# Patient Record
Sex: FEMALE | Race: WHITE | NOT HISPANIC OR LATINO | Employment: FULL TIME | URBAN - METROPOLITAN AREA
[De-identification: names, ages, dates, MRNs, and addresses within clinical notes are randomized per-mention and may not be internally consistent; named-entity substitution may affect disease eponyms.]

---

## 2017-01-01 ENCOUNTER — APPOINTMENT (OUTPATIENT)
Dept: ULTRASOUND IMAGING | Facility: CLINIC | Age: 62
DRG: 271 | End: 2017-01-01
Attending: INTERNAL MEDICINE
Payer: COMMERCIAL

## 2017-01-01 PROCEDURE — 93970 EXTREMITY STUDY: CPT

## 2017-01-02 ENCOUNTER — APPOINTMENT (OUTPATIENT)
Dept: CT IMAGING | Facility: CLINIC | Age: 62
DRG: 271 | End: 2017-01-02
Attending: PHYSICIAN ASSISTANT
Payer: COMMERCIAL

## 2017-01-02 ENCOUNTER — APPOINTMENT (OUTPATIENT)
Dept: GENERAL RADIOLOGY | Facility: CLINIC | Age: 62
DRG: 271 | End: 2017-01-02
Attending: INTERNAL MEDICINE
Payer: COMMERCIAL

## 2017-01-02 ENCOUNTER — DOCUMENTATION ONLY (OUTPATIENT)
Dept: PHARMACY | Facility: CLINIC | Age: 62
End: 2017-01-02

## 2017-01-02 ENCOUNTER — APPOINTMENT (OUTPATIENT)
Dept: ULTRASOUND IMAGING | Facility: CLINIC | Age: 62
DRG: 271 | End: 2017-01-02
Attending: INTERNAL MEDICINE
Payer: COMMERCIAL

## 2017-01-02 PROCEDURE — 93975 VASCULAR STUDY: CPT | Mod: TC

## 2017-01-02 PROCEDURE — 74177 CT ABD & PELVIS W/CONTRAST: CPT

## 2017-01-02 PROCEDURE — 74000 XR ABDOMEN 1 VW: CPT

## 2017-01-02 NOTE — PROGRESS NOTES
Prior Authorization Approval    Enoxaparin 150 mg/mL  Qty: 60 syringes  Day Supply: 30  Diagnosis: Portal vein thrombosis    Expected copay: 15.00  Effective Dates: 01/02/2017 - 01/02/2018    Insurance: [place]  Phone: [phone]  ID: [id number]  Case Number: [id number]  Submitted via: sterling Tucker  Pharmacy Liaison  Cell: [phone] Page: [phone]

## 2017-01-04 ENCOUNTER — HOSPITAL ENCOUNTER (INPATIENT)
Dept: VASCULAR ULTRASOUND | Facility: CLINIC | Age: 62
DRG: 271 | End: 2017-01-04
Attending: INTERNAL MEDICINE | Admitting: INTERNAL MEDICINE
Payer: COMMERCIAL

## 2017-01-04 ENCOUNTER — APPOINTMENT (OUTPATIENT)
Dept: PHYSICAL THERAPY | Facility: CLINIC | Age: 62
DRG: 271 | End: 2017-01-04
Attending: PHYSICIAN ASSISTANT
Payer: COMMERCIAL

## 2017-01-06 ENCOUNTER — APPOINTMENT (OUTPATIENT)
Dept: INTERVENTIONAL RADIOLOGY/VASCULAR | Facility: CLINIC | Age: 62
DRG: 271 | End: 2017-01-06
Attending: NURSE PRACTITIONER
Payer: COMMERCIAL

## 2017-01-06 ENCOUNTER — ANESTHESIA (OUTPATIENT)
Dept: SURGERY | Facility: CLINIC | Age: 62
DRG: 271 | End: 2017-01-06
Payer: COMMERCIAL

## 2017-01-06 ENCOUNTER — ANESTHESIA EVENT (OUTPATIENT)
Dept: SURGERY | Facility: CLINIC | Age: 62
DRG: 271 | End: 2017-01-06
Payer: COMMERCIAL

## 2017-01-06 ENCOUNTER — APPOINTMENT (OUTPATIENT)
Dept: GENERAL RADIOLOGY | Facility: CLINIC | Age: 62
DRG: 271 | End: 2017-01-06
Attending: INTERNAL MEDICINE
Payer: COMMERCIAL

## 2017-01-06 PROCEDURE — 25000125 ZZHC RX 250: Performed by: INTERNAL MEDICINE

## 2017-01-06 PROCEDURE — 35476: CPT

## 2017-01-06 PROCEDURE — 40000940 XR ABDOMEN PORT F1 VW

## 2017-01-06 PROCEDURE — 25000125 ZZHC RX 250: Performed by: NURSE ANESTHETIST, CERTIFIED REGISTERED

## 2017-01-06 PROCEDURE — 36011 PLACE CATHETER IN VEIN: CPT

## 2017-01-06 PROCEDURE — 37187 VENOUS MECH THROMBECTOMY: CPT

## 2017-01-06 PROCEDURE — 75891 VEIN X-RAY LIVER: CPT

## 2017-01-06 PROCEDURE — C1725 CATH, TRANSLUMIN NON-LASER: HCPCS

## 2017-01-06 PROCEDURE — C1757 CATH, THROMBECTOMY/EMBOLECT: HCPCS

## 2017-01-06 PROCEDURE — 37212 THROMBOLYTIC VENOUS THERAPY: CPT

## 2017-01-06 PROCEDURE — 25000128 H RX IP 250 OP 636: Performed by: NURSE PRACTITIONER

## 2017-01-06 PROCEDURE — 75978: CPT

## 2017-01-06 RX ORDER — NEOSTIGMINE METHYLSULFATE 1 MG/ML
VIAL (ML) INJECTION PRN
Status: DISCONTINUED | OUTPATIENT
Start: 2017-01-06 | End: 2017-01-06

## 2017-01-06 RX ORDER — LIDOCAINE HYDROCHLORIDE 20 MG/ML
INJECTION, SOLUTION INFILTRATION; PERINEURAL PRN
Status: DISCONTINUED | OUTPATIENT
Start: 2017-01-06 | End: 2017-01-06

## 2017-01-06 RX ORDER — GLYCOPYRROLATE 0.2 MG/ML
INJECTION, SOLUTION INTRAMUSCULAR; INTRAVENOUS PRN
Status: DISCONTINUED | OUTPATIENT
Start: 2017-01-06 | End: 2017-01-06

## 2017-01-06 RX ORDER — PROPOFOL 10 MG/ML
INJECTION, EMULSION INTRAVENOUS CONTINUOUS PRN
Status: DISCONTINUED | OUTPATIENT
Start: 2017-01-06 | End: 2017-01-06

## 2017-01-06 RX ORDER — FENTANYL CITRATE 50 UG/ML
INJECTION, SOLUTION INTRAMUSCULAR; INTRAVENOUS PRN
Status: DISCONTINUED | OUTPATIENT
Start: 2017-01-06 | End: 2017-01-06

## 2017-01-06 RX ORDER — HEPARIN SODIUM 1000 [USP'U]/ML
INJECTION, SOLUTION INTRAVENOUS; SUBCUTANEOUS PRN
Status: DISCONTINUED | OUTPATIENT
Start: 2017-01-06 | End: 2017-01-06

## 2017-01-06 RX ORDER — PROPOFOL 10 MG/ML
INJECTION, EMULSION INTRAVENOUS PRN
Status: DISCONTINUED | OUTPATIENT
Start: 2017-01-06 | End: 2017-01-06

## 2017-01-06 RX ADMIN — SODIUM CHLORIDE: 9 INJECTION, SOLUTION INTRAVENOUS at 11:05

## 2017-01-06 RX ADMIN — Medication 2000 UNITS: at 14:07

## 2017-01-06 RX ADMIN — PROPOFOL 150 MG: 10 INJECTION, EMULSION INTRAVENOUS at 11:20

## 2017-01-06 RX ADMIN — PIPERACILLIN AND TAZOBACTAM 3.38 G: 3; .375 INJECTION, POWDER, FOR SOLUTION INTRAVENOUS at 15:00

## 2017-01-06 RX ADMIN — Medication 4000 UNITS: at 14:49

## 2017-01-06 RX ADMIN — Medication 3 MG: at 15:38

## 2017-01-06 RX ADMIN — ROCURONIUM BROMIDE 50 MG: 10 INJECTION INTRAVENOUS at 11:20

## 2017-01-06 RX ADMIN — PROPOFOL 50 MCG/KG/MIN: 10 INJECTION, EMULSION INTRAVENOUS at 16:22

## 2017-01-06 RX ADMIN — GLYCOPYRROLATE 0.6 MG: 0.2 INJECTION, SOLUTION INTRAMUSCULAR; INTRAVENOUS at 15:38

## 2017-01-06 RX ADMIN — Medication 3000 UNITS: at 14:16

## 2017-01-06 RX ADMIN — FENTANYL CITRATE 100 MCG: 50 INJECTION, SOLUTION INTRAMUSCULAR; INTRAVENOUS at 11:20

## 2017-01-06 RX ADMIN — Medication 5000 UNITS: at 13:07

## 2017-01-06 RX ADMIN — FENTANYL CITRATE 50 MCG: 50 INJECTION, SOLUTION INTRAMUSCULAR; INTRAVENOUS at 12:58

## 2017-01-06 RX ADMIN — LIDOCAINE HYDROCHLORIDE 100 MG: 20 INJECTION, SOLUTION INFILTRATION; PERINEURAL at 11:20

## 2017-01-06 RX ADMIN — ONDANSETRON 4 MG: 2 INJECTION INTRAMUSCULAR; INTRAVENOUS at 15:33

## 2017-01-06 RX ADMIN — FENTANYL CITRATE 100 MCG: 50 INJECTION, SOLUTION INTRAMUSCULAR; INTRAVENOUS at 16:30

## 2017-01-06 NOTE — ANESTHESIA PREPROCEDURE EVALUATION
Anesthesia Evaluation     . Pt has had prior anesthetic. Type: General    No history of anesthetic complications     ROS/MED HX    ENT/Pulmonary:     (+)KALEB risk factors obese, Intermittent asthma Treatment: Inhaler prn,  , recent URI resolved Received Zithromax 5 day course, starting 11/11: . .    Neurologic:     (+)neuropathy - Numbness in bilateral feet associated with Rituxan therapy, migraines,     Cardiovascular:  - neg cardiovascular ROS      (-) taking anticoagulants/antiplatelets   METS/Exercise Tolerance:  4 - Raking leaves, gardening   Hematologic:     (+) Other Hematologic Disorder-Idiopathic thrombocytopenic purpura     (-) History of Transfusion   Musculoskeletal:  - neg musculoskeletal ROS       GI/Hepatic: Comment: Hepatitis B core antibody positive, indicating that she has been exposed, but cleared the virus.  However, due to the use of Rituxan, treatment for Hepatitis B was initiated.    (+) GERD Asymptomatic on medication, hepatitis type B, Other GI/Hepatic Borderline splenomegaly      Renal/Genitourinary:  - ROS Renal section negative       Endo:     (+) Chronic steroid usage for Other Date most recently used: Prednisone 5 mg PO BID ,Obesity, .      Psychiatric:  - neg psychiatric ROS       Infectious Disease:  - neg infectious disease ROS      (-) Recent Fever   Malignancy:      - no malignancy   Other:    - neg other ROS         ANESTHESIA PREOP EVALUATION    HPI: Charlee Marks is a 61 year old female who presents for Procedure(s):  Anesthesia Out of OR  IR Rm# 3 Portal Vein Thrombectomy       No personal or family h/o anesthesia problems.    PMHx/PSHx/ROS:  Past Medical History   Diagnosis Date     Other specified gastritis without mention of hemorrhage      Splenomegaly      Obesity      GERD (gastroesophageal reflux disease)      Idiopathic thrombocytopenic purpura (H)      Asthma        Past Surgical History   Procedure Laterality Date     Surgical history of -   1976     Breast Cyst  Excision      Surgical history of -   8/1997     Open Bladder Neck Suspension for stress urinary incontinence     C ligate fallopian tube       Surgical history of -   Age 6     Tonsillectomy     Surgical history of -   10/83 & 2/84     Oral Surgery [place]      Colonoscopy  10/19/2007     Laparoscopic splenectomy N/A 12/13/2016     Procedure: LAPAROSCOPIC SPLENECTOMY;  Surgeon: Catalina Brian MD;  Location: [facility] OR     Picc insertion Right 1/4/2017     5fr DL BioFlo PICC, 44cm (3cm external) in the R cephalic vein w/ tip in the low SVC.         Soc Hx:   Social History   Substance Use Topics     Smoking status: Never Smoker      Smokeless tobacco: Never Used     Alcohol Use: 0.0 oz/week     0 Standard drinks or equivalent per week      Comment: 2-3/week, none since Summer 2016       Allergies:   Allergies   Allergen Reactions     Adhesive Tape Other (See Comments)     reaction on skin     Sulfa Drugs Nausea       Meds:     No current facility-administered medications on file prior to encounter.  Current Outpatient Prescriptions on File Prior to Encounter:  predniSONE (DELTASONE) 5 MG tablet 5 mg po q q   Pyridoxine HCl (B-6) 100 MG TABS Take 1 tablet by mouth daily   tenofovir (VIREAD) 300 MG tablet Take 1 tablet (300 mg) by mouth daily   Acetaminophen (TYLENOL PO) Take 1,000 mg by mouth every 4 hours as needed for mild pain or fever (Headaches)   albuterol (PROAIR HFA, PROVENTIL HFA, VENTOLIN HFA) 108 (90 BASE) MCG/ACT inhaler Inhale 2 puffs into the lungs every 4 hours as needed for shortness of breath / dyspnea   LORATADINE 10 MG PO TABS ONE DAILY   oxyCODONE (ROXICODONE) 5 MG IR tablet Take 1-2 tablets (5-10 mg) by mouth every 3 hours as needed for moderate to severe pain   calcium carbonate-vitamin D 500-400 MG-UNIT TABS tablt Take 1 tablet by mouth 2 times daily   RANITIDINE  MG PO TABS ONE TABLET TWICE DAILY as needed will call when needed (Patient taking differently: ONE TABLET prn)   GLUCOSAMINE  CHONDRO COMPLEX OR 1 tablet by mouth daily   OMEGA 3 1000 MG OR CAPS 1 capsule by mouth daily   MULTIVITAMIN TABS   OR 1 TABLET DAILY       NPO Status: see flowsheet     Labs:    Blood Bank:  [facility]   Pending   1/6/2017  [facility]    Pending   1/6/2017  AS    Pending   1/6/2017    BMP:  Recent Labs   Lab Test  01/06/17 0813   NA  145*   POTASSIUM  3.6   CHLORIDE  109   CO2  29   BUN  12   CR  0.51*   GLC  103*   JOSÉ MIGUEL  7.9*     CBC:   Recent Labs   Lab Test  01/06/17 0813   WBC  9.7   RBC  2.79*   HGB  7.5*   HCT  25.7*   MCV  92   MCH  26.9   MCHC  29.2*   RDW  15.6*   PLT  588*     Coags:  Recent Labs   Lab Test  01/06/17   0013   12/31/16   1034   INR  1.11   < >  1.17*   PTT   --    --   32   FIBR   --    --   707*    < > = values in this interval not displayed.         Physical Exam  Normal systems: dental    Airway   Mallampati: II  TM distance: >3 FB  Neck ROM: full    Dental     Cardiovascular   Rhythm and rate: regular and normal  (-) carotid bruit is not present and no peripheral edema    Pulmonary    breath sounds clear to auscultation                 PAC Discussion and Assessment    ASA Classification:   Case is suitable for:   Anesthetic techniques and relevant risks discussed:   Invasive monitoring and risk discussed:   Types:   Possibility and Risk of blood transfusion discussed:   NPO instructions given:   Additional anesthetic preparation and risks discussed:   Needs early admission to pre-op area:   Other:     PAC Resident/NP Anesthesia Assessment:          Mid-Level Provider/Resident:   Date:   Time:     Attending Anesthesiologist Anesthesia Assessment:        Anesthesiologist:   Date:   Time:   Pass/Fail:   Disposition:     PAC Pharmacist Assessment:        Pharmacist:   Date:   Time:      Anesthesia Plan      History & Physical Review  History and physical reviewed and following examination; no interval change.    ASA Status:  3 .        Plan for General with Intravenous induction. Maintenance will be  Inhalation.    PONV prophylaxis:  Ondansetron  Additional equipment: 2nd IV and Arterial Line      Postoperative Care  Postoperative pain management:  IV analgesics.      Consents            -- ITP (type and screen pending). Has gotten plt transfusions with previous surgery   -- Daily steroid use - consider stress dose   -- GERD - moderate control on Zantac           Raul Bryant MD  Staff Anesthesiologist                       .

## 2017-01-06 NOTE — ANESTHESIA CARE TRANSFER NOTE
Patient: Charlee Marks    ANESTHESIA OUT OF OR (N/A Update)  Additional InformationProcedure(s):  Anesthesia Out of OR  IR Rm# 3 Portal Vein Thrombectomy     Diagnosis: Portal Vein Thrombosis  Diagnosis Additional Information: No value filed.    Anesthesia Type:   General     Note:  Airway :ETT (o2/ambu assist)  Patient transferred to:ICU  Comments: To icu monitored and propofol for sedation.  Placed on ventilator simv rr 12 fio2 40% tv 500  Vss, report given to rn      Vitals: (Last set prior to Anesthesia Care Transfer)              Electronically Signed By: ANDRA Phelps CRNA  January 6, 2017  4:41 PM

## 2017-01-06 NOTE — ANESTHESIA POSTPROCEDURE EVALUATION
Patient: Charlee Marks    ANESTHESIA OUT OF OR (N/A Update)  Additional InformationProcedure(s):  Anesthesia Out of OR  IR Rm# 3 Portal Vein Thrombectomy     Diagnosis:Portal Vein Thrombosis  Diagnosis Additional Information: No value filed.    Anesthesia Type:  General, ETT    Note:  Anesthesia Post Evaluation    Patient location during evaluation: ICU  Patient participation: Unable to evaluate secondary to administered sedation  Level of consciousness: obtunded/minimal responses  Pain management: adequate  Airway patency: patent  Cardiovascular status: acceptable  Respiratory status: acceptable  Hydration status: acceptable  PONV: none     Anesthetic complications: None    Comments: To 4A ICU. Intubated, Ventilated, Sedated with Propofol gtt. Monitored. No anesthetic complications.        Last vitals:  Filed Vitals:    01/05/17 1532 01/05/17 2243 01/06/17 0644   BP: 139/69 127/56 129/63   Pulse: 74 76 76   Temp: 37.1  C (98.7  F) 36.8  C (98.2  F) 36.7  C (98  F)   Resp: 18 18 18   SpO2: 94% 94% 95%       Electronically Signed By: Herberth Fraser MD  January 6, 2017  5:12 PM

## 2017-01-07 ENCOUNTER — APPOINTMENT (OUTPATIENT)
Dept: INTERVENTIONAL RADIOLOGY/VASCULAR | Facility: CLINIC | Age: 62
DRG: 271 | End: 2017-01-07
Attending: INTERNAL MEDICINE
Payer: COMMERCIAL

## 2017-01-07 PROCEDURE — 37188 VEN MECHNL THRMBC REPEAT TX: CPT

## 2017-01-07 PROCEDURE — 37187 VENOUS MECH THROMBECTOMY: CPT

## 2017-01-07 PROCEDURE — C1725 CATH, TRANSLUMIN NON-LASER: HCPCS

## 2017-01-07 PROCEDURE — 37213 THROMBLYTIC ART/VEN THERAPY: CPT

## 2017-01-07 PROCEDURE — C1887 CATHETER, GUIDING: HCPCS

## 2017-01-08 ENCOUNTER — APPOINTMENT (OUTPATIENT)
Dept: INTERVENTIONAL RADIOLOGY/VASCULAR | Facility: CLINIC | Age: 62
DRG: 271 | End: 2017-01-08
Attending: INTERNAL MEDICINE
Payer: COMMERCIAL

## 2017-01-08 ENCOUNTER — APPOINTMENT (OUTPATIENT)
Dept: PHYSICAL THERAPY | Facility: CLINIC | Age: 62
DRG: 271 | End: 2017-01-08
Attending: INTERNAL MEDICINE
Payer: COMMERCIAL

## 2017-01-08 PROCEDURE — 27810156 ZZH COIL/EMBOLIC DEVICE CR25

## 2017-01-08 PROCEDURE — 37214 CESSJ THERAPY CATH REMOVAL: CPT

## 2017-01-09 ENCOUNTER — APPOINTMENT (OUTPATIENT)
Dept: CT IMAGING | Facility: CLINIC | Age: 62
DRG: 271 | End: 2017-01-09
Attending: INTERNAL MEDICINE
Payer: COMMERCIAL

## 2017-01-09 PROCEDURE — 74177 CT ABD & PELVIS W/CONTRAST: CPT

## 2017-01-09 PROCEDURE — 71260 CT THORAX DX C+: CPT

## 2017-01-10 ENCOUNTER — APPOINTMENT (OUTPATIENT)
Dept: PHYSICAL THERAPY | Facility: CLINIC | Age: 62
DRG: 271 | End: 2017-01-10
Attending: INTERNAL MEDICINE
Payer: COMMERCIAL

## 2017-01-11 ENCOUNTER — APPOINTMENT (OUTPATIENT)
Dept: PHYSICAL THERAPY | Facility: CLINIC | Age: 62
DRG: 271 | End: 2017-01-11
Attending: INTERNAL MEDICINE
Payer: COMMERCIAL

## 2017-01-12 ENCOUNTER — APPOINTMENT (OUTPATIENT)
Dept: PHYSICAL THERAPY | Facility: CLINIC | Age: 62
DRG: 271 | End: 2017-01-12
Attending: INTERNAL MEDICINE
Payer: COMMERCIAL

## 2017-01-13 ENCOUNTER — APPOINTMENT (OUTPATIENT)
Dept: PHYSICAL THERAPY | Facility: CLINIC | Age: 62
DRG: 271 | End: 2017-01-13
Attending: INTERNAL MEDICINE
Payer: COMMERCIAL

## 2017-01-14 ENCOUNTER — TELEPHONE (OUTPATIENT)
Dept: NURSING | Facility: CLINIC | Age: 62
End: 2017-01-14

## 2017-01-14 NOTE — TELEPHONE ENCOUNTER
Call Type: Triage Call    Presenting Problem: Care coordinator from [facility] calling to  see if [facility] lab can draw blood off a PICC line.  Advised that I do not know, and recommend she call clinic to see if  they have a nurse who could access a line at the [facility].  She states she will have her follow up at the [facility].  Noted she  could call clinic Monday to see if they can do this.  Triage Note:  Guideline Title: Information Only Call; No Symptom Triage (Adult)  Recommended Disposition: Call Provider When Office is Open  Original Inclination: Wanted to speak with a nurse  Override Disposition:  Intended Action: Follow advice given  Physician Contacted: No  Requesting information and provider is best resource; no triage required. ?  YES  Requesting regular office appointment ? NO  Sign(s) or symptom(s) associated with a diagnosed condition or with a new illness  ? NO  Requesting information about provider, services or community resources ? NO  Call back to complete assessment/clarification of information from prior caller to  complete triage ? NO  Physician Instructions:  Care Advice:

## 2017-01-15 NOTE — ANESTHESIA PROCEDURE NOTES
Arterial Line Procedure Note  Staff:     Anesthesiologist:  KIERSTEN PARIS  Location: In OR After Induction  Procedure Start/Stop Times:     patient identified, IV checked, risks and benefits discussed, informed consent, monitors and equipment checked, pre-op evaluation and at physician/surgeon's request      Correct Patient: Yes      Correct Position: Yes      Correct Site: Yes      Correct Procedure: Yes    Line Placement:     Procedure:  Arterial Line    Insertion Site:  Radial    Insertion laterality:  Left    Skin Prep: Chloraprep      Patient Prep: mask, sterile gloves, hat and hand hygiene      Local skin infiltration:  None    Ultrasound Guided?: Yes      Artery evaluated via ultrasound confirming patency.   Using realtime imaging, the artery was punctured and the needle was observed entering the artery.      A permanent image is entered into patient's chart.      Catheter size:  20 gauge, Quick cath    Dressing:  Tegaderm    Complications:  None obvious    Arterial waveform: Yes      IBP within 10% of NIBP: Yes

## 2017-01-16 ENCOUNTER — ANTICOAGULATION THERAPY VISIT (OUTPATIENT)
Dept: ANTICOAGULATION | Facility: CLINIC | Age: 62
End: 2017-01-16

## 2017-01-16 ENCOUNTER — HOSPITAL ENCOUNTER (OUTPATIENT)
Facility: CLINIC | Age: 62
Discharge: HOME | End: 2017-01-16
Attending: FAMILY MEDICINE | Admitting: FAMILY MEDICINE
Payer: COMMERCIAL

## 2017-01-16 ENCOUNTER — CARE COORDINATION (OUTPATIENT)
Dept: SURGERY | Facility: CLINIC | Age: 62
End: 2017-01-16

## 2017-01-16 ENCOUNTER — INFUSION THERAPY VISIT (OUTPATIENT)
Dept: INFUSION THERAPY | Facility: CLINIC | Age: 62
End: 2017-01-16
Attending: INTERNAL MEDICINE
Payer: COMMERCIAL

## 2017-01-16 DIAGNOSIS — Z79.01 LONG-TERM (CURRENT) USE OF ANTICOAGULANTS: Primary | ICD-10-CM

## 2017-01-16 DIAGNOSIS — Z79.01 LONG-TERM (CURRENT) USE OF ANTICOAGULANTS: ICD-10-CM

## 2017-01-16 DIAGNOSIS — I81 PORTAL VEIN THROMBOSIS: ICD-10-CM

## 2017-01-16 LAB — INR PPP: 2.25 (ref 0.86–1.14)

## 2017-01-16 PROCEDURE — 85610 PROTHROMBIN TIME: CPT | Performed by: FAMILY MEDICINE

## 2017-01-16 PROCEDURE — 36592 COLLECT BLOOD FROM PICC: CPT

## 2017-01-16 NOTE — PROGRESS NOTES
ANTICOAGULATION FOLLOW-UP CLINIC VISIT    Patient Name:  Charlee Marks  Date:  1/16/2017  Contact Type:  Telephone    SUBJECTIVE:        OBJECTIVE    INR   Date Value Ref Range Status   01/16/2017 2.25* 0.86 - 1.14 Final       ASSESSMENT / PLAN  INR assessment THER    Recheck INR In: 2 DAYS    INR Location Clinic      Anticoagulation Summary as of 1/16/2017     INR goal 2.0-3.0   Selected INR 2.25 (1/16/2017)   Maintenance plan No maintenance plan   Full instructions 1/16: 5 mg; 1/17: 7.5 mg; Otherwise No maintenance plan   Next INR check 1/18/2017   Priority INR   Target end date     Indications   Long-term (current) use of anticoagulants [Z79.01] [Z79.01]  Portal vein thrombosis [I81]         Anticoagulation Episode Summary     INR check location     Preferred lab     Send INR reminders to [facility]    Comments 1/16/17 portal vein, splenic vein, and superior mesenteric vein thromboses. May transition to [facility] in future if PICC is DC'ed.       Anticoagulation Care Providers     Provider Role Specialty Phone number    Yasmin Mckenna MD [facility] [phone]            See the Encounter Report to view Anticoagulation Flowsheet and Dosing Calendar (Go to Encounters tab in chart review, and find the Anticoagulation Therapy Visit)    LM for patient.    Renetta Hair RN

## 2017-01-16 NOTE — PROGRESS NOTES
Hospitalized 12/31 to 1/14. Had previously undergone splenectomy for ITP on 12/13/16, then rehospitalized 12/31 and diagnosed with superior mesenteric vein, portal vein, and splenic vein thromboses. Underwent thrombectomy and tPA thombolysis. On Lovenox 135 mg Q 12 hours, to stop when INR therapeutic. Discharge instructions state INR to be done every other day, starting 1/16/17.     Spoke with Charlee today and introduced our clinic, provided warfarin education, and answered questions. Education materials, welcome letter, and HIPAA form sent today. She will be using the [place] lab and plans to have blood drawn via her PICC line. Reports she has bad veins; did let her know that in the future, she could use the [place] [facility] for finger stick INR if her PICC is DC'ed or if she finds that to be preferable.     Notes that she is off her multivitamin, but may resume after she speaks with her PCP. She understands that this will affect her warfarin and will let us know.

## 2017-01-16 NOTE — PROGRESS NOTES
Infusion Nursing Note:  Charlee Marks presents today for PICC lab draw and dressing change.    Patient seen by provider today: No    Note: N/A.    Intravenous Access:  PICC.    Treatment Conditions:N/A  INR drawn.      Post Infusion Assessment:  PICC dressing changed per protocol.    Discharge Plan:   Patient discharged in stable condition accompanied by: .  Departure Mode: Ambulatory.    Pilar Modi RN

## 2017-01-16 NOTE — PROGRESS NOTES
RN Post Op Care Coordination Note    Ms. Charlee Marks is a 61 year old female who underwent splenectomy with readmission for thrombosis  Spoke with Patient.    Support  Patient able to care for self independently     Health Status  Fevers/chills: Patient denies any fever or chills.  Nausea/Vomiting: Patient denies nausea/vomiting.  Eating/drinking: Patient is able to eat and drink without any complaints.  Bowel habits: Patient reports having a normal bowel movement.  Urinary: Voiding normally  Drains (NATALIE): N/A  Incisions: Patient denies any signs and symptoms of infection.  Pain: Minimal and well controlled  Anticoagulation:  Lovenox SQ BID and po Coumadin. INR arranged as well as PICC care.  INR Clinic following per patient.  INR being drawn today.  Appointment arranged with PCP 1/19.    Activity/Restrictions  Following restrictions outlined by surgeon.  Balancing rest with activity.    Equipment  other None    All of her questions were answered including reviewing restrictions.  She will call this office if she has any further questions and/or concerns.      Follow up appointment arranged with Dr. Brian 2/3/17 at 1100.    Whom and When to Call  Patient acknowledges understanding of how to manage any medication changes and when to seek medical care.     Patient advised that if after hour medical concerns arise to please call [phone] and choose option 4 to speak to the physician on call.

## 2017-01-18 ENCOUNTER — ANTICOAGULATION THERAPY VISIT (OUTPATIENT)
Dept: ANTICOAGULATION | Facility: CLINIC | Age: 62
End: 2017-01-18

## 2017-01-18 ENCOUNTER — HOSPITAL ENCOUNTER (OUTPATIENT)
Facility: CLINIC | Age: 62
Discharge: HOME | End: 2017-01-18
Attending: FAMILY MEDICINE | Admitting: FAMILY MEDICINE
Payer: COMMERCIAL

## 2017-01-18 ENCOUNTER — INFUSION THERAPY VISIT (OUTPATIENT)
Dept: INFUSION THERAPY | Facility: CLINIC | Age: 62
End: 2017-01-18
Attending: FAMILY MEDICINE
Payer: COMMERCIAL

## 2017-01-18 VITALS — TEMPERATURE: 98.4 F | DIASTOLIC BLOOD PRESSURE: 70 MMHG | SYSTOLIC BLOOD PRESSURE: 120 MMHG

## 2017-01-18 DIAGNOSIS — Z79.01 LONG-TERM (CURRENT) USE OF ANTICOAGULANTS: ICD-10-CM

## 2017-01-18 DIAGNOSIS — I81 PORTAL VEIN THROMBOSIS: ICD-10-CM

## 2017-01-18 DIAGNOSIS — Z79.01 LONG-TERM (CURRENT) USE OF ANTICOAGULANTS: Primary | ICD-10-CM

## 2017-01-18 LAB — INR PPP: 2.34 (ref 0.86–1.14)

## 2017-01-18 PROCEDURE — 85610 PROTHROMBIN TIME: CPT | Performed by: FAMILY MEDICINE

## 2017-01-18 PROCEDURE — 36592 COLLECT BLOOD FROM PICC: CPT

## 2017-01-18 PROCEDURE — 25000125 ZZHC RX 250: Performed by: FAMILY MEDICINE

## 2017-01-18 RX ORDER — HEPARIN SODIUM,PORCINE 10 UNIT/ML
5-10 VIAL (ML) INTRAVENOUS EVERY 24 HOURS
Status: DISCONTINUED | OUTPATIENT
Start: 2017-01-18 | End: 2017-01-18 | Stop reason: HOSPADM

## 2017-01-18 RX ADMIN — SODIUM CHLORIDE, PRESERVATIVE FREE 5 ML: 5 INJECTION INTRAVENOUS at 14:45

## 2017-01-18 NOTE — PROGRESS NOTES
ANTICOAGULATION FOLLOW-UP CLINIC VISIT    Patient Name:  Charlee Marks  Date:  1/18/2017  Contact Type:  Telephone    SUBJECTIVE:        OBJECTIVE    INR   Date Value Ref Range Status   01/18/2017 2.34* 0.86 - 1.14 Final       ASSESSMENT / PLAN  INR assessment THER    Recheck INR In: 2 DAYS    INR Location Clinic      Anticoagulation Summary as of 1/18/2017     INR goal 2.0-3.0   Selected INR 2.34 (1/18/2017)   Maintenance plan No maintenance plan   Full instructions 1/18: 7.5 mg; 1/19: 7.5 mg; Otherwise No maintenance plan   Next INR check 1/20/2017   Priority INR   Target end date     Indications   Long-term (current) use of anticoagulants [Z79.01] [Z79.01]  Portal vein thrombosis [I81]         Anticoagulation Episode Summary     INR check location     Preferred lab     Send INR reminders to  [facility]    Comments 1/16/17 portal vein, splenic vein, and superior mesenteric vein thromboses. [facility] is DC[facility]ed.       Anticoagulation Care Providers     Provider Role Specialty Phone number    Yasmin Mckenna MD [facility] [phone]            See the Encounter Report to view Anticoagulation Flowsheet and Dosing Calendar (Go to Encounters tab in chart review, and find the Anticoagulation Therapy Visit)    Left message with results and dosing recommendations. Asked patient to call back to report any missed doses, falls, signs and symptoms of bleeding or clotting, or any changes to health or diet.     Gracie Parada RN

## 2017-01-19 ENCOUNTER — OFFICE VISIT (OUTPATIENT)
Dept: FAMILY MEDICINE | Facility: CLINIC | Age: 62
End: 2017-01-19
Payer: COMMERCIAL

## 2017-01-19 VITALS
SYSTOLIC BLOOD PRESSURE: 127 MMHG | OXYGEN SATURATION: 96 % | HEIGHT: 67 IN | DIASTOLIC BLOOD PRESSURE: 77 MMHG | HEART RATE: 74 BPM | WEIGHT: 293 LBS | BODY MASS INDEX: 45.99 KG/M2 | TEMPERATURE: 98.9 F

## 2017-01-19 DIAGNOSIS — D69.3 IDIOPATHIC THROMBOCYTOPENIC PURPURA (H): ICD-10-CM

## 2017-01-19 DIAGNOSIS — Z23 ENCOUNTER FOR IMMUNIZATION: ICD-10-CM

## 2017-01-19 DIAGNOSIS — Z90.81 S/P SPLENECTOMY: Primary | ICD-10-CM

## 2017-01-19 PROCEDURE — 99213 OFFICE O/P EST LOW 20 MIN: CPT | Mod: 25 | Performed by: FAMILY MEDICINE

## 2017-01-19 PROCEDURE — 90472 IMMUNIZATION ADMIN EACH ADD: CPT | Performed by: FAMILY MEDICINE

## 2017-01-19 PROCEDURE — 90471 IMMUNIZATION ADMIN: CPT | Performed by: FAMILY MEDICINE

## 2017-01-19 PROCEDURE — 90734 MENACWYD/MENACWYCRM VACC IM: CPT | Performed by: FAMILY MEDICINE

## 2017-01-19 PROCEDURE — 90732 PPSV23 VACC 2 YRS+ SUBQ/IM: CPT | Performed by: FAMILY MEDICINE

## 2017-01-19 NOTE — PROGRESS NOTES
SUBJECTIVE:                                                    Charlee Marks is a 61 year old female who presents to clinic today for the following health issues:    Hospital Follow-up Visit:  Hospital/Nursing Home/IP Rehab Facility: [facility]  Date of Admission: 12/31/16  Date of Discharge: 1/14/17  Reason(s) for Admission:  Portal vein thrombosis  I81       S/P splenectomy  Z90.81      Idiopathic thrombocytopenic purpura (H)                Problems taking medications regularly:  None       Medication changes since discharge: now on warfarin       Problems adhering to non-medication therapy:  None  Summary of hospitalization:  [facility] discharge summary reviewed  Diagnostic Tests/Treatments reviewed.  Follow up needed: She is being followed by the [facility] Coumadin clinic and having her INRs drawn here.  She has a follow-up appointment with her surgeon and is still being followed by Dr. Rankin.  Other Healthcare Providers Involved in Patient s Care:         Specialist appointment - surgeon, hematologist, ongoing INR monitoring  Update since discharge: improved.     Post Discharge Medication Reconciliation: discharge medications reconciled, continue medications without change.  Plan of care communicated with patient     Coding guidelines for this visit:  Type of Medical   Decision Making Face-to-Face Visit       within 7 Days of discharge Face-to-Face Visit        within 14 days of discharge   Moderate Complexity [id number]   High Complexity [id number]            Charlee Marks is a 61 year old female who was diagnosed with ITP this fall, but did not respond adequately to treatment, so had pre-op immunizations on 11-7-16 and underwent splenectomy on 12/13/2016 at the [facility]. She was discharged on 12/20, but re-admitted on 12/31/16 with abdominal pain which imaging showed to be due to portal vein and superior mesenteric vein thrombosis. She underwent thrombectomy, thrombolysis, was  placed on heparin, then bridged with Lovenox until warfarin could reach therapeutic range. Her INR on 1/16 was 2.25, so she had discontinued the Lovenox.  Her last CBC on 1/14 showed platelets at 377, and a normocytic anemia (likely post-surgical) with a hemoglobin of 7.9.  She is due to have that rechecked at the end of this week and to see Dr. Rankin early next week. She is still on low dose prednisone for the ITP, and is anticipating being weaned off of that.    ASSESSMENT: s/p splenectomy for idiopathic thrombocytopenic purpura      Chronic anticoagulation for history of post-operative portal vein thrombosis and superior mesenteric vein thrombosis       Encounter for immunizations (Menactra #2, Pneumovax-23 #2)    PLAN: She will continue follow-up with hematology and surgery post-op follow-up.   According to current guidelines, she is now due for a second Pneumovax-23 (at least two months since her Prevnar-13 and >5 years since her previous Pneumovax-23) and a second Menactra.  Her Bexsero does not need to be repeated, but she was informed that she should have a Menactra booster every 5 years. This information was also added to her problem list.   She will be due for a dT booster this summer.    Yasmin Mckenna md

## 2017-01-19 NOTE — MR AVS SNAPSHOT
After Visit Summary   1/19/2017    Charlee Marks    MRN: [id number]           Patient Information     Date Of Birth          1955        Visit Information        Provider Department      1/19/2017 2:20 PM Yasmin Mckenna MD [facility]        Today's Diagnoses     S/P splenectomy    -  1     ITP (idiopathic thrombocytopenic purpura)            Follow-ups after your visit        Your next 10 appointments already scheduled     Jan 20, 2017  2:30 PM   Level O with ROOM 3 [facility] ([facility])    [facility]  [address]  [place] [address]   [phone]            Jan 23, 2017  2:30 PM   Level O with ROOM 10 [facility] ([facility])    [facility]  [address]  [place] [address]   [phone]            Jan 25, 2017  2:30 PM   Level O with ROOM 3 [facility] ([facility])    [facility]  [address]  [place] [address]   [phone]            Jan 26, 2017  1:15 PM   Return Visit with Kerrie Rankin MD   [facility] ([facility])    [facility]  [address]  [place] [address]   [phone]            Jan 27, 2017  2:30 PM   Level O with ROOM 4 [facility] ([facility])    [facility]  [address]  [facility] [address]   [phone]            Jan 30, 2017  2:30 PM   Level O with ROOM 10 [facility] ([facility])    [facility]  [address]  [place] [address]   [phone]            Feb 01, 2017  2:30 PM   Level O with ROOM 10 [facility] ([facility])    [facility]  [address]  [place] [address]   [phone]     "        Feb 03, 2017 11:00 AM   (Arrive by 10:45 AM)   Post-Op with Catalina Brian MD   [facility] ([facility])    [address]  4th Floor  [facility] [address] [phone]            Feb 03, 2017  2:30 PM   Level O with ROOM 4 [facility] ([facility])    [facility]  [address]  [facility] [address] [phone]              Who to contact     If you have questions or need follow up information about today's clinic visit or your schedule please contact [facility] directly at [phone].  Normal or non-critical lab and imaging results will be communicated to you by MyChart, letter or phone within 4 business days after the clinic has received the results. If you do not hear from us within 7 days, please contact the clinic through [employer] or phone. If you have a critical or abnormal lab result, we will notify you by phone as soon as possible.  Submit refill requests through [employer] or call your pharmacy and they will forward the refill request to us. Please allow 3 business days for your refill to be completed.          Additional Information About Your Visit        [employer] Information     [employer] gives you secure access to your electronic health record. If you see a primary care provider, you can also send messages to your care team and make appointments. If you have questions, please call your primary care clinic.  If you do not have a primary care provider, please call [phone] and they will assist you.        Care EveryWhere ID     This is your Care EveryWhere ID. This could be used by other organizations to access your [facility] medical records  [id number]        Your Vitals Were     Pulse Temperature Height BMI (Body Mass Index) Pulse Oximetry Last Period    74 98.9  F (37.2  C) (Tympanic) 5' 6.5\" (1.689 m) 46.72 kg/m2 96% 09/15/2007       Blood Pressure from " Last 3 Encounters:   01/19/17 127/77   01/18/17 120/70   01/14/17 115/72    Weight from Last 3 Encounters:   01/19/17 293 lb 12.8 oz (133.267 kg)   01/14/17 300 lb 9.6 oz (136.351 kg)   12/29/16 297 lb 11.2 oz (135.036 kg)              We Performed the Following     MENINGOCOCCAL VACCINE,IM (MENACTRA )     PNEUMOVOCCAL VACCINE 23 VALENT (PNEUMOVAX 23)          Today's Medication Changes          These changes are accurate as of: 1/19/17  3:04 PM.  If you have any questions, ask your nurse or doctor.               These medicines have changed or have updated prescriptions.        Dose/Directions    ranitidine 150 MG tablet   Commonly known as:  ZANTAC   This may have changed:  See the new instructions.   Used for:  Esophageal reflux        ONE TABLET TWICE DAILY as needed will call when needed   Quantity:  180 Tab   Refills:  3                Primary Care Provider Office Phone # Fax #    Yasmin Evelyn Mckenna [phone]       [facility] [address] [facility] [address]        Thank you!     Thank you for choosing [facility]  for your care. Our goal is always to provide you with excellent care. Hearing back from our patients is one way we can continue to improve our services. Please take a few minutes to complete the written survey that you may receive in the mail after your visit with us. Thank you!             Your Updated Medication List - Protect others around you: Learn how to safely use, store and throw away your medicines at www.disposemymeds.org.          This list is accurate as of: 1/19/17  3:04 PM.  Always use your most recent med list.                   Brand Name Dispense Instructions for use    albuterol 108 (90 BASE) MCG/ACT Inhaler    PROAIR HFA/PROVENTIL HFA/VENTOLIN HFA    1 Inhaler    Inhale 2 puffs into the lungs every 4 hours as needed for shortness of breath / dyspnea       B-6 100 MG Tabs      Take 1 tablet by mouth daily       calcium  carbonate-vitamin D 500-400 MG-UNIT Tabs per tablet      Take 1 tablet by mouth 2 times daily       GLUCOSAMINE CHONDRO COMPLEX OR      1 tablet by mouth daily       loratadine 10 MG tablet    CLARITIN    90 Tab    ONE DAILY       MULTIVITAMIN TABS   OR      1 TABLET DAILY       omega 3 1000 MG Caps      1 capsule by mouth daily       oxyCODONE 5 MG IR tablet    ROXICODONE    50 tablet    Take 1-2 tablets (5-10 mg) by mouth every 3 hours as needed for moderate to severe pain       predniSONE 5 MG tablet    DELTASONE    60 tablet    5 mg po q q       ranitidine 150 MG tablet    ZANTAC    180 Tab    ONE TABLET TWICE DAILY as needed will call when needed       tenofovir 300 MG tablet    VIREAD    30 tablet    Take 1 tablet (300 mg) by mouth daily       TYLENOL PO      Take 1,000 mg by mouth every 4 hours as needed for mild pain or fever (Headaches)       * warfarin 2.5 MG tablet    COUMADIN    30 tablet    Take 1 tablet (2.5 mg) by mouth daily       * warfarin 5 MG tablet    COUMADIN    30 tablet    Take 1 tablet (5 mg) by mouth daily       * warfarin 7.5 MG tablet    COUMADIN    2 tablet    Take 1 tablet (7.5 mg) by mouth daily for 2 days       * Notice:  This list has 3 medication(s) that are the same as other medications prescribed for you. Read the directions carefully, and ask your doctor or other care provider to review them with you.

## 2017-01-19 NOTE — NURSING NOTE
"Chief Complaint   Patient presents with     [facility] follow up 12/31/16 through 1/14/17       Initial /77 mmHg  Pulse 74  Temp(Src) 98.9  F (37.2  C) (Tympanic)  Ht 5' 6.5\" (1.689 m)  Wt 293 lb 12.8 oz (133.267 kg)  BMI 46.72 kg/m2  SpO2 96%  LMP 09/15/2007 Estimated body mass index is 46.72 kg/(m^2) as calculated from the following:    Height as of this encounter: 5' 6.5\" (1.689 m).    Weight as of this encounter: 293 lb 12.8 oz (133.267 kg).  BP completed using cuff size: pio Lemus CMA    "

## 2017-01-20 ENCOUNTER — INFUSION THERAPY VISIT (OUTPATIENT)
Dept: INFUSION THERAPY | Facility: CLINIC | Age: 62
End: 2017-01-20
Attending: FAMILY MEDICINE
Payer: COMMERCIAL

## 2017-01-20 ENCOUNTER — ANTICOAGULATION THERAPY VISIT (OUTPATIENT)
Dept: ANTICOAGULATION | Facility: CLINIC | Age: 62
End: 2017-01-20

## 2017-01-20 ENCOUNTER — HOSPITAL ENCOUNTER (OUTPATIENT)
Facility: CLINIC | Age: 62
Discharge: HOME | End: 2017-01-20
Attending: FAMILY MEDICINE | Admitting: INTERNAL MEDICINE
Payer: COMMERCIAL

## 2017-01-20 VITALS — DIASTOLIC BLOOD PRESSURE: 52 MMHG | TEMPERATURE: 97.8 F | SYSTOLIC BLOOD PRESSURE: 131 MMHG | HEART RATE: 73 BPM

## 2017-01-20 DIAGNOSIS — D69.3 IDIOPATHIC THROMBOCYTOPENIC PURPURA (H): ICD-10-CM

## 2017-01-20 DIAGNOSIS — I81 PORTAL VEIN THROMBOSIS: ICD-10-CM

## 2017-01-20 DIAGNOSIS — Z79.01 LONG-TERM (CURRENT) USE OF ANTICOAGULANTS: Primary | ICD-10-CM

## 2017-01-20 DIAGNOSIS — Z79.01 LONG-TERM (CURRENT) USE OF ANTICOAGULANTS: ICD-10-CM

## 2017-01-20 LAB
BASOPHILS # BLD AUTO: 0.1 10E9/L (ref 0–0.2)
BASOPHILS NFR BLD AUTO: 0.8 %
DIFFERENTIAL METHOD BLD: ABNORMAL
EOSINOPHIL # BLD AUTO: 0.2 10E9/L (ref 0–0.7)
EOSINOPHIL NFR BLD AUTO: 1.8 %
ERYTHROCYTE [DISTWIDTH] IN BLOOD BY AUTOMATED COUNT: 16.4 % (ref 10–15)
HCT VFR BLD AUTO: 33.4 % (ref 35–47)
HGB BLD-MCNC: 9.7 G/DL (ref 11.7–15.7)
IMM GRANULOCYTES # BLD: 0 10E9/L (ref 0–0.4)
IMM GRANULOCYTES NFR BLD: 0.2 %
INR PPP: 2.94 (ref 0.86–1.14)
LYMPHOCYTES # BLD AUTO: 1.9 10E9/L (ref 0.8–5.3)
LYMPHOCYTES NFR BLD AUTO: 20.9 %
MCH RBC QN AUTO: 25.9 PG (ref 26.5–33)
MCHC RBC AUTO-ENTMCNC: 29 G/DL (ref 31.5–36.5)
MCV RBC AUTO: 89 FL (ref 78–100)
MONOCYTES # BLD AUTO: 0.8 10E9/L (ref 0–1.3)
MONOCYTES NFR BLD AUTO: 9 %
NEUTROPHILS # BLD AUTO: 6 10E9/L (ref 1.6–8.3)
NEUTROPHILS NFR BLD AUTO: 67.3 %
PLATELET # BLD AUTO: 311 10E9/L (ref 150–450)
RBC # BLD AUTO: 3.74 10E12/L (ref 3.8–5.2)
WBC # BLD AUTO: 9 10E9/L (ref 4–11)

## 2017-01-20 PROCEDURE — 85610 PROTHROMBIN TIME: CPT | Performed by: INTERNAL MEDICINE

## 2017-01-20 PROCEDURE — 36592 COLLECT BLOOD FROM PICC: CPT

## 2017-01-20 PROCEDURE — 85025 COMPLETE CBC W/AUTO DIFF WBC: CPT | Performed by: INTERNAL MEDICINE

## 2017-01-20 NOTE — PROGRESS NOTES
Infusion Nursing Note:  Charlee Marks presents today for PICC labs.    Patient seen by provider today: No   present during visit today: Not Applicable.    Note: N/A.    Intravenous Access:  PICC.    Treatment Conditions:  Not Applicable.      Post Infusion Assessment:  Patient tolerated PICC labs and flush without incident.  Blood return noted pre and post flush to both lumens.     Discharge Plan:   Patient discharged in stable condition accompanied by: .  Departure Mode: Ambulatory.  Pt to return on 1/23/17 for labs and dressing change.     Kellie Paredes RN

## 2017-01-20 NOTE — PROGRESS NOTES
ANTICOAGULATION FOLLOW-UP CLINIC VISIT    Patient Name:  Charlee Marks  Date:  1/20/2017  Contact Type:  Telephone    SUBJECTIVE:        OBJECTIVE    INR   Date Value Ref Range Status   01/20/2017 2.94* 0.86 - 1.14 Final       ASSESSMENT / PLAN  INR assessment THER    Recheck INR In: 3 DAYS    INR Location Clinic      Anticoagulation Summary as of 1/20/2017     INR goal 2.0-3.0   Selected INR 2.94 (1/20/2017)   Maintenance plan No maintenance plan   Full instructions 1/20: 5 mg; 1/21: 7.5 mg; 1/22: 7.5 mg; Otherwise No maintenance plan   Next INR check 1/23/2017   Priority INR   Target end date     Indications   Long-term (current) use of anticoagulants [Z79.01] [Z79.01]  Portal vein thrombosis [I81]         Anticoagulation Episode Summary     INR check location     Preferred lab     Send INR reminders to [facility]    Comments 1/16/17 portal vein, splenic vein, and superior mesenteric vein thromboses. May transition to [facility] in future if PICC is DC'ed.       Anticoagulation Care Providers     Provider Role Specialty Phone number    Yasmin Mckenna MD [facility] [phone]            See the Encounter Report to view Anticoagulation Flowsheet and Dosing Calendar (Go to Encounters tab in chart review, and find the Anticoagulation Therapy Visit)    Spoke with Charlee. She had 2 vaccines yesterday and also had some diarrhea earlier this week for which she took imodium. This has resolved. She plans to restart her multivitamin this weekend and we discussed that this may lead to an increase in her warfarin dose.    Gracie Parada RN

## 2017-01-23 ENCOUNTER — INFUSION THERAPY VISIT (OUTPATIENT)
Dept: INFUSION THERAPY | Facility: CLINIC | Age: 62
End: 2017-01-23
Attending: FAMILY MEDICINE
Payer: COMMERCIAL

## 2017-01-23 ENCOUNTER — ANTICOAGULATION THERAPY VISIT (OUTPATIENT)
Dept: ANTICOAGULATION | Facility: CLINIC | Age: 62
End: 2017-01-23

## 2017-01-23 ENCOUNTER — HOSPITAL ENCOUNTER (OUTPATIENT)
Facility: CLINIC | Age: 62
Discharge: HOME | End: 2017-01-23
Attending: FAMILY MEDICINE | Admitting: FAMILY MEDICINE
Payer: COMMERCIAL

## 2017-01-23 DIAGNOSIS — Z79.01 LONG-TERM (CURRENT) USE OF ANTICOAGULANTS: Primary | ICD-10-CM

## 2017-01-23 DIAGNOSIS — I81 PORTAL VEIN THROMBOSIS: ICD-10-CM

## 2017-01-23 DIAGNOSIS — Z79.01 LONG-TERM (CURRENT) USE OF ANTICOAGULANTS: ICD-10-CM

## 2017-01-23 LAB — INR PPP: 2.53 (ref 0.86–1.14)

## 2017-01-23 PROCEDURE — 85610 PROTHROMBIN TIME: CPT | Performed by: FAMILY MEDICINE

## 2017-01-23 PROCEDURE — 36592 COLLECT BLOOD FROM PICC: CPT

## 2017-01-23 NOTE — PROGRESS NOTES
ANTICOAGULATION FOLLOW-UP CLINIC VISIT    Patient Name:  Charlee Marks  Date:  1/23/2017  Contact Type:  Telephone    SUBJECTIVE:     Patient Findings     Positives Herbal/Supplements    Comments Restarted her multivitamin this weekend.           OBJECTIVE    INR   Date Value Ref Range Status   01/23/2017 2.53* 0.86 - 1.14 Final       ASSESSMENT / PLAN  INR assessment THER    Recheck INR In: 2 DAYS    INR Location Clinic      Anticoagulation Summary as of 1/23/2017     INR goal 2.0-3.0   Selected INR 2.53 (1/23/2017)   Maintenance plan No maintenance plan   Full instructions 1/23: 7.5 mg; 1/24: 7.5 mg; Otherwise No maintenance plan   Next INR check 1/25/2017   Priority INR   Target end date     Indications   Long-term (current) use of anticoagulants [Z79.01] [Z79.01]  Portal vein thrombosis [I81]         Anticoagulation Episode Summary     INR check location     Preferred lab     Send INR reminders to [facility]    Comments 1/16/17 portal vein, splenic vein, and superior mesenteric vein thromboses. May transition to [facility] in future if PICC is DC'ed.       Anticoagulation Care Providers     Provider Role Specialty Phone number    Yasmin Mckenna MD [facility] [phone]            See the Encounter Report to view Anticoagulation Flowsheet and Dosing Calendar (Go to Encounters tab in chart review, and find the Anticoagulation Therapy Visit)    Spoke with Charlee. She is still having labs MWF. Explained that she could probably go to weekly soon.     Gracie Parada RN

## 2017-01-23 NOTE — PROGRESS NOTES
Labs drawn from PICC, both lines flushed and caps changed per protocol. PICC dressing changed. Pt will return on Wednesday for labs. Pilar Modi RN

## 2017-01-25 ENCOUNTER — ANTICOAGULATION THERAPY VISIT (OUTPATIENT)
Dept: ANTICOAGULATION | Facility: CLINIC | Age: 62
End: 2017-01-25

## 2017-01-25 ENCOUNTER — INFUSION THERAPY VISIT (OUTPATIENT)
Dept: INFUSION THERAPY | Facility: CLINIC | Age: 62
End: 2017-01-25
Attending: FAMILY MEDICINE
Payer: COMMERCIAL

## 2017-01-25 ENCOUNTER — HOSPITAL ENCOUNTER (OUTPATIENT)
Facility: CLINIC | Age: 62
Discharge: HOME | End: 2017-01-25
Attending: INTERNAL MEDICINE | Admitting: INTERNAL MEDICINE
Payer: COMMERCIAL

## 2017-01-25 DIAGNOSIS — Z79.01 LONG-TERM (CURRENT) USE OF ANTICOAGULANTS: ICD-10-CM

## 2017-01-25 DIAGNOSIS — I81 PORTAL VEIN THROMBOSIS: ICD-10-CM

## 2017-01-25 DIAGNOSIS — D69.3 IDIOPATHIC THROMBOCYTOPENIC PURPURA (H): ICD-10-CM

## 2017-01-25 DIAGNOSIS — Z79.01 LONG-TERM (CURRENT) USE OF ANTICOAGULANTS: Primary | ICD-10-CM

## 2017-01-25 LAB
BASOPHILS # BLD AUTO: 0.1 10E9/L (ref 0–0.2)
BASOPHILS NFR BLD AUTO: 0.8 %
DIFFERENTIAL METHOD BLD: ABNORMAL
EOSINOPHIL # BLD AUTO: 0.2 10E9/L (ref 0–0.7)
EOSINOPHIL NFR BLD AUTO: 1.7 %
ERYTHROCYTE [DISTWIDTH] IN BLOOD BY AUTOMATED COUNT: 16.2 % (ref 10–15)
HCT VFR BLD AUTO: 34.7 % (ref 35–47)
HGB BLD-MCNC: 10.2 G/DL (ref 11.7–15.7)
IMM GRANULOCYTES # BLD: 0 10E9/L (ref 0–0.4)
IMM GRANULOCYTES NFR BLD: 0.2 %
INR PPP: 2.44 (ref 0.86–1.14)
LYMPHOCYTES # BLD AUTO: 2.2 10E9/L (ref 0.8–5.3)
LYMPHOCYTES NFR BLD AUTO: 22.9 %
MCH RBC QN AUTO: 26 PG (ref 26.5–33)
MCHC RBC AUTO-ENTMCNC: 29.4 G/DL (ref 31.5–36.5)
MCV RBC AUTO: 89 FL (ref 78–100)
MONOCYTES # BLD AUTO: 0.9 10E9/L (ref 0–1.3)
MONOCYTES NFR BLD AUTO: 9.2 %
NEUTROPHILS # BLD AUTO: 6.2 10E9/L (ref 1.6–8.3)
NEUTROPHILS NFR BLD AUTO: 65.2 %
PLATELET # BLD AUTO: 175 10E9/L (ref 150–450)
RBC # BLD AUTO: 3.92 10E12/L (ref 3.8–5.2)
WBC # BLD AUTO: 9.5 10E9/L (ref 4–11)

## 2017-01-25 PROCEDURE — 36592 COLLECT BLOOD FROM PICC: CPT

## 2017-01-25 PROCEDURE — 85610 PROTHROMBIN TIME: CPT | Performed by: INTERNAL MEDICINE

## 2017-01-25 PROCEDURE — 85025 COMPLETE CBC W/AUTO DIFF WBC: CPT | Performed by: INTERNAL MEDICINE

## 2017-01-25 NOTE — PROGRESS NOTES
Labs drawn from PICC line as ordered. Both lines flushed with NS per protocol. Pt returns tomorrow for appt with Dr. Rankin. Pilar Modi RN

## 2017-01-26 ENCOUNTER — TELEPHONE (OUTPATIENT)
Dept: ANTICOAGULATION | Facility: CLINIC | Age: 62
End: 2017-01-26

## 2017-01-26 ENCOUNTER — ONCOLOGY VISIT (OUTPATIENT)
Dept: ONCOLOGY | Facility: CLINIC | Age: 62
End: 2017-01-26
Attending: INTERNAL MEDICINE
Payer: COMMERCIAL

## 2017-01-26 VITALS
TEMPERATURE: 97.8 F | SYSTOLIC BLOOD PRESSURE: 108 MMHG | WEIGHT: 293 LBS | DIASTOLIC BLOOD PRESSURE: 80 MMHG | HEART RATE: 84 BPM | OXYGEN SATURATION: 97 % | BODY MASS INDEX: 47.09 KG/M2 | HEIGHT: 66 IN | RESPIRATION RATE: 20 BRPM

## 2017-01-26 DIAGNOSIS — Z90.81 S/P SPLENECTOMY: ICD-10-CM

## 2017-01-26 DIAGNOSIS — K55.069 SUPERIOR MESENTERIC VEIN THROMBOSIS (H): Primary | ICD-10-CM

## 2017-01-26 DIAGNOSIS — I81 PORTAL VEIN THROMBOSIS: ICD-10-CM

## 2017-01-26 DIAGNOSIS — D64.9 ANEMIA, UNSPECIFIED TYPE: ICD-10-CM

## 2017-01-26 DIAGNOSIS — D69.3 IDIOPATHIC THROMBOCYTOPENIC PURPURA (H): ICD-10-CM

## 2017-01-26 PROCEDURE — 99215 OFFICE O/P EST HI 40 MIN: CPT | Performed by: INTERNAL MEDICINE

## 2017-01-26 PROCEDURE — 99211 OFF/OP EST MAY X REQ PHY/QHP: CPT

## 2017-01-26 ASSESSMENT — PAIN SCALES - GENERAL: PAINLEVEL: MODERATE PAIN (4)

## 2017-01-26 NOTE — TELEPHONE ENCOUNTER
"Received call from Ilsa Chin, station . Dr. Rankin requesting this pt have INR done via finger stick, to compare with INR drawn from PICC.   Review of chart indicates pt has been having INRs drawn only from PICC line, and all warfarin dosing is being done by the ACC at the . Per [facility] 1/26/17, \"She is a hard stick and have PICC line for INR monitoring.\"  Also per EPIC 1/26: \"If blood draw becomes a issue, she needs to use Lovenox or new oral anti Xa.\"  Pt's PCP is Dr. Mckenna, who has also signed the INR Clinic Referral for this pt.   Pt was scheduled for [facility] appt here at [facility], after PICC labs on Wednesday 2/1/17 for INR comparison (PICC vs finger stick POC).    Alanna Hodges RN      "

## 2017-01-26 NOTE — Clinical Note
[facility]  [address]  [place] [facility] [address]  Phone: [phone]     January 26, 2017      Re: Charlee Marks      TO WHOM IT MAY CONCERN:    Charlee Marks  was seen on 01.26.17 for post op and oncology/hematology evaluation, and adverse side effects.  Please extend her medical leave until 03.01.2017 due to surgery and side effects.  Patient is unable to work until then.    Cordially,          Kerrie Rankin MD, MD  [facility]  [phone]

## 2017-01-26 NOTE — PROGRESS NOTES
Per Dr. Rankin, OK to write letter extending patient's JASPREET from work until 03.01.17 d/t surgery and adverse effects (fatigue, weakness, etc).  Letter written, signed by Dr. Rankin and given directly to patient.

## 2017-01-26 NOTE — NURSING NOTE
"Charlee Marks is a 61 year old female who presents for:  Chief Complaint   Patient presents with     Hematology     Recheck ITP, review labs        Initial Vitals:  /80 mmHg  Pulse 84  Temp(Src) 97.8  F (36.6  C) (Tympanic)  Resp 20  Ht 1.689 m (5' 6.5\")  Wt 133.72 kg (294 lb 12.8 oz)  BMI 46.87 kg/m2  SpO2 97%  LMP 09/15/2007  Breastfeeding? No Estimated body mass index is 46.87 kg/(m^2) as calculated from the following:    Height as of this encounter: 1.689 m (5' 6.5\").    Weight as of this encounter: 133.72 kg (294 lb 12.8 oz).. Body surface area is 2.50 meters squared. BP completed using cuff size: large  Moderate Pain (4) Patient's last menstrual period was 09/15/2007. Allergies and medications reviewed.     Medications: Medication refills not needed today.  Pharmacy name entered into [employer]:    [facility] [place] - [place], [place] - [address] [place], [place] - [address] AT PORTAL TO REGISTERED [facility] SITES    Comments: Recheck ITP, review labs. Patient is requesting a letter to continue her time of work until the end of February 2017 due to her fatigue.     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

## 2017-01-26 NOTE — PROGRESS NOTES
CC: ITP s/p IVIG, Rituxan, Nplate, splenectomy, portal vein and SMV thromboses end of 12/2016.      HISTORY OF PRESENT ILLNESS:  The patient Charlee Marks is a 60-year-old female who presented with 2-3 months of easy bruising and also fatigue.  She presented to her Primary Care Physician's office on 07/25/2016 and was found to have critical thrombocytopenia with a platelet count of 3.  She was admitted through the Emergency Department with a platelet count confirmed at 3, with a normal white count, hemoglobin, MCV and other parameters.  She was offered IVIG 1 gram on 7/25/2016 and 4 days of   Dexamethasone. She was d/c with PL of 38 on 7/27/2016. PL dip to 13 on 8/4 and 7 on 8/11. She is offered wklyI IVIG 1g/kg starting 8/4/2016, and 2nd cycle of dex x 4 days on 8/11. PL improves to 77 on 8/15, then dip to 31 on 8/18.   Due to rapid drop of her PL when off dex, tx was changed to po prednisone 8/18 with wkly IVIG. R was added on 8/30/2016. She had no PL response despite R, steroid and IVIG. Nplate was started 9/23/2016. PL up from 20 to 110 after 2 doses of it. Then dip down again.   She saw Dr. Brian at  plan had splenectomy 12/13/2016. She is on prednisone taper.   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 She was admitted to , had IR thrombectomy and catheter-directed lytic therapy to portal vein. Following the procedure she was placed on high intensity heparin drip which was then bridged with warfarin starting 1/9/2017 with an INR goal of 2-3.    PAST MEDICAL HISTORY:      1.  Cystocele.    2.  Morbid obesity.    3.  Reflux.    4.  Intermittent asthma.    5.  Effusion of lower leg joints.        FAMILY HISTORY:  The patient denies a family history of blood disease, thrombocytopenia or cancer.        SOCIAL HISTORY:  The patient lives in [facility] [place] and works in the school district.  She is  with children.  She denies the use of alcohol or tobacco.      ROS  She is  "still recovering from splenectomy, and IR thrombolytic therapy. She is a hard stick and have PICC line for INR monitoring.            PHYSICAL EXAMINATION:    VITAL SIGNS:  Blood pressure 108/80, pulse 84, temperature 97.8  F (36.6  C), temperature source Tympanic, resp. rate 20, height 1.689 m (5' 6.5\"), weight 133.72 kg (294 lb 12.8 oz), last menstrual period 09/15/2007, SpO2 97 %, not currently breastfeeding.  GENERAL APPEARANCE:  A middle-aged woman who appears her stated age, very pleasant, morbidly obese, sitting comfortably in a chair and knitting.    HEENT: The patient is normocephalic, atraumatic. Pupils are equally reactive to light.  Sclerae are anicteric.  There is moist oral mucosa, negative pharynx, no oral thrush.    NECK:  Supple, no jugular venous distention, thyroid not palpable.    LYMPH NODES:  Superficial lymphadenopathy is not appreciable in the bilateral cervical, supraclavicular, axillary or inguinal areas.    CARDIOVASCULAR:  S1, S2 regular with no murmurs or gallops, no carotid or abdominal bruits.    PULMONARY:  Lungs are clear to auscultation and percussion bilaterally.  There is no wheezing or rhonchi.    GASTROINTESTINAL:  Abdomen is soft, nontender with no hepatosplenomegaly, no signs of ascites and no mass appreciable.    MUSCULOSKELETAL/EXTREMITIES:  No edema, no cyanotic changes, no signs of joint deformity, no lymphedema.    NEUROLOGIC:  Cranial nerves II-XII are grossly intact, sensation intact, muscle strength and muscle tone symmetrical, 5/5 throughout.    BACK:  No spinal or paraspinal tenderness, no CVA tenderness.    SKIN:  No petechiae, no rash and no signs of cellulitis.  There is scattered bruising on forearms and thighs, which are healing.        LABORATORY DATA:    1/25/2017     Current Image  CT 1/9/2017   1. Status post splenectomy with postsurgical changes in the left upper quadrant. Persistent thrombosis of the splenic vein.  2. Main portal vein now appears " patent with possible peripheral nonocclusive thrombosis/periportal edema. Residual thrombosis of portion of the left branch of the portal vein and the right posterior  branch of the portal vein.  3. Residual partial thrombosis of the distal portion of the superior mesenteric vein.   4. Small focal nodular opacities in both lower lobes, likely of infectious etiology.    12/31/2016 CT abd - thrombosis of the portal vein, splenic vein.    Old data reviewed with summary  Right after splenectomy 12/13/2016 wbc 14, neutrophiila, PL >600, Hb 9.2  PL at 91 on 12/8/2016, at 51 in 11/2016 at 28 from 43 from 110 early Oct, from 31 on 8/18, at 77 on 8/15, at 7 on 8/11, at 14 on 8/4/2016, at 38 7/27/2016  On 07/26/2016 platelets 8, lymphopenia with lymphocyte count 0.6.    On 07/25/2016 platelets 3.   smear: no dysplasia or schistocytes.     HepB DNA by PCR 10/2016 : negative    9/2016 H. Pylori breath test/stool antigen: negative.     8/2016 hep Bs Ag -,BsAb+, BcAb+, HepC -, for which she saw UGI and started on hepB med Tenofovir 9/8/2016.     CT a/p 10/2016: The spleen is borderline enlarged at 13.3 cm.   CT neck 9/15/2016: No enlarged or necrotic-appearing cervical lymph nodes are seen. No metastatic lesions are identified.  CT head 8/11: negative.           ASSESSMENT/PLAN:    1.  dx ITP in 7/2016 .  PL up to > 600 post splenectomy.   Advice further taper prednisone to 5 mg po qd. Cbc q 2 wks. Due to her dropping PL, will keep her on this dose for now.     2. + hepBcAb, - HepBsAg, HepBsAg-,HebB DNA - by PCR, she saw GI due to the use of Rituxan, started on Tenofovir 9/8/2016.    Advice limit Tylenol intake.     3. Post op anemia due to intra op bleed. Stable since d/c. No need for transfusion at this point. Will follow.     4. 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 S/p thrombolysis to portal vein, lovenox and now on coumadin. She needs minimal 3 likely 6 months of anti coagulation. If blood draw becomes a  issue, she needs to use Lovenox or new oral anti Xa.

## 2017-01-26 NOTE — PATIENT INSTRUCTIONS
Please follow up with Dr. Rankin in 1 month.  Please continue with labs every 2 weeks.     If you have any questions or concerns please feel free to call.    Phil Siu RN, BSN   Oncology Care Coordinator [facility]  [facility]  [phone]

## 2017-01-26 NOTE — MR AVS SNAPSHOT
After Visit Summary   1/26/2017    Charlee Marks    MRN: [id number]           Patient Information     Date Of Birth          1955        Visit Information        Provider Department      1/26/2017 1:15 PM Kerrie Rankin MD [facility]        Today's Diagnoses     Superior mesenteric vein thrombosis    -  1     Portal vein thrombosis         S/P splenectomy         Idiopathic thrombocytopenic purpura (H)         Anemia, unspecified type           Care Instructions      Please follow up with Dr. Rankin in 1 month.  Please continue with labs every 2 weeks.     If you have any questions or concerns please feel free to call.    Phil Siu, RN, BSN   Oncology Care Coordinator RN  [facility]  [phone]            Follow-ups after your visit        Your next 10 appointments already scheduled     Feb 01, 2017  2:30 PM   Level O with ROOM 10 [facility] ([facility])    [facility]  [address]  [facility] [address]   [phone]            Feb 03, 2017 11:00 AM   (Arrive by 10:45 AM)   Post-Op with Catalina Brian MD   [facility] ([facility])    [address] [place] [address]   [phone]            Feb 08, 2017  2:30 PM   Level O with ROOM 1 [facility] ([facility])    [facility]  [address]  [facility] [address]   [phone]            Feb 15, 2017  2:30 PM   Level O with ROOM 6 [facility] ([facility])    [facility]  [address]  [facility] [address]   [phone]            Feb 22, 2017  2:30 PM   Level O with ROOM 5 [facility] ([facility])    [facility]  [address]  [facility] [address]   [phone]            Feb 23, 2017 10:15 AM   Return  "Visit with Kerrie Rankin MD   [facility] ([facility])    [facility]  [address] [facility] [address]  [facility] [address] [phone]              Who to contact     If you have questions or need follow up information about today's clinic visit or your schedule please contact [facility] directly at [phone].  Normal or non-critical lab and imaging results will be communicated to you by [employer], letter or phone within 4 business days after the clinic has received the results. If you do not hear from us within 7 days, please contact the clinic through [employer] or phone. If you have a critical or abnormal lab result, we will notify you by phone as soon as possible.  Submit refill requests through [employer] or call your pharmacy and they will forward the refill request to us. Please allow 3 business days for your refill to be completed.          Additional Information About Your Visit        MyChart Information     [employer] gives you secure access to your electronic health record. If you see a primary care provider, you can also send messages to your care team and make appointments. If you have questions, please call your primary care clinic.  If you do not have a primary care provider, please call [phone] and they will assist you.        Care EveryWhere ID     This is your Care EveryWhere ID. This could be used by other organizations to access your [place] medical records  [id number]        Your Vitals Were     Pulse Temperature Respirations    84 97.8  F (36.6  C) (Tympanic) 20    Height BMI (Body Mass Index) Pulse Oximetry    1.689 m (5' 6.5\") 46.87 kg/m2 97%    Last Period Breastfeeding?       09/15/2007 No        Blood Pressure from Last 3 Encounters:   01/26/17 108/80   01/20/17 131/52   01/19/17 127/77    Weight from Last 3 Encounters:   01/26/17 133.72 kg (294 lb 12.8 oz)   01/19/17 133.267 kg (293 lb 12.8 oz)   01/14/17 136.351 kg (300 lb 9.6 oz)    "           Today, you had the following     No orders found for display         Today's Medication Changes          These changes are accurate as of: 1/26/17  2:23 PM.  If you have any questions, ask your nurse or doctor.               These medicines have changed or have updated prescriptions.        Dose/Directions    ranitidine 150 MG tablet   Commonly known as:  ZANTAC   This may have changed:  See the new instructions.   Used for:  Esophageal reflux        ONE TABLET TWICE DAILY as needed will call when needed   Quantity:  180 Tab   Refills:  3                Primary Care Provider Office Phone # Fax #    Yasmin Evelyn Mckenna [phone]       [facility] [address] [address] [facility] [address]        Thank you!     Thank you for choosing [facility]  for your care. Our goal is always to provide you with excellent care. Hearing back from our patients is one way we can continue to improve our services. Please take a few minutes to complete the written survey that you may receive in the mail after your visit with us. Thank you!             Your Updated Medication List - Protect others around you: Learn how to safely use, store and throw away your medicines at www.disposemymeds.org.          This list is accurate as of: 1/26/17  2:23 PM.  Always use your most recent med list.                   Brand Name Dispense Instructions for use    albuterol 108 (90 BASE) MCG/ACT Inhaler    PROAIR HFA/PROVENTIL HFA/VENTOLIN HFA    1 Inhaler    Inhale 2 puffs into the lungs every 4 hours as needed for shortness of breath / dyspnea       B-6 100 MG Tabs      Take 1 tablet by mouth daily       calcium carbonate-vitamin D 500-400 MG-UNIT Tabs per tablet      Take 1 tablet by mouth 2 times daily       GLUCOSAMINE CHONDRO COMPLEX OR      on hold       loratadine 10 MG tablet    CLARITIN    90 Tab    ONE DAILY       MULTIVITAMIN TABS   OR      1 TABLET DAILY       omega 3 1000 MG Caps      1 capsule  by mouth daily       oxyCODONE 5 MG IR tablet    ROXICODONE    50 tablet    Take 1-2 tablets (5-10 mg) by mouth every 3 hours as needed for moderate to severe pain       predniSONE 5 MG tablet    DELTASONE    60 tablet    5 mg po q q       ranitidine 150 MG tablet    ZANTAC    180 Tab    ONE TABLET TWICE DAILY as needed will call when needed       tenofovir 300 MG tablet    VIREAD    30 tablet    Take 1 tablet (300 mg) by mouth daily       TYLENOL PO      Take 1,000 mg by mouth every 4 hours as needed for mild pain or fever (Headaches)       * warfarin 2.5 MG tablet    COUMADIN    30 tablet    Take 1 tablet (2.5 mg) by mouth daily       * warfarin 5 MG tablet    COUMADIN    30 tablet    Take 1 tablet (5 mg) by mouth daily       * Notice:  This list has 2 medication(s) that are the same as other medications prescribed for you. Read the directions carefully, and ask your doctor or other care provider to review them with you.

## 2017-02-01 ENCOUNTER — TELEPHONE (OUTPATIENT)
Dept: GASTROENTEROLOGY | Facility: CLINIC | Age: 62
End: 2017-02-01

## 2017-02-01 ENCOUNTER — INFUSION THERAPY VISIT (OUTPATIENT)
Dept: INFUSION THERAPY | Facility: CLINIC | Age: 62
End: 2017-02-01
Attending: FAMILY MEDICINE
Payer: COMMERCIAL

## 2017-02-01 ENCOUNTER — ANTICOAGULATION THERAPY VISIT (OUTPATIENT)
Dept: ANTICOAGULATION | Facility: CLINIC | Age: 62
End: 2017-02-01

## 2017-02-01 ENCOUNTER — ANTICOAGULATION THERAPY VISIT (OUTPATIENT)
Dept: ANTICOAGULATION | Facility: CLINIC | Age: 62
End: 2017-02-01
Payer: COMMERCIAL

## 2017-02-01 ENCOUNTER — HOSPITAL ENCOUNTER (OUTPATIENT)
Facility: CLINIC | Age: 62
Discharge: HOME | End: 2017-02-01
Attending: INTERNAL MEDICINE | Admitting: INTERNAL MEDICINE
Payer: COMMERCIAL

## 2017-02-01 DIAGNOSIS — Z79.01 LONG-TERM (CURRENT) USE OF ANTICOAGULANTS: Primary | ICD-10-CM

## 2017-02-01 DIAGNOSIS — Z79.01 LONG-TERM (CURRENT) USE OF ANTICOAGULANTS: ICD-10-CM

## 2017-02-01 DIAGNOSIS — D69.3 IDIOPATHIC THROMBOCYTOPENIC PURPURA (H): ICD-10-CM

## 2017-02-01 DIAGNOSIS — I81 PORTAL VEIN THROMBOSIS: ICD-10-CM

## 2017-02-01 DIAGNOSIS — R76.8 HEPATITIS B CORE ANTIBODY POSITIVE: ICD-10-CM

## 2017-02-01 LAB
ALBUMIN SERPL-MCNC: 3.1 G/DL (ref 3.4–5)
ALP SERPL-CCNC: 164 U/L (ref 40–150)
ALT SERPL W P-5'-P-CCNC: 137 U/L (ref 0–50)
AST SERPL W P-5'-P-CCNC: 121 U/L (ref 0–45)
BASOPHILS # BLD AUTO: 0.1 10E9/L (ref 0–0.2)
BASOPHILS NFR BLD AUTO: 0.6 %
BILIRUB DIRECT SERPL-MCNC: <0.1 MG/DL (ref 0–0.2)
BILIRUB SERPL-MCNC: 0.4 MG/DL (ref 0.2–1.3)
DIFFERENTIAL METHOD BLD: ABNORMAL
EOSINOPHIL # BLD AUTO: 0.1 10E9/L (ref 0–0.7)
EOSINOPHIL NFR BLD AUTO: 1.3 %
ERYTHROCYTE [DISTWIDTH] IN BLOOD BY AUTOMATED COUNT: 16.2 % (ref 10–15)
HCT VFR BLD AUTO: 35.7 % (ref 35–47)
HGB BLD-MCNC: 10.5 G/DL (ref 11.7–15.7)
IMM GRANULOCYTES # BLD: 0 10E9/L (ref 0–0.4)
IMM GRANULOCYTES NFR BLD: 0.2 %
INR POINT OF CARE: 2.7 (ref 0.86–1.14)
INR PPP: 2.18 (ref 0.86–1.14)
LYMPHOCYTES # BLD AUTO: 2.5 10E9/L (ref 0.8–5.3)
LYMPHOCYTES NFR BLD AUTO: 22.5 %
MCH RBC QN AUTO: 25.4 PG (ref 26.5–33)
MCHC RBC AUTO-ENTMCNC: 29.4 G/DL (ref 31.5–36.5)
MCV RBC AUTO: 86 FL (ref 78–100)
MONOCYTES # BLD AUTO: 1.1 10E9/L (ref 0–1.3)
MONOCYTES NFR BLD AUTO: 10.4 %
NEUTROPHILS # BLD AUTO: 7.1 10E9/L (ref 1.6–8.3)
NEUTROPHILS NFR BLD AUTO: 65 %
PLATELET # BLD AUTO: 95 10E9/L (ref 150–450)
PROT SERPL-MCNC: 6.6 G/DL (ref 6.8–8.8)
RBC # BLD AUTO: 4.13 10E12/L (ref 3.8–5.2)
WBC # BLD AUTO: 11 10E9/L (ref 4–11)

## 2017-02-01 PROCEDURE — 80076 HEPATIC FUNCTION PANEL: CPT | Performed by: INTERNAL MEDICINE

## 2017-02-01 PROCEDURE — 99207 ZZC NO CHARGE NURSE ONLY: CPT

## 2017-02-01 PROCEDURE — 85610 PROTHROMBIN TIME: CPT | Performed by: INTERNAL MEDICINE

## 2017-02-01 PROCEDURE — 85025 COMPLETE CBC W/AUTO DIFF WBC: CPT | Performed by: INTERNAL MEDICINE

## 2017-02-01 PROCEDURE — 85610 PROTHROMBIN TIME: CPT | Mod: QW

## 2017-02-01 PROCEDURE — 36416 COLLJ CAPILLARY BLOOD SPEC: CPT

## 2017-02-01 PROCEDURE — 36592 COLLECT BLOOD FROM PICC: CPT

## 2017-02-01 NOTE — PROGRESS NOTES
Spoke with Charlee today.  She reported that the [place] clinic will start managing her warfarin dosing.

## 2017-02-01 NOTE — PROGRESS NOTES
"  ANTICOAGULATION FOLLOW-UP CLINIC VISIT    Patient Name:  Charlee Marks  Date:  2/1/2017  Contact Type:  Face to Face    SUBJECTIVE:     Patient Findings     Positives No Problem Findings    Comments Patient is seeing surgeon on 2-3 in hopes to have PICC removed. Spent 30 minutes answering questions about the lab versus infusion drawn INR versus ACC. Patient had many questions as did her spouse. Patient also sees Dr. Rankin, who apparently mentioned possibly needing lovenox if platelets get too low (not sure if this is accurate). She is waiting for lab results today.           OBJECTIVE    INR   Date Value Ref Range Status   02/01/2017 2.18* 0.86 - 1.14 Final       ASSESSMENT / PLAN  INR assessment THER    Recheck INR In: 6 DAYS    INR Location Clinic      Anticoagulation Summary as of 2/1/2017     INR goal 2.0-3.0   Selected INR 2.7 (2/1/2017)   Maintenance plan 5 mg (5 mg x 1) on Fri; 7.5 mg (5 mg x 1.5) all other days   Full instructions 5 mg on Fri; 7.5 mg all other days   Weekly total 50 mg   Plan last modified Kia Jimenez RN (2/1/2017)   Next INR check 2/7/2017   Priority INR   Target end date     Indications   Long-term (current) use of anticoagulants [Z79.01] [Z79.01]  Portal vein thrombosis [I81]         Anticoagulation Episode Summary     INR check location     Preferred lab     Send INR reminders to  ANTICOAG POOL    Comments * 1/16/17 portal vein, splenic vein, and superior mesenteric vein thromboses. Was getting INR via PICC line at the  Hopes to get PICC out soon and will see ACC in [facility]. Per Dr. Rankin \"minimal 3 likely 6 months of anti coagulation\"      Anticoagulation Care Providers     Provider Role Specialty Phone number    Yasmin Mckenna MD [facility] [phone]            See the Encounter Report to view Anticoagulation Flowsheet and Dosing Calendar (Go to Encounters tab in chart review, and find the Anticoagulation Therapy Visit)        Kia Jimenez RN        "

## 2017-02-01 NOTE — TELEPHONE ENCOUNTER
This patient had a recent splenectomy for ITP.  A recent note from oncology shows that this patient has a thrombus of the portal vein and SMV.  She is on coagulation therapy.  This could be the reason for the elevated transaminases.  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  She was started on Rituximab so she was started on Viread.  She has now been off the Rituximab for 3 months.  I will have the patient see me back in 2-3 months and we will recheck labs.    Suzie Valdes MS PA-C

## 2017-02-01 NOTE — MR AVS SNAPSHOT
Charlee HERNANDEZ Kendrick   2/1/2017 3:00 PM   Anticoagulation Therapy Visit    Description:  61 year old female   Provider:  [facility] [facility]   Department:  [facility]           INR as of 2/1/2017     Selected INR 2.7 (2/1/2017)      Anticoagulation Summary as of 2/1/2017     INR goal 2.0-3.0   Selected INR 2.7 (2/1/2017)   Full instructions 5 mg on Fri; 7.5 mg all other days   Next INR check 2/7/2017    Indications   Long-term (current) use of anticoagulants [Z79.01] [Z79.01]  Portal vein thrombosis [I81]         Your next Anticoagulation Clinic appointment(s)     Feb 07, 2017  2:45 PM   Anticoagulation [facility] with [facility] CO[facility]   [facility]    [address] [id number] [phone]              Contact Numbers     Please call [phone] to cancel and/or reschedule your appointment.  Please call [phone] with any problems or questions regarding your therapy          February 2017 Details    Sun Mon Tue Wed Thu Fri Sat        1      7.5 mg   See details      2      7.5 mg         3      5 mg         4      7.5 mg           5      7.5 mg         6      7.5 mg         7            8               9               10               11                 12               13               14               15               16               17               18                 19               20               21               22               23               24               25                 26               27               28                    Date Details   02/01 This INR check       Date of next INR:  2/7/2017         How to take your warfarin dose     To take:  5 mg Take 1 of the 5 mg tablets.    To take:  7.5 mg Take 1.5 of the 5 mg tablets.

## 2017-02-01 NOTE — PROGRESS NOTES
Infusion Nursing Note:  Charlee Marks presents today for PICC labs and dressing change.    Patient seen by provider today: No   present during visit today: Not Applicable.    Note: N/A.    Intravenous Access:  PICC.    Treatment Conditions:  HGB     10.5   2/1/2017  WBC     11.0   2/1/2017   ANEU      7.1   2/1/2017  PLT       95   2/1/2017       Post Infusion Assessment:  Pt tolerated PICC dressing change.      Discharge Plan:   Patient discharged in stable condition accompanied by: .  Departure Mode: Ambulatory.  Pt to return on 2/8/17 at 2;30 pm for labs and dressing change.      Kellie Paredes RN

## 2017-02-02 ENCOUNTER — TELEPHONE (OUTPATIENT)
Dept: SURGERY | Facility: CLINIC | Age: 62
End: 2017-02-02

## 2017-02-02 DIAGNOSIS — D69.3 IDIOPATHIC THROMBOCYTOPENIC PURPURA (H): Primary | ICD-10-CM

## 2017-02-02 NOTE — PROGRESS NOTES
Quick Note:    Pt returned my call and informed that Dr. Rankin recommends that she come in twice weekly to check a CBC for her decreased platelets. Pt stated that she has an appt with her surgeon tomorrow to see if her PICC line is discontinued and will call me back once she knows whether or not she can use this for draws before scheduling the labs.  ______

## 2017-02-02 NOTE — TELEPHONE ENCOUNTER
Established Patient Telephone Reminder Call    Date of call:  02/02/2017  Phone numbers:  Home number on file [phone] (home)    Reached patient/confirmed appointment:  Yes  Appointment with:   Dr. Catalina Brian  Reason for visit:  Po spleen

## 2017-02-03 ENCOUNTER — OFFICE VISIT (OUTPATIENT)
Dept: SURGERY | Facility: CLINIC | Age: 62
End: 2017-02-03

## 2017-02-03 VITALS
BODY MASS INDEX: 45.99 KG/M2 | HEIGHT: 67 IN | WEIGHT: 293 LBS | DIASTOLIC BLOOD PRESSURE: 86 MMHG | OXYGEN SATURATION: 96 % | HEART RATE: 77 BPM | TEMPERATURE: 98.2 F | SYSTOLIC BLOOD PRESSURE: 142 MMHG

## 2017-02-03 DIAGNOSIS — Z90.81 S/P SPLENECTOMY: Primary | ICD-10-CM

## 2017-02-03 ASSESSMENT — PAIN SCALES - GENERAL: PAINLEVEL: NO PAIN (0)

## 2017-02-03 NOTE — NURSING NOTE
"Chief Complaint   Patient presents with     Surgical Followup     f/u       Filed Vitals:    02/03/17 1110   BP: 142/86   Pulse: 77   Temp: 98.2  F (36.8  C)   Height: 5' 7\"   Weight: 294 lb 9.6 oz   SpO2: 96%       Body mass index is 46.13 kg/(m^2).      Nidia Hicks MA                            "

## 2017-02-03 NOTE — PROGRESS NOTES
Quick Note:    Pt called back stating that her PICC line has been removed and needs her labs drawn peripherally. Told Pt that Dr. Rankin recommends that she come in on Mondays and Thursdays for her labs draws and pt verbalized understanding and agreed with the plan. Call transferred to Alanis at the  to schedule these appts.  ______

## 2017-02-03 NOTE — PROGRESS NOTES
HISTORY OF PRESENT ILLNESS:  I am seeing Charlee for a postoperative splenectomy.  Her splenectomy was complicated by hemorrhaging during the operation, which required an emergent open procedure.  This seemed to go well, but postoperatively she did develop a splenic and portal vein thrombosis and did have a portal vein thrombectomy lysis and has been maintained on Coumadin for anticoagulation.  She was discharged with a PICC line, as she has pretty small veins, and there was concern about the ability for blood draws.  My hope was that we would get her to a stabilized point on her Coumadin prior to removing this; however, she does relate to me that they have been able to do finger sticks for her INR checks.  Her platelets have dropped since surgery; they are currently at 95.  She is working with her hematologist and is going to be doing twice-weekly platelet checks.  She has been maintained on her prednisone for the ITP.      From an abdominal standpoint, she is back to her regular diet.  She is able to move around.  She has not noted any incisional difficulties.  She occasionally has twinges of pain, but is at her baseline daily activities.      PHYSICAL EXAMINATION:  She has a well-healed left subcostal scar and 2 port sites.  There are no hernias detected at these, and there is no fluctuance or skin changes.        ASSESSMENT AND PLAN:  We discussed in detail the platelet drop.  I think that this can be explained by multiple things rather than persistence of ITP.  I am cautiously optimistic that we will see that the platelets will begin to settle out and bottom out not too much further than what they are, but I will certainly be keeping tabs while she works with her hematologist with this.  Her PICC line was removed in clinic, as it seemed that there is an ongoing need for this.  This was removed without difficulty without any bleeding.  A dressing was applied, which she can remove in 24 hours.        She is being  discharged from clinic.  We will contact her in 2-3 weeks just to see how she is doing.  If there are any other issues, we will try to coordinate a followup visit in April when she is coming to follow up with GI for her persistently elevated LFTs.

## 2017-02-06 DIAGNOSIS — D69.3 IDIOPATHIC THROMBOCYTOPENIC PURPURA (H): ICD-10-CM

## 2017-02-06 LAB
BASOPHILS # BLD AUTO: 0.1 10E9/L (ref 0–0.2)
BASOPHILS NFR BLD AUTO: 0.6 %
DIFFERENTIAL METHOD BLD: ABNORMAL
EOSINOPHIL # BLD AUTO: 0.1 10E9/L (ref 0–0.7)
EOSINOPHIL NFR BLD AUTO: 0.9 %
ERYTHROCYTE [DISTWIDTH] IN BLOOD BY AUTOMATED COUNT: 16.8 % (ref 10–15)
HCT VFR BLD AUTO: 34.7 % (ref 35–47)
HGB BLD-MCNC: 10.4 G/DL (ref 11.7–15.7)
LYMPHOCYTES # BLD AUTO: 2.9 10E9/L (ref 0.8–5.3)
LYMPHOCYTES NFR BLD AUTO: 29.3 %
MCH RBC QN AUTO: 25.8 PG (ref 26.5–33)
MCHC RBC AUTO-ENTMCNC: 30 G/DL (ref 31.5–36.5)
MCV RBC AUTO: 86 FL (ref 78–100)
MONOCYTES # BLD AUTO: 0.8 10E9/L (ref 0–1.3)
MONOCYTES NFR BLD AUTO: 7.8 %
NEUTROPHILS # BLD AUTO: 6.1 10E9/L (ref 1.6–8.3)
NEUTROPHILS NFR BLD AUTO: 61.4 %
PLATELET # BLD AUTO: 153 10E9/L (ref 150–450)
RBC # BLD AUTO: 4.03 10E12/L (ref 3.8–5.2)
WBC # BLD AUTO: 9.9 10E9/L (ref 4–11)

## 2017-02-06 PROCEDURE — 85025 COMPLETE CBC W/AUTO DIFF WBC: CPT | Performed by: INTERNAL MEDICINE

## 2017-02-06 PROCEDURE — 36415 COLL VENOUS BLD VENIPUNCTURE: CPT | Performed by: INTERNAL MEDICINE

## 2017-02-07 ENCOUNTER — ANTICOAGULATION THERAPY VISIT (OUTPATIENT)
Dept: ANTICOAGULATION | Facility: CLINIC | Age: 62
End: 2017-02-07

## 2017-02-07 ENCOUNTER — ANTICOAGULATION THERAPY VISIT (OUTPATIENT)
Dept: ANTICOAGULATION | Facility: CLINIC | Age: 62
End: 2017-02-07
Payer: COMMERCIAL

## 2017-02-07 DIAGNOSIS — I81 PORTAL VEIN THROMBOSIS: ICD-10-CM

## 2017-02-07 DIAGNOSIS — Z79.01 LONG-TERM (CURRENT) USE OF ANTICOAGULANTS: Primary | ICD-10-CM

## 2017-02-07 LAB — INR POINT OF CARE: 2.3 (ref 0.86–1.14)

## 2017-02-07 PROCEDURE — 99207 ZZC NO CHARGE NURSE ONLY: CPT

## 2017-02-07 PROCEDURE — 85610 PROTHROMBIN TIME: CPT | Mod: QW

## 2017-02-07 PROCEDURE — 36416 COLLJ CAPILLARY BLOOD SPEC: CPT

## 2017-02-07 RX ORDER — WARFARIN SODIUM 5 MG/1
TABLET ORAL
Qty: 90 TABLET | Refills: 0 | Status: SHIPPED | OUTPATIENT
Start: 2017-02-07 | End: 2017-02-17

## 2017-02-07 RX ORDER — WARFARIN SODIUM 2.5 MG/1
2.5 TABLET ORAL DAILY
Qty: 90 TABLET | Refills: 0 | Status: SHIPPED | OUTPATIENT
Start: 2017-02-07 | End: 2017-02-17

## 2017-02-07 NOTE — PROGRESS NOTES
Patient is being followed by the [facility].  We will inactivate her from our clinic.

## 2017-02-07 NOTE — PROGRESS NOTES
"  ANTICOAGULATION FOLLOW-UP CLINIC VISIT    Patient Name:  Charlee Marks  Date:  2/7/2017  Contact Type:  Face to Face    SUBJECTIVE:     Patient Findings     Comments Doesn't want to cut pills uses 5mg and 2.5mg           OBJECTIVE    INR PROTIME   Date Value Ref Range Status   02/07/2017 2.3* 0.86 - 1.14 Final       ASSESSMENT / PLAN  INR assessment THER    Recheck INR In: 10 DAYS    INR Location Clinic      Anticoagulation Summary as of 2/7/2017     INR goal 2.0-3.0   Selected INR 2.3 (2/7/2017)   Maintenance plan 5 mg (5 mg x 1) on Fri; 7.5 mg (5 mg x 1.5) all other days   Full instructions 5 mg on Fri; 7.5 mg all other days   Weekly total 50 mg   Plan last modified Kia Jimenez RN (2/1/2017)   Next INR check 2/17/2017   Priority INR   Target end date     Indications   Long-term (current) use of anticoagulants [Z79.01] [Z79.01]  Portal vein thrombosis [I81]         Anticoagulation Episode Summary     INR check location     Preferred lab     Send INR reminders to [facility] POOL    Comments * 1/16/17 portal vein, splenic vein, and superior mesenteric vein thromboses. Was getting INR via PICC line at the . Hopes to get PICC out soon and will see [facility] in [facility]. Per Dr. Rankin \"minimal 3 likely 6 months of anti coagulation\"      Anticoagulation Care Providers     Provider Role Specialty Phone number    Yasmin Mckenna MD [facility] [phone]            See the Encounter Report to view Anticoagulation Flowsheet and Dosing Calendar (Go to Encounters tab in chart review, and find the Anticoagulation Therapy Visit)        Samara Velarde RN                 "

## 2017-02-07 NOTE — MR AVS SNAPSHOT
Charlee HERNANDEZ Wilmangerson   2/7/2017 2:45 PM   Anticoagulation Therapy Visit    Description:  61 year old female   Provider:  [facility]   Department:  [facility]           INR as of 2/7/2017     Selected INR 2.3 (2/7/2017)      Anticoagulation Summary as of 2/7/2017     INR goal 2.0-3.0   Selected INR 2.3 (2/7/2017)   Full instructions 5 mg on Fri; 7.5 mg all other days   Next INR check 2/17/2017    Indications   Long-term (current) use of anticoagulants [Z79.01] [Z79.01]  Portal vein thrombosis [I81]         Description     Warfarin dose: 5mg Fri and 7.5mg the rest of the days of the week.        Contact Numbers     Please call [phone] to cancel and/or reschedule your appointment.  Please call [phone] with any problems or questions regarding your therapy          February 2017 Details    Sun Mon Tue Wed Thu Fri Sat        1               2               3               4                 5               6               7      7.5 mg   See details      8      7.5 mg         9      7.5 mg         10      5 mg         11      7.5 mg           12      7.5 mg         13      7.5 mg         14      7.5 mg         15      7.5 mg         16      7.5 mg         17            18                 19               20               21               22               23               24               25                 26               27               28                    Date Details   02/07 This INR check       Date of next INR:  2/17/2017         How to take your warfarin dose     To take:  5 mg Take 1 of the 5 mg tablets.    To take:  7.5 mg Take 1.5 of the 5 mg tablets.

## 2017-02-09 DIAGNOSIS — D69.3 IDIOPATHIC THROMBOCYTOPENIC PURPURA (H): ICD-10-CM

## 2017-02-09 LAB
BASOPHILS # BLD AUTO: 0 10E9/L (ref 0–0.2)
BASOPHILS NFR BLD AUTO: 0.4 %
DIFFERENTIAL METHOD BLD: ABNORMAL
EOSINOPHIL # BLD AUTO: 0.1 10E9/L (ref 0–0.7)
EOSINOPHIL NFR BLD AUTO: 1.3 %
ERYTHROCYTE [DISTWIDTH] IN BLOOD BY AUTOMATED COUNT: 16.9 % (ref 10–15)
HCT VFR BLD AUTO: 34.7 % (ref 35–47)
HGB BLD-MCNC: 10.4 G/DL (ref 11.7–15.7)
LYMPHOCYTES # BLD AUTO: 2.8 10E9/L (ref 0.8–5.3)
LYMPHOCYTES NFR BLD AUTO: 26.9 %
MCH RBC QN AUTO: 25.6 PG (ref 26.5–33)
MCHC RBC AUTO-ENTMCNC: 30 G/DL (ref 31.5–36.5)
MCV RBC AUTO: 86 FL (ref 78–100)
MONOCYTES # BLD AUTO: 1 10E9/L (ref 0–1.3)
MONOCYTES NFR BLD AUTO: 9.2 %
NEUTROPHILS # BLD AUTO: 6.6 10E9/L (ref 1.6–8.3)
NEUTROPHILS NFR BLD AUTO: 62.2 %
PLATELET # BLD AUTO: 216 10E9/L (ref 150–450)
RBC # BLD AUTO: 4.06 10E12/L (ref 3.8–5.2)
WBC # BLD AUTO: 10.5 10E9/L (ref 4–11)

## 2017-02-09 PROCEDURE — 85025 COMPLETE CBC W/AUTO DIFF WBC: CPT | Performed by: INTERNAL MEDICINE

## 2017-02-09 PROCEDURE — 36415 COLL VENOUS BLD VENIPUNCTURE: CPT | Performed by: INTERNAL MEDICINE

## 2017-02-10 NOTE — PROGRESS NOTES
Quick Note:    Called Pt regarding her recent labs results. Pt verbalized understanding and no further que/concerns.  ______

## 2017-02-13 DIAGNOSIS — D69.3 IDIOPATHIC THROMBOCYTOPENIC PURPURA (H): ICD-10-CM

## 2017-02-13 LAB
BASOPHILS # BLD AUTO: 0 10E9/L (ref 0–0.2)
BASOPHILS NFR BLD AUTO: 0.4 %
DIFFERENTIAL METHOD BLD: ABNORMAL
EOSINOPHIL # BLD AUTO: 0.1 10E9/L (ref 0–0.7)
EOSINOPHIL NFR BLD AUTO: 0.7 %
ERYTHROCYTE [DISTWIDTH] IN BLOOD BY AUTOMATED COUNT: 17 % (ref 10–15)
HCT VFR BLD AUTO: 34.3 % (ref 35–47)
HGB BLD-MCNC: 10.3 G/DL (ref 11.7–15.7)
LYMPHOCYTES # BLD AUTO: 3.1 10E9/L (ref 0.8–5.3)
LYMPHOCYTES NFR BLD AUTO: 29.5 %
MCH RBC QN AUTO: 25.7 PG (ref 26.5–33)
MCHC RBC AUTO-ENTMCNC: 30 G/DL (ref 31.5–36.5)
MCV RBC AUTO: 86 FL (ref 78–100)
MONOCYTES # BLD AUTO: 0.7 10E9/L (ref 0–1.3)
MONOCYTES NFR BLD AUTO: 6.7 %
NEUTROPHILS # BLD AUTO: 6.5 10E9/L (ref 1.6–8.3)
NEUTROPHILS NFR BLD AUTO: 62.7 %
PLATELET # BLD AUTO: 247 10E9/L (ref 150–450)
RBC # BLD AUTO: 4.01 10E12/L (ref 3.8–5.2)
WBC # BLD AUTO: 10.4 10E9/L (ref 4–11)

## 2017-02-13 PROCEDURE — 36415 COLL VENOUS BLD VENIPUNCTURE: CPT | Performed by: INTERNAL MEDICINE

## 2017-02-13 PROCEDURE — 85025 COMPLETE CBC W/AUTO DIFF WBC: CPT | Performed by: INTERNAL MEDICINE

## 2017-02-14 ENCOUNTER — TELEPHONE (OUTPATIENT)
Dept: ONCOLOGY | Facility: CLINIC | Age: 62
End: 2017-02-14

## 2017-02-14 NOTE — TELEPHONE ENCOUNTER
Called pt and reviewed the results of her CBC drawn 2/13/17. Told Pt that she does not need to be re-drawn until 2/23/17 the same day as her f/u with Dr. Rankin and that we would cancel her bi-weekly lab draws until that time. Pt verbalized understanding and no que at this time.

## 2017-02-17 ENCOUNTER — ANTICOAGULATION THERAPY VISIT (OUTPATIENT)
Dept: ANTICOAGULATION | Facility: CLINIC | Age: 62
End: 2017-02-17
Payer: COMMERCIAL

## 2017-02-17 DIAGNOSIS — Z79.01 LONG-TERM (CURRENT) USE OF ANTICOAGULANTS: ICD-10-CM

## 2017-02-17 DIAGNOSIS — I81 PORTAL VEIN THROMBOSIS: ICD-10-CM

## 2017-02-17 LAB — INR POINT OF CARE: 2 (ref 0.86–1.14)

## 2017-02-17 PROCEDURE — 85610 PROTHROMBIN TIME: CPT | Mod: QW

## 2017-02-17 PROCEDURE — 99207 ZZC NO CHARGE NURSE ONLY: CPT

## 2017-02-17 PROCEDURE — 36416 COLLJ CAPILLARY BLOOD SPEC: CPT

## 2017-02-17 RX ORDER — WARFARIN SODIUM 2.5 MG/1
TABLET ORAL
Qty: 90 TABLET | Refills: 0 | COMMUNITY
Start: 2017-02-17 | End: 2017-04-07

## 2017-02-17 RX ORDER — WARFARIN SODIUM 5 MG/1
TABLET ORAL
Qty: 90 TABLET | Refills: 0 | COMMUNITY
Start: 2017-02-17 | End: 2017-02-24

## 2017-02-17 NOTE — MR AVS SNAPSHOT
Chralee HERNANDEZ Kendrick   2/17/2017 3:15 PM   Anticoagulation Therapy Visit    Description:  61 year old female   Provider:  [facility] ANTI COAG   Department:  [facility] Anticoag           INR as of 2/17/2017     Today's INR 2.0      Anticoagulation Summary as of 2/17/2017     INR goal 2.0-3.0   Today's INR 2.0   Full instructions 7.5 mg every day   Next INR check 2/24/2017    Indications   Portal vein thrombosis [I81]  Long-term (current) use of anticoagulants [Z79.01] [Z79.01]         Description     Recheck INR 1 week, 2/24/17  Increase warfarin to 7.5 mg daily      Your next Anticoagulation Clinic appointment(s)     Feb 24, 2017  3:15 PM CST   Anticoagulation Visit with [facility]   [facility] ([facility]    [address] [facility] [id number] [phone]              Contact Numbers     Please call [phone] to cancel and/or reschedule your appointment.  Please call [phone] with any problems or questions regarding your therapy          February 2017 Details    Sun Mon Tue Wed Thu Fri Sat        1               2               3               4                 5               6               7               8               9               10               11                 12               13               14               15               16               17      7.5 mg   See details      18      7.5 mg           19      7.5 mg         20      7.5 mg         21      7.5 mg         22      7.5 mg         23      7.5 mg         24            25                 26               27               28                    Date Details   02/17 This INR check       Date of next INR:  2/24/2017         How to take your warfarin dose     To take:  7.5 mg Take 1 of the 2.5 mg tablets and 1 of the 5 mg tablets.

## 2017-02-17 NOTE — PROGRESS NOTES
ANTICOAGULATION FOLLOW-UP CLINIC VISIT    Patient Name:  Charlee Marks  Date:  2/17/2017  Contact Type:  Face to Face    SUBJECTIVE:     Patient Findings     Positives Change in medications (pt restarted her MVI with Vit K about 3 weeks ago), Change in diet/appetite (Pt eatsd Vit K foods about 1 time a week), Activity level change (activity is slightly increased as she is feeling better.  She hopes to go back to work in March)           OBJECTIVE    INR Protime   Date Value Ref Range Status   02/17/2017 2.0 (A) 0.86 - 1.14 Final       ASSESSMENT / PLAN  INR assessment THER    Recheck INR In: 1 WEEK    INR Location Clinic      Anticoagulation Summary as of 2/17/2017     INR goal 2.0-3.0   Today's INR 2.0   Maintenance plan 7.5 mg (2.5 mg x 1 and 5 mg x 1) every day   Full instructions 7.5 mg every day   Weekly total 52.5 mg   Plan last modified Yamileth Mcghee RN (2/17/2017)   Next INR check 2/24/2017   Priority INR   Target end date     Indications   Portal vein thrombosis [I81]  Long-term (current) use of anticoagulants [Z79.01] [Z79.01]         Anticoagulation Episode Summary     INR check location [facility]    Preferred lab     Send INR reminders to CL ANTICOAG POOL    Comments  +      Anticoagulation Care Providers     Provider Role Specialty Phone number    Yasmin Mckenna MD [facility] [phone]            See the Encounter Report to view Anticoagulation Flowsheet and Dosing Calendar (Go to Encounters tab in chart review, and find the Anticoagulation Therapy Visit)        Yamileth Mcghee RN

## 2017-02-23 ENCOUNTER — HOSPITAL ENCOUNTER (OUTPATIENT)
Dept: LAB | Facility: CLINIC | Age: 62
Discharge: HOME | End: 2017-02-23
Attending: INTERNAL MEDICINE | Admitting: INTERNAL MEDICINE
Payer: COMMERCIAL

## 2017-02-23 ENCOUNTER — ONCOLOGY VISIT (OUTPATIENT)
Dept: ONCOLOGY | Facility: CLINIC | Age: 62
End: 2017-02-23
Attending: INTERNAL MEDICINE
Payer: COMMERCIAL

## 2017-02-23 VITALS
SYSTOLIC BLOOD PRESSURE: 124 MMHG | WEIGHT: 293 LBS | TEMPERATURE: 97.6 F | HEIGHT: 67 IN | RESPIRATION RATE: 18 BRPM | OXYGEN SATURATION: 98 % | HEART RATE: 68 BPM | DIASTOLIC BLOOD PRESSURE: 66 MMHG | BODY MASS INDEX: 45.99 KG/M2

## 2017-02-23 DIAGNOSIS — D64.9 ANEMIA, UNSPECIFIED TYPE: ICD-10-CM

## 2017-02-23 DIAGNOSIS — D69.3 IDIOPATHIC THROMBOCYTOPENIC PURPURA (H): ICD-10-CM

## 2017-02-23 DIAGNOSIS — K75.9 HEPATITIS: ICD-10-CM

## 2017-02-23 DIAGNOSIS — K55.069 SUPERIOR MESENTERIC VEIN THROMBOSIS (H): ICD-10-CM

## 2017-02-23 DIAGNOSIS — D69.3 IDIOPATHIC THROMBOCYTOPENIC PURPURA (H): Primary | ICD-10-CM

## 2017-02-23 LAB
BASOPHILS # BLD AUTO: 0.1 10E9/L (ref 0–0.2)
BASOPHILS NFR BLD AUTO: 0.6 %
DIFFERENTIAL METHOD BLD: ABNORMAL
EOSINOPHIL # BLD AUTO: 0.2 10E9/L (ref 0–0.7)
EOSINOPHIL NFR BLD AUTO: 2.2 %
ERYTHROCYTE [DISTWIDTH] IN BLOOD BY AUTOMATED COUNT: 16.9 % (ref 10–15)
HCT VFR BLD AUTO: 36 % (ref 35–47)
HGB BLD-MCNC: 10.7 G/DL (ref 11.7–15.7)
IMM GRANULOCYTES # BLD: 0 10E9/L (ref 0–0.4)
IMM GRANULOCYTES NFR BLD: 0.1 %
LYMPHOCYTES # BLD AUTO: 2.6 10E9/L (ref 0.8–5.3)
LYMPHOCYTES NFR BLD AUTO: 30.6 %
MCH RBC QN AUTO: 25.1 PG (ref 26.5–33)
MCHC RBC AUTO-ENTMCNC: 29.7 G/DL (ref 31.5–36.5)
MCV RBC AUTO: 85 FL (ref 78–100)
MONOCYTES # BLD AUTO: 1.1 10E9/L (ref 0–1.3)
MONOCYTES NFR BLD AUTO: 12.9 %
NEUTROPHILS # BLD AUTO: 4.5 10E9/L (ref 1.6–8.3)
NEUTROPHILS NFR BLD AUTO: 53.6 %
PLATELET # BLD AUTO: 236 10E9/L (ref 150–450)
RBC # BLD AUTO: 4.26 10E12/L (ref 3.8–5.2)
WBC # BLD AUTO: 8.3 10E9/L (ref 4–11)

## 2017-02-23 PROCEDURE — 99211 OFF/OP EST MAY X REQ PHY/QHP: CPT

## 2017-02-23 PROCEDURE — 99215 OFFICE O/P EST HI 40 MIN: CPT | Performed by: INTERNAL MEDICINE

## 2017-02-23 PROCEDURE — 36415 COLL VENOUS BLD VENIPUNCTURE: CPT | Performed by: INTERNAL MEDICINE

## 2017-02-23 PROCEDURE — 85025 COMPLETE CBC W/AUTO DIFF WBC: CPT | Performed by: INTERNAL MEDICINE

## 2017-02-23 RX ORDER — PREDNISONE 5 MG/1
TABLET ORAL
Qty: 60 TABLET | Refills: 1 | Status: SHIPPED | OUTPATIENT
Start: 2017-02-23 | End: 2017-04-20

## 2017-02-23 ASSESSMENT — PAIN SCALES - GENERAL: PAINLEVEL: NO PAIN (0)

## 2017-02-23 NOTE — LETTER
2/24/2017     Re: Charlee Marks    To: Whom it May Concern,     Charlee Marks is under my medical care for the treatment of ITP and was last seen by myself in clinic on 2/23/17. Due to the nature of Charlee's disease I recommend that she work no more than 4 hours per day 5 days per week through the end of April 2017 at which time she will be re-evaluated regarding workability.    If you have any further questions or concerns, please do not hesitate to contact me.      Sincerely,      Kerrie Rankin MD  [facility] CANCER [facility]  [address] [address]  Phone: [phone]

## 2017-02-23 NOTE — PROGRESS NOTES
CC: ITP s/p IVIG, Rituxan, Nplate, splenectomy, portal vein and SMV thromboses end of 12/2016.      HISTORY OF PRESENT ILLNESS:  The patient Charlee Marks is a 60-year-old female who presented with 2-3 months of easy bruising and also fatigue.  She presented to her Primary Care Physician's office on 07/25/2016 and was found to have critical thrombocytopenia with a platelet count of 3.  She was admitted through the Emergency Department with a platelet count confirmed at 3, with a normal white count, hemoglobin, MCV and other parameters.  She was offered IVIG 1 gram on 7/25/2016 and 4 days of   Dexamethasone. She was d/c with PL of 38 on 7/27/2016. PL dip to 13 on 8/4 and 7 on 8/11. She is offered wklyI IVIG 1g/kg starting 8/4/2016, and 2nd cycle of dex x 4 days on 8/11. PL improves to 77 on 8/15, then dip to 31 on 8/18.   Due to rapid drop of her PL when off dex, tx was changed to po prednisone 8/18 with wkly IVIG. R was added on 8/30/2016. She had no PL response despite R, steroid and IVIG. Nplate was started 9/23/2016. PL up from 20 to 110 after 2 doses of it. Then dip down again.   She saw Dr. Brian at  plan had splenectomy 12/13/2016. She is on prednisone slow  taper.   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 She was admitted to , had IR thrombectomy and catheter-directed lytic therapy to portal vein. Following the procedure she was placed on high intensity heparin drip which was then bridged with warfarin starting 1/9/2017 with an INR goal of 2-3.    PAST MEDICAL HISTORY:      1.  Cystocele.    2.  Morbid obesity.    3.  Reflux.    4.  Intermittent asthma.    5.  Effusion of lower leg joints.        FAMILY HISTORY:  The patient denies a family history of blood disease, thrombocytopenia or cancer.        SOCIAL HISTORY:  The patient lives in [facility] and works in the school district.  She is  with children.  She denies the use of alcohol or tobacco.  "     ROS  She is still recovering from splenectomy, and IR thrombolytic therapy.   She is doing coumadin now.            PHYSICAL EXAMINATION:    VITAL SIGNS:  Blood pressure 124/66, pulse 68, temperature 97.6  F (36.4  C), temperature source Tympanic, resp. rate 18, height 1.702 m (5' 7.01\"), weight (!) 138.4 kg (305 lb 3.2 oz), last menstrual period 09/15/2007, SpO2 98 %, not currently breastfeeding.  GENERAL APPEARANCE:  A middle-aged woman who appears her stated age, very pleasant, morbidly obese, sitting comfortably in a chair and knitting.    HEENT: The patient is normocephalic, atraumatic. Pupils are equally reactive to light.  Sclerae are anicteric.  There is moist oral mucosa, negative pharynx, no oral thrush.    NECK:  Supple, no jugular venous distention, thyroid not palpable.    LYMPH NODES:  Superficial lymphadenopathy is not appreciable in the bilateral cervical, supraclavicular, axillary or inguinal areas.    CARDIOVASCULAR:  S1, S2 regular with no murmurs or gallops, no carotid or abdominal bruits.    PULMONARY:  Lungs are clear to auscultation and percussion bilaterally.  There is no wheezing or rhonchi.    GASTROINTESTINAL:  Abdomen is soft, nontender with no hepatosplenomegaly, no signs of ascites and no mass appreciable.    MUSCULOSKELETAL/EXTREMITIES:  No edema, no cyanotic changes, no signs of joint deformity, no lymphedema.    NEUROLOGIC:  Cranial nerves II-XII are grossly intact, sensation intact, muscle strength and muscle tone symmetrical, 5/5 throughout.    BACK:  No spinal or paraspinal tenderness, no CVA tenderness.    SKIN:  No petechiae, no rash and no signs of cellulitis.  There is scattered bruising on forearms and thighs, which are healing.        LABORATORY DATA:    Today PL  s> 250, hb 10-11    Current Image  CT 1/9/2017   1. Status post splenectomy with postsurgical changes in the left upper quadrant. Persistent thrombosis of the splenic vein.  2. Main portal vein now appears " patent with possible peripheral nonocclusive thrombosis/periportal edema. Residual thrombosis of portion of the left branch of the portal vein and the right posterior  branch of the portal vein.  3. Residual partial thrombosis of the distal portion of the superior mesenteric vein.   4. Small focal nodular opacities in both lower lobes, likely of infectious etiology.    12/31/2016 CT abd - thrombosis of the portal vein, splenic vein.    Old data reviewed with summary  Right after splenectomy 12/13/2016 wbc 14, neutrophiila, PL >600, Hb 9.2  PL at 91 on 12/8/2016, at 51 in 11/2016 at 28 from 43 from 110 early Oct, from 31 on 8/18, at 77 on 8/15, at 7 on 8/11, at 14 on 8/4/2016, at 38 7/27/2016  On 07/26/2016 platelets 8, lymphopenia with lymphocyte count 0.6.    On 07/25/2016 platelets 3.   smear: no dysplasia or schistocytes.     HepB DNA by PCR 10/2016 : negative    9/2016 H. Pylori breath test/stool antigen: negative.     8/2016 hep Bs Ag -,BsAb+, BcAb+, HepC -, for which she saw UGI and started on hepB med Tenofovir 9/8/2016.     CT a/p 10/2016: The spleen is borderline enlarged at 13.3 cm.   CT neck 9/15/2016: No enlarged or necrotic-appearing cervical lymph nodes are seen. No metastatic lesions are identified.  CT head 8/11: negative.           ASSESSMENT/PLAN:    1.  dx ITP in 7/2016 .  PL up to > 600 post splenectomy.   Advice further taper prednisone to 2.5 mg po qd. Cbc q 4 wks.   F/u in 2 months.     2. + hepBcAb, - HepBsAg, HepBsAg-,HebB DNA - by PCR, she saw GI due to the use of Rituxan, started on Tenofovir 9/8/2016.    Advice limit Tylenol intake.     3. Post op anemia due to intra op bleed. Stable since d/c. No need for transfusion at this point. Will follow.     4. 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 S/p thrombolysis to portal vein, lovenox and now on coumadin. She needs minimal 3 likely 6 months of anti coagulation.

## 2017-02-23 NOTE — PATIENT INSTRUCTIONS
Dr. Rankin recommends that you change your CBC to monthly and would like to see you back in 2 months.  Copy of appointments, and after visit summary (AVS) given to patient.  If you have any questions please call Jenny Osullivan RN, BSN, OCN Oncology Hematology  [facility] [phone]. For questions after business hours, or on holidays/weekends, please call our after hours Nurse Triage line [phone]. Thank you.

## 2017-02-23 NOTE — MR AVS SNAPSHOT
After Visit Summary   2/23/2017    Charlee Marks    MRN: [id number]           Patient Information     Date Of Birth          1955        Visit Information        Provider Department      2/23/2017 10:15 AM Kerrie Rankin MD [facility]        Today's Diagnoses     ITP (idiopathic thrombocytopenic purpura)    -  1    Superior mesenteric vein thrombosis        Anemia, unspecified type        Hepatitis        Idiopathic thrombocytopenic purpura (H)           Follow-ups after your visit        Your next 10 appointments already scheduled     Feb 24, 2017  3:15 PM CST   Anticoagulation Visit with CL ANTI COAG   [facility]    [address] [address]   [phone]            Mar 20, 2017 10:00 AM CDT   LAB with CL LAB   [facility])    [address] [address]   [phone]           Patient must bring picture ID.  Patient should be prepared to give a urine specimen  Please do not eat 10-12 hours before your appointment if you are coming in fasting for labs on lipids, cholesterol, or glucose (sugar).  Pregnant women should follow their Care Team instructions. Water with medications is okay. Do not drink coffee or other fluids.   If you have concerns about taking  your medications, please ask at office or if scheduling via Mersimot, send a message by clicking on Secure Messaging, Message Your Care Team.            Apr 14, 2017  9:45 AM CDT   Lab with  LAB   [facility] ([facility])    [address]  1st [address]  [place] [place] [address] [phone]            Apr 14, 2017 11:00 AM CDT   (Arrive by 10:45 AM)   Return General Liver with Suzie Valdes PA-C   [facility] ([facility])    [address]  3rd [address]  [facility] [id number] [phone]            Apr 17, 2017  10:00 AM CDT   LAB with CL LAB   [facility] ([facility])    [address]  [facility] [address] [phone]           Patient must bring picture ID.  Patient should be prepared to give a urine specimen  Please do not eat 10-12 hours before your appointment if you are coming in fasting for labs on lipids, cholesterol, or glucose (sugar).  Pregnant women should follow their Care Team instructions. Water with medications is okay. Do not drink coffee or other fluids.   If you have concerns about taking  your medications, please ask at office or if scheduling via Unbxd, send a message by clicking on Secure Messaging, Message Your Care Team.            Apr 20, 2017 10:15 AM CDT   Return Visit with Kerrie Rankin MD   [facility] ([facility])    [facility]  [address]  [facility] [address]   [phone]              Future tests that were ordered for you today     Open Standing Orders        Priority Remaining Interval Expires Ordered    CBC with platelets differential Routine 6/6 monthly 2/23/2018 2/23/2017            Who to contact     If you have questions or need follow up information about today's clinic visit or your schedule please contact [facility] CANCER [facility] directly at [phone].  Normal or non-critical lab and imaging results will be communicated to you by MyChart, letter or phone within 4 business days after the clinic has received the results. If you do not hear from us within 7 days, please contact the clinic through MyChart or phone. If you have a critical or abnormal lab result, we will notify you by phone as soon as possible.  Submit refill requests through Unbxd or call your pharmacy and they will forward the refill request to us. Please allow 3 business days for your refill to be completed.          Additional Information About Your Visit        Lexplique - /l?k â€¢ [email or web address] Information     Unbxd gives you  "secure access to your electronic health record. If you see a primary care provider, you can also send messages to your care team and make appointments. If you have questions, please call your primary care clinic.  If you do not have a primary care provider, please call [phone] and they will assist you.        Care EveryWhere ID     This is your Care EveryWhere ID. This could be used by other organizations to access your [place] medical records  [id number]        Your Vitals Were     Pulse Temperature Respirations Height Last Period Pulse Oximetry    68 97.6  F (36.4  C) (Tympanic) 18 1.702 m (5' 7.01\") 09/15/2007 98%    Breastfeeding? BMI (Body Mass Index)                No 47.79 kg/m2           Blood Pressure from Last 3 Encounters:   02/23/17 124/66   02/03/17 142/86   01/26/17 108/80    Weight from Last 3 Encounters:   02/23/17 (!) 138.4 kg (305 lb 3.2 oz)   02/03/17 133.6 kg (294 lb 9.6 oz)   01/26/17 133.7 kg (294 lb 12.8 oz)                 Today's Medication Changes          These changes are accurate as of: 2/23/17 10:39 AM.  If you have any questions, ask your nurse or doctor.               These medicines have changed or have updated prescriptions.        Dose/Directions    predniSONE 5 MG tablet   Commonly known as:  DELTASONE   This may have changed:  additional instructions   Used for:  Idiopathic thrombocytopenic purpura (H)   Changed by:  Kerrie Rankin MD        2.5 mg po q q   Quantity:  60 tablet   Refills:  1       ranitidine 150 MG tablet   Commonly known as:  ZANTAC   This may have changed:  See the new instructions.   Used for:  Esophageal reflux        ONE TABLET TWICE DAILY as needed will call when needed   Quantity:  180 Tab   Refills:  3            Where to get your medicines      These medications were sent to [facility] [place], [place] - [address] [place] [address]  [address] [place] [address]     Phone:  [phone]     predniSONE 5 MG tablet                Primary " Care Provider Office Phone # Fax #    Yasmin Evelyn Mckenna [phone]       [facility] [address]  [facility] [address]        Thank you!     Thank you for choosing [facility]  for your care. Our goal is always to provide you with excellent care. Hearing back from our patients is one way we can continue to improve our services. Please take a few minutes to complete the written survey that you may receive in the mail after your visit with us. Thank you!             Your Updated Medication List - Protect others around you: Learn how to safely use, store and throw away your medicines at www.disposemymeds.org.          This list is accurate as of: 2/23/17 10:39 AM.  Always use your most recent med list.                   Brand Name Dispense Instructions for use    albuterol 108 (90 BASE) MCG/ACT Inhaler    PROAIR HFA/PROVENTIL HFA/VENTOLIN HFA    1 Inhaler    Inhale 2 puffs into the lungs every 4 hours as needed for shortness of breath / dyspnea       B-6 100 MG Tabs      Take 1 tablet by mouth daily       calcium carbonate-vitamin D 500-400 MG-UNIT Tabs per tablet      Take 1 tablet by mouth 2 times daily       GLUCOSAMINE CHONDRO COMPLEX OR      on hold       loratadine 10 MG tablet    CLARITIN    90 Tab    ONE DAILY       MULTIVITAMIN TABS   OR      1 TABLET DAILY       omega 3 1000 MG Caps      Reported on 2/23/2017       oxyCODONE 5 MG IR tablet    ROXICODONE    50 tablet    Take 1-2 tablets (5-10 mg) by mouth every 3 hours as needed for moderate to severe pain       predniSONE 5 MG tablet    DELTASONE    60 tablet    2.5 mg po q q       ranitidine 150 MG tablet    ZANTAC    180 Tab    ONE TABLET TWICE DAILY as needed will call when needed       tenofovir 300 MG tablet    VIREAD    30 tablet    Take 1 tablet (300 mg) by mouth daily       TYLENOL PO      Take 1,000 mg by mouth every 4 hours as needed for mild pain or fever (Headaches)       * warfarin 5 MG tablet     COUMADIN    90 tablet    . As directed by Anticoagulation Clinic, current dose 7.5 mg daily, uses both 5 mg and 2.5 mg tablets       * warfarin 2.5 MG tablet    COUMADIN    90 tablet    As directed by Anticoagulation Clinic, current dose 7.5 mg daily   Uses both 5 mg and 2.5 mg tablets       * Notice:  This list has 2 medication(s) that are the same as other medications prescribed for you. Read the directions carefully, and ask your doctor or other care provider to review them with you.

## 2017-02-23 NOTE — NURSING NOTE
"Charlee Marks is a 61 year old female who presents for:  Chief Complaint   Patient presents with     Hematology     Recheck ITP, Labs today        Initial Vitals:  /66 (BP Location: Right arm, Patient Position: Chair, Cuff Size: Adult Large)  Pulse 68  Temp 97.6  F (36.4  C) (Tympanic)  Resp 18  Ht 1.702 m (5' 7.01\")  Wt (!) 138.4 kg (305 lb 3.2 oz)  LMP 09/15/2007  SpO2 98%  Breastfeeding? No  BMI 47.79 kg/m2 Estimated body mass index is 47.79 kg/(m^2) as calculated from the following:    Height as of this encounter: 1.702 m (5' 7.01\").    Weight as of this encounter: 138.4 kg (305 lb 3.2 oz).. Body surface area is 2.56 meters squared. BP completed using cuff size: large  No Pain (0) Patient's last menstrual period was 09/15/2007. Allergies and medications reviewed.     Medications: Medication refills not needed today.  Pharmacy name entered into RegistryLove:    [facility] - [place], [place] [facility] [address] [facility] [place] [place] - [address] AT PORTAL TO REGISTERED [facility] SITES    Comments: Recheck ITP, Labs today.   7  minutes for nursing intake (face to face time)   Lorena Wei CMA        "

## 2017-02-24 ENCOUNTER — CARE COORDINATION (OUTPATIENT)
Dept: SURGERY | Facility: CLINIC | Age: 62
End: 2017-02-24

## 2017-02-24 ENCOUNTER — ANTICOAGULATION THERAPY VISIT (OUTPATIENT)
Dept: ANTICOAGULATION | Facility: CLINIC | Age: 62
End: 2017-02-24
Payer: COMMERCIAL

## 2017-02-24 DIAGNOSIS — Z79.01 LONG-TERM (CURRENT) USE OF ANTICOAGULANTS: ICD-10-CM

## 2017-02-24 DIAGNOSIS — I81 PORTAL VEIN THROMBOSIS: ICD-10-CM

## 2017-02-24 LAB — INR POINT OF CARE: 1.5 (ref 0.86–1.14)

## 2017-02-24 PROCEDURE — 99207 ZZC NO CHARGE NURSE ONLY: CPT

## 2017-02-24 PROCEDURE — 85610 PROTHROMBIN TIME: CPT | Mod: QW

## 2017-02-24 PROCEDURE — 36416 COLLJ CAPILLARY BLOOD SPEC: CPT

## 2017-02-24 RX ORDER — WARFARIN SODIUM 5 MG/1
TABLET ORAL
Qty: 90 TABLET | Refills: 0 | COMMUNITY
Start: 2017-02-24 | End: 2017-04-07

## 2017-02-24 NOTE — PROGRESS NOTES
Called patient in follow up to recent clinic visit with Dr. Brian.  Charlee states that she continues to recover from the splenectomy and is followed closely by Dr. Rankin.  Her platelets are rising with most current at 236.  She is taking Coumadin.  She is up ad felicity, dilan po, afebrile, and her wounds are healing without signs or symptoms of infection.  She plans to return to work part time 3/1/17.      Overall, the patient is doing well.  Per Dr. Brian's note she can be discharged from the [facility] Surgery Clinic.  She will call with questions/concerns.

## 2017-02-24 NOTE — MR AVS SNAPSHOT
Charlee HERNANDEZ Kendrick   2/24/2017 3:15 PM   Anticoagulation Therapy Visit    Description:  61 year old female   Provider:  JAMAL ANTI COAG   Department:  Cl Anticoag           INR as of 2/24/2017     Today's INR 1.5!      Anticoagulation Summary as of 2/24/2017     INR goal 2.0-3.0   Today's INR 1.5!   Full instructions 10 mg on Fri, Sat; 7.5 mg all other days   Next INR check 3/3/2017    Indications   Portal vein thrombosis [I81]  Long-term (current) use of anticoagulants [Z79.01] [Z79.01]         Description     Recheck INR 1 week, 3/3/17  Take warfarin 10 mg Fri and Sat, 7.5 mg Sun, Mon, Tues, Wed, Thurs      Your next Anticoagulation Clinic appointment(s)     Mar 03, 2017  3:00 PM CST   Anticoagulation Visit with CL ANTI COAG   [facility] ([facility]    [address] [address] [phone]              Contact Numbers     Please call [phone] to cancel and/or reschedule your appointment.  Please call [phone] with any problems or questions regarding your therapy          February 2017 Details    Sun Mon Tue Wed Thu Fri Sat        1               2               3               4                 5               6               7               8               9               10               11                 12               13               14               15               16               17               18                 19               20               21               22               23               24      10 mg   See details      25      10 mg   See details        26      7.5 mg         27      7.5 mg         28      7.5 mg              Date Details   02/24 This INR check   no vit k veggies today      02/25 no vit k veggies today               How to take your warfarin dose     To take:  7.5 mg Take 1 of the 2.5 mg tablets and 1 of the 5 mg tablets.    To take:  10 mg Take 2 of the 5 mg tablets.           March 2017 Details    Sun Mon  Tue Wed Thu Fri Sat        1      7.5 mg         2      7.5 mg         3            4                 5               6               7               8               9               10               11                 12               13               14               15               16               17               18                 19               20               21               22               23               24               25                 26               27               28               29               30               31                 Date Details   No additional details    Date of next INR:  3/3/2017         How to take your warfarin dose     To take:  7.5 mg Take 1 of the 2.5 mg tablets and 1 of the 5 mg tablets.    To take:  10 mg Take 2 of the 5 mg tablets.

## 2017-02-24 NOTE — PROGRESS NOTES
ANTICOAGULATION FOLLOW-UP CLINIC VISIT    Patient Name:  Charlee Marks  Date:  2/24/2017  Contact Type:  Face to Face    SUBJECTIVE:     Patient Findings     Positives Activity level change (feeling a little better, activity is increasing), Missed doses (quite possibly frgot a 7.5 mg dose this week )    Comments Gave pt a medication box and taught how to treat missed doses           OBJECTIVE    INR Protime   Date Value Ref Range Status   02/24/2017 1.5 (A) 0.86 - 1.14 Final       ASSESSMENT / PLAN  INR assessment SUB    Recheck INR In: 1 WEEK    INR Location Clinic      Anticoagulation Summary as of 2/24/2017     INR goal 2.0-3.0   Today's INR 1.5!   Maintenance plan 10 mg (5 mg x 2) on Fri, Sat; 7.5 mg (2.5 mg x 1 and 5 mg x 1) all other days   Full instructions 10 mg on Fri, Sat; 7.5 mg all other days   Weekly total 57.5 mg   Plan last modified Yamileth Mcghee RN (2/24/2017)   Next INR check 3/3/2017   Priority INR   Target end date     Indications   Portal vein thrombosis [I81]  Long-term (current) use of anticoagulants [Z79.01] [Z79.01]         Anticoagulation Episode Summary     INR check location [facility]    Preferred lab     Send INR reminders to CL ANTICOAG POOL    Comments  +      Anticoagulation Care Providers     Provider Role Specialty Phone number    Yasmin Mckenna MD [facility] [phone]            See the Encounter Report to view Anticoagulation Flowsheet and Dosing Calendar (Go to Encounters tab in chart review, and find the Anticoagulation Therapy Visit)        Yamileth Mcghee RN

## 2017-03-02 ENCOUNTER — CARE COORDINATION (OUTPATIENT)
Dept: ONCOLOGY | Facility: CLINIC | Age: 62
End: 2017-03-02

## 2017-03-02 NOTE — PROGRESS NOTES
Long Term Disability Paperwork faxed to [employer] Attn: Ashley Barger at Fax #[phone]. Confirmation fax received and Pt notified that this was completed and a copy is filed at the  for her to . Pt verbalized understanding.

## 2017-03-03 ENCOUNTER — ANTICOAGULATION THERAPY VISIT (OUTPATIENT)
Dept: ANTICOAGULATION | Facility: CLINIC | Age: 62
End: 2017-03-03
Payer: COMMERCIAL

## 2017-03-03 DIAGNOSIS — Z79.01 LONG-TERM (CURRENT) USE OF ANTICOAGULANTS: ICD-10-CM

## 2017-03-03 DIAGNOSIS — I81 PORTAL VEIN THROMBOSIS: ICD-10-CM

## 2017-03-03 LAB — INR POINT OF CARE: 2.5 (ref 0.86–1.14)

## 2017-03-03 PROCEDURE — 36416 COLLJ CAPILLARY BLOOD SPEC: CPT

## 2017-03-03 PROCEDURE — 85610 PROTHROMBIN TIME: CPT | Mod: QW

## 2017-03-03 PROCEDURE — 99207 ZZC NO CHARGE NURSE ONLY: CPT

## 2017-03-03 NOTE — PROGRESS NOTES
ANTICOAGULATION FOLLOW-UP CLINIC VISIT    Patient Name:  Charlee Marks  Date:  3/3/2017  Contact Type:  Face to Face    SUBJECTIVE:     Patient Findings     Positives Change in diet/appetite (stable diet), Activity level change (pt was back to work for 3 half days this week.  she will be working 5 half days next week )    Comments States she tired but managed better than she thought           OBJECTIVE    INR Protime   Date Value Ref Range Status   03/03/2017 2.5 (A) 0.86 - 1.14 Final       ASSESSMENT / PLAN  INR assessment THER    Recheck INR In: 1 WEEK    INR Location Clinic      Anticoagulation Summary as of 3/3/2017     INR goal 2.0-3.0   Today's INR 2.5   Maintenance plan 10 mg (5 mg x 2) on Mon, Fri; 7.5 mg (2.5 mg x 1 and 5 mg x 1) all other days   Full instructions 10 mg on Mon, Fri; 7.5 mg all other days   Weekly total 57.5 mg   Plan last modified Yamileth Mcghee RN (3/3/2017)   Next INR check 3/10/2017   Priority INR   Target end date     Indications   Portal vein thrombosis [I81]  Long-term (current) use of anticoagulants [Z79.01] [Z79.01]         Anticoagulation Episode Summary     INR check location [facility]    Preferred lab     Send INR reminders to CL ANTICOAG POOL    Comments  +      Anticoagulation Care Providers     Provider Role Specialty Phone number    Yasmin Mckenna MD [facility] [phone]            See the Encounter Report to view Anticoagulation Flowsheet and Dosing Calendar (Go to Encounters tab in chart review, and find the Anticoagulation Therapy Visit)        Yamileth Mcghee RN

## 2017-03-03 NOTE — MR AVS SNAPSHOT
Charlee HERNANDEZ Kendrick   3/3/2017 3:00 PM   Anticoagulation Therapy Visit    Description:  61 year old female   Provider:  JAMAL ANTI COAG   Department:  Cl Anticoag           INR as of 3/3/2017     Today's INR 2.5      Anticoagulation Summary as of 3/3/2017     INR goal 2.0-3.0   Today's INR 2.5   Full instructions 10 mg on Mon, Fri; 7.5 mg all other days   Next INR check 3/10/2017    Indications   Portal vein thrombosis [I81]  Long-term (current) use of anticoagulants [Z79.01] [Z79.01]         Description     Recheck INR 1 week, 3/10/17  Take warfarin 10 mg Mon, Fri, 7.5 mg rest of week       Your next Anticoagulation Clinic appointment(s)     Mar 10, 2017 11:00 AM CST   Anticoagulation Visit with CL ANTI COAG   [facility] ([facility]    [address] [address] [id number] [phone]              Contact Numbers     Please call [phone] to cancel and/or reschedule your appointment.  Please call [phone] with any problems or questions regarding your therapy          March 2017 Details    Sun Mon Tue Wed Thu Fri Sat        1               2               3      10 mg   See details      4      7.5 mg           5      7.5 mg         6      10 mg         7      7.5 mg         8      7.5 mg         9      7.5 mg         10            11                 12               13               14               15               16               17               18                 19               20               21               22               23               24               25                 26               27               28               29               30               31                 Date Details   03/03 This INR check       Date of next INR:  3/10/2017         How to take your warfarin dose     To take:  7.5 mg Take 1 of the 2.5 mg tablets and 1 of the 5 mg tablets.    To take:  10 mg Take 2 of the 5 mg tablets.

## 2017-03-10 ENCOUNTER — ANTICOAGULATION THERAPY VISIT (OUTPATIENT)
Dept: ANTICOAGULATION | Facility: CLINIC | Age: 62
End: 2017-03-10
Payer: COMMERCIAL

## 2017-03-10 DIAGNOSIS — I81 PORTAL VEIN THROMBOSIS: ICD-10-CM

## 2017-03-10 DIAGNOSIS — Z79.01 LONG-TERM (CURRENT) USE OF ANTICOAGULANTS: ICD-10-CM

## 2017-03-10 LAB — INR POINT OF CARE: 2.9 (ref 0.86–1.14)

## 2017-03-10 PROCEDURE — 36416 COLLJ CAPILLARY BLOOD SPEC: CPT

## 2017-03-10 PROCEDURE — 85610 PROTHROMBIN TIME: CPT | Mod: QW

## 2017-03-10 PROCEDURE — 99207 ZZC NO CHARGE NURSE ONLY: CPT

## 2017-03-10 NOTE — MR AVS SNAPSHOT
Charlee HERNANDEZ Kendrick   3/10/2017 11:00 AM   Anticoagulation Therapy Visit    Description:  61 year old female   Provider:  CL ANTI COAG   Department:  Cl Anticoag           INR as of 3/10/2017     Today's INR 2.9      Anticoagulation Summary as of 3/10/2017     INR goal 2.0-3.0   Today's INR 2.9   Full instructions 10 mg on Mon, Fri; 7.5 mg all other days   Next INR check 3/24/2017    Indications   Portal vein thrombosis [I81]  Long-term (current) use of anticoagulants [Z79.01] [Z79.01]         Description     Recheck INR 2 weeks, 3/24/17  Continue warfarin 10 mg Mon, 7.5 mg rest of weeks      Your next Anticoagulation Clinic appointment(s)     Mar 10, 2017 11:00 AM CST   Anticoagulation Visit with CL ANTI COAG   [facility] ([facility])    [address] [address] [facility] [address] [address]   [phone]            Mar 24, 2017  3:30 PM CDT   Anticoagulation Visit with CL ANTI COAG   [facility] ([facility])    [address] [address] [address]   [phone]              Contact Numbers     Please call [phone] to cancel and/or reschedule your appointment.  Please call [phone] with any problems or questions regarding your therapy          March 2017 Details    Sun Mon Tue Wed Thu Fri Sat        1               2               3               4                 5               6               7               8               9               10      10 mg   See details      11      7.5 mg           12      7.5 mg         13      10 mg         14      7.5 mg         15      7.5 mg         16      7.5 mg         17      10 mg         18      7.5 mg           19      7.5 mg         20      10 mg         21      7.5 mg         22      7.5 mg         23      7.5 mg         24            25                 26               27               28               29               30               31                 Date Details   03/10 This INR  check   have a serving of Vit K        Date of next INR:  3/24/2017         How to take your warfarin dose     To take:  7.5 mg Take 1 of the 2.5 mg tablets and 1 of the 5 mg tablets.    To take:  10 mg Take 2 of the 5 mg tablets.

## 2017-03-10 NOTE — PROGRESS NOTES
ANTICOAGULATION FOLLOW-UP CLINIC VISIT    Patient Name:  Charlee Marks  Date:  3/10/2017  Contact Type:  Face to Face    SUBJECTIVE:     Patient Findings     Positives Change in diet/appetite (encouraged her to have at least 1 serving of Vit K food a week), Activity level change (pt has been back to work half days fo 6 days.  She will be on vacation next  week as it is spring break.)    Comments Also encouraged her to walk and be active during her vacation so she does not lose the progress she has gained  in getting  back to her baseline, verbalized understanding           OBJECTIVE    INR Protime   Date Value Ref Range Status   03/10/2017 2.9 (A) 0.86 - 1.14 Final       ASSESSMENT / PLAN  INR assessment THER    Recheck INR In: 2 WEEKS    INR Location Clinic      Anticoagulation Summary as of 3/10/2017     INR goal 2.0-3.0   Today's INR 2.9   Maintenance plan 10 mg (5 mg x 2) on Mon, Fri; 7.5 mg (2.5 mg x 1 and 5 mg x 1) all other days   Full instructions 10 mg on Mon, Fri; 7.5 mg all other days   Weekly total 57.5 mg   No change documented Yamileth Mcghee RN   Plan last modified Yamileth Mcghee RN (3/3/2017)   Next INR check 3/24/2017   Priority INR   Target end date     Indications   Portal vein thrombosis [I81]  Long-term (current) use of anticoagulants [Z79.01] [Z79.01]         Anticoagulation Episode Summary     [facility] check [facility]    Preferred lab     Send INR reminders to CL ANTICOAG POOL    Comments  +      Anticoagulation Care Providers     Provider Role Specialty Phone number    Yasmin Mckenna MD [facility] [phone]            See the Encounter Report to view Anticoagulation Flowsheet and Dosing Calendar (Go to Encounters tab in chart review, and find the Anticoagulation Therapy Visit)        Yamileth Mcghee RN

## 2017-03-20 DIAGNOSIS — K55.069 SUPERIOR MESENTERIC VEIN THROMBOSIS (H): ICD-10-CM

## 2017-03-20 DIAGNOSIS — D69.3 IDIOPATHIC THROMBOCYTOPENIC PURPURA (H): ICD-10-CM

## 2017-03-20 LAB
BASOPHILS # BLD AUTO: 0.1 10E9/L (ref 0–0.2)
BASOPHILS NFR BLD AUTO: 0.7 %
DIFFERENTIAL METHOD BLD: ABNORMAL
EOSINOPHIL # BLD AUTO: 0.1 10E9/L (ref 0–0.7)
EOSINOPHIL NFR BLD AUTO: 1 %
ERYTHROCYTE [DISTWIDTH] IN BLOOD BY AUTOMATED COUNT: 17.9 % (ref 10–15)
HCT VFR BLD AUTO: 34.4 % (ref 35–47)
HGB BLD-MCNC: 10.3 G/DL (ref 11.7–15.7)
LYMPHOCYTES # BLD AUTO: 3 10E9/L (ref 0.8–5.3)
LYMPHOCYTES NFR BLD AUTO: 32.2 %
MCH RBC QN AUTO: 24.9 PG (ref 26.5–33)
MCHC RBC AUTO-ENTMCNC: 29.9 G/DL (ref 31.5–36.5)
MCV RBC AUTO: 83 FL (ref 78–100)
MONOCYTES # BLD AUTO: 1 10E9/L (ref 0–1.3)
MONOCYTES NFR BLD AUTO: 10.4 %
NEUTROPHILS # BLD AUTO: 5.3 10E9/L (ref 1.6–8.3)
NEUTROPHILS NFR BLD AUTO: 55.7 %
PLATELET # BLD AUTO: 250 10E9/L (ref 150–450)
RBC # BLD AUTO: 4.13 10E12/L (ref 3.8–5.2)
WBC # BLD AUTO: 9.4 10E9/L (ref 4–11)

## 2017-03-20 PROCEDURE — 85025 COMPLETE CBC W/AUTO DIFF WBC: CPT | Performed by: INTERNAL MEDICINE

## 2017-03-20 PROCEDURE — 36415 COLL VENOUS BLD VENIPUNCTURE: CPT | Performed by: INTERNAL MEDICINE

## 2017-03-24 ENCOUNTER — ANTICOAGULATION THERAPY VISIT (OUTPATIENT)
Dept: ANTICOAGULATION | Facility: CLINIC | Age: 62
End: 2017-03-24
Payer: COMMERCIAL

## 2017-03-24 DIAGNOSIS — Z79.01 LONG-TERM (CURRENT) USE OF ANTICOAGULANTS: ICD-10-CM

## 2017-03-24 DIAGNOSIS — I81 PORTAL VEIN THROMBOSIS: ICD-10-CM

## 2017-03-24 LAB — INR POINT OF CARE: 3.4 (ref 0.86–1.14)

## 2017-03-24 PROCEDURE — 36416 COLLJ CAPILLARY BLOOD SPEC: CPT

## 2017-03-24 PROCEDURE — 85610 PROTHROMBIN TIME: CPT | Mod: QW

## 2017-03-24 PROCEDURE — 99207 ZZC NO CHARGE NURSE ONLY: CPT

## 2017-03-24 NOTE — MR AVS SNAPSHOT
Charlee HERNANDEZ Kendrick   3/24/2017 3:30 PM   Anticoagulation Therapy Visit    Description:  61 year old female   Provider:  CL ANTI COAG   Department:  Cl Anticoag           INR as of 3/24/2017     Today's INR 3.4!      Anticoagulation Summary as of 3/24/2017     INR goal 2.0-3.0   Today's INR 3.4!   Full instructions 3/24: 7.5 mg; Otherwise 10 mg on Mon, Fri; 7.5 mg all other days   Next INR check 4/7/2017    Indications   Portal vein thrombosis [I81]  Long-term (current) use of anticoagulants [Z79.01] [Z79.01]         Description     Recheck INR 2 weeks, 4/7/17  Take warfarin 7.5 mg today, then resume usual dose of 10 mg Mon, Fri, 7.5 mg rest of week   Have 2 servings of greens a week         Your next Anticoagulation Clinic appointment(s)     Mar 24, 2017  3:30 PM CDT   Anticoagulation Visit with CL ANTI COAG   [facility]    [address] [address] [address]   [phone]            Apr 07, 2017  3:15 PM CDT   Anticoagulation Visit with CL ANTI COAG   [facility]    [address] [address] [address]   [phone]              Contact Numbers     Please call [phone] to cancel and/or reschedule your appointment.  Please call [phone] with any problems or questions regarding your therapy          March 2017 Details    Sun Mon Tue Wed Thu Fri Sat        1               2               3               4                 5               6               7               8               9               10               11                 12               13               14               15               16               17               18                 19               20               21               22               23               24      7.5 mg   See details      25      7.5 mg           26      7.5 mg         27      10 mg         28      7.5 mg         29      7.5 mg         30       7.5 mg         31      10 mg           Date Details   03/24 This INR check   Take 7.5 mg (2.5 mg tablets x 1 and 5 mg tablets x 1)   serving of greens today               How to take your warfarin dose     To take:  7.5 mg Take 1 of the 2.5 mg tablets and 1 of the 5 mg tablets.    To take:  10 mg Take 2 of the 5 mg tablets.           April 2017 Details    Sun Mon Tue Wed Thu Fri Sat           1      7.5 mg           2      7.5 mg         3      10 mg         4      7.5 mg         5      7.5 mg         6      7.5 mg         7            8                 9               10               11               12               13               14               15                 16               17               18               19               20               21               22                 23               24               25               26               27               28               29                 30                      Date Details   No additional details    Date of next INR:  4/7/2017         How to take your warfarin dose     To take:  7.5 mg Take 1 of the 2.5 mg tablets and 1 of the 5 mg tablets.    To take:  10 mg Take 2 of the 5 mg tablets.

## 2017-03-24 NOTE — PROGRESS NOTES
ANTICOAGULATION FOLLOW-UP CLINIC VISIT    Patient Name:  Charlee Marks  Date:  3/24/2017  Contact Type:  Face to Face    SUBJECTIVE:     Patient Findings     Positives Change in diet/appetite (pt is drinking a serving of Boost M-F as part of her lunch since going back to work.  She will also try to have 2 servings of Vit K foods a week.  She has had her Boost this week but no Vit K veggies, she will have a serving tonight), Activity level change (she is now back to work full days, she was off work last week)           OBJECTIVE    INR Protime   Date Value Ref Range Status   03/24/2017 3.4 (A) 0.86 - 1.14 Final       ASSESSMENT / PLAN  INR assessment SUPRA    Recheck INR In: 2 WEEKS    INR Location Clinic      Anticoagulation Summary as of 3/24/2017     INR goal 2.0-3.0   Today's INR 3.4!   Maintenance plan 10 mg (5 mg x 2) on Mon, Fri; 7.5 mg (2.5 mg x 1 and 5 mg x 1) all other days   Full instructions 3/24: 7.5 mg; Otherwise 10 mg on Mon, Fri; 7.5 mg all other days   Weekly total 57.5 mg   Plan last modified Yamileth Mcghee RN (3/3/2017)   Next INR check 4/7/2017   Priority INR   Target end date     Indications   Portal vein thrombosis [I81]  Long-term (current) use of anticoagulants [Z79.01] [Z79.01]         Anticoagulation Episode Summary     [facility] check location [facility]    Preferred lab     Send INR reminders to  ANTICOAG POOL    Comments  +      Anticoagulation Care Providers     Provider Role Specialty Phone number    Yasmin Mckenna MD [facility] [phone]            See the Encounter Report to view Anticoagulation Flowsheet and Dosing Calendar (Go to Encounters tab in chart review, and find the Anticoagulation Therapy Visit)        Yamileth Mcghee RN

## 2017-04-07 ENCOUNTER — ANTICOAGULATION THERAPY VISIT (OUTPATIENT)
Dept: ANTICOAGULATION | Facility: CLINIC | Age: 62
End: 2017-04-07
Payer: COMMERCIAL

## 2017-04-07 DIAGNOSIS — Z79.01 LONG-TERM (CURRENT) USE OF ANTICOAGULANTS: ICD-10-CM

## 2017-04-07 DIAGNOSIS — I81 PORTAL VEIN THROMBOSIS: ICD-10-CM

## 2017-04-07 LAB — INR POINT OF CARE: 3.8 (ref 0.86–1.14)

## 2017-04-07 PROCEDURE — 99207 ZZC NO CHARGE NURSE ONLY: CPT

## 2017-04-07 PROCEDURE — 85610 PROTHROMBIN TIME: CPT | Mod: QW

## 2017-04-07 PROCEDURE — 36416 COLLJ CAPILLARY BLOOD SPEC: CPT

## 2017-04-07 RX ORDER — WARFARIN SODIUM 2.5 MG/1
TABLET ORAL
Qty: 90 TABLET | Refills: 0 | COMMUNITY
Start: 2017-04-07 | End: 2017-04-21

## 2017-04-07 RX ORDER — WARFARIN SODIUM 5 MG/1
TABLET ORAL
Qty: 90 TABLET | Refills: 0 | COMMUNITY
Start: 2017-04-07 | End: 2017-04-21

## 2017-04-07 NOTE — PROGRESS NOTES
ANTICOAGULATION FOLLOW-UP CLINIC VISIT    Patient Name:  Charlee Marks  Date:  4/7/2017  Contact Type:  Face to Face    SUBJECTIVE:     Patient Findings     Positives Change in diet/appetite (diet is stable, she will have a serving of greesn today), Activity level change (activity is back to her base line, back to work full time for the last 2 weeks)    Comments Reviewed increased risk of bleeding with the elevated INR, what to observe for and how to report if needed and to seek immediate medical attention for bleeding or head trauma, verbalized understanding           OBJECTIVE    INR Protime   Date Value Ref Range Status   04/07/2017 3.8 (A) 0.86 - 1.14 Final       ASSESSMENT / PLAN  INR assessment SUPRA    Recheck INR In: 2 WEEKS    INR Location Clinic      Anticoagulation Summary as of 4/7/2017     INR goal 2.0-3.0   Today's INR 3.8!   Maintenance plan 5 mg (5 mg x 1) on Fri; 7.5 mg (2.5 mg x 1 and 5 mg x 1) all other days   Full instructions 5 mg on Fri; 7.5 mg all other days   Weekly total 50 mg   Plan last modified Yamileth Mcghee RN (4/7/2017)   Next INR check 4/21/2017   Priority INR   Target end date     Indications   Portal vein thrombosis [I81]  Long-term (current) use of anticoagulants [Z79.01] [Z79.01]         Anticoagulation Episode Summary     [facility]R check location [facility]    Preferred lab     Send INR reminders to CL ANTICOAG POOL    Comments  +      Anticoagulation Care Providers     Provider Role Specialty Phone number    Yasmin Mckenna MD [facility] [phone]            See the Encounter Report to view Anticoagulation Flowsheet and Dosing Calendar (Go to Encounters tab in chart review, and find the Anticoagulation Therapy Visit)        Yamileth Mcghee RN

## 2017-04-07 NOTE — MR AVS SNAPSHOT
Charlee HERNANDEZ Kendrick   4/7/2017 3:15 PM   Anticoagulation Therapy Visit    Description:  61 year old female   Provider:  AJITH ANTI COAG   Department:  [facility]           INR as of 4/7/2017     Today's INR 3.8!      Anticoagulation Summary as of 4/7/2017     INR goal 2.0-3.0   Today's INR 3.8!   Full instructions 5 mg on Fri; 7.5 mg all other days   Next INR check 4/21/2017    Indications   Portal vein thrombosis [I81]  Long-term (current) use of anticoagulants [Z79.01] [Z79.01]         Description     Recheck INR 2 weeks, 4/21/17  Decrease warfarin  To 5 mg Fri, 7.5 mg rest of week       Contact Numbers     Please call [phone] to cancel and/or reschedule your appointment.  Please call [phone] with any problems or questions regarding your therapy          April 2017 Details    Sun Mon Tue Wed Thu Fri Sat           1                 2               3               4               5               6               7      5 mg   See details      8      7.5 mg           9      7.5 mg         10      7.5 mg         11      7.5 mg         12      7.5 mg         13      7.5 mg         14      5 mg         15      7.5 mg           16      7.5 mg         17      7.5 mg         18      7.5 mg         19      7.5 mg         20      7.5 mg         21            22                 23               24               25               26               27               28               29                 30                      Date Details   04/07 This INR check   have a serving of greens today       Date of next INR:  4/21/2017         How to take your warfarin dose     To take:  5 mg Take 1 of the 5 mg tablets.    To take:  7.5 mg Take 1 of the 2.5 mg tablets and 1 of the 5 mg tablets.

## 2017-04-12 DIAGNOSIS — R76.8 HEPATITIS B CORE ANTIBODY POSITIVE: Primary | ICD-10-CM

## 2017-04-14 ENCOUNTER — OFFICE VISIT (OUTPATIENT)
Dept: GASTROENTEROLOGY | Facility: CLINIC | Age: 62
End: 2017-04-14
Attending: INTERNAL MEDICINE
Payer: COMMERCIAL

## 2017-04-14 VITALS
WEIGHT: 293 LBS | DIASTOLIC BLOOD PRESSURE: 84 MMHG | HEIGHT: 67 IN | OXYGEN SATURATION: 96 % | SYSTOLIC BLOOD PRESSURE: 144 MMHG | TEMPERATURE: 98 F | BODY MASS INDEX: 45.99 KG/M2 | HEART RATE: 77 BPM

## 2017-04-14 DIAGNOSIS — R76.8 HEPATITIS B CORE ANTIBODY POSITIVE: Primary | ICD-10-CM

## 2017-04-14 DIAGNOSIS — R76.8 HEPATITIS B CORE ANTIBODY POSITIVE: ICD-10-CM

## 2017-04-14 LAB
ALBUMIN SERPL-MCNC: 3.6 G/DL (ref 3.4–5)
ALP SERPL-CCNC: 164 U/L (ref 40–150)
ALT SERPL W P-5'-P-CCNC: 51 U/L (ref 0–50)
ANION GAP SERPL CALCULATED.3IONS-SCNC: 6 MMOL/L (ref 3–14)
AST SERPL W P-5'-P-CCNC: 45 U/L (ref 0–45)
BILIRUB DIRECT SERPL-MCNC: <0.1 MG/DL (ref 0–0.2)
BILIRUB SERPL-MCNC: 0.5 MG/DL (ref 0.2–1.3)
BUN SERPL-MCNC: 14 MG/DL (ref 7–30)
CALCIUM SERPL-MCNC: 8.6 MG/DL (ref 8.5–10.1)
CHLORIDE SERPL-SCNC: 105 MMOL/L (ref 94–109)
CO2 SERPL-SCNC: 31 MMOL/L (ref 20–32)
CREAT SERPL-MCNC: 0.63 MG/DL (ref 0.52–1.04)
ERYTHROCYTE [DISTWIDTH] IN BLOOD BY AUTOMATED COUNT: 19.3 % (ref 10–15)
GFR SERPL CREATININE-BSD FRML MDRD: ABNORMAL ML/MIN/1.7M2
GLUCOSE SERPL-MCNC: 102 MG/DL (ref 70–99)
HCT VFR BLD AUTO: 35.2 % (ref 35–47)
HGB BLD-MCNC: 10.4 G/DL (ref 11.7–15.7)
INR PPP: 2.55 (ref 0.86–1.14)
MCH RBC QN AUTO: 24.9 PG (ref 26.5–33)
MCHC RBC AUTO-ENTMCNC: 29.5 G/DL (ref 31.5–36.5)
MCV RBC AUTO: 84 FL (ref 78–100)
PLATELET # BLD AUTO: 207 10E9/L (ref 150–450)
POTASSIUM SERPL-SCNC: 4.2 MMOL/L (ref 3.4–5.3)
PROT SERPL-MCNC: 6.7 G/DL (ref 6.8–8.8)
RBC # BLD AUTO: 4.18 10E12/L (ref 3.8–5.2)
SODIUM SERPL-SCNC: 142 MMOL/L (ref 133–144)
WBC # BLD AUTO: 8.5 10E9/L (ref 4–11)

## 2017-04-14 PROCEDURE — 99212 OFFICE O/P EST SF 10 MIN: CPT | Mod: ZF

## 2017-04-14 ASSESSMENT — PAIN SCALES - GENERAL: PAINLEVEL: NO PAIN (0)

## 2017-04-14 NOTE — LETTER
4/14/2017      RE: Charlee Marks  [address]  [facility] [address]           GASTROENTEROLOGY CLINIC             ASSESSMENT AND RECOMMENDATIONS:   Assessment:  Charlee Marks is a 61 year old female with a history of ITP s/p splenectomy, with a history of HBV exposure on Viread here with abnormal LFTs.      Idiopathic thrombocytopenic purpura   s/p splenectomy c/b portal and SMV thrombosis  She has a history of ITP, with portal and SMV thrombosis complicating her splenectomy.   She is on warfarin at the moment with therapeutic INR. No recent imaging of her liver and blood vessels.   Platelets have been stable for the last 2 months. It is reasonable to image her again.   Her proposed AC plan is 6 months at the most. However, they may be able to stop soon if clot has completely resolved.     Prior HBV exposure:  She was commenced on Viread following lab tests and treatment with Rituximab.   Given her serological markers, risk of reactivation is low.   In addition, given that her last dose of Rituximab was over 6 months ago, and that she is on small prednisone doses, based on AGA guidelines, it can be stopped.    NAFLD:  Steatosis noted on ultrasound. She is not a known hypertensive.   She is also not diabetic and a non-smoker. This makes her risk of developing cirrhosis much lower.  Using a FIB-4 score lacks utility in her because of her splenectomy and recovering platelets.   She may benefit from evaluation for fibrosis. She will also require risk factor modification.         Recommendations  -- Stop Viread today  -- Will get USS of liver with dopplers  -- Continue warfarin for now   Will defer stop date to the hematology providers  -- Advised patient on weight loss with diet and lifestyle management  -- Will see in 6 months and will consider getting a Fibroscan at that time.   -- Continue steroid taper  -- Monitor platelets and INR      Pt care plan discussed with Dr. Le, GI staff physician.    [facility]  [place] [facility]  Internal Medicine  PGY 2    The patient was seen and examined.  The above assessment and plan was developed jointly with the resident.    Agustin Le MD      Professor of Medicine  [employer]      Executive Medical Director, Solid Organ Transplant Program  [facility]   -------------------------------------------------------------------------------------------------------------------          Chief Complaint:   We were asked by Suzie Valdes of GI to evaluate this patient with abnormal LFTs    History is obtained from the patient and the medical record.          History of Present Illness:   Charlee Marks is a 61 year old female with a history of ITP s/p splenectomy, with a history of HBV exposure on Viread her with abnormal LFTs.     She had presented in 05/2016 with easy bruising and was found to have idiopathic thrombocytopenic purpura.  She was placed on steroids and then started on Rituximab. She saw hepatology at that time and her labs revealed HBcAb and HBsAb positive, but negative HBsAg and HBV DNA. She was commenced on Viread at that time. She had her last dose of Rituximab 09/2016.     Given poor response, she underwent splenectomy in 12/2016 and this was complicated by portal vein and SMV thrombosis, requiring readmission into the hospital within 3 weeks. She underwent thrombectomy and catheter directed thrombolysis. She was placed on Lovenox and then warfarin with an anticoagulation duration plan of 3 - 6 months.     Her platelets has improved and she is doing well. She has some epigastric pain, not out of expectation of that associated with surgery. She denies any fevers, bleeding or easy bruising, chills or rigors. She has no reports of nausea, vomiting or diarrhea.   She currently weighs over 300 pounds and is trying to lose weight. Her last ultrasound did show some evidence of fatty liver disease. She has no prior history of  hypertension or diabetes. She does not smoke. She is currently on a prednisone taper at 2.5 mg daily.            Past Medical History:   Reviewed and edited as appropriate  Past Medical History:   Diagnosis Date     Asthma      GERD (gastroesophageal reflux disease)      Idiopathic thrombocytopenic purpura (H)      Obesity      Other specified gastritis without mention of hemorrhage      Splenomegaly             Past Surgical History:   Reviewed and edited as appropriate   Past Surgical History:   Procedure Laterality Date     C LIGATE FALLOPIAN TUBE       COLONOSCOPY  10/19/2007     LAPAROSCOPIC SPLENECTOMY N/A 12/13/2016    Procedure: LAPAROSCOPIC SPLENECTOMY;  Surgeon: Catalina Brian MD;  Location: [facility] OR     PICC INSERTION Right 1/4/2017    5fr DL BioFlo PICC, 44cm (3cm external) in the R cephalic vein w/ tip in the low SVC.     SURGICAL HISTORY OF -   1976    Breast Cyst Excision      SURGICAL HISTORY OF -   8/1997    Open Bladder Neck Suspension for stress urinary incontinence     SURGICAL HISTORY OF -   Age 6    Tonsillectomy     SURGICAL HISTORY OF -   10/83 & 2/84    Oral Surgery [place]             Social History:   Reviewed and edited as appropriate  Social History     Social History     Marital status:      Spouse name: N/A     Number of children: N/A     Years of education: N/A     Occupational History     Not on file.     Social History Main Topics     Smoking status: Never Smoker     Smokeless tobacco: Never Used     Alcohol use 0.0 oz/week     0 Standard drinks or equivalent per week      Comment: 2-3/week, none since Summer 2016     Drug use: No     Sexual activity: Not Currently     Partners: Male     Birth control/ protection: Surgical      Comment: tubal     Other Topics Concern     Parent/Sibling W/ Cabg, Mi Or Angioplasty Before 65f 55m? No     Social History Narrative            Family History:   Reviewed and edited as appropriate  No known history of gastrointestinal/liver disease  "or  gastrointestinal malignancies       Allergies:   Reviewed and edited as appropriate     Allergies   Allergen Reactions     Adhesive Tape Other (See Comments)     reaction on skin     Sulfa Drugs Nausea            Medications:     Current Outpatient Prescriptions   Medication Sig     warfarin (COUMADIN) 5 MG tablet . As directed by Anticoagulation Clinic, current dose 5 mg Fri, 7.5 mg rest of week, uses both 2.5 and 5 mg tablets     warfarin (COUMADIN) 2.5 MG tablet As directed by Anticoagulation Clinic, current dose 5 mg Fri, 7.5 mg rest of week uses both 2.5 and 5 mg tablets     predniSONE (DELTASONE) 5 MG tablet 2.5 mg po q q     Pyridoxine HCl (B-6) 100 MG TABS Take 1 tablet by mouth daily     Acetaminophen (TYLENOL PO) Take 1,000 mg by mouth every 4 hours as needed for mild pain or fever (Headaches)     albuterol (PROAIR HFA, PROVENTIL HFA, VENTOLIN HFA) 108 (90 BASE) MCG/ACT inhaler Inhale 2 puffs into the lungs every 4 hours as needed for shortness of breath / dyspnea     calcium carbonate-vitamin D 500-400 MG-UNIT TABS tablt Take 1 tablet by mouth 2 times daily     RANITIDINE  MG PO TABS ONE TABLET TWICE DAILY as needed will call when needed (Patient taking differently: ONE TABLET prn)     LORATADINE 10 MG PO TABS ONE DAILY     GLUCOSAMINE CHONDRO COMPLEX OR on hold     MULTIVITAMIN TABS   OR 1 TABLET DAILY     OMEGA 3 1000 MG OR CAPS Reported on 4/14/2017     No current facility-administered medications for this visit.              Review of Systems:   A complete review of systems was performed and is negative except as noted in the HPI           Physical Exam:   /84  Pulse 77  Temp 98  F (36.7  C) (Oral)  Ht 1.695 m (5' 6.75\")  Wt (!) 141.3 kg (311 lb 6.4 oz)  LMP 09/15/2007  SpO2 96%  BMI 49.14 kg/m2  Wt:   Wt Readings from Last 2 Encounters:   04/14/17 (!) 141.3 kg (311 lb 6.4 oz)   02/23/17 (!) 138.4 kg (305 lb 3.2 oz)      Constitutional: Obese female, cooperative, pleasant, not " dyspneic, not diaphoretic, no acute distress  Eyes: Sclera anicteric/injected  Ears/nose/mouth/throat: Normal oropharynx without ulcers or exudate, mucus membranes moist, hearing intact  Neck: supple, thyroid normal size  CV:RRR,  No edema  Respiratory: Unlabored breathing, CTAB   Abd: Obese abdomen, Nondistended, +bs, nontender, no peritoneal signs  Skin: warm, perfused, no jaundice  Neuro: AAO x 3, No asterixis  Psych: Normal affect  MSK: Normal gait         Data:   Labs   Most Recent 3 CBC's:  Recent Labs   Lab Test  04/14/17   0944  03/20/17   1016  02/23/17   0927   WBC  8.5  9.4  8.3   HGB  10.4*  10.3*  10.7*   MCV  84  83  85   PLT  207  250  236     Most Recent 3 BMP's:  Recent Labs   Lab Test  04/14/17   0944  01/14/17   0405  01/13/17   0405   NA  142  143  142   POTASSIUM  4.2  3.8  3.9   CHLORIDE  105  108  106   CO2  31  26  28   BUN  14  14  15   CR  0.63  0.55  0.49*   ANIONGAP  6  9  9   JOSÉ MIGUEL  8.6  8.1*  8.0*   GLC  102*  87  103*     Most Recent 2 LFT's:  Recent Labs   Lab Test  04/14/17   0944  02/01/17   1445   AST  45  121*   ALT  51*  137*   ALKPHOS  164*  164*   BILITOTAL  0.5  0.4     Most Recent 3 INR's:  Recent Labs   Lab Test  04/14/17   0944 04/07/17 03/24/17   INR  2.55*  3.8*  3.4*       Agustin Le MD

## 2017-04-14 NOTE — PROGRESS NOTES
GASTROENTEROLOGY CLINIC             ASSESSMENT AND RECOMMENDATIONS:   Assessment:  Charlee Marks is a 61 year old female with a history of ITP s/p splenectomy, with a history of HBV exposure on Viread here with abnormal LFTs.      Idiopathic thrombocytopenic purpura   s/p splenectomy c/b portal and SMV thrombosis  She has a history of ITP, with portal and SMV thrombosis complicating her splenectomy.   She is on warfarin at the moment with therapeutic INR. No recent imaging of her liver and blood vessels.   Platelets have been stable for the last 2 months. It is reasonable to image her again.   Her proposed AC plan is 6 months at the most. However, they may be able to stop soon if clot has completely resolved.     Prior HBV exposure:  She was commenced on Viread following lab tests and treatment with Rituximab.   Given her serological markers, risk of reactivation is low.   In addition, given that her last dose of Rituximab was over 6 months ago, and that she is on small prednisone doses, based on AGA guidelines, it can be stopped.    NAFLD:  Steatosis noted on ultrasound. She is not a known hypertensive.   She is also not diabetic and a non-smoker. This makes her risk of developing cirrhosis much lower.  Using a FIB-4 score lacks utility in her because of her splenectomy and recovering platelets.   She may benefit from evaluation for fibrosis. She will also require risk factor modification.         Recommendations  -- Stop Viread today  -- Will get USS of liver with dopplers  -- Continue warfarin for now   Will defer stop date to the hematology providers  -- Advised patient on weight loss with diet and lifestyle management  -- Will see in 6 months and will consider getting a Fibroscan at that time.   -- Continue steroid taper  -- Monitor platelets and INR      Pt care plan discussed with Dr. Le, GI staff physician.    Fifi Kitchen  Internal Medicine  PGY 2    The patient was seen and examined.  The  above assessment and plan was developed jointly with the resident.    Agustin Le MD      Professor of Medicine  [employer]      Executive Medical Director, Solid Organ Transplant Program  [facility]   -------------------------------------------------------------------------------------------------------------------          Chief Complaint:   We were asked by Suzie Valdes of GI to evaluate this patient with abnormal LFTs    History is obtained from the patient and the medical record.          History of Present Illness:   Charlee Marks is a 61 year old female with a history of ITP s/p splenectomy, with a history of HBV exposure on Viread her with abnormal LFTs.     She had presented in 05/2016 with easy bruising and was found to have idiopathic thrombocytopenic purpura.  She was placed on steroids and then started on Rituximab. She saw hepatology at that time and her labs revealed HBcAb and HBsAb positive, but negative HBsAg and HBV DNA. She was commenced on Viread at that time. She had her last dose of Rituximab 09/2016.     Given poor response, she underwent splenectomy in 12/2016 and this was complicated by portal vein and SMV thrombosis, requiring readmission into the hospital within 3 weeks. She underwent thrombectomy and catheter directed thrombolysis. She was placed on Lovenox and then warfarin with an anticoagulation duration plan of 3 - 6 months.     Her platelets has improved and she is doing well. She has some epigastric pain, not out of expectation of that associated with surgery. She denies any fevers, bleeding or easy bruising, chills or rigors. She has no reports of nausea, vomiting or diarrhea.   She currently weighs over 300 pounds and is trying to lose weight. Her last ultrasound did show some evidence of fatty liver disease. She has no prior history of hypertension or diabetes. She does not smoke. She is currently on a prednisone taper  at 2.5 mg daily.            Past Medical History:   Reviewed and edited as appropriate  Past Medical History:   Diagnosis Date     Asthma      GERD (gastroesophageal reflux disease)      Idiopathic thrombocytopenic purpura (H)      Obesity      Other specified gastritis without mention of hemorrhage      Splenomegaly             Past Surgical History:   Reviewed and edited as appropriate   Past Surgical History:   Procedure Laterality Date     C LIGATE FALLOPIAN TUBE       COLONOSCOPY  10/19/2007     LAPAROSCOPIC SPLENECTOMY N/A 12/13/2016    Procedure: LAPAROSCOPIC SPLENECTOMY;  Surgeon: Catalina Brian MD;  Location: [facility] OR     PICC INSERTION Right 1/4/2017    5fr DL BioFlo PICC, 44cm (3cm external) in the R cephalic vein w/ tip in the low SVC.     SURGICAL HISTORY OF -   1976    Breast Cyst Excision      SURGICAL HISTORY OF -   8/1997    Open Bladder Neck Suspension for stress urinary incontinence     SURGICAL HISTORY OF -   Age 6    Tonsillectomy     SURGICAL HISTORY OF -   10/83 & 2/84    Oral Surgery [place]             Social History:   Reviewed and edited as appropriate  Social History     Social History     Marital status:      Spouse name: N/A     Number of children: N/A     Years of education: N/A     Occupational History     Not on file.     Social History Main Topics     Smoking status: Never Smoker     Smokeless tobacco: Never Used     Alcohol use 0.0 oz/week     0 Standard drinks or equivalent per week      Comment: 2-3/week, none since Summer 2016     Drug use: No     Sexual activity: Not Currently     Partners: Male     Birth control/ protection: Surgical      Comment: tubal     Other Topics Concern     Parent/Sibling W/ Cabg, Mi Or Angioplasty Before 65f 55m? No     Social History Narrative            Family History:   Reviewed and edited as appropriate  No known history of gastrointestinal/liver disease or  gastrointestinal malignancies       Allergies:   Reviewed and edited as  "appropriate     Allergies   Allergen Reactions     Adhesive Tape Other (See Comments)     reaction on skin     Sulfa Drugs Nausea            Medications:     Current Outpatient Prescriptions   Medication Sig     warfarin (COUMADIN) 5 MG tablet . As directed by Anticoagulation Clinic, current dose 5 mg Fri, 7.5 mg rest of week, uses both 2.5 and 5 mg tablets     warfarin (COUMADIN) 2.5 MG tablet As directed by Anticoagulation Clinic, current dose 5 mg Fri, 7.5 mg rest of week uses both 2.5 and 5 mg tablets     predniSONE (DELTASONE) 5 MG tablet 2.5 mg po q q     Pyridoxine HCl (B-6) 100 MG TABS Take 1 tablet by mouth daily     Acetaminophen (TYLENOL PO) Take 1,000 mg by mouth every 4 hours as needed for mild pain or fever (Headaches)     albuterol (PROAIR HFA, PROVENTIL HFA, VENTOLIN HFA) 108 (90 BASE) MCG/ACT inhaler Inhale 2 puffs into the lungs every 4 hours as needed for shortness of breath / dyspnea     calcium carbonate-vitamin D 500-400 MG-UNIT TABS tablt Take 1 tablet by mouth 2 times daily     RANITIDINE  MG PO TABS ONE TABLET TWICE DAILY as needed will call when needed (Patient taking differently: ONE TABLET prn)     LORATADINE 10 MG PO TABS ONE DAILY     GLUCOSAMINE CHONDRO COMPLEX OR on hold     MULTIVITAMIN TABS   OR 1 TABLET DAILY     OMEGA 3 1000 MG OR CAPS Reported on 4/14/2017     No current facility-administered medications for this visit.              Review of Systems:   A complete review of systems was performed and is negative except as noted in the HPI           Physical Exam:   /84  Pulse 77  Temp 98  F (36.7  C) (Oral)  Ht 1.695 m (5' 6.75\")  Wt (!) 141.3 kg (311 lb 6.4 oz)  LMP 09/15/2007  SpO2 96%  BMI 49.14 kg/m2  Wt:   Wt Readings from Last 2 Encounters:   04/14/17 (!) 141.3 kg (311 lb 6.4 oz)   02/23/17 (!) 138.4 kg (305 lb 3.2 oz)      Constitutional: Obese female, cooperative, pleasant, not dyspneic, not diaphoretic, no acute distress  Eyes: Sclera " anicteric/injected  Ears/nose/mouth/throat: Normal oropharynx without ulcers or exudate, mucus membranes moist, hearing intact  Neck: supple, thyroid normal size  CV:RRR,  No edema  Respiratory: Unlabored breathing, CTAB   Abd: Obese abdomen, Nondistended, +bs, nontender, no peritoneal signs  Skin: warm, perfused, no jaundice  Neuro: AAO x 3, No asterixis  Psych: Normal affect  MSK: Normal gait         Data:   Labs   Most Recent 3 CBC's:  Recent Labs   Lab Test  04/14/17   0944  03/20/17   1016  02/23/17   0927   WBC  8.5  9.4  8.3   HGB  10.4*  10.3*  10.7*   MCV  84  83  85   PLT  207  250  236     Most Recent 3 BMP's:  Recent Labs   Lab Test  04/14/17   0944  01/14/17   0405  01/13/17   0405   NA  142  143  142   POTASSIUM  4.2  3.8  3.9   CHLORIDE  105  108  106   CO2  31  26  28   BUN  14  14  15   CR  0.63  0.55  0.49*   ANIONGAP  6  9  9   JOSÉ MIGUEL  8.6  8.1*  8.0*   GLC  102*  87  103*     Most Recent 2 LFT's:  Recent Labs   Lab Test  04/14/17   0944  02/01/17   1445   AST  45  121*   ALT  51*  137*   ALKPHOS  164*  164*   BILITOTAL  0.5  0.4     Most Recent 3 INR's:  Recent Labs   Lab Test  04/14/17   0944 04/07/17 03/24/17   INR  2.55*  3.8*  3.4*

## 2017-04-14 NOTE — NURSING NOTE
Chief Complaint   Patient presents with     RECHECK     Hepatitis B core antibody positive   Pt roomed, vitals, meds, and allergies reviewed with pt. Pt ready for provider.  Calvin Jang, CMA

## 2017-04-14 NOTE — MR AVS SNAPSHOT
After Visit Summary   4/14/2017    Charlee Marks    MRN: [id number]           Patient Information     Date Of Birth          1955        Visit Information        Provider Department      4/14/2017 11:15 AM Agustin Le MD [facility] Hepatology        Today's Diagnoses     Hepatitis B core antibody positive           Follow-ups after your visit        Follow-up notes from your care team     Return in about 6 months (around 10/14/2017).      Your next 10 appointments already scheduled     Apr 17, 2017 10:00 AM CDT   LAB with CL LAB   [facility])    [address] [address]   [phone]           Patient must bring picture ID.  Patient should be prepared to give a urine specimen  Please do not eat 10-12 hours before your appointment if you are coming in fasting for labs on lipids, cholesterol, or glucose (sugar).  Pregnant women should follow their Care Team instructions. Water with medications is okay. Do not drink coffee or other fluids.   If you have concerns about taking  your medications, please ask at office or if scheduling via zerobound, send a message by clicking on Secure Messaging, Message Your Care Team.            Apr 20, 2017 10:15 AM CDT   Return Visit with Kerrie Rankin MD   [facility] ([facility])    [facility]  [address]  [place] [facility] [address]   [phone]            Apr 21, 2017  3:15 PM CDT   Anticoagulation Visit with CL ANTI COAG   [facility])    [address] [address]   [phone]            Apr 22, 2017 10:00 AM CDT   US ABDOMEN/PELVIS DUPLEX COMPLETE with UCUS3   [facility] ([facility])    [address]  1[address] Floor  [place] [address] [phone]           Please bring a list of your medicines (including vitamins,  minerals and over-the-counter drugs). Also, tell your doctor about any allergies you may have. Wear comfortable clothes and leave your valuables at home.  Adults: No eating or drinking for 8 hours before the exam. You may take medicine with a small sip of water.  Children: - Children 6+ years: No food or drink for 6 hours before exam. - Children 1-5 years: No food or drink for 4 hours before exam. - Infants, breast-fed: may have breast milk up to 2 hours before exam. - Infants, formula: may have bottle until 4 hours before exam.  Please call the Imaging Department at your exam site with any questions.            Oct 09, 2017  1:00 PM CDT   Lab with  LAB   [facility] ([facility])    [address] [place] [address] [phone]            Oct 09, 2017  2:00 PM CDT   (Arrive by 1:45 PM)   Return General Liver with Agustin Le MD   [facility] ([facility])    [address] [place] [address] [phone]              Who to contact     If you have questions or need follow up information about today's clinic visit or your schedule please contact [facility] HEPATOLOG[facility] directly at [phone].  Normal or non-critical lab and imaging results will be communicated to you by MyChart, letter or phone within 4 business days after the clinic has received the results. If you do not hear from us within 7 days, please contact the clinic through [facility] or phone. If you have a critical or abnormal lab result, we will notify you by phone as soon as possible.  Submit refill requests through [facility] or call your pharmacy and they will forward the refill request to us. Please allow 3 business days for your refill to be completed.          Additional Information About Your Visit        [facility] Information     [facility] gives you secure access to your electronic health record. If you see a primary care provider, you can also  "send messages to your care team and make appointments. If you have questions, please call your primary care clinic.  If you do not have a primary care provider, please call [phone] and they will assist you.        Care EveryWhere ID     This is your Care EveryWhere ID. This could be used by other organizations to access your [facility] medical records  [id number]        Your Vitals Were     Pulse Temperature Height Last Period Pulse Oximetry BMI (Body Mass Index)    77 98  F (36.7  C) (Oral) 1.695 m (5' 6.75\") 09/15/2007 96% 49.14 kg/m2       Blood Pressure from Last 3 Encounters:   04/14/17 144/84   02/23/17 124/66   02/03/17 142/86    Weight from Last 3 Encounters:   04/14/17 (!) 141.3 kg (311 lb 6.4 oz)   02/23/17 (!) 138.4 kg (305 lb 3.2 oz)   02/03/17 133.6 kg (294 lb 9.6 oz)              We Performed the Following     US Abdomen Complete w Doppler Complete          Today's Medication Changes          These changes are accurate as of: 4/14/17 11:43 AM.  If you have any questions, ask your nurse or doctor.               These medicines have changed or have updated prescriptions.        Dose/Directions    ranitidine 150 MG tablet   Commonly known as:  ZANTAC   This may have changed:  See the new instructions.   Used for:  Esophageal reflux        ONE TABLET TWICE DAILY as needed will call when needed   Quantity:  180 Tab   Refills:  3                Primary Care Provider Office Phone # Fax #    Yasmin Evelyn Mckenna [phone]       [facility] [address] [facility] [address]        Thank you!     Thank you for choosing [facility] HEPATOLOG[facility]  for your care. Our goal is always to provide you with excellent care. Hearing back from our patients is one way we can continue to improve our services. Please take a few minutes to complete the written survey that you may receive in the mail after your visit with us. Thank you!             Your Updated Medication List - Protect others " around you: Learn how to safely use, store and throw away your medicines at www.disposemymeds.org.          This list is accurate as of: 4/14/17 11:43 AM.  Always use your most recent med list.                   Brand Name Dispense Instructions for use    albuterol 108 (90 BASE) MCG/ACT Inhaler    PROAIR HFA/PROVENTIL HFA/VENTOLIN HFA    1 Inhaler    Inhale 2 puffs into the lungs every 4 hours as needed for shortness of breath / dyspnea       B-6 100 MG Tabs      Take 1 tablet by mouth daily       calcium carbonate-vitamin D 500-400 MG-UNIT Tabs per tablet      Take 1 tablet by mouth 2 times daily       GLUCOSAMINE CHONDRO COMPLEX OR      on hold       loratadine 10 MG tablet    CLARITIN    90 Tab    ONE DAILY       MULTIVITAMIN TABS   OR      1 TABLET DAILY       omega 3 1000 MG Caps      Reported on 4/14/2017       predniSONE 5 MG tablet    DELTASONE    60 tablet    2.5 mg po q q       ranitidine 150 MG tablet    ZANTAC    180 Tab    ONE TABLET TWICE DAILY as needed will call when needed       TYLENOL PO      Take 1,000 mg by mouth every 4 hours as needed for mild pain or fever (Headaches)       * warfarin 5 MG tablet    COUMADIN    90 tablet    . As directed by Anticoagulation Clinic, current dose 5 mg Fri, 7.5 mg rest of week, uses both 2.5 and 5 mg tablets       * warfarin 2.5 MG tablet    COUMADIN    90 tablet    As directed by Anticoagulation Clinic, current dose 5 mg Fri, 7.5 mg rest of week uses both 2.5 and 5 mg tablets       * Notice:  This list has 2 medication(s) that are the same as other medications prescribed for you. Read the directions carefully, and ask your doctor or other care provider to review them with you.

## 2017-04-17 DIAGNOSIS — D69.3 IDIOPATHIC THROMBOCYTOPENIC PURPURA (H): ICD-10-CM

## 2017-04-17 DIAGNOSIS — K55.069 SUPERIOR MESENTERIC VEIN THROMBOSIS (H): ICD-10-CM

## 2017-04-17 LAB
BASOPHILS # BLD AUTO: 0 10E9/L (ref 0–0.2)
BASOPHILS NFR BLD AUTO: 0.4 %
DIFFERENTIAL METHOD BLD: ABNORMAL
EOSINOPHIL # BLD AUTO: 0.2 10E9/L (ref 0–0.7)
EOSINOPHIL NFR BLD AUTO: 2.5 %
ERYTHROCYTE [DISTWIDTH] IN BLOOD BY AUTOMATED COUNT: 19.6 % (ref 10–15)
HCT VFR BLD AUTO: 34.4 % (ref 35–47)
HGB BLD-MCNC: 10.6 G/DL (ref 11.7–15.7)
LYMPHOCYTES # BLD AUTO: 2.7 10E9/L (ref 0.8–5.3)
LYMPHOCYTES NFR BLD AUTO: 35.2 %
MCH RBC QN AUTO: 25.1 PG (ref 26.5–33)
MCHC RBC AUTO-ENTMCNC: 30.8 G/DL (ref 31.5–36.5)
MCV RBC AUTO: 82 FL (ref 78–100)
MONOCYTES # BLD AUTO: 1 10E9/L (ref 0–1.3)
MONOCYTES NFR BLD AUTO: 12.8 %
NEUTROPHILS # BLD AUTO: 3.8 10E9/L (ref 1.6–8.3)
NEUTROPHILS NFR BLD AUTO: 49.1 %
PLATELET # BLD AUTO: 214 10E9/L (ref 150–450)
RBC # BLD AUTO: 4.22 10E12/L (ref 3.8–5.2)
WBC # BLD AUTO: 7.7 10E9/L (ref 4–11)

## 2017-04-17 PROCEDURE — 85025 COMPLETE CBC W/AUTO DIFF WBC: CPT | Performed by: INTERNAL MEDICINE

## 2017-04-17 PROCEDURE — 36415 COLL VENOUS BLD VENIPUNCTURE: CPT | Performed by: INTERNAL MEDICINE

## 2017-04-18 LAB
HBV DNA SERPL NAA+PROBE-ACNC: NORMAL [IU]/ML
HBV DNA SERPL NAA+PROBE-LOG IU: NORMAL {LOG_IU}/ML

## 2017-04-20 ENCOUNTER — ONCOLOGY VISIT (OUTPATIENT)
Dept: ONCOLOGY | Facility: CLINIC | Age: 62
End: 2017-04-20
Attending: INTERNAL MEDICINE
Payer: COMMERCIAL

## 2017-04-20 VITALS
HEART RATE: 75 BPM | SYSTOLIC BLOOD PRESSURE: 143 MMHG | RESPIRATION RATE: 20 BRPM | HEIGHT: 67 IN | TEMPERATURE: 98.1 F | DIASTOLIC BLOOD PRESSURE: 86 MMHG | WEIGHT: 293 LBS | OXYGEN SATURATION: 97 % | BODY MASS INDEX: 45.99 KG/M2

## 2017-04-20 DIAGNOSIS — K55.069 SUPERIOR MESENTERIC VEIN THROMBOSIS (H): ICD-10-CM

## 2017-04-20 DIAGNOSIS — D64.9 ANEMIA, UNSPECIFIED TYPE: ICD-10-CM

## 2017-04-20 DIAGNOSIS — D69.3 IDIOPATHIC THROMBOCYTOPENIC PURPURA (H): Primary | ICD-10-CM

## 2017-04-20 DIAGNOSIS — R94.5 NONSPECIFIC ABNORMAL RESULTS OF LIVER FUNCTION STUDY: ICD-10-CM

## 2017-04-20 PROCEDURE — 99211 OFF/OP EST MAY X REQ PHY/QHP: CPT

## 2017-04-20 PROCEDURE — 99214 OFFICE O/P EST MOD 30 MIN: CPT | Performed by: INTERNAL MEDICINE

## 2017-04-20 ASSESSMENT — PAIN SCALES - GENERAL: PAINLEVEL: MILD PAIN (2)

## 2017-04-20 NOTE — NURSING NOTE
"Charlee Marks is a 61 year old female who presents for:  Chief Complaint   Patient presents with     Hematology     2 month recheck ITP, review labs        Initial Vitals:  /86 (BP Location: Right arm, Patient Position: Chair, Cuff Size: Adult Large)  Pulse 75  Temp 98.1  F (36.7  C) (Tympanic)  Resp 20  Ht 1.695 m (5' 6.73\")  Wt (!) 143 kg (315 lb 3.2 oz)  LMP 09/15/2007  SpO2 97%  Breastfeeding? No  BMI 49.76 kg/m2 Estimated body mass index is 49.76 kg/(m^2) as calculated from the following:    Height as of this encounter: 1.695 m (5' 6.73\").    Weight as of this encounter: 143 kg (315 lb 3.2 oz).. Body surface area is 2.59 meters squared. BP completed using cuff size: large  Mild Pain (2) Patient's last menstrual period was 09/15/2007. Allergies and medications reviewed.     Medications: Medication refills not needed today.  Pharmacy name entered into [facility]:    [facility] - [place], [place] - [address] [facility] - [place], [place] - [address] AT PORTAL TO REGISTERED [facility] SITES    Comments: 2 month recheck ITP, review labs.     7  minutes for nursing intake (face to face time)   Lorena eWi CMA        "

## 2017-04-20 NOTE — LETTER
4/20/2017       To Whom it May Concern,     Re: Charlee Marks  [address]  [place] [address]      Charlee Marks is under my medical care for the treatment of ITP and is able to return to work without restrictions starting 5/1/17. If you have any questions or concerns, please do not hesitate to contact me.      Sincerely,      Kerrie Rankin MD  [facility]  [address]  [place] [address]  Phone: [phone]

## 2017-04-20 NOTE — MR AVS SNAPSHOT
After Visit Summary   4/20/2017    Charlee Marks    MRN: [id number]           Patient Information     Date Of Birth          1955        Visit Information        Provider Department      4/20/2017 10:15 AM Kerrie Rankin MD [facility]        Today's Diagnoses     ITP (idiopathic thrombocytopenic purpura)    -  1    Superior mesenteric vein thrombosis        Anemia, unspecified type        Nonspecific abnormal results of liver function study          Care Instructions    Dr. Rankin recommends that you discontinue your prednisone and to complete monthly labs to check your CBC. We would like to see you back in 3 months for a follow up. When you are in need of a refill, please call your pharmacy and they will send us a request.  Copy of appointments, and after visit summary (AVS) given to patient.  If you have any questions please call Jenny Osullivan RN, BSN, OCN Oncology Hematology  [facility] [phone]. For questions after business hours, or on holidays/weekends, please call our after hours Nurse Triage line [phone]. Thank you.           Follow-ups after your visit        Your next 10 appointments already scheduled     Apr 21, 2017  3:15 PM CDT   Anticoagulation Visit with CL ANTI COAG   [facility]    [address]  [facility] [address] [phone]            Apr 22, 2017 10:00 AM CDT   US ABDOMEN/PELVIS DUPLEX COMPLETE with UCUS3   [facility] ([facility])    [address]  1st Floor  [facility] [address] [phone]           Please bring a list of your medicines (including vitamins, minerals and over-the-counter drugs). Also, tell your doctor about any allergies you may have. Wear comfortable clothes and leave your valuables at home.  Adults: No eating or drinking for 8 hours before the exam. You may take medicine with a small  sip of water.  Children: - Children 6+ years: No food or drink for 6 hours before exam. - Children 1-5 years: No food or drink for 4 hours before exam. - Infants, breast-fed: may have breast milk up to 2 hours before exam. - Infants, formula: may have bottle until 4 hours before exam.  Please call the Imaging Department at your exam site with any questions.            May 15, 2017  9:45 AM CDT   LAB with CL LAB   [facility] ([facility])    [address] [address]   [phone]           Patient must bring picture ID.  Patient should be prepared to give a urine specimen  Please do not eat 10-12 hours before your appointment if you are coming in fasting for labs on lipids, cholesterol, or glucose (sugar).  Pregnant women should follow their Care Team instructions. Water with medications is okay. Do not drink coffee or other fluids.   If you have concerns about taking  your medications, please ask at office or if scheduling via [employer], send a message by clicking on Secure Messaging, Message Your Care Team.            Jun 12, 2017  9:45 AM CDT   LAB with CL LAB   [facility] ([facility])    [address] [address]   [phone]           Patient must bring picture ID.  Patient should be prepared to give a urine specimen  Please do not eat 10-12 hours before your appointment if you are coming in fasting for labs on lipids, cholesterol, or glucose (sugar).  Pregnant women should follow their Care Team instructions. Water with medications is okay. Do not drink coffee or other fluids.   If you have concerns about taking  your medications, please ask at office or if scheduling via [employer], send a message by clicking on Secure Messaging, Message Your Care Team.            Jul 17, 2017 10:00 AM CDT   LAB with CL LAB   [facility] ([facility])    [address]  [facility] [address]   [phone]           Patient must bring picture ID.  Patient should be prepared to give a urine specimen  Please do not eat 10-12 hours before your appointment if you are coming in fasting for labs on lipids, cholesterol, or glucose (sugar).  Pregnant women should follow their Care Team instructions. Water with medications is okay. Do not drink coffee or other fluids.   If you have concerns about taking  your medications, please ask at office or if scheduling via [employer], send a message by clicking on Secure Messaging, Message Your Care Team.            Jul 18, 2017  1:15 PM CDT   Return Visit with Kerrie Rankin MD   [facility] ([facility])    [facility]  [address] [address]   [phone]            Oct 09, 2017  1:00 PM CDT   Lab with  LAB   [facility] ([facility])    [address]  1st Floor  [facility] [id number] [phone]            Oct 09, 2017  2:00 PM CDT   (Arrive by 1:45 PM)   Return General Liver with Agustin Le MD   [facility] ([facility])    [address]  3rd Floor  [facility] [id number] [phone]              Who to contact     If you have questions or need follow up information about today's clinic visit or your schedule please contact [facility] directly at [phone].  Normal or non-critical lab and imaging results will be communicated to you by MyChart, letter or phone within 4 business days after the clinic has received the results. If you do not hear from us within 7 days, please contact the clinic through [employer] or phone. If you have a critical or abnormal lab result, we will notify you by phone as soon as possible.  Submit refill requests through [employer] or call your pharmacy and they will forward the refill request to us. Please allow 3 business days for your refill to be  "completed.          Additional Information About Your Visit        [employer] Information     [email or web address] gives you secure access to your electronic health record. If you see a primary care provider, you can also send messages to your care team and make appointments. If you have questions, please call your primary care clinic.  If you do not have a primary care provider, please call [phone] and they will assist you.        Care EveryWhere ID     This is your Care EveryWhere ID. This could be used by other organizations to access your [place] medical records  [id number]        Your Vitals Were     Pulse Temperature Respirations Height Last Period Pulse Oximetry    75 98.1  F (36.7  C) (Tympanic) 20 1.695 m (5' 6.73\") 09/15/2007 97%    Breastfeeding? BMI (Body Mass Index)                No 49.76 kg/m2           Blood Pressure from Last 3 Encounters:   04/20/17 143/86   04/14/17 144/84   02/23/17 124/66    Weight from Last 3 Encounters:   04/20/17 (!) 143 kg (315 lb 3.2 oz)   04/14/17 (!) 141.3 kg (311 lb 6.4 oz)   02/23/17 (!) 138.4 kg (305 lb 3.2 oz)              Today, you had the following     No orders found for display         Today's Medication Changes          These changes are accurate as of: 4/20/17 11:26 AM.  If you have any questions, ask your nurse or doctor.               These medicines have changed or have updated prescriptions.        Dose/Directions    ranitidine 150 MG tablet   Commonly known as:  ZANTAC   This may have changed:  See the new instructions.   Used for:  Esophageal reflux        ONE TABLET TWICE DAILY as needed will call when needed   Quantity:  180 Tab   Refills:  3                Primary Care Provider Office Phone # Fax #    Yasmin Mckenna [phone]       [facility] [address] [place] [address]        Thank you!     Thank you for choosing [facility]  for your care. Our goal is always to provide you with excellent care. Hearing " back from our patients is one way we can continue to improve our services. Please take a few minutes to complete the written survey that you may receive in the mail after your visit with us. Thank you!             Your Updated Medication List - Protect others around you: Learn how to safely use, store and throw away your medicines at www.disposemymeds.org.          This list is accurate as of: 4/20/17 11:26 AM.  Always use your most recent med list.                   Brand Name Dispense Instructions for use    albuterol 108 (90 BASE) MCG/ACT Inhaler    PROAIR HFA/PROVENTIL HFA/VENTOLIN HFA    1 Inhaler    Inhale 2 puffs into the lungs every 4 hours as needed for shortness of breath / dyspnea       B-6 100 MG Tabs      Take 1 tablet by mouth daily       calcium carbonate-vitamin D 500-400 MG-UNIT Tabs per tablet      Take 1 tablet by mouth 2 times daily       GLUCOSAMINE CHONDRO COMPLEX OR      on hold       loratadine 10 MG tablet    CLARITIN    90 Tab    ONE DAILY       MULTIVITAMIN TABS   OR      1 TABLET DAILY       omega 3 1000 MG Caps      Reported on 4/20/2017       ranitidine 150 MG tablet    ZANTAC    180 Tab    ONE TABLET TWICE DAILY as needed will call when needed       TYLENOL PO      Take 1,000 mg by mouth every 4 hours as needed for mild pain or fever (Headaches)       * warfarin 5 MG tablet    COUMADIN    90 tablet    . As directed by Anticoagulation Clinic, current dose 5 mg Fri, 7.5 mg rest of week, uses both 2.5 and 5 mg tablets       * warfarin 2.5 MG tablet    COUMADIN    90 tablet    As directed by Anticoagulation Clinic, current dose 5 mg Fri, 7.5 mg rest of week uses both 2.5 and 5 mg tablets       * Notice:  This list has 2 medication(s) that are the same as other medications prescribed for you. Read the directions carefully, and ask your doctor or other care provider to review them with you.

## 2017-04-20 NOTE — PATIENT INSTRUCTIONS
Dr. Rankin recommends that you discontinue your prednisone and to complete monthly labs to check your CBC. We would like to see you back in 3 months for a follow up. When you are in need of a refill, please call your pharmacy and they will send us a request.  Copy of appointments, and after visit summary (AVS) given to patient.  If you have any questions please call Jenny Osullivan RN, BSN, OCN Oncology Hematology  [facility] [phone]. For questions after business hours, or on holidays/weekends, please call our after hours Nurse Triage line [phone]. Thank you.

## 2017-04-20 NOTE — PROGRESS NOTES
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      HISTORY OF PRESENT ILLNESS:  she presented with 2-3 months of easy bruising and also fatigue.  She presented to her Primary Care Physician's office on 07/25/2016 and was found to have critical thrombocytopenia with a platelet count of 3.    She was offered IVIG 1 gram on 7/25/2016 and 4 days of   Dexamethasone. She was d/c with PL of 38 on 7/27/2016. PL dip to 13 on 8/4 and 7 on 8/11. She is offered wklyI IVIG 1g/kg starting 8/4/2016, and 2nd cycle of dex x 4 days on 8/11. PL improves to 77 on 8/15, then dip to 31 on 8/18.   Due to rapid drop of her PL when off dex, tx was changed to po prednisone 8/18 with wkly IVIG. R was added on 8/30/2016. She had no PL response despite R, steroid and IVIG.   Nplate was started 9/23/2016. PL up from 20 to 110 after 2 doses of it. Then dip down again.   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   Course was complicated with portal vein and SMV thromboses end of 12/2016. She was admitted to , had IR thrombectomy and catheter-directed lytic therapy to portal vein. Following the procedure she was placed on high intensity heparin drip which was then bridged with warfarin starting 1/9/2017 with an INR goal of 2-3.  She finished prednisone slow taper 4/2017.     PAST MEDICAL HISTORY:      1.  Cystocele.    2.  Morbid obesity.    3.  Reflux.    4.  Intermittent asthma.    5.  Effusion of lower leg joints.    6. 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        SOCIAL HISTORY:  The patient lives in [place] and works in the school district.  She is  with children.  She denies the use of alcohol or tobacco.      ROS  She is back to her baseline energy level.   She reports occasional left side abd pain.   She denies bleeding episode.          PHYSICAL EXAMINATION:    VITAL SIGNS:  Blood pressure 143/86,  "pulse 75, temperature 98.1  F (36.7  C), temperature source Tympanic, resp. rate 20, height 1.695 m (5' 6.73\"), weight (!) 143 kg (315 lb 3.2 oz), last menstrual period 09/15/2007, SpO2 97 %, not currently breastfeeding.  GENERAL APPEARANCE:  A middle-aged woman who appears her stated age, very pleasant, morbidly obese, sitting comfortably in a chair and knitting.    HEENT: The patient is normocephalic, atraumatic. Pupils are equally reactive to light.  Sclerae are anicteric.  There is moist oral mucosa, negative pharynx, no oral thrush.    NECK:  Supple, no jugular venous distention, thyroid not palpable.    LYMPH NODES:  Superficial lymphadenopathy is not appreciable in the bilateral cervical, supraclavicular, axillary or inguinal areas.    CARDIOVASCULAR:  S1, S2 regular with no murmurs or gallops, no carotid or abdominal bruits.    PULMONARY:  Lungs are clear to auscultation and percussion bilaterally.  There is no wheezing or rhonchi.    GASTROINTESTINAL:  Abdomen is soft, nontender with no hepatosplenomegaly, no signs of ascites and no mass appreciable.    MUSCULOSKELETAL/EXTREMITIES:  No edema, no cyanotic changes, no signs of joint deformity, no lymphedema.    NEUROLOGIC:  Cranial nerves II-XII are grossly intact, sensation intact, muscle strength and muscle tone symmetrical, 5/5 throughout.    BACK:  No spinal or paraspinal tenderness, no CVA tenderness.    SKIN:  No petechiae, no rash and no signs of cellulitis.  There is scattered bruising on forearms and thighs, which are healing.        LABORATORY DATA:    PL  > 200, hb 10-11    Current Image  CT 1/9/2017   1. Status post splenectomy with postsurgical changes in the left upper quadrant. Persistent thrombosis of the splenic vein.  2. Main portal vein now appears patent with possible peripheral nonocclusive thrombosis/periportal edema. Residual thrombosis of portion of the left branch of the portal vein and the right posterior  branch of the portal " vein.  3. Residual partial thrombosis of the distal portion of the superior mesenteric vein.   4. Small focal nodular opacities in both lower lobes, likely of infectious etiology.    Old data reviewed with summary  12/31/2016 CT abd - thrombosis of the portal vein, splenic vein.    Right after splenectomy 12/13/2016 wbc 14, neutrophiila, PL >600, Hb 9.2  PL at 91 on 12/8/2016, at 51 in 11/2016 at 28 from 43 from 110 early Oct, from 31 on 8/18, at 77 on 8/15, at 7 on 8/11, at 14 on 8/4/2016, at 38 7/27/2016  On 07/26/2016 platelets 8, lymphopenia with lymphocyte count 0.6.    On 07/25/2016 platelets 3.   smear: no dysplasia or schistocytes.     HepB DNA by PCR 10/2016 : negative    9/2016 H. Pylori breath test/stool antigen: negative.     8/2016 hep Bs Ag -,BsAb+, BcAb+, HepC -, for which she saw UGI and started on hepB med Tenofovir 9/8/2016.     CT a/p 10/2016: The spleen is borderline enlarged at 13.3 cm.   CT neck 9/15/2016: No enlarged or necrotic-appearing cervical lymph nodes are seen. No metastatic lesions are identified.  CT head 8/11: negative.           ASSESSMENT/PLAN:    1.  dx ITP in 7/2016 . Failed dexa =/- R, IVIG, Nplate  PL up to > 600 post splenectomy in 12/2016.   Advice d/c prednisone.  Cbc q 4 wks.   F/u in 3 months.     2. + hepBcAb, - HepBsAg, HepBsAg-,HebB DNA - by PCR, she saw GI due to the use of Rituxan, started on Tenofovir 9/8/2016 till 4/2017.    Her LFT is not nl.   Advice limit Tylenol intake.     3. Post op anemia due to intra op bleed. Stable since d/c. No need for transfusion at this point. Will follow.     4. 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 S/p thrombolysis to portal vein, lovenox and now on coumadin. She needs minimal 3 likely 6 months of anti coagulation.

## 2017-04-21 ENCOUNTER — ANTICOAGULATION THERAPY VISIT (OUTPATIENT)
Dept: ANTICOAGULATION | Facility: CLINIC | Age: 62
End: 2017-04-21
Payer: COMMERCIAL

## 2017-04-21 DIAGNOSIS — I81 PORTAL VEIN THROMBOSIS: ICD-10-CM

## 2017-04-21 DIAGNOSIS — I81 PORTAL VEIN THROMBOSIS: Primary | ICD-10-CM

## 2017-04-21 DIAGNOSIS — K74.60 CIRRHOSIS OF LIVER WITHOUT ASCITES, UNSPECIFIED HEPATIC CIRRHOSIS TYPE (H): Primary | ICD-10-CM

## 2017-04-21 DIAGNOSIS — Z79.01 LONG-TERM (CURRENT) USE OF ANTICOAGULANTS: ICD-10-CM

## 2017-04-21 LAB — INR POINT OF CARE: 3.4 (ref 0.86–1.14)

## 2017-04-21 PROCEDURE — 85610 PROTHROMBIN TIME: CPT | Mod: QW

## 2017-04-21 PROCEDURE — 36416 COLLJ CAPILLARY BLOOD SPEC: CPT

## 2017-04-21 PROCEDURE — 99207 ZZC NO CHARGE NURSE ONLY: CPT

## 2017-04-21 RX ORDER — WARFARIN SODIUM 2.5 MG/1
TABLET ORAL
Qty: 90 TABLET | Refills: 0 | COMMUNITY
Start: 2017-04-21 | End: 2017-05-05

## 2017-04-21 RX ORDER — WARFARIN SODIUM 5 MG/1
TABLET ORAL
Qty: 90 TABLET | Refills: 0 | COMMUNITY
Start: 2017-04-21 | End: 2017-05-05

## 2017-04-21 NOTE — MR AVS SNAPSHOT
Charlee HERNANDEZ Kendrick   4/21/2017 3:15 PM   Anticoagulation Therapy Visit    Description:  61 year old female   Provider:  JAMAL ANTI COAG   Department:  Cl Anticoag           INR as of 4/21/2017     Today's INR 3.4!      Anticoagulation Summary as of 4/21/2017     INR goal 2.0-3.0   Today's INR 3.4!   Full instructions 5 mg on Mon, Wed, Fri; 7.5 mg all other days   Next INR check 5/5/2017    Indications   Portal vein thrombosis [I81]  Long-term (current) use of anticoagulants [Z79.01] [Z79.01]         Your next Anticoagulation Clinic appointment(s)     May 05, 2017  3:30 PM CDT   Anticoagulation Visit with JAMAL ANTI COAG   [facility]    [address] [address] [phone]              Contact Numbers     Please call [phone] to cancel and/or reschedule your appointment.  Please call [phone] with any problems or questions regarding your therapy          April 2017 Details    Sun Mon Tue Wed Thu Fri Sat           1                 2               3               4               5               6               7               8                 9               10               11               12               13               14               15                 16               17               18               19               20               21      5 mg   See details      22      7.5 mg           23      7.5 mg         24      5 mg         25      7.5 mg         26      5 mg         27      7.5 mg         28      5 mg         29      7.5 mg           30      7.5 mg                Date Details   04/21 This INR check   have a serving of greens today               How to take your warfarin dose     To take:  5 mg Take 1 of the 5 mg tablets.    To take:  7.5 mg Take 1 of the 2.5 mg tablets and 1 of the 5 mg tablets.           May 2017 Details    Sun Mon Tue Wed Thu Fri Sat      1      5 mg         2      7.5 mg         3      5 mg         4       7.5 mg         5            6                 7               8               9               10               11               12               13                 14               15               16               17               18               19               20                 21               22               23               24               25               26               27                 28               29               30               31                   Date Details   No additional details    Date of next INR:  5/5/2017         How to take your warfarin dose     To take:  5 mg Take 1 of the 5 mg tablets.    To take:  7.5 mg Take 1 of the 2.5 mg tablets and 1 of the 5 mg tablets.

## 2017-04-21 NOTE — PROGRESS NOTES
ANTICOAGULATION FOLLOW-UP CLINIC VISIT    Patient Name:  Charlee Marks  Date:  4/21/2017  Contact Type:  Face to Face    SUBJECTIVE:     Patient Findings     Positives Change in medications (pt stopped prednisone and Viread (does not interact with warfarin)), Change in diet/appetite (diet is stable with her usual Vit K food), Activity level change (activity is stable, no change, will be going back to work full time in [place])           OBJECTIVE    INR Protime   Date Value Ref Range Status   04/21/2017 3.4 (A) 0.86 - 1.14 Final       ASSESSMENT / PLAN  INR assessment SUPRA    Recheck INR In: 2 WEEKS    INR Location Clinic      Anticoagulation Summary as of 4/21/2017     INR goal 2.0-3.0   Today's INR 3.4!   Maintenance plan 5 mg (5 mg x 1) on Mon, Wed, Fri; 7.5 mg (2.5 mg x 1 and 5 mg x 1) all other days   Full instructions 5 mg on Mon, Wed, Fri; 7.5 mg all other days   Weekly total 45 mg   Plan last modified Yamileth Mcghee RN (4/21/2017)   Next INR check 5/5/2017   Priority INR   Target end date     Indications   Portal vein thrombosis [I81]  Long-term (current) use of anticoagulants [Z79.01] [Z79.01]         Anticoagulation Episode Summary     INR check location [facility]    Preferred lab     Send INR reminders to CL ANTICOAG POOL    Comments  +      Anticoagulation Care Providers     Provider Role Specialty Phone number    Yasmin Mckenna MD [facility] [phone]            See the Encounter Report to view Anticoagulation Flowsheet and Dosing Calendar (Go to Encounters tab in chart review, and find the Anticoagulation Therapy Visit)        Yamileth Mcghee RN

## 2017-05-05 ENCOUNTER — ANTICOAGULATION THERAPY VISIT (OUTPATIENT)
Dept: ANTICOAGULATION | Facility: CLINIC | Age: 62
End: 2017-05-05
Payer: COMMERCIAL

## 2017-05-05 DIAGNOSIS — I81 PORTAL VEIN THROMBOSIS: ICD-10-CM

## 2017-05-05 DIAGNOSIS — Z79.01 LONG-TERM (CURRENT) USE OF ANTICOAGULANTS: ICD-10-CM

## 2017-05-05 LAB — INR POINT OF CARE: 3.2 (ref 0.86–1.14)

## 2017-05-05 PROCEDURE — 36416 COLLJ CAPILLARY BLOOD SPEC: CPT

## 2017-05-05 PROCEDURE — 85610 PROTHROMBIN TIME: CPT | Mod: QW

## 2017-05-05 PROCEDURE — 99207 ZZC NO CHARGE NURSE ONLY: CPT

## 2017-05-05 RX ORDER — WARFARIN SODIUM 5 MG/1
TABLET ORAL
Qty: 90 TABLET | Refills: 0 | Status: SHIPPED | OUTPATIENT
Start: 2017-05-05 | End: 2017-07-24

## 2017-05-05 RX ORDER — WARFARIN SODIUM 2.5 MG/1
TABLET ORAL
Qty: 60 TABLET | Refills: 0 | Status: SHIPPED | OUTPATIENT
Start: 2017-05-05 | End: 2017-05-05

## 2017-05-05 RX ORDER — WARFARIN SODIUM 2.5 MG/1
TABLET ORAL
Qty: 60 TABLET | Refills: 0 | Status: SHIPPED | OUTPATIENT
Start: 2017-05-05 | End: 2017-07-24

## 2017-05-05 NOTE — MR AVS SNAPSHOT
Charlee Marks   5/5/2017 3:30 PM   Anticoagulation Therapy Visit    Description:  61 year old female   Provider:  JAMAL ANTI COAG   Department:  Cl Anticoag           INR as of 5/5/2017     Today's INR 3.2!      Anticoagulation Summary as of 5/5/2017     INR goal 2.0-3.0   Today's INR 3.2!   Full instructions 7.5 mg on Mon, Wed, Fri; 5 mg all other days   Next INR check 5/19/2017    Indications   Portal vein thrombosis [I81]  Long-term (current) use of anticoagulants [Z79.01] [Z79.01]         Description     Warfarin dose: 7.5mg MWF and 5mg the rest of the days of the week.        Your next Anticoagulation Clinic appointment(s)     May 19, 2017  3:45 PM CDT   Anticoagulation Visit with JAMAL ANTI COAG   [facility] ([facility])    [address] [id number] [phone]              Contact Numbers     Please call [phone] to cancel and/or reschedule your appointment.  Please call [phone] with any problems or questions regarding your therapy          May 2017 Details    Sun Mon Tue Wed Thu Fri Sat      1               2               3               4               5      7.5 mg   See details      6      5 mg           7      5 mg         8      7.5 mg         9      5 mg         10      7.5 mg         11      5 mg         12      7.5 mg         13      5 mg           14      5 mg         15      7.5 mg         16      5 mg         17      7.5 mg         18      5 mg         19            20                 21               22               23               24               25               26               27                 28               29               30               31                   Date Details   05/05 This INR check       Date of next INR:  5/19/2017         How to take your warfarin dose     To take:  5 mg Take 1 of the 5 mg tablets.    To take:  7.5 mg Take 1 of the 2.5 mg tablets and 1 of the 5 mg tablets.

## 2017-05-05 NOTE — PROGRESS NOTES
ANTICOAGULATION FOLLOW-UP CLINIC VISIT    Patient Name:  Charlee Marks  Date:  5/5/2017  Contact Type:  Face to Face    SUBJECTIVE: No changes      OBJECTIVE    INR Protime   Date Value Ref Range Status   05/05/2017 3.2 (A) 0.86 - 1.14 Final       ASSESSMENT / PLAN  No question data found.  Anticoagulation Summary as of 5/5/2017     INR goal 2.0-3.0   Today's INR 3.2!   Maintenance plan 7.5 mg (2.5 mg x 1 and 5 mg x 1) on Mon, Wed, Fri; 5 mg (5 mg x 1) all other days   Full instructions 7.5 mg on Mon, Wed, Fri; 5 mg all other days   Weekly total 42.5 mg   Plan last modified Samara Velarde RN (5/5/2017)   Next INR check 5/19/2017   Priority INR   Target end date     Indications   Portal vein thrombosis [I81]  Long-term (current) use of anticoagulants [Z79.01] [Z79.01]         Anticoagulation Episode Summary     [facility] check location [facility]    Preferred lab     Send INR reminders to  [facility]EL[facility] POOL    Comments  +      Anticoagulation Care Providers     Provider Role Specialty Phone number    Yasmin Mckenna MD [facility] [phone]            See the Encounter Report to view Anticoagulation Flowsheet and Dosing Calendar (Go to Encounters tab in chart review, and find the Anticoagulation Therapy Visit)        Samara Velarde RN

## 2017-05-15 DIAGNOSIS — D69.3 IDIOPATHIC THROMBOCYTOPENIC PURPURA (H): ICD-10-CM

## 2017-05-15 DIAGNOSIS — K55.069 SUPERIOR MESENTERIC VEIN THROMBOSIS (H): ICD-10-CM

## 2017-05-15 LAB
BASOPHILS # BLD AUTO: 0.1 10E9/L (ref 0–0.2)
BASOPHILS NFR BLD AUTO: 0.5 %
DIFFERENTIAL METHOD BLD: ABNORMAL
EOSINOPHIL # BLD AUTO: 0.1 10E9/L (ref 0–0.7)
EOSINOPHIL NFR BLD AUTO: 1.5 %
ERYTHROCYTE [DISTWIDTH] IN BLOOD BY AUTOMATED COUNT: 19.7 % (ref 10–15)
HCT VFR BLD AUTO: 34.4 % (ref 35–47)
HGB BLD-MCNC: 10.3 G/DL (ref 11.7–15.7)
LYMPHOCYTES # BLD AUTO: 2.9 10E9/L (ref 0.8–5.3)
LYMPHOCYTES NFR BLD AUTO: 30.9 %
MCH RBC QN AUTO: 25.2 PG (ref 26.5–33)
MCHC RBC AUTO-ENTMCNC: 29.9 G/DL (ref 31.5–36.5)
MCV RBC AUTO: 84 FL (ref 78–100)
MONOCYTES # BLD AUTO: 1.3 10E9/L (ref 0–1.3)
MONOCYTES NFR BLD AUTO: 14.3 %
NEUTROPHILS # BLD AUTO: 4.9 10E9/L (ref 1.6–8.3)
NEUTROPHILS NFR BLD AUTO: 52.8 %
PLATELET # BLD AUTO: 173 10E9/L (ref 150–450)
RBC # BLD AUTO: 4.08 10E12/L (ref 3.8–5.2)
WBC # BLD AUTO: 9.3 10E9/L (ref 4–11)

## 2017-05-15 PROCEDURE — 85025 COMPLETE CBC W/AUTO DIFF WBC: CPT | Performed by: INTERNAL MEDICINE

## 2017-05-15 PROCEDURE — 36415 COLL VENOUS BLD VENIPUNCTURE: CPT | Performed by: INTERNAL MEDICINE

## 2017-05-19 ENCOUNTER — ANTICOAGULATION THERAPY VISIT (OUTPATIENT)
Dept: ANTICOAGULATION | Facility: CLINIC | Age: 62
End: 2017-05-19
Payer: COMMERCIAL

## 2017-05-19 DIAGNOSIS — I81 PORTAL VEIN THROMBOSIS: ICD-10-CM

## 2017-05-19 DIAGNOSIS — Z79.01 LONG-TERM (CURRENT) USE OF ANTICOAGULANTS: ICD-10-CM

## 2017-05-19 LAB — INR POINT OF CARE: 2.3 (ref 0.86–1.14)

## 2017-05-19 PROCEDURE — 36416 COLLJ CAPILLARY BLOOD SPEC: CPT

## 2017-05-19 PROCEDURE — 85610 PROTHROMBIN TIME: CPT | Mod: QW

## 2017-05-19 PROCEDURE — 99207 ZZC NO CHARGE NURSE ONLY: CPT

## 2017-05-19 NOTE — MR AVS SNAPSHOT
Charlee Stovallgerson   5/19/2017 3:45 PM   Anticoagulation Therapy Visit    Description:  61 year old female   Provider:  CL ANTI COAG   Department:  Cl Anticoag           INR as of 5/19/2017     Today's INR 2.3      Anticoagulation Summary as of 5/19/2017     INR goal 2.0-3.0   Today's INR 2.3   Full instructions 7.5 mg on Mon, Wed, Fri; 5 mg all other days   Next INR check 6/9/2017    Indications   Portal vein thrombosis [I81]  Long-term (current) use of anticoagulants [Z79.01] [Z79.01]         Description     Warfarin dose: 7.5mg MWF and 5mg the rest of the days of the week.        Your next Anticoagulation Clinic appointment(s)     May 19, 2017  3:45 PM CDT   Anticoagulation Visit with CL ANTI COAG   [facility]    [address] [address]   [phone]            Jun 09, 2017 10:00 AM CDT   Anticoagulation Visit with CL ANTI COAG   [facility]    [address] [address]   [phone]              Contact Numbers     Please call [phone] to cancel and/or reschedule your appointment.  Please call [phone] with any problems or questions regarding your therapy          May 2017 Details    Sun Mon Tue Wed Thu Fri Sat      1               2               3               4               5               6                 7               8               9               10               11               12               13                 14               15               16               17               18               19      7.5 mg   See details      20      5 mg           21      5 mg         22      7.5 mg         23      5 mg         24      7.5 mg         25      5 mg         26      7.5 mg         27      5 mg           28      5 mg         29      7.5 mg         30      5 mg         31      7.5 mg             Date Details   05/19 This INR check               How  to take your warfarin dose     To take:  5 mg Take 1 of the 5 mg tablets.    To take:  7.5 mg Take 1 of the 2.5 mg tablets and 1 of the 5 mg tablets.           June 2017 Details    Sun Mon Tue Wed Thu Fri Sat         1      5 mg         2      7.5 mg         3      5 mg           4      5 mg         5      7.5 mg         6      5 mg         7      7.5 mg         8      5 mg         9            10                 11               12               13               14               15               16               17                 18               19               20               21               22               23               24                 25               26               27               28               29               30                 Date Details   No additional details    Date of next INR:  6/9/2017         How to take your warfarin dose     To take:  5 mg Take 1 of the 5 mg tablets.    To take:  7.5 mg Take 1 of the 2.5 mg tablets and 1 of the 5 mg tablets.

## 2017-05-19 NOTE — PROGRESS NOTES
ANTICOAGULATION FOLLOW-UP CLINIC VISIT    Patient Name:  Charlee Marks  Date:  5/19/2017  Contact Type:  Face to Face    SUBJECTIVE:     Patient Findings     Positives No Problem Findings           OBJECTIVE    INR Protime   Date Value Ref Range Status   05/19/2017 2.3 (A) 0.86 - 1.14 Final       ASSESSMENT / PLAN  INR assessment THER    Recheck INR In: 3 WEEKS    INR Location Clinic      Anticoagulation Summary as of 5/19/2017     INR goal 2.0-3.0   Today's INR 2.3   Maintenance plan 7.5 mg (2.5 mg x 1 and 5 mg x 1) on Mon, Wed, Fri; 5 mg (5 mg x 1) all other days   Full instructions 7.5 mg on Mon, Wed, Fri; 5 mg all other days   Weekly total 42.5 mg   Plan last modified Samara Velarde RN (5/5/2017)   Next INR check 6/9/2017   Priority INR   Target end date     Indications   Portal vein thrombosis [I81]  Long-term (current) use of anticoagulants [Z79.01] [Z79.01]         Anticoagulation Episode Summary     [facility]    Preferred lab     Send INR reminders to [facility] ANTICOAG POOL    Comments  +      Anticoagulation Care Providers     Provider Role Specialty Phone number    Yasmin Mckenna MD [facility] [phone]            See the Encounter Report to view Anticoagulation Flowsheet and Dosing Calendar (Go to Encounters tab in chart review, and find the Anticoagulation Therapy Visit)        Samara Velarde RN

## 2017-05-31 ENCOUNTER — TELEPHONE (OUTPATIENT)
Dept: FAMILY MEDICINE | Facility: CLINIC | Age: 62
End: 2017-05-31

## 2017-05-31 NOTE — LETTER
[facility]  [address]  [facility] [address]  Phone: [phone]    May 31, 2017    Charlee Marks  [address] [place] [address]              Allison,       This is a reminder that it is time to make your appointment for your annual mammogram.  You may make an appointment for your Digital Mammogram by calling our scheduling line at [phone] to schedule at one of our locations.    If you are experiencing breast symptoms or concerns such as a new lump or breast pain you should first contact your health care provider before scheduling your mammogram. Your physician may want to see you prior to your visit.    We would like to take this opportunity to remind you that along with an annual mammogram, the [employer] Society recommends an annual breast examination by your physician or health care provider.    If you are a patient currently enrolled in the [place] [facility] Program or the [facility] Program, you must be seen by your provider for a clinical breast examination prior to your mammogram.    Please disregard this notice if you have already scheduled an appointment. Or, if you have had this done elsewhere, please have your records sent to the clinic.     Thank you and we look forward to seeing you in the near future for your annual screening mammogram.        Sincerely,      Yasmin Mckenna MD/ la

## 2017-05-31 NOTE — TELEPHONE ENCOUNTER
Panel Management Review      Patient has the following on her problem list:     Asthma review     ACT Total Scores 7/25/2016   ACT TOTAL SCORE -   ASTHMA ER VISITS -   ASTHMA HOSPITALIZATIONS -   ACT TOTAL SCORE (Goal Greater than or Equal to 20) 22   In the past 12 months, how many times did you visit the emergency room for your asthma without being admitted to the hospital? 0   In the past 12 months, how many times were you hospitalized overnight because of your asthma? 0      1. Is Asthma diagnosis on the Problem List? Yes    2. Is Asthma listed on Health Maintenance? Yes    3. Patient is due for:  ACT and AAP      Composite cancer screening  Chart review shows that this patient is due/due soon for the following Mammogram  Summary:    Patient is due/failing the following:   ACT and MAMMOGRAM    Action needed:   Patient needs to do ACT. and to schedule mammogram    Type of outreach:    Sent letter. left message for pt to call back to go over ACT    Questions for provider review:    None                                                                                                                                    Iona Briggs MA

## 2017-06-09 ENCOUNTER — ANTICOAGULATION THERAPY VISIT (OUTPATIENT)
Dept: ANTICOAGULATION | Facility: CLINIC | Age: 62
End: 2017-06-09
Payer: COMMERCIAL

## 2017-06-09 DIAGNOSIS — I81 PORTAL VEIN THROMBOSIS: ICD-10-CM

## 2017-06-09 DIAGNOSIS — Z79.01 LONG-TERM (CURRENT) USE OF ANTICOAGULANTS: ICD-10-CM

## 2017-06-09 LAB — INR POINT OF CARE: 2.6 (ref 0.86–1.14)

## 2017-06-09 PROCEDURE — 99207 ZZC NO CHARGE NURSE ONLY: CPT

## 2017-06-09 PROCEDURE — 85610 PROTHROMBIN TIME: CPT | Mod: QW

## 2017-06-09 PROCEDURE — 36416 COLLJ CAPILLARY BLOOD SPEC: CPT

## 2017-06-09 NOTE — PROGRESS NOTES
ANTICOAGULATION FOLLOW-UP CLINIC VISIT    Patient Name:  Charlee Marks  Date:  6/9/2017  Contact Type:  Face to Face    SUBJECTIVE:     Patient Findings     Positives No Problem Findings    Comments Has been wearing compression stockings when at work due to LE swelling.            OBJECTIVE    INR Protime   Date Value Ref Range Status   06/09/2017 2.6 (A) 0.86 - 1.14 Final       ASSESSMENT / PLAN  INR assessment THER    Recheck INR In: 2 WEEKS 2 1/2 weeks   INR Location Clinic      Anticoagulation Summary as of 6/9/2017     INR goal 2.0-3.0   Today's INR 2.6   Maintenance plan 7.5 mg (2.5 mg x 1 and 5 mg x 1) on Mon, Wed, Fri; 5 mg (5 mg x 1) all other days   Full instructions 7.5 mg on Mon, Wed, Fri; 5 mg all other days   Weekly total 42.5 mg   No change documented Alanna Hodges RN   Plan last modified Samara Velarde RN (5/5/2017)   Next INR check 6/27/2017   Priority INR   Target end date     Indications   Portal vein thrombosis [I81]  Long-term (current) use of anticoagulants [Z79.01] [Z79.01]         Anticoagulation Episode Summary     [facility]    Preferred lab     Send INR reminders to CL ANTICOAG POOL    Comments * uses both 2.5mg & 5mg tabs      Anticoagulation Care Providers     Provider Role Specialty Phone number    Yasmin Mckenna MD [facility] [phone]            See the Encounter Report to view Anticoagulation Flowsheet and Dosing Calendar (Go to Encounters tab in chart review, and find the Anticoagulation Therapy Visit)    Alanna Hodges, RN

## 2017-06-09 NOTE — MR AVS SNAPSHOT
Charlee Stovallgerson   6/9/2017 10:00 AM   Anticoagulation Therapy Visit    Description:  61 year old female   Provider:  JAMAL ANTI COAG   Department:  Cl Anticoag           INR as of 6/9/2017     Today's INR 2.6      Anticoagulation Summary as of 6/9/2017     INR goal 2.0-3.0   Today's INR 2.6   Full instructions 7.5 mg on Mon, Wed, Fri; 5 mg all other days   Next INR check 6/27/2017    Indications   Portal vein thrombosis [I81]  Long-term (current) use of anticoagulants [Z79.01] [Z79.01]         Description     No change, recheck INR in 2 1/2 weeks.      Your next Anticoagulation Clinic appointment(s)     Jun 27, 2017  2:30 PM CDT   Anticoagulation Visit with JAMAL ANTI CORRIEG   [facility] ([facility]    [address] [id number] [phone]              Contact Numbers     Please call [phone] to cancel and/or reschedule your appointment.  Please call [phone] with any problems or questions regarding your therapy          June 2017 Details    Sun Mon Tue Wed Thu Fri Sat         1               2               3                 4               5               6               7               8               9      7.5 mg   See details      10      5 mg           11      5 mg         12      7.5 mg         13      5 mg         14      7.5 mg         15      5 mg         16      7.5 mg         17      5 mg           18      5 mg         19      7.5 mg         20      5 mg         21      7.5 mg         22      5 mg         23      7.5 mg         24      5 mg           25      5 mg         26      7.5 mg         27            28               29               30                 Date Details   06/09 This INR check       Date of next INR:  6/27/2017         How to take your warfarin dose     To take:  5 mg Take 1 of the 5 mg tablets.    To take:  7.5 mg Take 1 of the 2.5 mg tablets and 1 of the 5 mg tablets.

## 2017-06-12 DIAGNOSIS — D69.3 IDIOPATHIC THROMBOCYTOPENIC PURPURA (H): ICD-10-CM

## 2017-06-12 DIAGNOSIS — K55.069 SUPERIOR MESENTERIC VEIN THROMBOSIS (H): ICD-10-CM

## 2017-06-12 LAB
BASOPHILS # BLD AUTO: 0.1 10E9/L (ref 0–0.2)
BASOPHILS NFR BLD AUTO: 0.7 %
DIFFERENTIAL METHOD BLD: ABNORMAL
EOSINOPHIL # BLD AUTO: 0.2 10E9/L (ref 0–0.7)
EOSINOPHIL NFR BLD AUTO: 2.9 %
ERYTHROCYTE [DISTWIDTH] IN BLOOD BY AUTOMATED COUNT: 18.9 % (ref 10–15)
HCT VFR BLD AUTO: 36.6 % (ref 35–47)
HGB BLD-MCNC: 11 G/DL (ref 11.7–15.7)
LYMPHOCYTES # BLD AUTO: 3.5 10E9/L (ref 0.8–5.3)
LYMPHOCYTES NFR BLD AUTO: 46.4 %
MCH RBC QN AUTO: 25.7 PG (ref 26.5–33)
MCHC RBC AUTO-ENTMCNC: 30.1 G/DL (ref 31.5–36.5)
MCV RBC AUTO: 86 FL (ref 78–100)
MONOCYTES # BLD AUTO: 1 10E9/L (ref 0–1.3)
MONOCYTES NFR BLD AUTO: 12.6 %
NEUTROPHILS # BLD AUTO: 2.9 10E9/L (ref 1.6–8.3)
NEUTROPHILS NFR BLD AUTO: 37.4 %
PLATELET # BLD AUTO: 150 10E9/L (ref 150–450)
RBC # BLD AUTO: 4.28 10E12/L (ref 3.8–5.2)
WBC # BLD AUTO: 7.6 10E9/L (ref 4–11)

## 2017-06-12 PROCEDURE — 36415 COLL VENOUS BLD VENIPUNCTURE: CPT | Performed by: INTERNAL MEDICINE

## 2017-06-12 PROCEDURE — 85025 COMPLETE CBC W/AUTO DIFF WBC: CPT | Performed by: INTERNAL MEDICINE

## 2017-06-27 ENCOUNTER — ANTICOAGULATION THERAPY VISIT (OUTPATIENT)
Dept: ANTICOAGULATION | Facility: CLINIC | Age: 62
End: 2017-06-27
Payer: COMMERCIAL

## 2017-06-27 DIAGNOSIS — Z79.01 LONG-TERM (CURRENT) USE OF ANTICOAGULANTS: ICD-10-CM

## 2017-06-27 DIAGNOSIS — I81 PORTAL VEIN THROMBOSIS: ICD-10-CM

## 2017-06-27 LAB — INR POINT OF CARE: 2.5 (ref 0.86–1.14)

## 2017-06-27 PROCEDURE — 99207 ZZC NO CHARGE NURSE ONLY: CPT

## 2017-06-27 PROCEDURE — 36416 COLLJ CAPILLARY BLOOD SPEC: CPT

## 2017-06-27 PROCEDURE — 85610 PROTHROMBIN TIME: CPT | Mod: QW

## 2017-06-27 NOTE — MR AVS SNAPSHOT
Charlee HERNANDEZ Kendrick   6/27/2017 2:30 PM   Anticoagulation Therapy Visit    Description:  61 year old female   Provider:  JAMAL ANTI COAG   Department:  Cl Anticoag           INR as of 6/27/2017     Today's INR 2.5      Anticoagulation Summary as of 6/27/2017     INR goal 2.0-3.0   Today's INR 2.5   Full instructions 7.5 mg on Mon, Wed, Fri; 5 mg all other days   Next INR check 7/25/2017    Indications   Portal vein thrombosis [I81]  Long-term (current) use of anticoagulants [Z79.01] [Z79.01]         Description     Recheck INR 4 weeks, 7/25/17 if still on warfarin  Continue warfarin 7.5 mg Mon, Wed,  Fri, 5 mg rest of week       Your next Anticoagulation Clinic appointment(s)     Jul 25, 2017  1:00 PM CDT   Anticoagulation Visit with JAMAL ANTI COAG   [facility]    [address] [address] [phone]              Contact Numbers     Please call [phone] to cancel and/or reschedule your appointment.  Please call [phone] with any problems or questions regarding your therapy          June 2017 Details    Sun Mon Tue Wed Thu Fri Sat         1               2               3                 4               5               6               7               8               9               10                 11               12               13               14               15               16               17                 18               19               20               21               22               23               24                 25               26               27      5 mg   See details      28      7.5 mg         29      5 mg         30      7.5 mg           Date Details   06/27 This INR check               How to take your warfarin dose     To take:  5 mg Take 1 of the 5 mg tablets.    To take:  7.5 mg Take 1 of the 2.5 mg tablets and 1 of the 5 mg tablets.           July 2017 Details    Sun Mon Tue Wed Thu Fri Sat           1       5 mg           2      5 mg         3      7.5 mg         4      5 mg         5      7.5 mg         6      5 mg         7      7.5 mg         8      5 mg           9      5 mg         10      7.5 mg         11      5 mg         12      7.5 mg         13      5 mg         14      7.5 mg         15      5 mg           16      5 mg         17      7.5 mg         18      5 mg         19      7.5 mg         20      5 mg         21      7.5 mg         22      5 mg           23      5 mg         24      7.5 mg         25            26               27               28               29                 30               31                     Date Details   No additional details    Date of next INR:  7/25/2017         How to take your warfarin dose     To take:  5 mg Take 1 of the 5 mg tablets.    To take:  7.5 mg Take 1 of the 2.5 mg tablets and 1 of the 5 mg tablets.

## 2017-06-27 NOTE — PROGRESS NOTES
ANTICOAGULATION FOLLOW-UP CLINIC VISIT    Patient Name:  Charlee Marks  Date:  6/27/2017  Contact Type:  Face to Face    SUBJECTIVE:     Patient Findings     Positives No Problem Findings (no changes, concerns or problems reported)    Comments Pt will see Dr. Rankin on 7/18/17, duration of warfarin will be addressed at this clinic appt.           OBJECTIVE    INR Protime   Date Value Ref Range Status   06/27/2017 2.5 (A) 0.86 - 1.14 Final       ASSESSMENT / PLAN  INR assessment THER    Recheck INR In: 4 WEEKS if still on warfarin   INR Location Clinic      Anticoagulation Summary as of 6/27/2017     INR goal 2.0-3.0   Today's INR 2.5   Maintenance plan 7.5 mg (2.5 mg x 1 and 5 mg x 1) on Mon, Wed, Fri; 5 mg (5 mg x 1) all other days   Full instructions 7.5 mg on Mon, Wed, Fri; 5 mg all other days   Weekly total 42.5 mg   No change documented Yamileth Mcghee RN   Plan last modified Samara Velarde RN (5/5/2017)   Next INR check 7/25/2017   Priority INR   Target end date     Indications   Portal vein thrombosis [I81]  Long-term (current) use of anticoagulants [Z79.01] [Z79.01]         Anticoagulation Episode Summary     [facility]    Preferred lab     Send INR reminders to CL ANTICOAG POOL    Comments * uses both 2.5mg & 5mg tabs      Anticoagulation Care Providers     Provider Role Specialty Phone number    Yasmin Mckenna MD [facility] [phone]            See the Encounter Report to view Anticoagulation Flowsheet and Dosing Calendar (Go to Encounters tab in chart review, and find the Anticoagulation Therapy Visit)        Yamileth Mcghee, RN

## 2017-07-11 ENCOUNTER — OFFICE VISIT (OUTPATIENT)
Dept: FAMILY MEDICINE | Facility: CLINIC | Age: 62
End: 2017-07-11
Payer: COMMERCIAL

## 2017-07-11 VITALS
WEIGHT: 293 LBS | DIASTOLIC BLOOD PRESSURE: 87 MMHG | HEART RATE: 75 BPM | SYSTOLIC BLOOD PRESSURE: 142 MMHG | BODY MASS INDEX: 48.69 KG/M2

## 2017-07-11 DIAGNOSIS — R60.0 PERIPHERAL EDEMA: Primary | ICD-10-CM

## 2017-07-11 DIAGNOSIS — E66.01 MORBID OBESITY DUE TO EXCESS CALORIES (H): ICD-10-CM

## 2017-07-11 PROCEDURE — 99213 OFFICE O/P EST LOW 20 MIN: CPT | Performed by: FAMILY MEDICINE

## 2017-07-11 RX ORDER — HYDROCHLOROTHIAZIDE 25 MG/1
25 TABLET ORAL DAILY
Qty: 30 TABLET | Refills: 0 | Status: SHIPPED | OUTPATIENT
Start: 2017-07-11 | End: 2017-08-15

## 2017-07-11 NOTE — PROGRESS NOTES
SUBJECTIVE:                                                    Charlee Marks is a 61 year old female who presents to clinic today for the following health issues:    Chief Complaint   Patient presents with     Musculoskeletal Problem     bilateral knee and ankle pain and swelling and changes in color ongoing for the last 3 months.      Recheck Medication     recently taken off two medications. Taken off prednisone around april and pain started coming back.    ITP     Blood Draw     fasting ---not indicated -- patient had normal fasting lipids in 2016     Knee and ankle Pain    Onset: about three months    Description:   Location: both knees and both ankles  Character: Sharp, Dull ache and Fullness    Intensity: severe    Progression of Symptoms: worse    Accompanying Signs & Symptoms:  Other symptoms: radiation of pain to ankles and feet    History:   Previous similar pain: YES      Precipitating factors:   Trauma or overuse: no     Alleviating factors:  Improved by: nothing    Therapies Tried and outcome: none    Charlee Marks is a 61 year old female who is morbidly obese. Six months ago she was diagnosed with ITP. 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 She is now off these three meds, but is still on warfarin due to the postoperative thromboses; she expects to be taken of off this within the month. She has been followed by Dr. Rankin and Dr. Le.    She reports that she felt good when she was on the prednisone, but as she was weaned off, she has noted more aching in her knees and ankles. In addition she feels she is retaining some fluid in her lower legs.  She has been off the prednisone and Viread since April.      OBJECTIVE: /87 (BP Location: Right arm, Patient Position: Chair, Cuff Size: Adult Regular)  Pulse 75  Wt (!) 308 lb 6.4 oz (139.9 kg)  LMP 09/15/2007  BMI 48.69  kg/m2    Weight has ranged between 290 and 319 over the past six months.    The knees and ankles do not show any erythema or effusion.   Knees were examined -- there is no joint line tenderness, she has full extension and as full flexion as her obese thighs will allow. Cruciate and collateral ligaments are intact, and Joey testing is negative for meniscal injury.  Examination of the lower legs show dry, slightly scaly skin, minimal stasis changes.  There is only trace edema, but she feels the legs are heavier, admits it is worse toward the end of the day.      Component      Latest Ref Rng & Units 7/25/2016 4/14/2017 6/12/2017   WBC      4.0 - 11.0 10e9/L 6.8  7.6   RBC Count      3.8 - 5.2 10e12/L 4.44  4.28   Hemoglobin      11.7 - 15.7 g/dL 12.9  11.0 (L)   Hematocrit      35.0 - 47.0 % 38.9  36.6   MCV      78 - 100 fl 87  86   MCH      26.5 - 33.0 pg 29.1  25.7 (L)   MCHC      31.5 - 36.5 g/dL 33.4  30.1 (L)   RDW      10.0 - 15.0 % 11.8  18.9 (H)   Platelet Count      150 - 450 10e9/L 3 (LL)  150   Diff Method       Automated Method  Automated Method   % Neutrophils      % 79.0  37.4   % Lymphocytes      % 14.0  46.4   % Monocytes      % 6.0  12.6   % Eosinophils      % 1.0  2.9   Absolute Neutrophil      1.6 - 8.3 10e9/L 5.3  2.9   Absolute Lymphocytes      0.8 - 5.3 10e9/L 1.0  3.5   Absolute Monocytes      0.0 - 1.3 10e9/L 0.4  1.0   Absolute Eosinophils      0.0 - 0.7 10e9/L 0.1  0.2   RBC Morphology       Normal     Platelet Estimate       Decreased     % Basophils      %   0.7   Absolute Basophils      0.0 - 0.2 10e9/L   0.1   Sodium      133 - 144 mmol/L  142    Potassium      3.4 - 5.3 mmol/L  4.2    Chloride      94 - 109 mmol/L  105    Carbon Dioxide      20 - 32 mmol/L  31    Anion Gap      3 - 14 mmol/L  6    Glucose      70 - 99 mg/dL  102 (H)    Urea Nitrogen      7 - 30 mg/dL  14    Creatinine      0.52 - 1.04 mg/dL  0.63    GFR Estimate      >60 mL/min/1.7m2  >90 . . .    GFR Estimate If  Black      >60 mL/min/1.7m2  >90 . . .    Calcium      8.5 - 10.1 mg/dL  8.6    Bilirubin Direct      0.0 - 0.2 mg/dL  <0.1    Bilirubin Total      0.2 - 1.3 mg/dL  0.5    Albumin      3.4 - 5.0 g/dL  3.6    Protein Total      6.8 - 8.8 g/dL  6.7 (L)    Alkaline Phosphatase      40 - 150 U/L  164 (H)    ALT      0 - 50 U/L  51 (H)    AST      0 - 45 U/L  45    Cholesterol      <200 mg/dL 188     Triglycerides      <150 mg/dL 196 (H)     HDL Cholesterol      >49 mg/dL 46 (L)     LDL Cholesterol Calculated      <100 mg/dL 103 (H)     Non HDL Cholesterol      <130 mg/dL 142 (H)     Hemoglobin A1C      4.3 - 6.0 % 5.4     TSH      0.40 - 4.00 mU/L 4.60 (H)         ASSESSMENT:   1) morbid obesity  2) osteoarthrosis in weight-bearing joints, likely aggravated by her obesity  3) trace peripheral edema  4) hx ITP, now s/p steroid treatment and splenectomy  5) hx post-op portal vein thrombosis, currently finishing a six month course of warfarin    PLAN: Her symptoms in both ankles and knees are most consistent with early arthritis and strain from her obesity; no imaging was ordered.  At present, she may use Tylenol up to 3000 mg daily. NSAIDs are contraindicated while she is on warfarin, and she is instructed to ask Dr. Rankin at her next visit whether with her ITP history she would ever be safe to take these, since NSAIDs can sometimes trigger ITP. Now that she is s/p spenectomy it might be safe to do so at some point in the future, but she needs to clarify that with her hematologist.   The ideal longterm solution would be weight loss.  She has been morbidly obese for years.She has never followed any weight loss program, and is strongly encouraged to check into Weight Watchers or TOPS.The option for various types of bariatric surgery were also discussed briefly.   Rx for HCTZ 25 mg daily for one month.  Reduce sodium intake, which she does not feel is excessive.  Recheck in one month.    Yasmin Mckenna md

## 2017-07-11 NOTE — MR AVS SNAPSHOT
After Visit Summary   7/11/2017    Charlee Marks    MRN: [id number]           Patient Information     Date Of Birth          1955        Visit Information        Provider Department      7/11/2017 2:40 PM Yasmin Mckenna MD [facility]        Today's Diagnoses     Peripheral edema    -  1       Follow-ups after your visit        Your next 10 appointments already scheduled     Jul 17, 2017 10:00 AM CDT   LAB with CL LAB   [facility] ([facility])    [address] [facility] [address]   [phone]           Patient must bring picture ID.  Patient should be prepared to give a urine specimen  Please do not eat 10-12 hours before your appointment if you are coming in fasting for labs on lipids, cholesterol, or glucose (sugar).  Pregnant women should follow their Care Team instructions. Water with medications is okay. Do not drink coffee or other fluids.   If you have concerns about taking  your medications, please ask at office or if scheduling via [employer], send a message by clicking on Secure Messaging, Message Your Care Team.            Jul 18, 2017  1:15 PM CDT   Return Visit with Kerrie Rankin MD   [facility] ([facility])    [facility]  [address]  [facility] [address]   [phone]            Jul 25, 2017  1:00 PM CDT   Anticoagulation Visit with CL ANTI COAG   [facility] ([facility])    [address] [facility] [address]   [phone]            Oct 09, 2017  1:00 PM CDT   Lab with  LAB   [facility] ([facility])    [address] [place] [address]  [address] [facility] [address]   [phone]            Oct 09, 2017  2:00 PM CDT   (Arrive by 1:45 PM)   Return General Liver with Agustin Le MD   [facility] ([facility])    [address] [place] [address]  [facility] [address] [phone]              Who to contact     If you have questions or need follow up information about today's clinic visit or your schedule please contact [facility] directly at [phone].  Normal or non-critical lab and imaging results will be communicated to you by MyChart, letter or phone within 4 business days after the clinic has received the results. If you do not hear from us within 7 days, please contact the clinic through [employer] or phone. If you have a critical or abnormal lab result, we will notify you by phone as soon as possible.  Submit refill requests through [employer] or call your pharmacy and they will forward the refill request to us. Please allow 3 business days for your refill to be completed.          Additional Information About Your Visit        [employer] Information     [employer] gives you secure access to your electronic health record. If you see a primary care provider, you can also send messages to your care team and make appointments. If you have questions, please call your primary care clinic.  If you do not have a primary care provider, please call [phone] and they will assist you.        Care EveryWhere ID     This is your Care EveryWhere ID. This could be used by other organizations to access your [place] medical records  [id number]        Your Vitals Were     Pulse Last Period BMI (Body Mass Index)             75 09/15/2007 48.69 kg/m2          Blood Pressure from Last 3 Encounters:   07/11/17 142/87   04/20/17 143/86   04/14/17 144/84    Weight from Last 3 Encounters:   07/11/17 (!) 308 lb 6.4 oz (139.9 kg)   04/20/17 (!) 315 lb 3.2 oz (143 kg)   04/14/17 (!) 311 lb 6.4 oz (141.3 kg)              Today, you had the following     No orders found for display         Today's Medication Changes          These changes are accurate as of: 7/11/17  4:36 PM.  If you have any questions, ask your nurse or doctor.               Start  taking these medicines.        Dose/Directions    hydrochlorothiazide 25 MG tablet   Commonly known as:  HYDRODIURIL   Used for:  Peripheral edema   Started by:  Yasmin Mckenna MD        Dose:  25 mg   Take 1 tablet (25 mg) by mouth daily   Quantity:  30 tablet   Refills:  0         These medicines have changed or have updated prescriptions.        Dose/Directions    ranitidine 150 MG tablet   Commonly known as:  ZANTAC   This may have changed:  See the new instructions.   Used for:  Esophageal reflux        ONE TABLET TWICE DAILY as needed will call when needed   Quantity:  180 Tab   Refills:  3            Where to get your medicines      These medications were sent to [facility] [address]  [address] [facility] [address]     Phone:  [phone]     hydrochlorothiazide 25 MG tablet                Primary Care Provider Office Phone # Fax #    Yasmin Mckenna [phone]       [facility] [address] [facility] [address]        Equal Access to Services     [facility]: Hadii sourav ku hadasho Soomaali, waaxda luqadaha, qaybta kaalmada adeegyada, waxay idiin hayalejandro khanna . So [facility] [phone].    ATENCIÓN: Si habla español, tiene a cosme disposición servicios gratuitos de asistencia lingüística. Llame al [phone].    We comply with applicable federal civil rights laws and [place] laws. We do not discriminate on the basis of race, color, national origin, age, disability sex, sexual orientation or gender identity.            Thank you!     Thank you for choosing [facility]  for your care. Our goal is always to provide you with excellent care. Hearing back from our patients is one way we can continue to improve our services. Please take a few minutes to complete the written survey that you may receive in the mail after your visit with us. Thank you!             Your  Updated Medication List - Protect others around you: Learn how to safely use, store and throw away your medicines at www.disposemymeds.org.          This list is accurate as of: 7/11/17  4:36 PM.  Always use your most recent med list.                   Brand Name Dispense Instructions for use Diagnosis    albuterol 108 (90 BASE) MCG/ACT Inhaler    PROAIR HFA/PROVENTIL HFA/VENTOLIN HFA    1 Inhaler    Inhale 2 puffs into the lungs every 4 hours as needed for shortness of breath / dyspnea    Mild intermittent asthma without complication       B-6 100 MG Tabs      Take 1 tablet by mouth daily        calcium carbonate-vitamin D 500-400 MG-UNIT Tabs per tablet      Take 1 tablet by mouth 2 times daily        GLUCOSAMINE CHONDRO COMPLEX OR      on hold        hydrochlorothiazide 25 MG tablet    HYDRODIURIL    30 tablet    Take 1 tablet (25 mg) by mouth daily    Peripheral edema       loratadine 10 MG tablet    CLARITIN    90 Tab    ONE DAILY    Allergy, unspecified not elsewhere classified       MULTIVITAMIN TABS   OR      1 TABLET DAILY        omega 3 1000 MG Caps      Reported on 4/20/2017        ranitidine 150 MG tablet    ZANTAC    180 Tab    ONE TABLET TWICE DAILY as needed will call when needed    Esophageal reflux       TYLENOL PO      Take 1,000 mg by mouth every 4 hours as needed for mild pain or fever (Headaches)        * warfarin 2.5 MG tablet    COUMADIN    60 tablet    As directed by Anticoagulation Clinic, current dose 7.5 mg Mon, Wed, Fri, 5 mg rest of week uses both 2.5 and 5 mg tablets    Portal vein thrombosis       * warfarin 5 MG tablet    COUMADIN    90 tablet    . As directed by Anticoagulation Clinic, current dose 7.5 mg Mon, Wed, Fri, 5 mg rest of week, uses both 2.5 and 5 mg tablets    Portal vein thrombosis       * Notice:  This list has 2 medication(s) that are the same as other medications prescribed for you. Read the directions carefully, and ask your doctor or other care provider to review  them with you.

## 2017-07-11 NOTE — NURSING NOTE
"Chief Complaint   Patient presents with     Musculoskeletal Problem     bilateral knee and ankle pain and swelling and changes in color ongoing for the last 3 months.      Recheck Medication     recently taken off two medications. Taken off prednisone around april and pain started coming back.    ITP     Blood Draw     fasting        Initial /87 (BP Location: Right arm, Patient Position: Chair, Cuff Size: Adult Regular)  Pulse 75  Wt (!) 308 lb 6.4 oz (139.9 kg)  LMP 09/15/2007  BMI 48.69 kg/m2 Estimated body mass index is 48.69 kg/(m^2) as calculated from the following:    Height as of 4/20/17: 5' 6.73\" (1.695 m).    Weight as of this encounter: 308 lb 6.4 oz (139.9 kg).  Medication Reconciliation: complete   Bobbi Lemus CMA    "

## 2017-07-17 DIAGNOSIS — D69.3 IDIOPATHIC THROMBOCYTOPENIC PURPURA (H): ICD-10-CM

## 2017-07-17 DIAGNOSIS — K55.069 SUPERIOR MESENTERIC VEIN THROMBOSIS (H): ICD-10-CM

## 2017-07-17 LAB
BASOPHILS # BLD AUTO: 0.1 10E9/L (ref 0–0.2)
BASOPHILS NFR BLD AUTO: 0.7 %
DIFFERENTIAL METHOD BLD: ABNORMAL
EOSINOPHIL # BLD AUTO: 0.2 10E9/L (ref 0–0.7)
EOSINOPHIL NFR BLD AUTO: 2 %
ERYTHROCYTE [DISTWIDTH] IN BLOOD BY AUTOMATED COUNT: 18.2 % (ref 10–15)
HCT VFR BLD AUTO: 39.8 % (ref 35–47)
HGB BLD-MCNC: 12.2 G/DL (ref 11.7–15.7)
LYMPHOCYTES # BLD AUTO: 3.5 10E9/L (ref 0.8–5.3)
LYMPHOCYTES NFR BLD AUTO: 42.8 %
MCH RBC QN AUTO: 26.7 PG (ref 26.5–33)
MCHC RBC AUTO-ENTMCNC: 30.7 G/DL (ref 31.5–36.5)
MCV RBC AUTO: 87 FL (ref 78–100)
MONOCYTES # BLD AUTO: 1.1 10E9/L (ref 0–1.3)
MONOCYTES NFR BLD AUTO: 13.9 %
NEUTROPHILS # BLD AUTO: 3.3 10E9/L (ref 1.6–8.3)
NEUTROPHILS NFR BLD AUTO: 40.6 %
PLATELET # BLD AUTO: 106 10E9/L (ref 150–450)
RBC # BLD AUTO: 4.57 10E12/L (ref 3.8–5.2)
WBC # BLD AUTO: 8.1 10E9/L (ref 4–11)

## 2017-07-17 PROCEDURE — 85025 COMPLETE CBC W/AUTO DIFF WBC: CPT | Performed by: INTERNAL MEDICINE

## 2017-07-17 PROCEDURE — 36415 COLL VENOUS BLD VENIPUNCTURE: CPT | Performed by: INTERNAL MEDICINE

## 2017-07-18 ENCOUNTER — ONCOLOGY VISIT (OUTPATIENT)
Dept: ONCOLOGY | Facility: CLINIC | Age: 62
End: 2017-07-18
Attending: INTERNAL MEDICINE
Payer: COMMERCIAL

## 2017-07-18 VITALS
TEMPERATURE: 98.6 F | HEIGHT: 67 IN | RESPIRATION RATE: 20 BRPM | DIASTOLIC BLOOD PRESSURE: 68 MMHG | WEIGHT: 293 LBS | OXYGEN SATURATION: 97 % | SYSTOLIC BLOOD PRESSURE: 136 MMHG | HEART RATE: 76 BPM | BODY MASS INDEX: 45.99 KG/M2

## 2017-07-18 DIAGNOSIS — D69.3 IDIOPATHIC THROMBOCYTOPENIC PURPURA (H): Primary | ICD-10-CM

## 2017-07-18 DIAGNOSIS — I81 PORTAL VEIN THROMBOSIS: ICD-10-CM

## 2017-07-18 DIAGNOSIS — R94.5 NONSPECIFIC ABNORMAL RESULTS OF LIVER FUNCTION STUDY: ICD-10-CM

## 2017-07-18 PROCEDURE — 99214 OFFICE O/P EST MOD 30 MIN: CPT | Performed by: INTERNAL MEDICINE

## 2017-07-18 PROCEDURE — 99211 OFF/OP EST MAY X REQ PHY/QHP: CPT

## 2017-07-18 RX ORDER — IOPAMIDOL 755 MG/ML
100 INJECTION, SOLUTION INTRAVASCULAR ONCE
Status: COMPLETED | OUTPATIENT
Start: 2017-07-19 | End: 2017-07-19

## 2017-07-18 ASSESSMENT — PAIN SCALES - GENERAL: PAINLEVEL: NO PAIN (0)

## 2017-07-18 NOTE — MR AVS SNAPSHOT
After Visit Summary   7/18/2017    Charlee Marks    MRN: [id number]           Patient Information     Date Of Birth          1955        Visit Information        Provider Department      7/18/2017 1:15 PM Kerrie Rankin MD [facility]        Today's Diagnoses     ITP (idiopathic thrombocytopenic purpura)    -  1    Portal vein thrombosis        Nonspecific abnormal results of liver function study          Care Instructions    Dr. Rankin would like you yo have a CT now and weekly lab work and we will call you with the results. We will plan the follow up appointment depending on the results. When you are in need of a refill, please call your pharmacy and they will send us a request.  Copy of appointments, and after visit summary (AVS) given to patient.  If you have any questions please call Karla Scott RN, BSN Oncology Hematology  [facility] [phone]. For questions after business hours, or on holidays/weekends, please call our after hours Nurse Triage line [phone]. Thank you.           Wkly cbc diff, CT now, will call pt on result.   F/u plan depends on the above.           Follow-ups after your visit        Your next 10 appointments already scheduled     Jul 19, 2017  4:15 PM CDT   CT ABDOMEN PELVIS W CONTRAST with WYCT1   [facility] ([facility])    [address] [facility] [address] [phone]           Please bring any scans or X-rays taken at other hospitals, if similar tests were done. Also bring a list of your medicines, including vitamins, minerals and over-the-counter drugs. It is safest to leave personal items at home.  Be sure to tell your doctor:   If you have any allergies.   If there s any chance you are pregnant.   If you are breastfeeding.   If you have any special needs.  You may have contrast for this exam. To prepare:   Do not eat or drink for 2 hours before your exam. If you need to take  medicine, you may take it with small sips of water. (We may ask you to take liquid medicine as well.)   The day before your exam, drink extra fluids at least six 8-ounce glasses (unless your doctor tells you to restrict your fluids).  Patients over 70 or patients with diabetes or kidney problems:   If you haven t had a blood test (creatinine test) within the last 30 days, go to your clinic or Diagnostic Imaging Department for this test.  If you have diabetes:   If your kidney function is normal, continue taking your metformin (Avandamet, Glucophage, Glucovance, Metaglip) on the day of your exam.   If your kidney function is abnormal, wait 48 hours before restarting this medicine.  You will have oral contrast for this exam:   You will drink the contrast at home. Get this from your clinic or Diagnostic Imaging Department. Please follow the directions given.  Please wear loose clothing, such as a sweat suit or jogging clothes. Avoid snaps, zippers and other metal. We may ask you to undress and put on a hospital gown.  If you have any questions, please call the Imaging Department where you will have your exam.            Jul 24, 2017  8:00 AM CDT   SHORT with Yasmin Mckenna MD   [facility] ([facility])    [address] [address]   [phone]            Jul 24, 2017  8:30 AM CDT   LAB with CL LAB   [facility] ([facility])    [address] [address]   [phone]           Patient must bring picture ID.  Patient should be prepared to give a urine specimen  Please do not eat 10-12 hours before your appointment if you are coming in fasting for labs on lipids, cholesterol, or glucose (sugar).  Pregnant women should follow their Care Team instructions. Water with medications is okay. Do not drink coffee or other fluids.   If you have concerns about taking  your medications, please ask at office or if  scheduling via [employer], send a message by clicking on Secure Messaging, Message Your Care Team.            Jul 25, 2017  1:00 PM CDT   Anticoagulation Visit with CL ANTI COAG   [facility]    [address] [facility] [address]   [phone]            Jul 31, 2017 10:15 AM CDT   LAB with CL LAB   [facility]    [address] [facility] [address]   [phone]           Patient must bring picture ID.  Patient should be prepared to give a urine specimen  Please do not eat 10-12 hours before your appointment if you are coming in fasting for labs on lipids, cholesterol, or glucose (sugar).  Pregnant women should follow their Care Team instructions. Water with medications is okay. Do not drink coffee or other fluids.   If you have concerns about taking  your medications, please ask at office or if scheduling via [employer], send a message by clicking on Secure Messaging, Message Your Care Team.            Oct 09, 2017  1:00 PM CDT   Lab with  LAB   [facility] ([facility])    [address]  1[address]  [facility] [address] [phone]            Oct 09, 2017  2:00 PM CDT   (Arrive by 1:45 PM)   Return General Liver with Agustin Le MD   [facility] ([facility])    [address]  3rd [address]  [facility] [address] [phone]              Future tests that were ordered for you today     Open Standing Orders        Priority Remaining Interval Expires Ordered    CBC with platelets differential Routine 4/4 weekly 7/18/2018 7/18/2017          Open Future Orders        Priority Expected Expires Ordered    CT Abdomen Pelvis w Contrast Routine  7/18/2018 7/18/2017            Who to contact     If you have questions or need follow up information about today's clinic visit or your schedule please contact [facility]  "directly at [phone].  Normal or non-critical lab and imaging results will be communicated to you by [employer], letter or phone within 4 business days after the clinic has received the results. If you do not hear from us within 7 days, please contact the clinic through [employer] or phone. If you have a critical or abnormal lab result, we will notify you by phone as soon as possible.  Submit refill requests through [email or web address] or call your pharmacy and they will forward the refill request to us. Please allow 3 business days for your refill to be completed.          Additional Information About Your Visit        [employer] Information     [email or web address] gives you secure access to your electronic health record. If you see a primary care provider, you can also send messages to your care team and make appointments. If you have questions, please call your primary care clinic.  If you do not have a primary care provider, please call [phone] and they will assist you.        Care EveryWhere ID     This is your Care EveryWhere ID. This could be used by other organizations to access your [place] medical records  [id number]        Your Vitals Were     Pulse Temperature Respirations Height Last Period Pulse Oximetry    76 98.6  F (37  C) (Tympanic) 20 1.689 m (5' 6.5\") 09/15/2007 97%    Breastfeeding? BMI (Body Mass Index)                No 48.95 kg/m2           Blood Pressure from Last 3 Encounters:   07/18/17 136/68   07/11/17 142/87   04/20/17 143/86    Weight from Last 3 Encounters:   07/18/17 (!) 139.7 kg (307 lb 14.4 oz)   07/11/17 (!) 139.9 kg (308 lb 6.4 oz)   04/20/17 (!) 143 kg (315 lb 3.2 oz)                 Today's Medication Changes          These changes are accurate as of: 7/18/17  1:56 PM.  If you have any questions, ask your nurse or doctor.               These medicines have changed or have updated prescriptions.        Dose/Directions    ranitidine 150 MG tablet   Commonly known as:  ZANTAC   This may have changed:  " See the new instructions.   Used for:  Esophageal reflux        ONE TABLET TWICE DAILY as needed will call when needed   Quantity:  180 Tab   Refills:  3                Primary Care Provider Office Phone # Fax #    Yasmin Evelyn Mckenna [phone]       [facility] [address] [address] [facility] [address]        Equal Access to Services     [facility] : Hadii aad ku hadasho Soomaali, waaxda luqadaha, qaybta kaalmada adeegyada, waxay idiin hayaan adeeg khlisettesh laLeilaniniurkan . So [facility] [phone].    ATENCIÓN: Si habla español, tiene a cosme disposición servicios gratuitos de asistencia lingüística. Llame al [phone].    We comply with applicable federal civil rights laws and [place] laws. We do not discriminate on the basis of race, color, national origin, age, disability sex, sexual orientation or gender identity.            Thank you!     Thank you for choosing [facility]  for your care. Our goal is always to provide you with excellent care. Hearing back from our patients is one way we can continue to improve our services. Please take a few minutes to complete the written survey that you may receive in the mail after your visit with us. Thank you!             Your Updated Medication List - Protect others around you: Learn how to safely use, store and throw away your medicines at www.disposemymeds.org.          This list is accurate as of: 7/18/17  1:56 PM.  Always use your most recent med list.                   Brand Name Dispense Instructions for use Diagnosis    albuterol 108 (90 BASE) MCG/ACT Inhaler    PROAIR HFA/PROVENTIL HFA/VENTOLIN HFA    1 Inhaler    Inhale 2 puffs into the lungs every 4 hours as needed for shortness of breath / dyspnea    Mild intermittent asthma without complication       B-6 100 MG Tabs      Take 1 tablet by mouth daily        calcium carbonate-vitamin D 500-400 MG-UNIT Tabs per tablet      Take 1 tablet by mouth 2 times daily        GLUCOSAMINE  CHONDRO COMPLEX OR      on hold        hydrochlorothiazide 25 MG tablet    HYDRODIURIL    30 tablet    Take 1 tablet (25 mg) by mouth daily    Peripheral edema       loratadine 10 MG tablet    CLARITIN    90 Tab    ONE DAILY    Allergy, unspecified not elsewhere classified       MULTIVITAMIN TABS   OR      1 TABLET DAILY        omega 3 1000 MG Caps      Reported on 4/20/2017        ranitidine 150 MG tablet    ZANTAC    180 Tab    ONE TABLET TWICE DAILY as needed will call when needed    Esophageal reflux       TYLENOL PO      Take 1,000 mg by mouth every 4 hours as needed for mild pain or fever (Headaches)        * warfarin 2.5 MG tablet    COUMADIN    60 tablet    As directed by Anticoagulation Clinic, current dose 7.5 mg Mon, Wed, Fri, 5 mg rest of week uses both 2.5 and 5 mg tablets    Portal vein thrombosis       * warfarin 5 MG tablet    COUMADIN    90 tablet    . As directed by Anticoagulation Clinic, current dose 7.5 mg Mon, Wed, Fri, 5 mg rest of week, uses both 2.5 and 5 mg tablets    Portal vein thrombosis       * Notice:  This list has 2 medication(s) that are the same as other medications prescribed for you. Read the directions carefully, and ask your doctor or other care provider to review them with you.

## 2017-07-18 NOTE — PATIENT INSTRUCTIONS
Dr. Rankin would like you yo have a CT now and weekly lab work and we will call you with the results. We will plan the follow up appointment depending on the results. When you are in need of a refill, please call your pharmacy and they will send us a request.  Copy of appointments, and after visit summary (AVS) given to patient.  If you have any questions please call Karla Scott RN, BSN Oncology Hematology  [facility] [phone]. For questions after business hours, or on holidays/weekends, please call our after hours Nurse Triage line [phone]. Thank you.           Wkly cbc diff, CT now, will call pt on result.   F/u plan depends on the above.

## 2017-07-18 NOTE — PROGRESS NOTES
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      HISTORY OF PRESENT ILLNESS:  she presented with 2-3 months of easy bruising and also fatigue.  She presented to her Primary Care Physician's office on 07/25/2016 and was found to have critical thrombocytopenia with a platelet count of 3.    She was offered IVIG 1 gram on 7/25/2016 and 4 days of   Dexamethasone. She was d/c with PL of 38 on 7/27/2016. PL dip to 13 on 8/4 and 7 on 8/11. She was offered wklyI IVIG 1g/kg starting 8/4/2016, and 2nd cycle of dex x 4 days on 8/11/2016. PL improves to 77 on 8/15, then dip to 31 on 8/18.   Due to rapid drop of her PL when off dex, tx was changed to po prednisone 8/18/2017 with wkly IVIG. R was added on 8/30/2016. She had no PL response despite R, steroid and IVIG.   Nplate was started 9/23/2016. PL up from 20 to 110 after 2 doses of it. Then dip down again.   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   Course was complicated with portal vein and SMV thromboses end of 12/2016. She was admitted to , had IR thrombectomy and catheter-directed lytic therapy to portal vein. Following the procedure she was placed on high intensity heparin drip which was then bridged with warfarin starting 1/9/2017 with an INR goal of 2-3.  She finished prednisone slow taper 4/2017.     PL dip down to nl in 7/2017.     PAST MEDICAL HISTORY:      1.  Cystocele.    2.  Morbid obesity.    3.  Reflux.    4.  Intermittent asthma.    5.  Effusion of lower leg joints.    6. 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        SOCIAL HISTORY:  The patient lives in [facility] and works in the school district.  She is  with children.  She denies the use of alcohol or tobacco.      ROS  She is back to her baseline energy level.   She reports occasional left side abd pain.   She denies bleeding episode.          PHYSICAL  "EXAMINATION:    VITAL SIGNS:  Blood pressure 136/68, pulse 76, temperature 98.6  F (37  C), temperature source Tympanic, resp. rate 20, height 1.689 m (5' 6.5\"), weight (!) 139.7 kg (307 lb 14.4 oz), last menstrual period 09/15/2007, SpO2 97 %, not currently breastfeeding.  GENERAL APPEARANCE:  A middle-aged woman who appears her stated age, very pleasant, morbidly obese, sitting comfortably in a chair and knitting.    HEENT: The patient is normocephalic, atraumatic. Pupils are equally reactive to light.  Sclerae are anicteric.  There is moist oral mucosa, negative pharynx, no oral thrush.    NECK:  Supple, no jugular venous distention, thyroid not palpable.    LYMPH NODES:  Superficial lymphadenopathy is not appreciable in the bilateral cervical, supraclavicular, axillary or inguinal areas.    CARDIOVASCULAR:  S1, S2 regular with no murmurs or gallops, no carotid or abdominal bruits.    PULMONARY:  Lungs are clear to auscultation and percussion bilaterally.  There is no wheezing or rhonchi.    GASTROINTESTINAL:  Abdomen is soft, nontender with no hepatosplenomegaly, no signs of ascites and no mass appreciable.    MUSCULOSKELETAL/EXTREMITIES:  No edema, no cyanotic changes, no signs of joint deformity, no lymphedema.    NEUROLOGIC:  Cranial nerves II-XII are grossly intact, sensation intact, muscle strength and muscle tone symmetrical, 5/5 throughout.    BACK:  No spinal or paraspinal tenderness, no CVA tenderness.    SKIN:  No petechiae, no rash and no signs of cellulitis.  There is scattered bruising on forearms and thighs, which are healing.        LABORATORY DATA reviewed   in 7/2107 from nl 6/2017  Hb nl, wbc/diff nl.     Current Image reviewed  US 4/2017  1.  Recanalized main portal and splenic veins. Persistent occlusion of left portal vein.  2.  Splenectomy.    CT 1/9/2017   1. Status post splenectomy with postsurgical changes in the left upper quadrant. Persistent thrombosis of the splenic vein.  2. " Main portal vein now appears patent with possible peripheral nonocclusive thrombosis/periportal edema. Residual thrombosis of portion of the left branch of the portal vein and the right posterior  branch of the portal vein.  3. Residual partial thrombosis of the distal portion of the superior mesenteric vein.   4. Small focal nodular opacities in both lower lobes, likely of infectious etiology.    Old data reviewed with summary  12/31/2016 CT abd - thrombosis of the portal vein, splenic vein.    Right after splenectomy 12/13/2016 wbc 14, neutrophiila, PL >600, Hb 9.2  PL at 91 on 12/8/2016, at 51 in 11/2016 at 28 from 43 from 110 early Oct, from 31 on 8/18, at 77 on 8/15, at 7 on 8/11, at 14 on 8/4/2016, at 38 7/27/2016  On 07/26/2016 platelets 8, lymphopenia with lymphocyte count 0.6.    On 07/25/2016 platelets 3.   smear: no dysplasia or schistocytes.     HepB DNA by PCR 10/2016 : negative    9/2016 H. Pylori breath test/stool antigen: negative.     8/2016 hep Bs Ag -,BsAb+, BcAb+, HepC -, for which she saw UGI and started on hepB med Tenofovir 9/8/2016.     CT a/p 10/2016: The spleen is borderline enlarged at 13.3 cm.   CT neck 9/15/2016: No enlarged or necrotic-appearing cervical lymph nodes are seen. No metastatic lesions are identified.  CT head 8/11: negative.           ASSESSMENT/PLAN:    1.  dx ITP in 7/2016 . Failed dexa +/- R, IVIG, Nplate  PL up to > 600 post splenectomy in 12/2016.   S/p prednisone taper off in 4/2017.  PL dip down below 150 in 7/2017. She is to f/u with wkly cbc diff.       2. + hepBcAb, - HepBsAg, HepBsAg-,HebB DNA - by PCR, she saw GI due to the use of Rituxan, started on Tenofovir 9/8/2016 till 4/2017.    Her LFT is not nl.   Advice limit Tylenol intake.     3. 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 S/p thrombolysis to portal vein, lovenox and now on coumadin.   She is on  anti coagulation.  Due to recheck DVT status with CT ab. Will call her on results and next step  of action.

## 2017-07-18 NOTE — NURSING NOTE
"Oncology Rooming Note    July 18, 2017 1:17 PM   Charlee Marks is a 61 year old female who presents for:    Chief Complaint   Patient presents with     Oncology Clinic Visit     3 month recheck ITP, review labs     Initial Vitals: /68 (BP Location: Right arm, Patient Position: Sitting, Cuff Size: Adult Large)  Pulse 76  Temp 98.6  F (37  C) (Tympanic)  Resp 20  Ht 1.689 m (5' 6.5\")  Wt (!) 139.7 kg (307 lb 14.4 oz)  LMP 09/15/2007  SpO2 97%  Breastfeeding? No  BMI 48.95 kg/m2 Estimated body mass index is 48.95 kg/(m^2) as calculated from the following:    Height as of this encounter: 1.689 m (5' 6.5\").    Weight as of this encounter: 139.7 kg (307 lb 14.4 oz). Body surface area is 2.56 meters squared.  No Pain (0) Comment: Data Unavailable   Patient's last menstrual period was 09/15/2007.  Allergies reviewed: Yes  Medications reviewed: Yes    Medications: Medication refills not needed today.  Pharmacy name entered into [employer]:    [facility] [place], [place] - [address]      Clinical concerns: 3 month recheck ITP, review labs  7  minutes for nursing intake (face to face time)     Lorena Wei CMA              "

## 2017-07-19 ENCOUNTER — HOSPITAL ENCOUNTER (OUTPATIENT)
Dept: CT IMAGING | Facility: CLINIC | Age: 62
Discharge: HOME | End: 2017-07-19
Attending: INTERNAL MEDICINE | Admitting: INTERNAL MEDICINE
Payer: COMMERCIAL

## 2017-07-19 DIAGNOSIS — I81 PORTAL VEIN THROMBOSIS: ICD-10-CM

## 2017-07-19 PROCEDURE — 25000125 ZZHC RX 250: Performed by: RADIOLOGY

## 2017-07-19 PROCEDURE — 74177 CT ABD & PELVIS W/CONTRAST: CPT

## 2017-07-19 PROCEDURE — 25000128 H RX IP 250 OP 636: Performed by: RADIOLOGY

## 2017-07-19 RX ADMIN — IOPAMIDOL 100 ML: 755 INJECTION, SOLUTION INTRAVENOUS at 16:26

## 2017-07-19 RX ADMIN — SODIUM CHLORIDE 74 ML: 9 INJECTION, SOLUTION INTRAVENOUS at 16:26

## 2017-07-20 ENCOUNTER — ANTICOAGULATION THERAPY VISIT (OUTPATIENT)
Dept: ANTICOAGULATION | Facility: CLINIC | Age: 62
End: 2017-07-20

## 2017-07-20 DIAGNOSIS — Z79.01 LONG-TERM (CURRENT) USE OF ANTICOAGULANTS: ICD-10-CM

## 2017-07-20 DIAGNOSIS — I81 PORTAL VEIN THROMBOSIS: Primary | ICD-10-CM

## 2017-07-20 DIAGNOSIS — I81 PORTAL VEIN THROMBOSIS: ICD-10-CM

## 2017-07-20 NOTE — MR AVS SNAPSHOT
Charlee Marks   7/20/2017   Anticoagulation Therapy Visit    Description:  61 year old female   Provider:  Delia Judd, RN   Department:  [facility]           INR as of 7/20/2017     Today's INR       Anticoagulation Summary as of 7/20/2017     INR goal 2.0-3.0   Today's INR    Full instructions 7.5 mg on Mon, Wed, Fri; 5 mg all other days   Next INR check     Indications   Portal vein thrombosis [I81]  Long-term (current) use of anticoagulants [Z79.01] [Z79.01]         Anticoagulation Episode Summary     Resolved date 7/20/2017    Resolved reason Changed Anticoagulant       Your next Anticoagulation Clinic appointment(s)     Jul 25, 2017  1:00 PM CDT   Anticoagulation Visit with CL ANTI COAG   [facility]    [id number]  [facility] [id number]   [phone]

## 2017-07-20 NOTE — PROGRESS NOTES
Called and reviewed results with the patient per Dr. Rankin. Patient had no further questions or concerns at this time and also verbalized understanding. Direct line provided if any further questions/concerns arise. Called Coumadin clinic to let them know of the discontinuation. CBC w/diff and lovenox order placed.

## 2017-07-20 NOTE — PROGRESS NOTES
[facility] received a call from Karla in hematology. As of today, patient will not be taking warfarin but will be switched to Lovenox BID. Hematology will contact [facility] if she is restarted on warfarin.     Delia Judd RN  Anticoagulation Clinic

## 2017-07-24 ENCOUNTER — TELEPHONE (OUTPATIENT)
Dept: ONCOLOGY | Facility: CLINIC | Age: 62
End: 2017-07-24

## 2017-07-24 ENCOUNTER — OFFICE VISIT (OUTPATIENT)
Dept: FAMILY MEDICINE | Facility: CLINIC | Age: 62
End: 2017-07-24
Payer: COMMERCIAL

## 2017-07-24 VITALS
HEART RATE: 74 BPM | BODY MASS INDEX: 48.71 KG/M2 | DIASTOLIC BLOOD PRESSURE: 84 MMHG | SYSTOLIC BLOOD PRESSURE: 136 MMHG | WEIGHT: 293 LBS

## 2017-07-24 DIAGNOSIS — D69.3 IDIOPATHIC THROMBOCYTOPENIC PURPURA (H): Primary | ICD-10-CM

## 2017-07-24 DIAGNOSIS — D69.3 IDIOPATHIC THROMBOCYTOPENIC PURPURA (H): ICD-10-CM

## 2017-07-24 DIAGNOSIS — R60.0 LOCALIZED EDEMA: ICD-10-CM

## 2017-07-24 LAB
ANION GAP SERPL CALCULATED.3IONS-SCNC: 8 MMOL/L (ref 3–14)
BASOPHILS # BLD AUTO: 0.1 10E9/L (ref 0–0.2)
BASOPHILS NFR BLD AUTO: 0.8 %
BUN SERPL-MCNC: 17 MG/DL (ref 7–30)
CALCIUM SERPL-MCNC: 9.4 MG/DL (ref 8.5–10.1)
CHLORIDE SERPL-SCNC: 104 MMOL/L (ref 94–109)
CO2 SERPL-SCNC: 29 MMOL/L (ref 20–32)
CREAT SERPL-MCNC: 0.69 MG/DL (ref 0.52–1.04)
DIFFERENTIAL METHOD BLD: ABNORMAL
EOSINOPHIL # BLD AUTO: 0.2 10E9/L (ref 0–0.7)
EOSINOPHIL NFR BLD AUTO: 2.8 %
ERYTHROCYTE [DISTWIDTH] IN BLOOD BY AUTOMATED COUNT: 18.1 % (ref 10–15)
GFR SERPL CREATININE-BSD FRML MDRD: 87 ML/MIN/1.7M2
GLUCOSE SERPL-MCNC: 97 MG/DL (ref 70–99)
HCT VFR BLD AUTO: 39.6 % (ref 35–47)
HGB BLD-MCNC: 12.4 G/DL (ref 11.7–15.7)
LYMPHOCYTES # BLD AUTO: 3.4 10E9/L (ref 0.8–5.3)
LYMPHOCYTES NFR BLD AUTO: 41.1 %
MCH RBC QN AUTO: 27.2 PG (ref 26.5–33)
MCHC RBC AUTO-ENTMCNC: 31.3 G/DL (ref 31.5–36.5)
MCV RBC AUTO: 87 FL (ref 78–100)
MONOCYTES # BLD AUTO: 1.1 10E9/L (ref 0–1.3)
MONOCYTES NFR BLD AUTO: 13.5 %
NEUTROPHILS # BLD AUTO: 3.5 10E9/L (ref 1.6–8.3)
NEUTROPHILS NFR BLD AUTO: 41.8 %
PLATELET # BLD AUTO: 109 10E9/L (ref 150–450)
POTASSIUM SERPL-SCNC: 4 MMOL/L (ref 3.4–5.3)
RBC # BLD AUTO: 4.56 10E12/L (ref 3.8–5.2)
SODIUM SERPL-SCNC: 141 MMOL/L (ref 133–144)
WBC # BLD AUTO: 8.3 10E9/L (ref 4–11)

## 2017-07-24 PROCEDURE — 36415 COLL VENOUS BLD VENIPUNCTURE: CPT | Performed by: FAMILY MEDICINE

## 2017-07-24 PROCEDURE — 85025 COMPLETE CBC W/AUTO DIFF WBC: CPT | Performed by: INTERNAL MEDICINE

## 2017-07-24 PROCEDURE — 99213 OFFICE O/P EST LOW 20 MIN: CPT | Performed by: FAMILY MEDICINE

## 2017-07-24 PROCEDURE — 80048 BASIC METABOLIC PNL TOTAL CA: CPT | Performed by: FAMILY MEDICINE

## 2017-07-24 NOTE — NURSING NOTE
"Chief Complaint   Patient presents with     Swelling     recheck bilateral feet/ankle swelling. She states this has improved by 90 percent      Results     Discuss CT results of abdominal blood clot.      Recheck Medication     discuss med changes from coumadin to Lovenox        Initial /84 (BP Location: Right arm, Patient Position: Chair, Cuff Size: Adult Regular)  Pulse 74  Wt (!) 306 lb 6.4 oz (139 kg)  LMP 09/15/2007  BMI 48.71 kg/m2 Estimated body mass index is 48.71 kg/(m^2) as calculated from the following:    Height as of 7/18/17: 5' 6.5\" (1.689 m).    Weight as of this encounter: 306 lb 6.4 oz (139 kg).  Medication Reconciliation: complete   Bobbi Lemus CMA    "

## 2017-07-24 NOTE — PATIENT INSTRUCTIONS
Thank you for choosing [facility].  You may be receiving a survey in the mail from [facility] regarding your visit today.  Please take a few minutes to complete and return the survey to let us know how we are doing.  Our Clinic hours are:  Mondays    7:20 am - 7 pm  Tues -  Fri  7:20 am - 5 pm  Clinic Phone: [phone]  The clinic lab opens at 7:30 am Mon - Fri and appointments are required.  [facility]. [phone]  Monday-Thursday 8 am - 7pm  Tues/Wed/Fri 8 am - 5:30 pm

## 2017-07-24 NOTE — PROGRESS NOTES
"  SUBJECTIVE:                                                    Charlee Marks is a 61 year old female who presents to clinic today for the following health issues:    Chief Complaint   Patient presents with     Swelling     recheck bilateral feet/ankle swelling.     Results     Discuss CT results of abdominal blood clot.      Recheck Medication     discuss med changes from coumadin to Lovenox      Ginger noted her ankle edema resolved within a few days of starting the HCTZ, so she may reduce this to PRN use now.  She recently had another abdominal CT. This revealed \"Progression of left portal venous branch thrombosis and  SMV thrombosis is seen compared to the prior study.\"  Her hematologist has switched her from warfarin to Lovenox. She notes some abdominal bruising with this.    She has also been informed that her platelets have been dropping again, so she is going to be checking these more frequently. It has been a year now since her initial diagnosis of ITP. She is s/p steroid treatment, immune globulin infusions, Nplate injections, then splenectomy last December complicated by portal vein and superior mesenteric vein thromboses requiring additional procedures including thrombectomy and lytic therapy. She has been on some anticoagulation since then, has been off steroids since April.    Current Outpatient Prescriptions   Medication Sig     enoxaparin (LOVENOX) 150 MG/ML injection Inject 0.9 mLs (135 mg) Subcutaneous every 12 hours     hydrochlorothiazide (HYDRODIURIL) 25 MG tablet Take 1 tablet (25 mg) by mouth daily     Pyridoxine HCl (B-6) 100 MG TABS Take 1 tablet by mouth daily     Acetaminophen (TYLENOL PO) Take 1,000 mg by mouth every 4 hours as needed for mild pain or fever (Headaches)     albuterol (PROAIR HFA, PROVENTIL HFA, VENTOLIN HFA) 108 (90 BASE) MCG/ACT inhaler Inhale 2 puffs into the lungs every 4 hours as needed for shortness of breath / dyspnea     calcium carbonate-vitamin D 500-400 MG-UNIT " TABS tablt Take 1 tablet by mouth 2 times daily     RANITIDINE  MG PO TABS ONE TABLET TWICE DAILY as needed will call when needed (Patient taking differently: ONE TABLET prn)     LORATADINE 10 MG PO TABS ONE DAILY     GLUCOSAMINE CHONDRO COMPLEX OR on hold     OMEGA 3 1000 MG OR CAPS Reported on 4/20/2017     MULTIVITAMIN TABS   OR 1 TABLET DAILY     No current facility-administered medications for this visit.          OBJECTIVE: /84 (BP Location: Right arm, Patient Position: Chair, Cuff Size: Adult Regular)  Pulse 74  Wt (!) 306 lb 6.4 oz (139 kg)  LMP 09/15/2007  BMI 48.71 kg/m2      Ankle edema has resolved.    Component      Latest Ref Rng & Units 7/24/2017   WBC      4.0 - 11.0 10e9/L 8.3   RBC Count      3.8 - 5.2 10e12/L 4.56   Hemoglobin      11.7 - 15.7 g/dL 12.4   Hematocrit      35.0 - 47.0 % 39.6   MCV      78 - 100 fl 87   MCH      26.5 - 33.0 pg 27.2   MCHC      31.5 - 36.5 g/dL 31.3 (L)   RDW      10.0 - 15.0 % 18.1 (H)   Platelet Count      150 - 450 10e9/L 109 (L)   Diff Method       Automated Method   % Neutrophils      % 41.8   % Lymphocytes      % 41.1   % Monocytes      % 13.5   % Eosinophils      % 2.8   % Basophils      % 0.8   Absolute Neutrophil      1.6 - 8.3 10e9/L 3.5   Absolute Lymphocytes      0.8 - 5.3 10e9/L 3.4   Absolute Monocytes      0.0 - 1.3 10e9/L 1.1   Absolute Eosinophils      0.0 - 0.7 10e9/L 0.2   Absolute Basophils      0.0 - 0.2 10e9/L 0.1   Sodium      133 - 144 mmol/L 141   Potassium      3.4 - 5.3 mmol/L 4.0   Chloride      94 - 109 mmol/L 104   Carbon Dioxide      20 - 32 mmol/L 29   Anion Gap      3 - 14 mmol/L 8   Glucose      70 - 99 mg/dL 97   Urea Nitrogen      7 - 30 mg/dL 17   Creatinine      0.52 - 1.04 mg/dL 0.69   GFR Estimate      >60 mL/min/1.7m2 87   GFR Estimate If Black      >60 mL/min/1.7m2 >90 . . .   Calcium      8.5 - 10.1 mg/dL 9.4     Component Platelet Count   Latest Ref Rng & Units 150 - 450 10e9/L   4/14/2017 207   4/17/2017  214   5/15/2017 173   6/12/2017 150   7/17/2017 106 (L)   7/24/2017 109 (L)     Platelets had continued dropping over the past three months, but now appear to be stabilizing.      ASSESSMENT: ITP      Morbid obesity    PLAN: Continue follow-up with Dr. Rankin, hematology.   Over the past seven months, her weight has varied from 294 to 320. The higher weights were related to her steroid treatments. She is encouraged in continued efforts to watch portions and eat sensibly.    Yasmin Mckenna md

## 2017-07-24 NOTE — LETTER
My Asthma Action Plan  Name: Charlee Marks   YOB: 1955  Date: 7/24/2017   My doctor: Yasmin Mckenna MD   My clinic: [facility]        My Control Medicine:   My Rescue Medicine:    My Asthma Severity:   Avoid your asthma triggers:              GREEN ZONE   Good Control    I feel good    No cough or wheeze    Can work, sleep and play without asthma symptoms       Take your asthma control medicine every day.     1. If exercise triggers your asthma, take your rescue medication    15 minutes before exercise or sports, and    During exercise if you have asthma symptoms  2. Spacer to use with inhaler: If you have a spacer, make sure to use it with your inhaler             YELLOW ZONE Getting Worse  I have ANY of these:    I do not feel good    Cough or wheeze    Chest feels tight    Wake up at night   1. Keep taking your Green Zone medications  2. Start taking your rescue medicine:    every 20 minutes for up to 1 hour. Then every 4 hours for 24-48 hours.  3. If you stay in the Yellow Zone for more than 12-24 hours, contact your doctor.  4. If you do not return to the Green Zone in 12-24 hours or you get worse, start taking your oral steroid medicine if prescribed by your provider.           RED ZONE Medical Alert - Get Help  I have ANY of these:    I feel awful    Medicine is not helping    Breathing getting harder    Trouble walking or talking    Nose opens wide to breathe       1. Take your rescue medicine NOW  2. If your provider has prescribed an oral steroid medicine, start taking it NOW  3. Call your doctor NOW  4. If you are still in the Red Zone after 20 minutes and you have not reached your doctor:    Take your rescue medicine again and    Call 911 or go to the emergency room right away    See your regular doctor within 2 weeks of an Emergency Room or Urgent Care visit for follow-up treatment.        Electronically signed by: Bobbi Lemus, July 24, 2017    Annual  Reminders:  Meet with Asthma Educator,  Flu Shot in the Fall, consider Pneumonia Vaccination for patients with asthma (aged 19 and older).    Pharmacy:    [facility] [place] [address]  [facility] [place] [place] - [address] AT PORTAL TO REGISTERED [facility] SITES                    Asthma Triggers  How To Control Things That Make Your Asthma Worse    Triggers are things that make your asthma worse.  Look at the list below to help you find your triggers and what you can do about them.  You can help prevent asthma flare-ups by staying away from your triggers.      Trigger                                                          What you can do   Cigarette Smoke  Tobacco smoke can make asthma worse. Do not allow smoking in your home, car or around you.  Be sure no one smokes at a child s day care or school.  If you smoke, ask your health care provider for ways to help you quit.  Ask family members to quit too.  Ask your health care provider for a referral to Quit Plan to help you quit smoking, or call 6-093-284-PLAN.     Colds, Flu, Bronchitis  These are common triggers of asthma. Wash your hands often.  Don t touch your eyes, nose or mouth.  Get a flu shot every year.     Dust Mites  These are tiny bugs that live in cloth or carpet. They are too small to see. Wash sheets and blankets in hot water every week.   Encase pillows and mattress in dust mite proof covers.  Avoid having carpet if you can. If you have carpet, vacuum weekly.   Use a dust mask and HEPA vacuum.   Pollen and Outdoor Mold  Some people are allergic to trees, grass, or weed pollen, or molds. Try to keep your windows closed.  Limit time out doors when pollen count is high.   Ask you health care provider about taking medicine during allergy season.     Animal Dander  Some people are allergic to skin flakes, urine or saliva from pets with fur or feathers. Keep pets with fur or feathers out of  your home.    If you can t keep the pet outdoors, then keep the pet out of your bedroom.  Keep the bedroom door closed.  Keep pets off cloth furniture and away from stuffed toys.     Mice, Rats, and Cockroaches  Some people are allergic to the waste from these pests.   Cover food and garbage.  Clean up spills and food crumbs.  Store grease in the refrigerator.   Keep food out of the bedroom.   Indoor Mold  This can be a trigger if your home has high moisture. Fix leaking faucets, pipes, or other sources of water.   Clean moldy surfaces.  Dehumidify basement if it is damp and smelly.   Smoke, Strong Odors, and Sprays  These can reduce air quality. Stay away from strong odors and sprays, such as perfume, powder, hair spray, paints, smoke incense, paint, cleaning products, candles and new carpet.   Exercise or Sports  Some people with asthma have this trigger. Be active!  Ask your doctor about taking medicine before sports or exercise to prevent symptoms.    Warm up for 5-10 minutes before and after sports or exercise.     Other Triggers of Asthma  Cold air:  Cover your nose and mouth with a scarf.  Sometimes laughing or crying can be a trigger.  Some medicines and food can trigger asthma.

## 2017-07-24 NOTE — MR AVS SNAPSHOT
After Visit Summary   7/24/2017    Charlee Marks    MRN: [id number]           Patient Information     Date Of Birth          1955        Visit Information        Provider Department      7/24/2017 8:00 AM Yasmin Mckenna MD [facility]        Today's Diagnoses     Localized edema    -  1      Care Instructions      Thank you for choosing [facility].  You may be receiving a survey in the mail from [facility] regarding your visit today.  Please take a few minutes to complete and return the survey to let us know how we are doing.  Our Clinic hours are:  Mondays    7:20 am - 7 pm  Tues -  Fri  7:20 am - 5 pm  Clinic Phone: [phone]  The clinic lab opens at 7:30 am Mon - Fri and appointments are required.  [facility]  Ph. [phone]  Monday-Thursday 8 am - 7pm  Tues/Wed/Fri 8 am - 5:30 pm             Follow-ups after your visit        Your next 10 appointments already scheduled     Jul 25, 2017  1:00 PM CDT   Anticoagulation Visit with CL ANTI COAG   [facility] ([facility])    [address] [place] [address]   [phone]            Jul 31, 2017 10:15 AM CDT   LAB with CL LAB   [facility] ([facility])    [address] [place] [address]   [phone]           Patient must bring picture ID.  Patient should be prepared to give a urine specimen  Please do not eat 10-12 hours before your appointment if you are coming in fasting for labs on lipids, cholesterol, or glucose (sugar).  Pregnant women should follow their Care Team instructions. Water with medications is okay. Do not drink coffee or other fluids.   If you have concerns about taking  your medications, please ask at office or if scheduling via [employer], send a message by clicking on Secure Messaging, Message Your Care Team.            Oct 09, 2017  1:00 PM CDT   Lab with  LAB    [facility]  ([facility])    [address]  1st Floor  [place] [id number] [phone]            Oct 09, 2017  2:00 PM CDT   (Arrive by 1:45 PM)   Return General Liver with Agustin Le MD   [facility] ([facility])    [address]  3rd Floor  [place] [id number] [phone]              Who to contact     If you have questions or need follow up information about today's clinic visit or your schedule please contact [facility] directly at [phone].  Normal or non-critical lab and imaging results will be communicated to you by MyChart, letter or phone within 4 business days after the clinic has received the results. If you do not hear from us within 7 days, please contact the clinic through [employer] or phone. If you have a critical or abnormal lab result, we will notify you by phone as soon as possible.  Submit refill requests through [employer] or call your pharmacy and they will forward the refill request to us. Please allow 3 business days for your refill to be completed.          Additional Information About Your Visit        [employer] Information     [facility] [employer] gives you secure access to your electronic health record. If you see a primary care provider, you can also send messages to your care team and make appointments. If you have questions, please call your primary care clinic.  If you do not have a primary care provider, please call [phone] and they will assist you.        Care EveryWhere ID     This is your Care EveryWhere ID. This could be used by other organizations to access your [facility] medical records  [id number]        Your Vitals Were     Pulse Last Period BMI (Body Mass Index)             74 09/15/2007 48.71 kg/m2          Blood Pressure from Last 3 Encounters:   07/24/17 136/84   07/18/17 136/68   07/11/17 142/87    Weight from Last 3 Encounters:   07/24/17 (!) 306 lb 6.4 oz (139 kg)   07/18/17 (!)  307 lb 14.4 oz (139.7 kg)   07/11/17 (!) 308 lb 6.4 oz (139.9 kg)              We Performed the Following     Basic metabolic panel          Today's Medication Changes          These changes are accurate as of: 7/24/17  8:33 AM.  If you have any questions, ask your nurse or doctor.               These medicines have changed or have updated prescriptions.        Dose/Directions    ranitidine 150 MG tablet   Commonly known as:  ZANTAC   This may have changed:  See the new instructions.   Used for:  Esophageal reflux        ONE TABLET TWICE DAILY as needed will call when needed   Quantity:  180 Tab   Refills:  3                Primary Care Provider Office Phone # Fax #    Yasmin Evelyn Mckenna [phone]       [facility] [address] [address]        Equal Access to Services     LAVERN CABA : Mary Grace aleman Soelizabeth, waaxda luqadaha, qaybta kaalmada adeegyada, julieta khanna . So [facility] [phone].    ATENCIÓN: Si habla español, tiene a cosme disposición servicios gratuitos de asistencia lingüística. Llame al [phone].    We comply with applicable federal civil rights laws and [place] laws. We do not discriminate on the basis of race, color, national origin, age, disability sex, sexual orientation or gender identity.            Thank you!     Thank you for choosing [facility]  for your care. Our goal is always to provide you with excellent care. Hearing back from our patients is one way we can continue to improve our services. Please take a few minutes to complete the written survey that you may receive in the mail after your visit with us. Thank you!             Your Updated Medication List - Protect others around you: Learn how to safely use, store and throw away your medicines at www.disposemymeds.org.          This list is accurate as of: 7/24/17  8:33 AM.  Always use your most recent med list.                    Brand Name Dispense Instructions for use Diagnosis    albuterol 108 (90 BASE) MCG/ACT Inhaler    PROAIR HFA/PROVENTIL HFA/VENTOLIN HFA    1 Inhaler    Inhale 2 puffs into the lungs every 4 hours as needed for shortness of breath / dyspnea    Mild intermittent asthma without complication       B-6 100 MG Tabs      Take 1 tablet by mouth daily        calcium carbonate-vitamin D 500-400 MG-UNIT Tabs per tablet      Take 1 tablet by mouth 2 times daily        enoxaparin 150 MG/ML injection    LOVENOX    54 mL    Inject 0.9 mLs (135 mg) Subcutaneous every 12 hours    Portal vein thrombosis, Long-term (current) use of anticoagulants       GLUCOSAMINE CHONDRO COMPLEX OR      on hold        hydrochlorothiazide 25 MG tablet    HYDRODIURIL    30 tablet    Take 1 tablet (25 mg) by mouth daily    Peripheral edema       loratadine 10 MG tablet    CLARITIN    90 Tab    ONE DAILY    Allergy, unspecified not elsewhere classified       MULTIVITAMIN TABS   OR      1 TABLET DAILY        omega 3 1000 MG Caps      Reported on 4/20/2017        ranitidine 150 MG tablet    ZANTAC    180 Tab    ONE TABLET TWICE DAILY as needed will call when needed    Esophageal reflux       TYLENOL PO      Take 1,000 mg by mouth every 4 hours as needed for mild pain or fever (Headaches)

## 2017-07-24 NOTE — TELEPHONE ENCOUNTER
Pt stopped in the clinic to get her lab results from earlier today since starting on the Lovenox and going on vacation for a week. Reviewed the CBC with pt and platelet being stable, CMP was still pending. Pt verbalized understanding and had no further questions. Pt has direct line if any question/concerns arise.

## 2017-07-25 ASSESSMENT — ASTHMA QUESTIONNAIRES: ACT_TOTALSCORE: 23

## 2017-07-31 DIAGNOSIS — D69.3 IDIOPATHIC THROMBOCYTOPENIC PURPURA (H): ICD-10-CM

## 2017-07-31 LAB
ANISOCYTOSIS BLD QL SMEAR: SLIGHT
BASOPHILS # BLD AUTO: 0.1 10E9/L (ref 0–0.2)
BASOPHILS NFR BLD AUTO: 1.1 %
DIFFERENTIAL METHOD BLD: ABNORMAL
EOSINOPHIL # BLD AUTO: 0.2 10E9/L (ref 0–0.7)
EOSINOPHIL NFR BLD AUTO: 2.5 %
ERYTHROCYTE [DISTWIDTH] IN BLOOD BY AUTOMATED COUNT: 18.3 % (ref 10–15)
HCT VFR BLD AUTO: 39.8 % (ref 35–47)
HGB BLD-MCNC: 12.4 G/DL (ref 11.7–15.7)
IMM GRANULOCYTES # BLD: 0 10E9/L (ref 0–0.4)
IMM GRANULOCYTES NFR BLD: 0.1 %
LYMPHOCYTES # BLD AUTO: 3.6 10E9/L (ref 0.8–5.3)
LYMPHOCYTES NFR BLD AUTO: 42.3 %
MCH RBC QN AUTO: 27.3 PG (ref 26.5–33)
MCHC RBC AUTO-ENTMCNC: 31.2 G/DL (ref 31.5–36.5)
MCV RBC AUTO: 88 FL (ref 78–100)
MONOCYTES # BLD AUTO: 1.2 10E9/L (ref 0–1.3)
MONOCYTES NFR BLD AUTO: 13.8 %
NEUTROPHILS # BLD AUTO: 3.4 10E9/L (ref 1.6–8.3)
NEUTROPHILS NFR BLD AUTO: 40.2 %
PLATELET # BLD AUTO: 96 10E9/L (ref 150–450)
PLATELET # BLD EST: ABNORMAL 10*3/UL
RBC # BLD AUTO: 4.55 10E12/L (ref 3.8–5.2)
WBC # BLD AUTO: 8.5 10E9/L (ref 4–11)

## 2017-07-31 PROCEDURE — 36415 COLL VENOUS BLD VENIPUNCTURE: CPT | Performed by: INTERNAL MEDICINE

## 2017-07-31 PROCEDURE — 85025 COMPLETE CBC W/AUTO DIFF WBC: CPT | Performed by: INTERNAL MEDICINE

## 2017-08-01 NOTE — PROGRESS NOTES
Called and reviewed results with the patient per Dr. Rankin. Patient had no further questions or concerns at this time and also verbalized understanding. Direct line provided if any further questions/concerns arise.

## 2017-08-07 DIAGNOSIS — D69.3 IDIOPATHIC THROMBOCYTOPENIC PURPURA (H): ICD-10-CM

## 2017-08-07 LAB
BASOPHILS # BLD AUTO: 0.1 10E9/L (ref 0–0.2)
BASOPHILS NFR BLD AUTO: 0.6 %
DIFFERENTIAL METHOD BLD: ABNORMAL
EOSINOPHIL # BLD AUTO: 0.2 10E9/L (ref 0–0.7)
EOSINOPHIL NFR BLD AUTO: 2.1 %
ERYTHROCYTE [DISTWIDTH] IN BLOOD BY AUTOMATED COUNT: 18.3 % (ref 10–15)
HCT VFR BLD AUTO: 41.9 % (ref 35–47)
HGB BLD-MCNC: 13.2 G/DL (ref 11.7–15.7)
LYMPHOCYTES # BLD AUTO: 4.3 10E9/L (ref 0.8–5.3)
LYMPHOCYTES NFR BLD AUTO: 39.3 %
MCH RBC QN AUTO: 27.8 PG (ref 26.5–33)
MCHC RBC AUTO-ENTMCNC: 31.5 G/DL (ref 31.5–36.5)
MCV RBC AUTO: 88 FL (ref 78–100)
MONOCYTES # BLD AUTO: 1.5 10E9/L (ref 0–1.3)
MONOCYTES NFR BLD AUTO: 13.4 %
NEUTROPHILS # BLD AUTO: 4.8 10E9/L (ref 1.6–8.3)
NEUTROPHILS NFR BLD AUTO: 44.6 %
PLATELET # BLD AUTO: 162 10E9/L (ref 150–450)
RBC # BLD AUTO: 4.74 10E12/L (ref 3.8–5.2)
WBC # BLD AUTO: 10.9 10E9/L (ref 4–11)

## 2017-08-07 PROCEDURE — 36415 COLL VENOUS BLD VENIPUNCTURE: CPT | Performed by: INTERNAL MEDICINE

## 2017-08-07 PROCEDURE — 85025 COMPLETE CBC W/AUTO DIFF WBC: CPT | Performed by: INTERNAL MEDICINE

## 2017-08-14 DIAGNOSIS — D69.3 IDIOPATHIC THROMBOCYTOPENIC PURPURA (H): ICD-10-CM

## 2017-08-14 LAB
BASOPHILS # BLD AUTO: 0.1 10E9/L (ref 0–0.2)
BASOPHILS NFR BLD AUTO: 0.7 %
DIFFERENTIAL METHOD BLD: ABNORMAL
EOSINOPHIL # BLD AUTO: 0.3 10E9/L (ref 0–0.7)
EOSINOPHIL NFR BLD AUTO: 3.6 %
ERYTHROCYTE [DISTWIDTH] IN BLOOD BY AUTOMATED COUNT: 18 % (ref 10–15)
HCT VFR BLD AUTO: 39.1 % (ref 35–47)
HGB BLD-MCNC: 12.2 G/DL (ref 11.7–15.7)
IMM GRANULOCYTES # BLD: 0 10E9/L (ref 0–0.4)
IMM GRANULOCYTES NFR BLD: 0.2 %
LYMPHOCYTES # BLD AUTO: 3.7 10E9/L (ref 0.8–5.3)
LYMPHOCYTES NFR BLD AUTO: 42.5 %
MCH RBC QN AUTO: 27.6 PG (ref 26.5–33)
MCHC RBC AUTO-ENTMCNC: 31.2 G/DL (ref 31.5–36.5)
MCV RBC AUTO: 89 FL (ref 78–100)
MONOCYTES # BLD AUTO: 1 10E9/L (ref 0–1.3)
MONOCYTES NFR BLD AUTO: 11.8 %
NEUTROPHILS # BLD AUTO: 3.6 10E9/L (ref 1.6–8.3)
NEUTROPHILS NFR BLD AUTO: 41.2 %
PLATELET # BLD AUTO: 106 10E9/L (ref 150–450)
RBC # BLD AUTO: 4.42 10E12/L (ref 3.8–5.2)
WBC # BLD AUTO: 8.6 10E9/L (ref 4–11)

## 2017-08-14 PROCEDURE — 85025 COMPLETE CBC W/AUTO DIFF WBC: CPT | Performed by: INTERNAL MEDICINE

## 2017-08-14 PROCEDURE — 36415 COLL VENOUS BLD VENIPUNCTURE: CPT | Performed by: INTERNAL MEDICINE

## 2017-08-15 ENCOUNTER — TELEPHONE (OUTPATIENT)
Dept: ONCOLOGY | Facility: CLINIC | Age: 62
End: 2017-08-15

## 2017-08-15 ENCOUNTER — OFFICE VISIT (OUTPATIENT)
Dept: FAMILY MEDICINE | Facility: CLINIC | Age: 62
End: 2017-08-15
Payer: COMMERCIAL

## 2017-08-15 VITALS
WEIGHT: 293 LBS | BODY MASS INDEX: 48.75 KG/M2 | SYSTOLIC BLOOD PRESSURE: 137 MMHG | TEMPERATURE: 98.1 F | DIASTOLIC BLOOD PRESSURE: 86 MMHG | HEART RATE: 76 BPM

## 2017-08-15 DIAGNOSIS — I81 PORTAL VEIN THROMBOSIS: Primary | ICD-10-CM

## 2017-08-15 DIAGNOSIS — R60.0 PERIPHERAL EDEMA: ICD-10-CM

## 2017-08-15 DIAGNOSIS — T80.90XA INJECTION SITE REACTION, INITIAL ENCOUNTER: Primary | ICD-10-CM

## 2017-08-15 DIAGNOSIS — J45.20 MILD INTERMITTENT ASTHMA WITHOUT COMPLICATION: ICD-10-CM

## 2017-08-15 PROCEDURE — 99213 OFFICE O/P EST LOW 20 MIN: CPT | Performed by: FAMILY MEDICINE

## 2017-08-15 RX ORDER — HYDROCHLOROTHIAZIDE 25 MG/1
25 TABLET ORAL DAILY
Qty: 90 TABLET | Refills: 1 | Status: SHIPPED | OUTPATIENT
Start: 2017-08-15 | End: 2018-09-10

## 2017-08-15 RX ORDER — ALBUTEROL SULFATE 90 UG/1
2 AEROSOL, METERED RESPIRATORY (INHALATION) EVERY 4 HOURS PRN
Qty: 1 INHALER | Refills: 5 | Status: SHIPPED | OUTPATIENT
Start: 2017-08-15 | End: 2018-09-10

## 2017-08-15 NOTE — PROGRESS NOTES
SUBJECTIVE:                                                    Charlee Marks is a 61 year old female who presents to clinic today for the following health issues:    Chief Complaint   Patient presents with     Derm Problem     swelling, pain, and bruising to abdominal area after Lovenox injections.        Charlee is followed by hematology for a history of ITP, s/p splenectomy, still with fluctuating platelet levels.  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    She comes in now with concerns about increased abdominal bruising and the appearance of some subcutaneous nodules at the sites of her injections.    OBJECTIVE: /86 (BP Location: Right arm, Patient Position: Chair, Cuff Size: Adult Large)  Pulse 76  Temp 98.1  F (36.7  C) (Tympanic)  Wt (!) 306 lb 9.6 oz (139.1 kg)  LMP 09/15/2007  BMI 48.75 kg/m2    The abdomen is obese. She has several red or bronzed areas of previous injections, and also three or four palpable firm smooth subcutaneous nodules, the largest on the left side at about 3 cm in greatest diameter.    ASSESSMENT: subQ abdominal nodules at injection sites, possible granulomatous formations    PLAN: This may be due to a reaction to the Lovenox, as quick research reveals complaints of nodule in 1-10% of cases, but I have also had a couple of patients develop granulomas as reaction to other injectable drugs, so this might just be secondary to the injection itself.  I do not know whether an alternate anticoagulant would be better tolerated, but she needs to inform her hematologist of this problems and get advice as to the next step.  She will call for an appointment earlier than her routine follow-up.    Yasmin Mckenna md

## 2017-08-15 NOTE — MR AVS SNAPSHOT
After Visit Summary   8/15/2017    Chralee Marks    MRN: [id number]           Patient Information     Date Of Birth          1955        Visit Information        Provider Department      8/15/2017 12:40 PM Yasmin Mckenna MD [facility]        Today's Diagnoses     Injection site reaction, initial encounter    -  1       Follow-ups after your visit        Additional Services     ONC/HEME ADULT REFERRAL       Your provider has referred you to: [facility]: [facility] [place] [phone]   [email or web address]-cancer-clinic-posthcysab-zl-gprhbnwal-[facility]/    Charlee Marks is a 61 year old female on indefinite Lovenox treatment who is starting to develop local subcutaneous reactions, possibly granulomatous, to the injections. These are painful and she is looking either for treatment or for change in anticoagulant medication.    Please be aware that coverage of these services is subject to the terms and limitations of your health insurance plan.  Call member services at your health plan with any benefit or coverage questions.      Please bring the following with you to your appointment:    (1) Any X-Rays, CTs or MRIs which have been performed.  Contact the facility where they were done to arrange for  prior to your scheduled appointment.   (2) List of current medications  (3) This referral request   (4) Any documents/labs given to you for this referral                  Your next 10 appointments already scheduled     Aug 21, 2017 10:15 AM CDT   LAB with CL LAB   [facility]    [address] [address]   [phone]           Patient must bring picture ID. Patient should be prepared to give a urine specimen  Please do not eat 10-12 hours before your appointment if you are coming in fasting for labs on lipids,  cholesterol, or glucose (sugar). Pregnant women should follow their Care Team instructions. Water with medications is okay. Do not drink coffee or other fluids. If you have concerns about taking  your medications, please ask at office or if scheduling via [employer], send a message by clicking on Secure Messaging, Message Your Care Team.            Aug 28, 2017 10:15 AM CDT   LAB with CL LAB   [facility])    [address] [address]   [phone]           Patient must bring picture ID. Patient should be prepared to give a urine specimen  Please do not eat 10-12 hours before your appointment if you are coming in fasting for labs on lipids, cholesterol, or glucose (sugar). Pregnant women should follow their Care Team instructions. Water with medications is okay. Do not drink coffee or other fluids. If you have concerns about taking  your medications, please ask at office or if scheduling via [employer], send a message by clicking on Secure Messaging, Message Your Care Team.            Sep 05, 2017 10:15 AM CDT   LAB with CL LAB   [facility] ([facility])    [address] [address]   [phone]           Patient must bring picture ID. Patient should be prepared to give a urine specimen  Please do not eat 10-12 hours before your appointment if you are coming in fasting for labs on lipids, cholesterol, or glucose (sugar). Pregnant women should follow their Care Team instructions. Water with medications is okay. Do not drink coffee or other fluids. If you have concerns about taking  your medications, please ask at office or if scheduling via [employer], send a message by clicking on Secure Messaging, Message Your Care Team.            Sep 11, 2017 10:15 AM CDT   LAB with CL LAB   [facility] ([facility])    [address] [address]    [phone]           Patient must bring picture ID. Patient should be prepared to give a urine specimen  Please do not eat 10-12 hours before your appointment if you are coming in fasting for labs on lipids, cholesterol, or glucose (sugar). Pregnant women should follow their Care Team instructions. Water with medications is okay. Do not drink coffee or other fluids. If you have concerns about taking  your medications, please ask at office or if scheduling via [employer], send a message by clicking on Secure Messaging, Message Your Care Team.            Oct 09, 2017  1:00 PM CDT   Lab with  LAB   [facility] ([facility])    [address]  1st Floor  [facility] [id number] [phone]            Oct 09, 2017  2:00 PM CDT   (Arrive by 1:45 PM)   Return General Liver with Agustin Le MD   [facility] ([facility])    [address]  3rd Floor  [facility] [id number] [phone]              Who to contact     If you have questions or need follow up information about today's clinic visit or your schedule please contact [facility] directly at [phone].  Normal or non-critical lab and imaging results will be communicated to you by MyChart, letter or phone within 4 business days after the clinic has received the results. If you do not hear from us within 7 days, please contact the clinic through [employer] or phone. If you have a critical or abnormal lab result, we will notify you by phone as soon as possible.  Submit refill requests through [employer] or call your pharmacy and they will forward the refill request to us. Please allow 3 business days for your refill to be completed.          Additional Information About Your Visit        [employer] Information     [employer] gives you secure access to your electronic health record. If you see a primary care provider, you can also send messages to your care team and make  appointments. If you have questions, please call your primary care clinic.  If you do not have a primary care provider, please call [phone] and they will assist you.        Care EveryWhere ID     This is your Care EveryWhere ID. This could be used by other organizations to access your [place] medical records  [id number]        Your Vitals Were     Pulse Temperature Last Period BMI (Body Mass Index)          76 98.1  F (36.7  C) (Tympanic) 09/15/2007 48.75 kg/m2         Blood Pressure from Last 3 Encounters:   08/15/17 137/86   07/24/17 136/84   07/18/17 136/68    Weight from Last 3 Encounters:   08/15/17 (!) 306 lb 9.6 oz (139.1 kg)   07/24/17 (!) 306 lb 6.4 oz (139 kg)   07/18/17 (!) 307 lb 14.4 oz (139.7 kg)              We Performed the Following     ONC/HEME ADULT REFERRAL          Today's Medication Changes          These changes are accurate as of: 8/15/17  1:56 PM.  If you have any questions, ask your nurse or doctor.               These medicines have changed or have updated prescriptions.        Dose/Directions    ranitidine 150 MG tablet   Commonly known as:  ZANTAC   This may have changed:  See the new instructions.   Used for:  Esophageal reflux        ONE TABLET TWICE DAILY as needed will call when needed   Quantity:  180 Tab   Refills:  3                Primary Care Provider Office Phone # Fax #    Yasmin Evelyn Mckenna [phone] [address] [address]        Equal Access to Services     [facility] ROSALES AH: Hadii sourav ku hadasho Soomaali, waaxda luqadaha, qaybta kaalmada adeegyada, julieta mcguire. So [facility] [phone].    ATENCIÓN: Si habla nabilañol, tiene a cosme disposición servicios gratuitos de asistencia lingüística. Llame al [phone].    We comply with applicable federal civil rights laws and [place] laws. We do not discriminate on the basis of race, color, national origin, age, disability sex, sexual orientation or gender  identity.            Thank you!     Thank you for choosing [facility]  for your care. Our goal is always to provide you with excellent care. Hearing back from our patients is one way we can continue to improve our services. Please take a few minutes to complete the written survey that you may receive in the mail after your visit with us. Thank you!             Your Updated Medication List - Protect others around you: Learn how to safely use, store and throw away your medicines at www.disposemymeds.org.          This list is accurate as of: 8/15/17  1:56 PM.  Always use your most recent med list.                   Brand Name Dispense Instructions for use Diagnosis    albuterol 108 (90 BASE) MCG/ACT Inhaler    PROAIR HFA/PROVENTIL HFA/VENTOLIN HFA    1 Inhaler    Inhale 2 puffs into the lungs every 4 hours as needed for shortness of breath / dyspnea    Mild intermittent asthma without complication       B-6 100 MG Tabs      Take 1 tablet by mouth daily        calcium carbonate-vitamin D 500-400 MG-UNIT Tabs per tablet      Take 1 tablet by mouth 2 times daily        enoxaparin 150 MG/ML injection    LOVENOX    54 mL    Inject 0.9 mLs (135 mg) Subcutaneous every 12 hours    Portal vein thrombosis, Long-term (current) use of anticoagulants       GLUCOSAMINE CHONDRO COMPLEX OR      on hold        hydrochlorothiazide 25 MG tablet    HYDRODIURIL    30 tablet    Take 1 tablet (25 mg) by mouth daily    Peripheral edema       loratadine 10 MG tablet    CLARITIN    90 Tab    ONE DAILY    Allergy, unspecified not elsewhere classified       MULTIVITAMIN TABS   OR      1 TABLET DAILY        omega 3 1000 MG Caps      Reported on 4/20/2017        ranitidine 150 MG tablet    ZANTAC    180 Tab    ONE TABLET TWICE DAILY as needed will call when needed    Esophageal reflux       TYLENOL PO      Take 1,000 mg by mouth every 4 hours as needed for mild pain or fever (Headaches)

## 2017-08-15 NOTE — NURSING NOTE
"Chief Complaint   Patient presents with     Derm Problem     swelling, pain, and bruising to abdominal area after Lovenox injections.        Initial /86 (BP Location: Right arm, Patient Position: Chair, Cuff Size: Adult Large)  Pulse 76  Temp 98.1  F (36.7  C) (Tympanic)  Wt (!) 306 lb 9.6 oz (139.1 kg)  LMP 09/15/2007  BMI 48.75 kg/m2 Estimated body mass index is 48.75 kg/(m^2) as calculated from the following:    Height as of 7/18/17: 5' 6.5\" (1.689 m).    Weight as of this encounter: 306 lb 9.6 oz (139.1 kg).  Medication Reconciliation: complete   Bobbi Lemus CMA    "

## 2017-08-15 NOTE — TELEPHONE ENCOUNTER
Pt called after seeing her PCP for belly pain at injection sites. MD reports possible granulomas and the possible rhiannon for medication change. Reviewed with Dr. Rankin and with the possibility of the granulomas and/or thrombosed plan to switch the pt to Xarelto. Called and reviewed with pt she may switch over with the next Lovenox dose pending the pharmacy is able to fill it tonight. Pt aware not to duplicate the Lovenox/xarelto dose and to take the xarelto in place of the Loven. Advised the granulomas may take a while to reabsorb and will need to continue on the every 1 week cbc w/diff. Pt verbalized understanding.

## 2017-08-21 DIAGNOSIS — Z79.01 LONG-TERM (CURRENT) USE OF ANTICOAGULANTS: ICD-10-CM

## 2017-08-21 DIAGNOSIS — I81 PORTAL VEIN THROMBOSIS: ICD-10-CM

## 2017-08-21 LAB
BASOPHILS # BLD AUTO: 0.1 10E9/L (ref 0–0.2)
BASOPHILS NFR BLD AUTO: 0.7 %
DIFFERENTIAL METHOD BLD: ABNORMAL
EOSINOPHIL # BLD AUTO: 0.2 10E9/L (ref 0–0.7)
EOSINOPHIL NFR BLD AUTO: 2.7 %
ERYTHROCYTE [DISTWIDTH] IN BLOOD BY AUTOMATED COUNT: 17.4 % (ref 10–15)
HCT VFR BLD AUTO: 40.2 % (ref 35–47)
HGB BLD-MCNC: 12.9 G/DL (ref 11.7–15.7)
IMM GRANULOCYTES # BLD: 0 10E9/L (ref 0–0.4)
IMM GRANULOCYTES NFR BLD: 0.2 %
LYMPHOCYTES # BLD AUTO: 3.3 10E9/L (ref 0.8–5.3)
LYMPHOCYTES NFR BLD AUTO: 40.9 %
MCH RBC QN AUTO: 28.3 PG (ref 26.5–33)
MCHC RBC AUTO-ENTMCNC: 32.1 G/DL (ref 31.5–36.5)
MCV RBC AUTO: 88 FL (ref 78–100)
MONOCYTES # BLD AUTO: 1 10E9/L (ref 0–1.3)
MONOCYTES NFR BLD AUTO: 12.5 %
NEUTROPHILS # BLD AUTO: 3.5 10E9/L (ref 1.6–8.3)
NEUTROPHILS NFR BLD AUTO: 43 %
PLATELET # BLD AUTO: 98 10E9/L (ref 150–450)
RBC # BLD AUTO: 4.56 10E12/L (ref 3.8–5.2)
WBC # BLD AUTO: 8.2 10E9/L (ref 4–11)

## 2017-08-21 PROCEDURE — 85025 COMPLETE CBC W/AUTO DIFF WBC: CPT | Performed by: INTERNAL MEDICINE

## 2017-08-21 PROCEDURE — 36415 COLL VENOUS BLD VENIPUNCTURE: CPT | Performed by: INTERNAL MEDICINE

## 2017-08-28 DIAGNOSIS — I81 PORTAL VEIN THROMBOSIS: ICD-10-CM

## 2017-08-28 DIAGNOSIS — Z79.01 LONG-TERM (CURRENT) USE OF ANTICOAGULANTS: ICD-10-CM

## 2017-08-28 LAB
BASOPHILS # BLD AUTO: 0.1 10E9/L (ref 0–0.2)
BASOPHILS NFR BLD AUTO: 0.8 %
DIFFERENTIAL METHOD BLD: ABNORMAL
EOSINOPHIL # BLD AUTO: 0.2 10E9/L (ref 0–0.7)
EOSINOPHIL NFR BLD AUTO: 2.4 %
ERYTHROCYTE [DISTWIDTH] IN BLOOD BY AUTOMATED COUNT: 17.2 % (ref 10–15)
HCT VFR BLD AUTO: 40.2 % (ref 35–47)
HGB BLD-MCNC: 12.8 G/DL (ref 11.7–15.7)
IMM GRANULOCYTES # BLD: 0 10E9/L (ref 0–0.4)
IMM GRANULOCYTES NFR BLD: 0.3 %
LYMPHOCYTES # BLD AUTO: 3.5 10E9/L (ref 0.8–5.3)
LYMPHOCYTES NFR BLD AUTO: 38 %
MCH RBC QN AUTO: 28.2 PG (ref 26.5–33)
MCHC RBC AUTO-ENTMCNC: 31.8 G/DL (ref 31.5–36.5)
MCV RBC AUTO: 89 FL (ref 78–100)
MONOCYTES # BLD AUTO: 1.3 10E9/L (ref 0–1.3)
MONOCYTES NFR BLD AUTO: 14.3 %
NEUTROPHILS # BLD AUTO: 4.1 10E9/L (ref 1.6–8.3)
NEUTROPHILS NFR BLD AUTO: 44.2 %
PLATELET # BLD AUTO: 81 10E9/L (ref 150–450)
RBC # BLD AUTO: 4.54 10E12/L (ref 3.8–5.2)
WBC # BLD AUTO: 9.3 10E9/L (ref 4–11)

## 2017-08-28 PROCEDURE — 36415 COLL VENOUS BLD VENIPUNCTURE: CPT | Performed by: INTERNAL MEDICINE

## 2017-08-28 PROCEDURE — 85025 COMPLETE CBC W/AUTO DIFF WBC: CPT | Performed by: INTERNAL MEDICINE

## 2017-08-30 DIAGNOSIS — I81 PORTAL VEIN THROMBOSIS: Primary | ICD-10-CM

## 2017-09-05 ENCOUNTER — TELEPHONE (OUTPATIENT)
Dept: ONCOLOGY | Facility: CLINIC | Age: 62
End: 2017-09-05

## 2017-09-05 DIAGNOSIS — I81 PORTAL VEIN THROMBOSIS: ICD-10-CM

## 2017-09-05 DIAGNOSIS — Z79.01 LONG-TERM (CURRENT) USE OF ANTICOAGULANTS: ICD-10-CM

## 2017-09-05 PROCEDURE — 85025 COMPLETE CBC W/AUTO DIFF WBC: CPT | Performed by: INTERNAL MEDICINE

## 2017-09-05 PROCEDURE — 36415 COLL VENOUS BLD VENIPUNCTURE: CPT | Performed by: INTERNAL MEDICINE

## 2017-09-05 NOTE — TELEPHONE ENCOUNTER
Lab called with pt still there to report a preliminary platelet count of 46. Pt denies any bleeding or additional bruising. She was started on Xarelto 3 weeks ago after having granulomas or thrombosed. Will review with Dr. Hicks what his recommendations are and call pt back. Pt refused to take Xarelto dose today and will wait to hear in the morning. Advised pt she needs to go to the ER to be evaluated in any bleeding, excessive bruising or new symptoms occur. Pt verbalized understanding and says she has been through this before when she was down. Will follow up tomorrow.

## 2017-09-06 LAB
BASOPHILS # BLD AUTO: 0.1 10E9/L (ref 0–0.2)
BASOPHILS NFR BLD AUTO: 0.5 %
DIFFERENTIAL METHOD BLD: ABNORMAL
EOSINOPHIL # BLD AUTO: 0.2 10E9/L (ref 0–0.7)
EOSINOPHIL NFR BLD AUTO: 1.5 %
ERYTHROCYTE [DISTWIDTH] IN BLOOD BY AUTOMATED COUNT: 16.2 % (ref 10–15)
HCT VFR BLD AUTO: 39.3 % (ref 35–47)
HGB BLD-MCNC: 12.5 G/DL (ref 11.7–15.7)
IMM GRANULOCYTES # BLD: 0 10E9/L (ref 0–0.4)
IMM GRANULOCYTES NFR BLD: 0.1 %
LYMPHOCYTES # BLD AUTO: 4.4 10E9/L (ref 0.8–5.3)
LYMPHOCYTES NFR BLD AUTO: 31.8 %
MCH RBC QN AUTO: 27.9 PG (ref 26.5–33)
MCHC RBC AUTO-ENTMCNC: 31.8 G/DL (ref 31.5–36.5)
MCV RBC AUTO: 88 FL (ref 78–100)
MONOCYTES # BLD AUTO: 1.8 10E9/L (ref 0–1.3)
MONOCYTES NFR BLD AUTO: 13.3 %
NEUTROPHILS # BLD AUTO: 7.2 10E9/L (ref 1.6–8.3)
NEUTROPHILS NFR BLD AUTO: 52.8 %
PLATELET # BLD AUTO: 52 10E9/L (ref 150–450)
RBC # BLD AUTO: 4.48 10E12/L (ref 3.8–5.2)
WBC # BLD AUTO: 13.7 10E9/L (ref 4–11)

## 2017-09-06 NOTE — TELEPHONE ENCOUNTER
Final Platelet level came back at 52, reviewed with Dr. Hicks and he recommends pt continues on Xarelto unless final result comes back less than 50 or pt has any bleeding issues or other adverse effects. Pt should also continue with weekly labs and set up a f/u appt with Dr. Rankin. Pt verbalized understanding a will call back to set up an appointment.

## 2017-09-11 ENCOUNTER — TELEPHONE (OUTPATIENT)
Dept: ONCOLOGY | Facility: CLINIC | Age: 62
End: 2017-09-11

## 2017-09-11 ENCOUNTER — ALLIED HEALTH/NURSE VISIT (OUTPATIENT)
Dept: FAMILY MEDICINE | Facility: CLINIC | Age: 62
End: 2017-09-11
Payer: COMMERCIAL

## 2017-09-11 DIAGNOSIS — Z79.01 LONG-TERM (CURRENT) USE OF ANTICOAGULANTS: ICD-10-CM

## 2017-09-11 DIAGNOSIS — Z23 NEED FOR PROPHYLACTIC VACCINATION AND INOCULATION AGAINST INFLUENZA: Primary | ICD-10-CM

## 2017-09-11 DIAGNOSIS — I81 PORTAL VEIN THROMBOSIS: ICD-10-CM

## 2017-09-11 LAB
BASOPHILS # BLD AUTO: 0.1 10E9/L (ref 0–0.2)
BASOPHILS NFR BLD AUTO: 0.5 %
DIFFERENTIAL METHOD BLD: ABNORMAL
EOSINOPHIL # BLD AUTO: 0.2 10E9/L (ref 0–0.7)
EOSINOPHIL NFR BLD AUTO: 1.7 %
ERYTHROCYTE [DISTWIDTH] IN BLOOD BY AUTOMATED COUNT: 16.3 % (ref 10–15)
HCT VFR BLD AUTO: 39.5 % (ref 35–47)
HGB BLD-MCNC: 12.9 G/DL (ref 11.7–15.7)
IMM GRANULOCYTES # BLD: 0 10E9/L (ref 0–0.4)
IMM GRANULOCYTES NFR BLD: 0.4 %
LYMPHOCYTES # BLD AUTO: 3.2 10E9/L (ref 0.8–5.3)
LYMPHOCYTES NFR BLD AUTO: 29 %
MCH RBC QN AUTO: 28.8 PG (ref 26.5–33)
MCHC RBC AUTO-ENTMCNC: 32.7 G/DL (ref 31.5–36.5)
MCV RBC AUTO: 88 FL (ref 78–100)
MONOCYTES # BLD AUTO: 1.6 10E9/L (ref 0–1.3)
MONOCYTES NFR BLD AUTO: 14.5 %
NEUTROPHILS # BLD AUTO: 5.9 10E9/L (ref 1.6–8.3)
NEUTROPHILS NFR BLD AUTO: 53.9 %
PLATELET # BLD AUTO: 54 10E9/L (ref 150–450)
RBC # BLD AUTO: 4.48 10E12/L (ref 3.8–5.2)
WBC # BLD AUTO: 10.9 10E9/L (ref 4–11)

## 2017-09-11 PROCEDURE — 90471 IMMUNIZATION ADMIN: CPT

## 2017-09-11 PROCEDURE — 90472 IMMUNIZATION ADMIN EACH ADD: CPT

## 2017-09-11 PROCEDURE — 90686 IIV4 VACC NO PRSV 0.5 ML IM: CPT

## 2017-09-11 PROCEDURE — 99207 ZZC NO CHARGE NURSE ONLY: CPT

## 2017-09-11 PROCEDURE — 36415 COLL VENOUS BLD VENIPUNCTURE: CPT | Performed by: INTERNAL MEDICINE

## 2017-09-11 PROCEDURE — 90715 TDAP VACCINE 7 YRS/> IM: CPT

## 2017-09-11 PROCEDURE — 85025 COMPLETE CBC W/AUTO DIFF WBC: CPT | Performed by: INTERNAL MEDICINE

## 2017-09-11 NOTE — TELEPHONE ENCOUNTER
DATE:  9/11/2017   TIME OF RECEIPT FROM LAB:  1545  LAB TEST:  PLT  LAB VALUE:  42  RESULTS GIVEN WITH READ-BACK TO (PROVIDER):  Dr. COURTNEY Rankin   TIME LAB VALUE REPORTED TO PROVIDER:   [employer]

## 2017-09-11 NOTE — MR AVS SNAPSHOT
After Visit Summary   9/11/2017    Charlee Marks    MRN: [id number]           Patient Information     Date Of Birth          1955        Visit Information        Provider Department      9/11/2017 4:15 PM [facility], [facility]        Today's Diagnoses     Need for prophylactic vaccination and inoculation against influenza    -  1       Follow-ups after your visit        Your next 10 appointments already scheduled     Sep 18, 2017  3:30 PM CDT   LAB with CL LAB   [facility])    [address] [address]   [phone]           Patient must bring picture ID. Patient should be prepared to give a urine specimen  Please do not eat 10-12 hours before your appointment if you are coming in fasting for labs on lipids, cholesterol, or glucose (sugar). Pregnant women should follow their Care Team instructions. Water with medications is okay. Do not drink coffee or other fluids. If you have concerns about taking  your medications, please ask at office or if scheduling via WatchParty, send a message by clicking on Secure Messaging, Message Your Care Team.            Sep 25, 2017  3:30 PM CDT   LAB with CL LAB   [facility] ([facility])    [address] [address]   [phone]           Patient must bring picture ID. Patient should be prepared to give a urine specimen  Please do not eat 10-12 hours before your appointment if you are coming in fasting for labs on lipids, cholesterol, or glucose (sugar). Pregnant women should follow their Care Team instructions. Water with medications is okay. Do not drink coffee or other fluids. If you have concerns about taking  your medications, please ask at office or if scheduling via WatchParty, send a message by clicking on Secure Messaging, Message Your Care Team.            Oct 02, 2017  3:30 PM CDT   LAB with CL LAB    [facility] ([facility])    [address]  [facility] [id number] [phone]           Patient must bring picture ID. Patient should be prepared to give a urine specimen  Please do not eat 10-12 hours before your appointment if you are coming in fasting for labs on lipids, cholesterol, or glucose (sugar). Pregnant women should follow their Care Team instructions. Water with medications is okay. Do not drink coffee or other fluids. If you have concerns about taking  your medications, please ask at office or if scheduling via FoodShootr, send a message by clicking on Secure Messaging, Message Your Care Team.            Oct 09, 2017  1:00 PM CDT   Lab with  LAB   [facility] ([facility])    [address] [place] [id number] [phone]            Oct 09, 2017  2:00 PM CDT   (Arrive by 1:45 PM)   Return General Liver with Agustin Le MD   [facility] Hepatology ([facility])    [address] [place] [address] [phone]              Who to contact     If you have questions or need follow up information about today's clinic visit or your schedule please contact [facility] directly at [phone].  Normal or non-critical lab and imaging results will be communicated to you by Obsorbhart, letter or phone within 4 business days after the clinic has received the results. If you do not hear from us within 7 days, please contact the clinic through Obsorbhart or phone. If you have a critical or abnormal lab result, we will notify you by phone as soon as possible.  Submit refill requests through FoodShootr or call your pharmacy and they will forward the refill request to us. Please allow 3 business days for your refill to be completed.          Additional Information About Your Visit        FoodShootr Information     FoodShootr gives you secure access to your electronic  health record. If you see a primary care provider, you can also send messages to your care team and make appointments. If you have questions, please call your primary care clinic.  If you do not have a primary care provider, please call [phone] and they will assist you.        Care EveryWhere ID     This is your Care EveryWhere ID. This could be used by other organizations to access your [facility] medical records  [id number]        Your Vitals Were     Last Period                   09/15/2007            Blood Pressure from Last 3 Encounters:   08/15/17 137/86   07/24/17 136/84   07/18/17 136/68    Weight from Last 3 Encounters:   08/15/17 (!) 306 lb 9.6 oz (139.1 kg)   07/24/17 (!) 306 lb 6.4 oz (139 kg)   07/18/17 (!) 307 lb 14.4 oz (139.7 kg)              We Performed the Following     ADMIN 1st VACCINE     FLU VAC, SPLIT VIRUS IM > 3 YO (QUADRIVALENT) [02147]     TDAP VACCINE (ADACEL)     Vaccine Administration, Each Additional [19008]          Today's Medication Changes          These changes are accurate as of: 9/11/17  4:56 PM.  If you have any questions, ask your nurse or doctor.               These medicines have changed or have updated prescriptions.        Dose/Directions    ranitidine 150 MG tablet   Commonly known as:  ZANTAC   This may have changed:  See the new instructions.   Used for:  Esophageal reflux        ONE TABLET TWICE DAILY as needed will call when needed   Quantity:  180 Tab   Refills:  3                Primary Care Provider Office Phone # Fax #    Yasmin Evelyn Mckenna [phone] [address] [address] [facility] [address]        Equal Access to Services     [facility] AH: Hadii sourav altman hadcalli Soelizabeth, waaxda luqadaha, qaybta kaalmajulieta monsalve. So [facility] [phone].    ATENCIÓN: Si habla español, tiene a cosme disposición servicios gratuitos de asistencia lingüística. Llame al [phone].    We comply with applicable  federal civil rights laws and [place] laws. We do not discriminate on the basis of race, color, national origin, age, disability sex, sexual orientation or gender identity.            Thank you!     Thank you for choosing [facility]  for your care. Our goal is always to provide you with excellent care. Hearing back from our patients is one way we can continue to improve our services. Please take a few minutes to complete the written survey that you may receive in the mail after your visit with us. Thank you!             Your Updated Medication List - Protect others around you: Learn how to safely use, store and throw away your medicines at www.disposemymeds.org.          This list is accurate as of: 9/11/17  4:56 PM.  Always use your most recent med list.                   Brand Name Dispense Instructions for use Diagnosis    albuterol 108 (90 BASE) MCG/ACT Inhaler    PROAIR HFA/PROVENTIL HFA/VENTOLIN HFA    1 Inhaler    Inhale 2 puffs into the lungs every 4 hours as needed for shortness of breath / dyspnea    Mild intermittent asthma without complication       B-6 100 MG Tabs      Take 1 tablet by mouth daily        calcium carbonate-vitamin D 500-400 MG-UNIT Tabs per tablet      Take 1 tablet by mouth 2 times daily        GLUCOSAMINE CHONDRO COMPLEX OR      on hold        hydrochlorothiazide 25 MG tablet    HYDRODIURIL    90 tablet    Take 1 tablet (25 mg) by mouth daily    Peripheral edema       loratadine 10 MG tablet    CLARITIN    90 Tab    ONE DAILY    Allergy, unspecified not elsewhere classified       MULTIVITAMIN TABS   OR      1 TABLET DAILY        omega 3 1000 MG Caps      Reported on 4/20/2017        ranitidine 150 MG tablet    ZANTAC    180 Tab    ONE TABLET TWICE DAILY as needed will call when needed    Esophageal reflux       rivaroxaban ANTICOAGULANT 20 MG Tabs tablet    XARELTO    30 tablet    Take 1 tablet (20 mg) by mouth daily (with dinner)    Portal vein thrombosis        TYLENOL PO      Take 1,000 mg by mouth every 4 hours as needed for mild pain or fever (Headaches)

## 2017-09-11 NOTE — PROGRESS NOTES
Injectable Influenza Immunization Documentation    1.  Are you sick today? (Fever of 100.5 or higher on the day of the clinic)   No    2.  Have you ever had Guillain-Stockwell Syndrome within 6 weeks of an influenza vaccionation?  No    3. Do you have a life-threatening allergy to eggs?  No    4. Do you have a life-threatening allergy to a component of the vaccine? May include antibiotics, gelatin or latex.  No     5. Have you ever had a reaction to a dose of flu vaccine that needed immediate medical attention?  No     Form completed by Iona Shelton CMA

## 2017-09-12 DIAGNOSIS — Z90.81 S/P SPLENECTOMY: ICD-10-CM

## 2017-09-12 DIAGNOSIS — I81 PORTAL VEIN THROMBOSIS: Primary | ICD-10-CM

## 2017-09-12 NOTE — PROGRESS NOTES
Called and reviewed results with the patient per Dr. Rankin. Pt has only a few of the 20 mg tablets left and will alternate them with the 10 mg tables until gone then start 15 mg daily. Pt aware to hold if less platelets are less than 50 or if any bleeding occurs. Patient had no further questions or concerns at this time and also verbalized understanding and will  new mg table after work today. Direct line provided if any further questions/concerns arise.

## 2017-09-18 DIAGNOSIS — I81 PORTAL VEIN THROMBOSIS: ICD-10-CM

## 2017-09-18 DIAGNOSIS — Z79.01 LONG-TERM (CURRENT) USE OF ANTICOAGULANTS: ICD-10-CM

## 2017-09-18 PROCEDURE — 36415 COLL VENOUS BLD VENIPUNCTURE: CPT | Performed by: INTERNAL MEDICINE

## 2017-09-18 PROCEDURE — 85025 COMPLETE CBC W/AUTO DIFF WBC: CPT | Performed by: INTERNAL MEDICINE

## 2017-09-19 LAB
BASOPHILS # BLD AUTO: 0.1 10E9/L (ref 0–0.2)
BASOPHILS NFR BLD AUTO: 1 %
DIFFERENTIAL METHOD BLD: ABNORMAL
EOSINOPHIL # BLD AUTO: 0.4 10E9/L (ref 0–0.7)
EOSINOPHIL NFR BLD AUTO: 3.3 %
ERYTHROCYTE [DISTWIDTH] IN BLOOD BY AUTOMATED COUNT: 16.1 % (ref 10–15)
HCT VFR BLD AUTO: 41 % (ref 35–47)
HGB BLD-MCNC: 13.2 G/DL (ref 11.7–15.7)
IMM GRANULOCYTES # BLD: 0 10E9/L (ref 0–0.4)
IMM GRANULOCYTES NFR BLD: 0.2 %
LYMPHOCYTES # BLD AUTO: 2.9 10E9/L (ref 0.8–5.3)
LYMPHOCYTES NFR BLD AUTO: 27.1 %
MCH RBC QN AUTO: 28.5 PG (ref 26.5–33)
MCHC RBC AUTO-ENTMCNC: 32.2 G/DL (ref 31.5–36.5)
MCV RBC AUTO: 89 FL (ref 78–100)
MONOCYTES # BLD AUTO: 1.2 10E9/L (ref 0–1.3)
MONOCYTES NFR BLD AUTO: 11 %
NEUTROPHILS # BLD AUTO: 6 10E9/L (ref 1.6–8.3)
NEUTROPHILS NFR BLD AUTO: 57.4 %
PLATELET # BLD AUTO: 49 10E9/L (ref 150–450)
RBC # BLD AUTO: 4.63 10E12/L (ref 3.8–5.2)
WBC # BLD AUTO: 10.5 10E9/L (ref 4–11)

## 2017-09-22 ENCOUNTER — TELEPHONE (OUTPATIENT)
Dept: ONCOLOGY | Facility: CLINIC | Age: 62
End: 2017-09-22

## 2017-09-22 NOTE — TELEPHONE ENCOUNTER
Pt called today reporting she received her Platelet count last rafia via [employer]. Pt reports it was 49 so she held her dose of Xarelto last rafia and was wondering what the plan was going forward until next monday lab draw. Pt reports no abnomal bleeding or anything along those lines.     Called and reviewed with Dr. Rankin and noted the platelet level of 49 was a minimal change since previous level of 54 and 52, will plan to have her alternate the 15mg with the 10mg until Monday when she has her labs done again and go from there.     Updated pt with plan, pt verbalized understanding and aware I will be watching for results and caller her on Monday.

## 2017-09-25 ENCOUNTER — TELEPHONE (OUTPATIENT)
Dept: ONCOLOGY | Facility: CLINIC | Age: 62
End: 2017-09-25

## 2017-09-25 DIAGNOSIS — I81 PORTAL VEIN THROMBOSIS: ICD-10-CM

## 2017-09-25 DIAGNOSIS — I81 PORTAL VEIN THROMBOSIS: Primary | ICD-10-CM

## 2017-09-25 DIAGNOSIS — Z79.01 LONG-TERM (CURRENT) USE OF ANTICOAGULANTS: ICD-10-CM

## 2017-09-25 LAB
BASOPHILS # BLD AUTO: 0.1 10E9/L (ref 0–0.2)
BASOPHILS NFR BLD AUTO: 0.7 %
DIFFERENTIAL METHOD BLD: ABNORMAL
EOSINOPHIL # BLD AUTO: 0.3 10E9/L (ref 0–0.7)
EOSINOPHIL NFR BLD AUTO: 2.2 %
ERYTHROCYTE [DISTWIDTH] IN BLOOD BY AUTOMATED COUNT: 15.3 % (ref 10–15)
HCT VFR BLD AUTO: 40.5 % (ref 35–47)
HGB BLD-MCNC: 13.1 G/DL (ref 11.7–15.7)
IMM GRANULOCYTES # BLD: 0 10E9/L (ref 0–0.4)
IMM GRANULOCYTES NFR BLD: 0.2 %
LYMPHOCYTES # BLD AUTO: 3.4 10E9/L (ref 0.8–5.3)
LYMPHOCYTES NFR BLD AUTO: 28.7 %
MCH RBC QN AUTO: 28.7 PG (ref 26.5–33)
MCHC RBC AUTO-ENTMCNC: 32.3 G/DL (ref 31.5–36.5)
MCV RBC AUTO: 89 FL (ref 78–100)
MONOCYTES # BLD AUTO: 1.5 10E9/L (ref 0–1.3)
MONOCYTES NFR BLD AUTO: 12.8 %
NEUTROPHILS # BLD AUTO: 6.6 10E9/L (ref 1.6–8.3)
NEUTROPHILS NFR BLD AUTO: 55.4 %
PLATELET # BLD AUTO: 44 10E9/L (ref 150–450)
RBC # BLD AUTO: 4.57 10E12/L (ref 3.8–5.2)
WBC # BLD AUTO: 11.9 10E9/L (ref 4–11)

## 2017-09-25 PROCEDURE — 36415 COLL VENOUS BLD VENIPUNCTURE: CPT | Performed by: INTERNAL MEDICINE

## 2017-09-25 PROCEDURE — 85025 COMPLETE CBC W/AUTO DIFF WBC: CPT | Performed by: INTERNAL MEDICINE

## 2017-09-25 NOTE — TELEPHONE ENCOUNTER
DATE:  9/25/2017   TIME OF RECEIPT FROM LAB:  1264  LAB TEST:  PLT  LAB VALUE:  Preliminary PLT result 42  RESULTS GIVEN WITH READ-BACK TO (PROVIDER):  Dr. COURTNEY PASCUAL  TIME LAB VALUE REPORTED TO PROVIDER:   0281

## 2017-09-26 NOTE — TELEPHONE ENCOUNTER
Called pt this morning to update her on the final platelet result of 44. Discussed with Dr. Rankin, plan for her to hold Xarelto at this time, have a repeat scan done, labs and appt on thursday. Pt verbalized understanding. Will try to adjust schedule to work for pt.

## 2017-09-26 NOTE — PROGRESS NOTES
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      HISTORY OF PRESENT ILLNESS:  she presented with 2-3 months of easy bruising and also fatigue.  She presented to her Primary Care Physician's office on 07/25/2016 and was found to have critical thrombocytopenia with a platelet count of 3.    She was offered IVIG 1 gram on 7/25/2016 and 4 days of   Dexamethasone. She was d/c with PL of 38 on 7/27/2016. PL dip to 13 on 8/4 and 7 on 8/11. She was offered wklyI IVIG 1g/kg starting 8/4/2016, and 2nd cycle of dex x 4 days on 8/11/2016. PL improves to 77 on 8/15, then dip to 31 on 8/18.   Due to rapid drop of her PL when off dex, tx was changed to po prednisone 8/18/2017 with wkly IVIG. R was added on 8/30/2016. She had no PL response despite R, steroid and IVIG.   Nplate was started 9/23/2016. PL up from 20 to 110 after 2 doses of it. Then dip down again.   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   Course was complicated with portal vein and SMV thromboses end of 12/2016. She was admitted to , had IR thrombectomy and catheter-directed lytic therapy to portal vein. Following the procedure she was placed on high intensity heparin drip which was then bridged with warfarin starting 1/9/2017 with an INR goal of 2-3.   Due to progression of portal venous branch thrombosis and SMV thrombosis by CT in 7/2017. Tx is changed to Lovenox. She could not tolerates the shots. tx is changed to Xarelto.     She finished prednisone slow taper 4/2017.      PL dip down to around 50's in Aug 2017. Xarelto put on hold in Sep due to PL less than 50 and stable SMV thromboses.     PAST MEDICAL HISTORY:      1.  Cystocele.    2.  Morbid obesity.    3.  Reflux.    4.  Intermittent asthma.    5.  Effusion of lower leg joints.    6. 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        SOCIAL HISTORY:  The patient lives in  [place] and works in the school district.  She is  with children.  She denies the use of alcohol or tobacco.      ROS  She is back to her baseline energy level.   She denies bleeding episode.          PHYSICAL EXAMINATION:    VITAL SIGNS:  Blood pressure 143/69, pulse 72, temperature 99  F (37.2  C), temperature source Tympanic, resp. rate 20, height 1.689 m (5' 6.5\"), weight (!) 137.3 kg (302 lb 9.6 oz), last menstrual period 09/15/2007, SpO2 100 %, not currently breastfeeding.    GENERAL APPEARANCE:  A middle-aged woman who appears her stated age, very pleasant, morbidly obese, sitting comfortably in a chair and knitting.    HEENT: The patient is normocephalic, atraumatic. Pupils are equally reactive to light.  Sclerae are anicteric.  There is moist oral mucosa, negative pharynx, no oral thrush.    NECK:  Supple, no jugular venous distention, thyroid not palpable.    LYMPH NODES:  Superficial lymphadenopathy is not appreciable in the bilateral cervical, supraclavicular, axillary or inguinal areas.    CARDIOVASCULAR:  S1, S2 regular with no murmurs or gallops, no carotid or abdominal bruits.    PULMONARY:  Lungs are clear to auscultation and percussion bilaterally.  There is no wheezing or rhonchi.    GASTROINTESTINAL:  Abdomen is soft, nontender with no hepatosplenomegaly, no signs of ascites and no mass appreciable.    MUSCULOSKELETAL/EXTREMITIES:  No edema, no cyanotic changes, no signs of joint deformity, no lymphedema.    NEUROLOGIC:  Cranial nerves II-XII are grossly intact, sensation intact, muscle strength and muscle tone symmetrical, 5/5 throughout.    BACK:  No spinal or paraspinal tenderness, no CVA tenderness.    SKIN:  No petechiae, no rash and no signs of cellulitis.  There is scattered bruising on forearms and thighs, which are healing.        LABORATORY DATA reviewed  PL less than 50 in 9/2017.    in 7/2107 from nl 6/2017  Hb nl, wbc/diff nl.     Current Image reviewed  CT a/p " 7/2017 : Progression of left portal venous branch thrombosis and SMV thrombosis is seen compared to the prior study.    US 4/2017  1.  Recanalized main portal and splenic veins. Persistent occlusion of left portal vein.  2.  Splenectomy.    CT 1/9/2017   1. Status post splenectomy with postsurgical changes in the left upper quadrant. Persistent thrombosis of the splenic vein.  2. Main portal vein now appears patent with possible peripheral nonocclusive thrombosis/periportal edema. Residual thrombosis of portion of the left branch of the portal vein and the right posterior  branch of the portal vein.  3. Residual partial thrombosis of the distal portion of the superior mesenteric vein.   4. Small focal nodular opacities in both lower lobes, likely of infectious etiology.    Old data reviewed with summary  12/31/2016 CT abd - thrombosis of the portal vein, splenic vein.    Right after splenectomy 12/13/2016 wbc 14, neutrophiila, PL >600, Hb 9.2  PL at 91 on 12/8/2016, at 51 in 11/2016 at 28 from 43 from 110 early Oct, from 31 on 8/18, at 77 on 8/15, at 7 on 8/11, at 14 on 8/4/2016, at 38 7/27/2016  On 07/26/2016 platelets 8, lymphopenia with lymphocyte count 0.6.    On 07/25/2016 platelets 3.   smear: no dysplasia or schistocytes.     HepB DNA by PCR 10/2016 : negative    9/2016 H. Pylori breath test/stool antigen: negative.     8/2016 hep Bs Ag -,BsAb+, BcAb+, HepC -, for which she saw UGI and started on hepB med Tenofovir 9/8/2016.     CT a/p 10/2016: The spleen is borderline enlarged at 13.3 cm.   CT neck 9/15/2016: No enlarged or necrotic-appearing cervical lymph nodes are seen. No metastatic lesions are identified.  CT head 8/11: negative.           ASSESSMENT/PLAN:    1.  dx ITP in 7/2016 . Failed dexa +/- R, IVIG, Nplate  PL up to > 600 post splenectomy in 12/2016.   S/p prednisone taper off in 4/2017.  PL dip down below 50 in 9/2017.     We discussed the tx option:   Azathioprine immunosuppression.   Cytoxan  750mg - 1g/m2 IV q month or po (maybe 100mg/m2 D1-14 q 28days)  vicristine 1-2mg IV wkly for several wks  vinblastin 0.1mg/kg  combination chemo -CVP, CVP+ -16.  Promacta po - 3-4% risk of thrombosis    The side effects associated with the above options. This is a tough situation.     Will print out CTX education material to her.   She would like more time to digest the options.     She is on q 2 wks cbc check.     Recommend [place] consult for refractory ITP.     2. + hepBcAb, - HepBsAg, HepBsAg-,HebB DNA - by PCR, she saw GI due to the use of Rituxan, started on Tenofovir 9/8/2016 till 4/2017.    Her LFT is not nl.   Advice limit Tylenol intake.     3. 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 S/p thrombolysis to portal vein, lovenox. Has been on Coumadin, lovenox again, Xarelto for 9 months.   Her clots are chronic and  stable now per CT 9/2017. Due to her severe low PL. Recommend off anticoagulation to be observed now.     Total time is 40 minutes, more than 25 minutes spent in counseling regarding her complicated ITP and thrombosis issues.

## 2017-09-26 NOTE — PROGRESS NOTES
Called and reviewed results with the patient per Dr. Rankin.PLan for pt to have CT, labs and appt on Thursday morning.  Patient had no further questions or concerns at this time and also verbalized understanding of plan and to hold medication. Direct line provided if any further questions/concerns arise.

## 2017-09-28 ENCOUNTER — HOSPITAL ENCOUNTER (OUTPATIENT)
Dept: CT IMAGING | Facility: CLINIC | Age: 62
Discharge: HOME | End: 2017-09-28
Attending: INTERNAL MEDICINE | Admitting: INTERNAL MEDICINE
Payer: COMMERCIAL

## 2017-09-28 ENCOUNTER — HOSPITAL ENCOUNTER (OUTPATIENT)
Dept: LAB | Facility: CLINIC | Age: 62
End: 2017-09-28
Attending: INTERNAL MEDICINE
Payer: COMMERCIAL

## 2017-09-28 ENCOUNTER — ONCOLOGY VISIT (OUTPATIENT)
Dept: ONCOLOGY | Facility: CLINIC | Age: 62
End: 2017-09-28
Attending: INTERNAL MEDICINE
Payer: COMMERCIAL

## 2017-09-28 VITALS
HEIGHT: 67 IN | WEIGHT: 293 LBS | TEMPERATURE: 99 F | HEART RATE: 72 BPM | RESPIRATION RATE: 20 BRPM | BODY MASS INDEX: 45.99 KG/M2 | SYSTOLIC BLOOD PRESSURE: 143 MMHG | OXYGEN SATURATION: 100 % | DIASTOLIC BLOOD PRESSURE: 69 MMHG

## 2017-09-28 DIAGNOSIS — Z79.01 LONG-TERM (CURRENT) USE OF ANTICOAGULANTS: ICD-10-CM

## 2017-09-28 DIAGNOSIS — I81 PORTAL VEIN THROMBOSIS: ICD-10-CM

## 2017-09-28 DIAGNOSIS — R94.5 NONSPECIFIC ABNORMAL RESULTS OF LIVER FUNCTION STUDY: ICD-10-CM

## 2017-09-28 DIAGNOSIS — D69.3 IDIOPATHIC THROMBOCYTOPENIC PURPURA (H): ICD-10-CM

## 2017-09-28 DIAGNOSIS — D69.3 IDIOPATHIC THROMBOCYTOPENIC PURPURA (H): Primary | ICD-10-CM

## 2017-09-28 DIAGNOSIS — Z90.81 S/P SPLENECTOMY: ICD-10-CM

## 2017-09-28 LAB
ALBUMIN SERPL-MCNC: 3.6 G/DL (ref 3.4–5)
ALP SERPL-CCNC: 137 U/L (ref 40–150)
ALT SERPL W P-5'-P-CCNC: 51 U/L (ref 0–50)
ANION GAP SERPL CALCULATED.3IONS-SCNC: 4 MMOL/L (ref 3–14)
AST SERPL W P-5'-P-CCNC: 51 U/L (ref 0–45)
BASOPHILS # BLD AUTO: 0.1 10E9/L (ref 0–0.2)
BASOPHILS NFR BLD AUTO: 1 %
BILIRUB SERPL-MCNC: 0.6 MG/DL (ref 0.2–1.3)
BUN SERPL-MCNC: 15 MG/DL (ref 7–30)
CALCIUM SERPL-MCNC: 8.5 MG/DL (ref 8.5–10.1)
CHLORIDE SERPL-SCNC: 101 MMOL/L (ref 94–109)
CO2 SERPL-SCNC: 31 MMOL/L (ref 20–32)
CREAT SERPL-MCNC: 0.55 MG/DL (ref 0.52–1.04)
DIFFERENTIAL METHOD BLD: ABNORMAL
EOSINOPHIL # BLD AUTO: 0.2 10E9/L (ref 0–0.7)
EOSINOPHIL NFR BLD AUTO: 2.4 %
ERYTHROCYTE [DISTWIDTH] IN BLOOD BY AUTOMATED COUNT: 15.4 % (ref 10–15)
GFR SERPL CREATININE-BSD FRML MDRD: >90 ML/MIN/1.7M2
GLUCOSE SERPL-MCNC: 94 MG/DL (ref 70–99)
HCT VFR BLD AUTO: 39.8 % (ref 35–47)
HGB BLD-MCNC: 12.9 G/DL (ref 11.7–15.7)
IMM GRANULOCYTES # BLD: 0 10E9/L (ref 0–0.4)
IMM GRANULOCYTES NFR BLD: 0.2 %
LYMPHOCYTES # BLD AUTO: 4.1 10E9/L (ref 0.8–5.3)
LYMPHOCYTES NFR BLD AUTO: 44.3 %
MCH RBC QN AUTO: 29 PG (ref 26.5–33)
MCHC RBC AUTO-ENTMCNC: 32.4 G/DL (ref 31.5–36.5)
MCV RBC AUTO: 89 FL (ref 78–100)
MONOCYTES # BLD AUTO: 1.3 10E9/L (ref 0–1.3)
MONOCYTES NFR BLD AUTO: 13.5 %
NEUTROPHILS # BLD AUTO: 3.6 10E9/L (ref 1.6–8.3)
NEUTROPHILS NFR BLD AUTO: 38.6 %
PLATELET # BLD AUTO: 52 10E9/L (ref 150–450)
POTASSIUM SERPL-SCNC: 3.7 MMOL/L (ref 3.4–5.3)
PROT SERPL-MCNC: 6.8 G/DL (ref 6.8–8.8)
RBC # BLD AUTO: 4.45 10E12/L (ref 3.8–5.2)
SODIUM SERPL-SCNC: 136 MMOL/L (ref 133–144)
WBC # BLD AUTO: 9.4 10E9/L (ref 4–11)

## 2017-09-28 PROCEDURE — 99215 OFFICE O/P EST HI 40 MIN: CPT | Performed by: INTERNAL MEDICINE

## 2017-09-28 PROCEDURE — 85025 COMPLETE CBC W/AUTO DIFF WBC: CPT | Performed by: INTERNAL MEDICINE

## 2017-09-28 PROCEDURE — 80053 COMPREHEN METABOLIC PANEL: CPT | Performed by: INTERNAL MEDICINE

## 2017-09-28 PROCEDURE — 74177 CT ABD & PELVIS W/CONTRAST: CPT

## 2017-09-28 PROCEDURE — 25000128 H RX IP 250 OP 636: Performed by: RADIOLOGY

## 2017-09-28 PROCEDURE — 25000125 ZZHC RX 250: Performed by: RADIOLOGY

## 2017-09-28 PROCEDURE — 99211 OFF/OP EST MAY X REQ PHY/QHP: CPT

## 2017-09-28 RX ORDER — IOPAMIDOL 755 MG/ML
100 INJECTION, SOLUTION INTRAVASCULAR ONCE
Status: COMPLETED | OUTPATIENT
Start: 2017-09-28 | End: 2017-09-28

## 2017-09-28 RX ADMIN — SODIUM CHLORIDE 74 ML: 9 INJECTION, SOLUTION INTRAVENOUS at 08:03

## 2017-09-28 RX ADMIN — IOPAMIDOL 100 ML: 755 INJECTION, SOLUTION INTRAVENOUS at 08:03

## 2017-09-28 ASSESSMENT — PAIN SCALES - GENERAL: PAINLEVEL: NO PAIN (0)

## 2017-09-28 NOTE — NURSING NOTE
"Oncology Rooming Note    September 28, 2017 8:52 AM   Charlee Marks is a 62 year old female who presents for:    Chief Complaint   Patient presents with     Hematology     Recheck ITP, review labs & CT from today     Initial Vitals: /69 (BP Location: Right arm, Patient Position: Sitting, Cuff Size: Adult Large)  Pulse 72  Temp 99  F (37.2  C) (Tympanic)  Resp 20  Ht 1.689 m (5' 6.5\")  Wt (!) 137.3 kg (302 lb 9.6 oz)  LMP 09/15/2007  SpO2 100%  Breastfeeding? No  BMI 48.11 kg/m2 Estimated body mass index is 48.11 kg/(m^2) as calculated from the following:    Height as of this encounter: 1.689 m (5' 6.5\").    Weight as of this encounter: 137.3 kg (302 lb 9.6 oz). Body surface area is 2.54 meters squared.  No Pain (0) Comment: Data Unavailable   Patient's last menstrual period was 09/15/2007.  Allergies reviewed: Yes  Medications reviewed: Yes    Medications: Medication refills not needed today.  Pharmacy name entered into MyAppConverter:      [place], [place] - [address]    Clinical concerns: Recheck ITP, review labs & CT from today.     7  minutes for nursing intake (face to face time)     Lorena Wei CMA              "

## 2017-09-28 NOTE — MR AVS SNAPSHOT
After Visit Summary   9/28/2017    Charlee Marks    MRN: [id number]           Patient Information     Date Of Birth          1955        Visit Information        Provider Department      9/28/2017 9:15 AM Kerrie Rankin MD [facility]        Today's Diagnoses     Idiopathic thrombocytopenic purpura (H)    -  1    S/P splenectomy        Nonspecific abnormal results of liver function study        Portal vein thrombosis        Long-term (current) use of anticoagulants [Z79.01]          Care Instructions    Dr. Rankin would like you to refer you yo [facility], stop Xarelto and change to CBC every 2 weeks. Cytoxan eduction material has been given. When you are in need of a refill, please call your pharmacy and they will send us a request.  Copy of appointments, and after visit summary (AVS) given to patient.  If you have any questions please call Karla Scott RN, BSN Oncology Hematology  [facility] [phone]. For questions after business hours, or on holidays/weekends, please call our after hours Nurse Triage line [phone]. Thank you.               Print out cytoxan education material to pt.   Change to cbc q 2 wks, off xarelto.   Refer to [facility], with record from 7/2016 till now.            Follow-ups after your visit        Additional Services     [facility] REFERRAL                 Your next 10 appointments already scheduled     Oct 09, 2017  1:00 PM CDT   Lab with UC LAB   [facility] ([facility])    [address] [place] [address]  1st Floor  [facility] [id number] [phone]            Oct 09, 2017  2:00 PM CDT   (Arrive by 1:45 PM)   Return General Liver with Agustin Le MD   [facility] ([facility])    [address] [place] [address]  3rd Floor  [facility] [id number] [phone]            Oct 12, 2017  3:30 PM CDT   LAB with  LAB   [facility]  [place]    [address] [facility] [address]   [phone]           Patient must bring picture ID. Patient should be prepared to give a urine specimen  Please do not eat 10-12 hours before your appointment if you are coming in fasting for labs on lipids, cholesterol, or glucose (sugar). Pregnant women should follow their Care Team instructions. Water with medications is okay. Do not drink coffee or other fluids. If you have concerns about taking  your medications, please ask at office or if scheduling via [employer], send a message by clicking on Secure Messaging, Message Your Care Team.            Oct 26, 2017  3:30 PM CDT   LAB with CL LAB   [facility]    [address] [facility] [address]   [phone]           Patient must bring picture ID. Patient should be prepared to give a urine specimen  Please do not eat 10-12 hours before your appointment if you are coming in fasting for labs on lipids, cholesterol, or glucose (sugar). Pregnant women should follow their Care Team instructions. Water with medications is okay. Do not drink coffee or other fluids. If you have concerns about taking  your medications, please ask at office or if scheduling via [employer], send a message by clicking on Secure Messaging, Message Your Care Team.              Future tests that were ordered for you today     Open Standing Orders        Priority Remaining Interval Expires Ordered    CBC with platelets differential Routine 6/6 q 2 wks 9/28/2018 9/28/2017          Open Future Orders        Priority Expected Expires Ordered    CBC with platelets differential Routine 9/27/2017 9/30/2017 9/26/2017    Comprehensive metabolic panel Routine 9/27/2017 9/30/2017 9/26/2017            Who to contact     If you have questions or need follow up information about today's clinic visit or your schedule please contact [facility] directly at [phone].  Normal or  "non-critical lab and imaging results will be communicated to you by MyChart, letter or phone within 4 business days after the clinic has received the results. If you do not hear from us within 7 days, please contact the clinic through Noblivity or phone. If you have a critical or abnormal lab result, we will notify you by phone as soon as possible.  Submit refill requests through Noblivity or call your pharmacy and they will forward the refill request to us. Please allow 3 business days for your refill to be completed.          Additional Information About Your Visit        Noblivity Information     [employer] gives you secure access to your electronic health record. If you see a primary care provider, you can also send messages to your care team and make appointments. If you have questions, please call your primary care clinic.  If you do not have a primary care provider, please call [phone] and they will assist you.        Care EveryWhere ID     This is your Care EveryWhere ID. This could be used by other organizations to access your [place] medical records  [id number]        Your Vitals Were     Pulse Temperature Respirations Height Last Period Pulse Oximetry    72 99  F (37.2  C) (Tympanic) 20 1.689 m (5' 6.5\") 09/15/2007 100%    Breastfeeding? BMI (Body Mass Index)                No 48.11 kg/m2           Blood Pressure from Last 3 Encounters:   09/28/17 143/69   08/15/17 137/86   07/24/17 136/84    Weight from Last 3 Encounters:   09/28/17 (!) 137.3 kg (302 lb 9.6 oz)   08/15/17 (!) 139.1 kg (306 lb 9.6 oz)   07/24/17 (!) 139 kg (306 lb 6.4 oz)              We Performed the Following     [facility] REFERRAL          Today's Medication Changes          These changes are accurate as of: 9/28/17 10:10 AM.  If you have any questions, ask your nurse or doctor.               These medicines have changed or have updated prescriptions.        Dose/Directions    ranitidine 150 MG tablet   Commonly known as:  ZANTAC "   This may have changed:  See the new instructions.   Used for:  Esophageal reflux        ONE TABLET TWICE DAILY as needed will call when needed   Quantity:  180 Tab   Refills:  3                Primary Care Provider Office Phone # Fax #    Kay Briggs [phone] [id number]  [facility] [address]        Equal Access to Services     [facility] : Hadii aad ku hadasho Soomaali, waaxda luqadaha, qaybta kaalmada adeegyada, waxay jellyin hayaan adesosa khjunie larandolphn . So [facility] [phone].    ATENCIÓN: Si habla español, tiene a cosme disposición servicios gratuitos de asistencia lingüística. Llame al [phone].    We comply with applicable federal civil rights laws and [place] laws. We do not discriminate on the basis of race, color, national origin, age, disability sex, sexual orientation or gender identity.            Thank you!     Thank you for choosing [facility]  for your care. Our goal is always to provide you with excellent care. Hearing back from our patients is one way we can continue to improve our services. Please take a few minutes to complete the written survey that you may receive in the mail after your visit with us. Thank you!             Your Updated Medication List - Protect others around you: Learn how to safely use, store and throw away your medicines at www.disposemymeds.org.          This list is accurate as of: 9/28/17 10:10 AM.  Always use your most recent med list.                   Brand Name Dispense Instructions for use Diagnosis    albuterol 108 (90 BASE) MCG/ACT Inhaler    PROAIR HFA/PROVENTIL HFA/VENTOLIN HFA    1 Inhaler    Inhale 2 puffs into the lungs every 4 hours as needed for shortness of breath / dyspnea    Mild intermittent asthma without complication       B-6 100 MG Tabs      Take 1 tablet by mouth daily        calcium carbonate-vitamin D 500-400 MG-UNIT Tabs per tablet      Take 1 tablet by mouth 2 times daily        GLUCOSAMINE  CHONDRO COMPLEX OR      on hold        hydrochlorothiazide 25 MG tablet    HYDRODIURIL    90 tablet    Take 1 tablet (25 mg) by mouth daily    Peripheral edema       loratadine 10 MG tablet    CLARITIN    90 Tab    ONE DAILY    Allergy, unspecified not elsewhere classified       MULTIVITAMIN TABS   OR      1 TABLET DAILY        omega 3 1000 MG Caps      Reported on 4/20/2017        ranitidine 150 MG tablet    ZANTAC    180 Tab    ONE TABLET TWICE DAILY as needed will call when needed    Esophageal reflux       * rivaroxaban ANTICOAGULANT 20 MG Tabs tablet    XARELTO    30 tablet    Take 1 tablet (20 mg) by mouth daily (with dinner)    Portal vein thrombosis       * rivaroxaban ANTICOAGULANT 10 MG Tabs tablet    XARELTO    30 tablet    Alternate with 20 mg tablets until 20 mg are finished. Hold Xarelto if platelets are less then 50.    Portal vein thrombosis, S/P splenectomy       TYLENOL PO      Take 1,000 mg by mouth At Bedtime        * Notice:  This list has 2 medication(s) that are the same as other medications prescribed for you. Read the directions carefully, and ask your doctor or other care provider to review them with you.

## 2017-09-28 NOTE — PATIENT INSTRUCTIONS
Dr. Rankin would like you to refer you yo [facility], stop Xarelto and change to CBC every 2 weeks. Cytoxan eduction material has been given. When you are in need of a refill, please call your pharmacy and they will send us a request.  Copy of appointments, and after visit summary (AVS) given to patient.  If you have any questions please call Karla Scott RN, BSN Oncology Hematology  [facility] [phone]. For questions after business hours, or on holidays/weekends, please call our after hours Nurse Triage line [phone]. Thank you.               Print out cytoxan education material to pt.   Change to cbc q 2 wks, off xarelto.   Refer to [facility], with record from 7/2016 till now.

## 2017-10-09 ENCOUNTER — TRANSFERRED RECORDS (OUTPATIENT)
Dept: HEALTH INFORMATION MANAGEMENT | Facility: CLINIC | Age: 62
End: 2017-10-09

## 2017-10-12 ENCOUNTER — TELEPHONE (OUTPATIENT)
Dept: ONCOLOGY | Facility: CLINIC | Age: 62
End: 2017-10-12

## 2017-10-12 ENCOUNTER — TRANSFERRED RECORDS (OUTPATIENT)
Dept: HEALTH INFORMATION MANAGEMENT | Facility: CLINIC | Age: 62
End: 2017-10-12

## 2017-10-12 DIAGNOSIS — D69.3 IDIOPATHIC THROMBOCYTOPENIC PURPURA (H): ICD-10-CM

## 2017-10-12 DIAGNOSIS — Z90.81 S/P SPLENECTOMY: ICD-10-CM

## 2017-10-12 DIAGNOSIS — I81 PORTAL VEIN THROMBOSIS: ICD-10-CM

## 2017-10-12 DIAGNOSIS — D69.3 IDIOPATHIC THROMBOCYTOPENIC PURPURA (H): Primary | ICD-10-CM

## 2017-10-12 DIAGNOSIS — Z79.01 LONG-TERM (CURRENT) USE OF ANTICOAGULANTS: ICD-10-CM

## 2017-10-12 LAB
BASOPHILS # BLD AUTO: 0.1 10E9/L (ref 0–0.2)
BASOPHILS NFR BLD AUTO: 0.6 %
DIFFERENTIAL METHOD BLD: ABNORMAL
EOSINOPHIL # BLD AUTO: 0.3 10E9/L (ref 0–0.7)
EOSINOPHIL NFR BLD AUTO: 2.5 %
ERYTHROCYTE [DISTWIDTH] IN BLOOD BY AUTOMATED COUNT: 14.9 % (ref 10–15)
HCT VFR BLD AUTO: 38.8 % (ref 35–47)
HGB BLD-MCNC: 12.5 G/DL (ref 11.7–15.7)
IMM GRANULOCYTES # BLD: 0 10E9/L (ref 0–0.4)
IMM GRANULOCYTES NFR BLD: 0.2 %
LYMPHOCYTES # BLD AUTO: 3.3 10E9/L (ref 0.8–5.3)
LYMPHOCYTES NFR BLD AUTO: 30.1 %
MCH RBC QN AUTO: 28.9 PG (ref 26.5–33)
MCHC RBC AUTO-ENTMCNC: 32.2 G/DL (ref 31.5–36.5)
MCV RBC AUTO: 90 FL (ref 78–100)
MONOCYTES # BLD AUTO: 1.4 10E9/L (ref 0–1.3)
MONOCYTES NFR BLD AUTO: 12.7 %
NEUTROPHILS # BLD AUTO: 5.9 10E9/L (ref 1.6–8.3)
NEUTROPHILS NFR BLD AUTO: 53.9 %
PLATELET # BLD AUTO: 42 10E9/L (ref 150–450)
RBC # BLD AUTO: 4.33 10E12/L (ref 3.8–5.2)
WBC # BLD AUTO: 10.9 10E9/L (ref 4–11)

## 2017-10-12 PROCEDURE — 85025 COMPLETE CBC W/AUTO DIFF WBC: CPT | Performed by: INTERNAL MEDICINE

## 2017-10-12 PROCEDURE — 36415 COLL VENOUS BLD VENIPUNCTURE: CPT | Performed by: INTERNAL MEDICINE

## 2017-10-12 NOTE — TELEPHONE ENCOUNTER
Received phone call from lab stating the pt was waiting there until the prelim platelet result was back. Prelim result was 31. Reviewed with Dr. Rankin and called pt to discuss her appt she had with [facility] earlier this week. Pt reports they were not going to start any new medication until platelet was <30. Pt reports she is not bleeding or bruising at all. Will get record from [facility] and continue to monitor at this time. Plan for a CBC

## 2017-10-17 ENCOUNTER — OFFICE VISIT (OUTPATIENT)
Dept: GASTROENTEROLOGY | Facility: CLINIC | Age: 62
End: 2017-10-17
Attending: INTERNAL MEDICINE
Payer: COMMERCIAL

## 2017-10-17 VITALS
HEIGHT: 67 IN | TEMPERATURE: 98 F | BODY MASS INDEX: 45.99 KG/M2 | HEART RATE: 66 BPM | SYSTOLIC BLOOD PRESSURE: 126 MMHG | WEIGHT: 293 LBS | DIASTOLIC BLOOD PRESSURE: 83 MMHG

## 2017-10-17 DIAGNOSIS — D69.3 IDIOPATHIC THROMBOCYTOPENIC PURPURA (H): ICD-10-CM

## 2017-10-17 DIAGNOSIS — I81 PORTAL VEIN THROMBOSIS: ICD-10-CM

## 2017-10-17 DIAGNOSIS — Z79.01 LONG-TERM (CURRENT) USE OF ANTICOAGULANTS: ICD-10-CM

## 2017-10-17 DIAGNOSIS — I81 PORTAL VEIN THROMBOSIS: Primary | ICD-10-CM

## 2017-10-17 LAB
ALBUMIN SERPL-MCNC: 3.7 G/DL (ref 3.4–5)
ALP SERPL-CCNC: 146 U/L (ref 40–150)
ALT SERPL W P-5'-P-CCNC: 43 U/L (ref 0–50)
ANION GAP SERPL CALCULATED.3IONS-SCNC: 6 MMOL/L (ref 3–14)
AST SERPL W P-5'-P-CCNC: 30 U/L (ref 0–45)
BASOPHILS # BLD AUTO: 0.1 10E9/L (ref 0–0.2)
BASOPHILS NFR BLD AUTO: 1 %
BILIRUB DIRECT SERPL-MCNC: 0.2 MG/DL (ref 0–0.2)
BILIRUB SERPL-MCNC: 0.7 MG/DL (ref 0.2–1.3)
BUN SERPL-MCNC: 14 MG/DL (ref 7–30)
CALCIUM SERPL-MCNC: 8.8 MG/DL (ref 8.5–10.1)
CHLORIDE SERPL-SCNC: 101 MMOL/L (ref 94–109)
CO2 SERPL-SCNC: 31 MMOL/L (ref 20–32)
CREAT SERPL-MCNC: 0.57 MG/DL (ref 0.52–1.04)
DIFFERENTIAL METHOD BLD: ABNORMAL
EOSINOPHIL # BLD AUTO: 0.2 10E9/L (ref 0–0.7)
EOSINOPHIL NFR BLD AUTO: 2.6 %
ERYTHROCYTE [DISTWIDTH] IN BLOOD BY AUTOMATED COUNT: 14.7 % (ref 10–15)
GFR SERPL CREATININE-BSD FRML MDRD: >90 ML/MIN/1.7M2
GLUCOSE SERPL-MCNC: 96 MG/DL (ref 70–99)
HCT VFR BLD AUTO: 41.9 % (ref 35–47)
HGB BLD-MCNC: 13.4 G/DL (ref 11.7–15.7)
IMM GRANULOCYTES # BLD: 0 10E9/L (ref 0–0.4)
IMM GRANULOCYTES NFR BLD: 0.2 %
INR PPP: 0.97 (ref 0.86–1.14)
LYMPHOCYTES # BLD AUTO: 3.4 10E9/L (ref 0.8–5.3)
LYMPHOCYTES NFR BLD AUTO: 38.6 %
MCH RBC QN AUTO: 29.1 PG (ref 26.5–33)
MCHC RBC AUTO-ENTMCNC: 32 G/DL (ref 31.5–36.5)
MCV RBC AUTO: 91 FL (ref 78–100)
MONOCYTES # BLD AUTO: 1 10E9/L (ref 0–1.3)
MONOCYTES NFR BLD AUTO: 11.2 %
NEUTROPHILS # BLD AUTO: 4.1 10E9/L (ref 1.6–8.3)
NEUTROPHILS NFR BLD AUTO: 46.4 %
NRBC # BLD AUTO: 0 10*3/UL
NRBC BLD AUTO-RTO: 0 /100
PLATELET # BLD AUTO: 58 10E9/L (ref 150–450)
POTASSIUM SERPL-SCNC: 3.5 MMOL/L (ref 3.4–5.3)
PROT SERPL-MCNC: 7.4 G/DL (ref 6.8–8.8)
RBC # BLD AUTO: 4.61 10E12/L (ref 3.8–5.2)
SODIUM SERPL-SCNC: 138 MMOL/L (ref 133–144)
WBC # BLD AUTO: 8.7 10E9/L (ref 4–11)

## 2017-10-17 PROCEDURE — 36415 COLL VENOUS BLD VENIPUNCTURE: CPT | Performed by: INTERNAL MEDICINE

## 2017-10-17 PROCEDURE — 80048 BASIC METABOLIC PNL TOTAL CA: CPT | Performed by: INTERNAL MEDICINE

## 2017-10-17 PROCEDURE — 80076 HEPATIC FUNCTION PANEL: CPT | Performed by: INTERNAL MEDICINE

## 2017-10-17 PROCEDURE — 85025 COMPLETE CBC W/AUTO DIFF WBC: CPT | Performed by: INTERNAL MEDICINE

## 2017-10-17 PROCEDURE — 99212 OFFICE O/P EST SF 10 MIN: CPT | Mod: ZF

## 2017-10-17 PROCEDURE — 85610 PROTHROMBIN TIME: CPT | Performed by: INTERNAL MEDICINE

## 2017-10-17 ASSESSMENT — PAIN SCALES - GENERAL: PAINLEVEL: NO PAIN (0)

## 2017-10-17 NOTE — LETTER
10/17/2017     RE: Charlee Marks  [address]  [facility] [address]     Dear Colleague,    Thank you for referring your patient, Charlee Marks, to the [facility] HEPATOLOGY at [facility]. Please see a copy of my visit note below.    HISTORY OF PRESENT ILLNESS:  I had the pleasure of seeing Charlee Marks for followup in the Liver Clinic at the [facility] on 10/17/2017.  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  She also has a chronic portal vein thrombosis and is status post splenectomy for ITP.      Her major issue has been that she had experienced a falling platelet count.  It appears to have somewhat stabilized at this point in time and they are considering a number of possibilities for managing her chronic ITP.      She otherwise denies any abdominal pain, itching or skin rash.  She has a moderate amount of fatigue.  She denies any increased abdominal girth or lower extremity edema.  She denies any fevers or chills, no cough or shortness of breath.  She denies any nausea or vomiting, diarrhea or constipation.  Her appetite has been good and she unfortunately still could lose some weight.  She has indeed lost 10 pounds over the past month.       Current Outpatient Prescriptions   Medication     albuterol (PROAIR HFA/PROVENTIL HFA/VENTOLIN HFA) 108 (90 BASE) MCG/ACT Inhaler     hydrochlorothiazide (HYDRODIURIL) 25 MG tablet     Pyridoxine HCl (B-6) 100 MG TABS     Acetaminophen (TYLENOL PO)     calcium carbonate-vitamin D 500-400 MG-UNIT TABS tablt     RANITIDINE  MG PO TABS     LORATADINE 10 MG PO TABS     GLUCOSAMINE CHONDRO COMPLEX OR     OMEGA 3 1000 MG OR CAPS     MULTIVITAMIN TABS   OR     No current facility-administered medications for this visit.      B/P: 126/83, T: 98, P: 66, R: Data Unavailable    HEENT exam shows no scleral icterus and no temporal muscle wasting.  Her chest is clear.  Her abdominal exam shows no  increase in girth.  No masses or tenderness to palpation are present.  Liver is 10 cm in span without left lobe enlargement.  No spleen tip is palpable, and extremity exam shows no edema.  Skin exam shows no stigmata of chronic liver disease.  She does appear to have some rosacea around her face that she says is fairly recent onset, but it looks different than spider angioma.  Neurologic exam shows no asterixis.     Recent Results (from the past 168 hour(s))   CBC with platelets differential    Collection Time: 10/19/17  8:24 AM   Result Value Ref Range    WBC 9.2 4.0 - 11.0 10e9/L    RBC Count 4.45 3.8 - 5.2 10e12/L    Hemoglobin 12.9 11.7 - 15.7 g/dL    Hematocrit 40.0 35.0 - 47.0 %    MCV 90 78 - 100 fl    MCH 29.0 26.5 - 33.0 pg    MCHC 32.3 31.5 - 36.5 g/dL    RDW 14.6 10.0 - 15.0 %    Platelet Count 44 (LL) 150 - 450 10e9/L    Diff Method Automated Method     % Neutrophils 44.1 %    % Lymphocytes 36.6 %    % Monocytes 16.0 %    % Eosinophils 2.2 %    % Basophils 0.9 %    % Immature Granulocytes 0.2 %    Absolute Neutrophil 4.0 1.6 - 8.3 10e9/L    Absolute Lymphocytes 3.4 0.8 - 5.3 10e9/L    Absolute Monocytes 1.5 (H) 0.0 - 1.3 10e9/L    Absolute Eosinophils 0.2 0.0 - 0.7 10e9/L    Absolute Basophils 0.1 0.0 - 0.2 10e9/L    Abs Immature Granulocytes 0.0 0 - 0.4 10e9/L       Her last imaging of her liver was at the end of September which showed no significant change.  They did see ongoing partial portal vein thrombosis of the left portal vein, posterior branch of the right portal vein and some branches of the superior mesenteric vein, but no complete thrombosis.      IMPRESSION:  My impression is that Ms. Marks has cirrhosis and is status post cirrhosis with partial portal vein thrombosis and is status post splenectomy for ITP, which has become chronic.      I would point out that her liver function is excellent at this point in time and certainly her liver would be able to tolerate whatever therapy they want to  use for chronic ITP.  Using a thrombopoietin analog is probably not a great idea as in general having a portal vein thrombosis is a contraindication and has a substantial chance of making the portal vein thrombosis more extensive.  I will not be making any change to her medical regimen at this visit.  She is up-to-date with regard to vaccines.  I will see her back in the clinic again in 6 months for repeat HCC screening and blood work.      Thank you very much for allowing me to participate in the care of this patient.  If you have any questions regarding my recommendations, please do not hesitate to contact me.       Agustin Le MD      Professor of Medicine  [employer]      Executive Medical Director, Solid Organ Transplant Program  [facility]

## 2017-10-17 NOTE — NURSING NOTE
"Chief Complaint   Patient presents with     RECHECK     6 month follow up ITP       Initial /83  Pulse 66  Temp 98  F (36.7  C) (Oral)  Ht 1.695 m (5' 6.75\")  Wt 134.3 kg (296 lb)  LMP 09/15/2007  BMI 46.71 kg/m2 Estimated body mass index is 46.71 kg/(m^2) as calculated from the following:    Height as of this encounter: 1.695 m (5' 6.75\").    Weight as of this encounter: 134.3 kg (296 lb).  Medication Reconciliation: complete  "

## 2017-10-17 NOTE — MR AVS SNAPSHOT
After Visit Summary   10/17/2017    Charlee Marks    MRN: [id number]           Patient Information     Date Of Birth          1955        Visit Information        Provider Department      10/17/2017 9:00 AM Agustin Le MD [facility] Hepatology         Follow-ups after your visit        Follow-up notes from your care team     Return in about 1 year (around 10/17/2018).      Your next 10 appointments already scheduled     Oct 19, 2017  8:15 AM CDT   LAB with CL LAB   [facility])    [address] [facility] [address]   [phone]           Patient must bring picture ID. Patient should be prepared to give a urine specimen  Please do not eat 10-12 hours before your appointment if you are coming in fasting for labs on lipids, cholesterol, or glucose (sugar). Pregnant women should follow their Care Team instructions. Water with medications is okay. Do not drink coffee or other fluids. If you have concerns about taking  your medications, please ask at office or if scheduling via [employer], send a message by clicking on Secure Messaging, Message Your Care Team.            Oct 26, 2017  3:30 PM CDT   LAB with CL LAB   [facility]    [address] [facility] [address]   [phone]           Patient must bring picture ID. Patient should be prepared to give a urine specimen  Please do not eat 10-12 hours before your appointment if you are coming in fasting for labs on lipids, cholesterol, or glucose (sugar). Pregnant women should follow their Care Team instructions. Water with medications is okay. Do not drink coffee or other fluids. If you have concerns about taking  your medications, please ask at office or if scheduling via For[employer], send a message by clicking on Secure Messaging, Message Your Care Team.              Who to contact     If you have questions or need follow up information  "about today's clinic visit or your schedule please contact [facility] HEPATOLOG[facility] directly at [phone].  Normal or non-critical lab and imaging results will be communicated to you by [employer], letter or phone within 4 business days after the clinic has received the results. If you do not hear from us within 7 days, please contact the clinic through [employer] or phone. If you have a critical or abnormal lab result, we will notify you by phone as soon as possible.  Submit refill requests through SpotXchange or call your pharmacy and they will forward the refill request to us. Please allow 3 business days for your refill to be completed.          Additional Information About Your Visit        [employer] Information     [employer] gives you secure access to your electronic health record. If you see a primary care provider, you can also send messages to your care team and make appointments. If you have questions, please call your primary care clinic.  If you do not have a primary care provider, please call [phone] and they will assist you.        Care EveryWhere ID     This is your Care EveryWhere ID. This could be used by other organizations to access your [place] medical records  [id number]        Your Vitals Were     Pulse Temperature Height Last Period BMI (Body Mass Index)       66 98  F (36.7  C) (Oral) 1.695 m (5' 6.75\") 09/15/2007 46.71 kg/m2        Blood Pressure from Last 3 Encounters:   10/17/17 126/83   09/28/17 143/69   08/15/17 137/86    Weight from Last 3 Encounters:   10/17/17 134.3 kg (296 lb)   09/28/17 (!) 137.3 kg (302 lb 9.6 oz)   08/15/17 (!) 139.1 kg (306 lb 9.6 oz)              Today, you had the following     No orders found for display         Today's Medication Changes          These changes are accurate as of: 10/17/17  9:47 AM.  If you have any questions, ask your nurse or doctor.               These medicines have changed or have updated prescriptions.        Dose/Directions    ranitidine 150 MG " tablet   Commonly known as:  ZANTAC   This may have changed:  See the new instructions.   Used for:  Esophageal reflux        ONE TABLET TWICE DAILY as needed will call when needed   Quantity:  180 Tab   Refills:  3                Primary Care Provider Office Phone # Fax #    Kay Briggs [phone] [id number]  [facility] [address]        Equal Access to Services     [facility]: Hadii aad ku hadasho Soomaali, waaxda luqadaha, qaybta kaalmada adeegyada, waxay idiin hayaan adeeg kharash la'aan . So [facility] [phone].    ATENCIÓN: Si habla español, tiene a cosme disposición servicios gratuitos de asistencia lingüística. Llame al [phone].    We comply with applicable federal civil rights laws and [place] laws. We do not discriminate on the basis of race, color, national origin, age, disability, sex, sexual orientation, or gender identity.            Thank you!     Thank you for choosing [facility] HEPATOLOG[facility]  for your care. Our goal is always to provide you with excellent care. Hearing back from our patients is one way we can continue to improve our services. Please take a few minutes to complete the written survey that you may receive in the mail after your visit with us. Thank you!             Your Updated Medication List - Protect others around you: Learn how to safely use, store and throw away your medicines at www.disposemymeds.org.          This list is accurate as of: 10/17/17  9:47 AM.  Always use your most recent med list.                   Brand Name Dispense Instructions for use Diagnosis    albuterol 108 (90 BASE) MCG/ACT Inhaler    PROAIR HFA/PROVENTIL HFA/VENTOLIN HFA    1 Inhaler    Inhale 2 puffs into the lungs every 4 hours as needed for shortness of breath / dyspnea    Mild intermittent asthma without complication       B-6 100 MG Tabs      Take 1 tablet by mouth daily        calcium carbonate-vitamin D 500-400 MG-UNIT Tabs per tablet      Take 1 tablet by mouth  2 times daily        GLUCOSAMINE CHONDRO COMPLEX OR      on hold        hydrochlorothiazide 25 MG tablet    HYDRODIURIL    90 tablet    Take 1 tablet (25 mg) by mouth daily    Peripheral edema       loratadine 10 MG tablet    CLARITIN    90 Tab    ONE DAILY    Allergy, unspecified not elsewhere classified       MULTIVITAMIN TABS   OR      1 TABLET DAILY        omega 3 1000 MG Caps      Reported on 4/20/2017        ranitidine 150 MG tablet    ZANTAC    180 Tab    ONE TABLET TWICE DAILY as needed will call when needed    Esophageal reflux       TYLENOL PO      Take 1,000 mg by mouth At Bedtime

## 2017-10-18 NOTE — PROGRESS NOTES
HISTORY OF PRESENT ILLNESS:  I had the pleasure of seeing Charlee Marks for followup in the Liver Clinic at the [facility] on 10/17/2017.  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  She also has a chronic portal vein thrombosis and is status post splenectomy for ITP.      Her major issue has been that she had experienced a falling platelet count.  It appears to have somewhat stabilized at this point in time and they are considering a number of possibilities for managing her chronic ITP.      She otherwise denies any abdominal pain, itching or skin rash.  She has a moderate amount of fatigue.  She denies any increased abdominal girth or lower extremity edema.  She denies any fevers or chills, no cough or shortness of breath.  She denies any nausea or vomiting, diarrhea or constipation.  Her appetite has been good and she unfortunately still could lose some weight.  She has indeed lost 10 pounds over the past month.       Current Outpatient Prescriptions   Medication     albuterol (PROAIR HFA/PROVENTIL HFA/VENTOLIN HFA) 108 (90 BASE) MCG/ACT Inhaler     hydrochlorothiazide (HYDRODIURIL) 25 MG tablet     Pyridoxine HCl (B-6) 100 MG TABS     Acetaminophen (TYLENOL PO)     calcium carbonate-vitamin D 500-400 MG-UNIT TABS tablt     RANITIDINE  MG PO TABS     LORATADINE 10 MG PO TABS     GLUCOSAMINE CHONDRO COMPLEX OR     OMEGA 3 1000 MG OR CAPS     MULTIVITAMIN TABS   OR     No current facility-administered medications for this visit.      B/P: 126/83, T: 98, P: 66, R: Data Unavailable    HEENT exam shows no scleral icterus and no temporal muscle wasting.  Her chest is clear.  Her abdominal exam shows no increase in girth.  No masses or tenderness to palpation are present.  Liver is 10 cm in span without left lobe enlargement.  No spleen tip is palpable, and extremity exam shows no edema.  Skin exam shows no stigmata of chronic liver disease.  She does appear to have some  rosacea around her face that she says is fairly recent onset, but it looks different than spider angioma.  Neurologic exam shows no asterixis.     Recent Results (from the past 168 hour(s))   CBC with platelets differential    Collection Time: 10/19/17  8:24 AM   Result Value Ref Range    WBC 9.2 4.0 - 11.0 10e9/L    RBC Count 4.45 3.8 - 5.2 10e12/L    Hemoglobin 12.9 11.7 - 15.7 g/dL    Hematocrit 40.0 35.0 - 47.0 %    MCV 90 78 - 100 fl    MCH 29.0 26.5 - 33.0 pg    MCHC 32.3 31.5 - 36.5 g/dL    RDW 14.6 10.0 - 15.0 %    Platelet Count 44 (LL) 150 - 450 10e9/L    Diff Method Automated Method     % Neutrophils 44.1 %    % Lymphocytes 36.6 %    % Monocytes 16.0 %    % Eosinophils 2.2 %    % Basophils 0.9 %    % Immature Granulocytes 0.2 %    Absolute Neutrophil 4.0 1.6 - 8.3 10e9/L    Absolute Lymphocytes 3.4 0.8 - 5.3 10e9/L    Absolute Monocytes 1.5 (H) 0.0 - 1.3 10e9/L    Absolute Eosinophils 0.2 0.0 - 0.7 10e9/L    Absolute Basophils 0.1 0.0 - 0.2 10e9/L    Abs Immature Granulocytes 0.0 0 - 0.4 10e9/L       Her last imaging of her liver was at the end of September which showed no significant change.  They did see ongoing partial portal vein thrombosis of the left portal vein, posterior branch of the right portal vein and some branches of the superior mesenteric vein, but no complete thrombosis.      IMPRESSION:  My impression is that Ms. Marks has cirrhosis and is status post cirrhosis with partial portal vein thrombosis and is status post splenectomy for ITP, which has become chronic.      I would point out that her liver function is excellent at this point in time and certainly her liver would be able to tolerate whatever therapy they want to use for chronic ITP.  Using a thrombopoietin analog is probably not a great idea as in general having a portal vein thrombosis is a contraindication and has a substantial chance of making the portal vein thrombosis more extensive.  I will not be making any change to her  medical regimen at this visit.  She is up-to-date with regard to vaccines.  I will see her back in the clinic again in 6 months for repeat HCC screening and blood work.      Thank you very much for allowing me to participate in the care of this patient.  If you have any questions regarding my recommendations, please do not hesitate to contact me.       Agustin Le MD      Professor of Medicine  [employer]      Executive Medical Director, Solid Organ Transplant Program  [facility]

## 2017-10-19 ENCOUNTER — TELEPHONE (OUTPATIENT)
Dept: ONCOLOGY | Facility: CLINIC | Age: 62
End: 2017-10-19

## 2017-10-19 DIAGNOSIS — D69.3 IDIOPATHIC THROMBOCYTOPENIC PURPURA (H): ICD-10-CM

## 2017-10-19 LAB
BASOPHILS # BLD AUTO: 0.1 10E9/L (ref 0–0.2)
BASOPHILS NFR BLD AUTO: 0.9 %
DIFFERENTIAL METHOD BLD: ABNORMAL
EOSINOPHIL # BLD AUTO: 0.2 10E9/L (ref 0–0.7)
EOSINOPHIL NFR BLD AUTO: 2.2 %
ERYTHROCYTE [DISTWIDTH] IN BLOOD BY AUTOMATED COUNT: 14.6 % (ref 10–15)
HCT VFR BLD AUTO: 40 % (ref 35–47)
HGB BLD-MCNC: 12.9 G/DL (ref 11.7–15.7)
IMM GRANULOCYTES # BLD: 0 10E9/L (ref 0–0.4)
IMM GRANULOCYTES NFR BLD: 0.2 %
LYMPHOCYTES # BLD AUTO: 3.4 10E9/L (ref 0.8–5.3)
LYMPHOCYTES NFR BLD AUTO: 36.6 %
MCH RBC QN AUTO: 29 PG (ref 26.5–33)
MCHC RBC AUTO-ENTMCNC: 32.3 G/DL (ref 31.5–36.5)
MCV RBC AUTO: 90 FL (ref 78–100)
MONOCYTES # BLD AUTO: 1.5 10E9/L (ref 0–1.3)
MONOCYTES NFR BLD AUTO: 16 %
NEUTROPHILS # BLD AUTO: 4 10E9/L (ref 1.6–8.3)
NEUTROPHILS NFR BLD AUTO: 44.1 %
PLATELET # BLD AUTO: 44 10E9/L (ref 150–450)
RBC # BLD AUTO: 4.45 10E12/L (ref 3.8–5.2)
WBC # BLD AUTO: 9.2 10E9/L (ref 4–11)

## 2017-10-19 PROCEDURE — 85025 COMPLETE CBC W/AUTO DIFF WBC: CPT | Performed by: INTERNAL MEDICINE

## 2017-10-19 PROCEDURE — 36415 COLL VENOUS BLD VENIPUNCTURE: CPT | Performed by: INTERNAL MEDICINE

## 2017-10-19 NOTE — TELEPHONE ENCOUNTER
DATE:  10/19/2017   TIME OF RECEIPT FROM LAB:  841  LAB TEST:  Platelet (prelim)  LAB VALUE:  31  RESULTS GIVEN WITH READ-BACK TO (PROVIDER):  Dr. Rankin  TIME LAB VALUE REPORTED TO PROVIDER:   841

## 2017-10-26 DIAGNOSIS — D69.3 IDIOPATHIC THROMBOCYTOPENIC PURPURA (H): ICD-10-CM

## 2017-10-26 PROCEDURE — 36415 COLL VENOUS BLD VENIPUNCTURE: CPT | Performed by: INTERNAL MEDICINE

## 2017-10-26 PROCEDURE — 85025 COMPLETE CBC W/AUTO DIFF WBC: CPT | Performed by: INTERNAL MEDICINE

## 2017-10-27 LAB
BASOPHILS # BLD AUTO: 0 10E9/L (ref 0–0.2)
BASOPHILS NFR BLD AUTO: 0 %
DIFFERENTIAL METHOD BLD: ABNORMAL
EOSINOPHIL # BLD AUTO: 0.1 10E9/L (ref 0–0.7)
EOSINOPHIL NFR BLD AUTO: 1 %
ERYTHROCYTE [DISTWIDTH] IN BLOOD BY AUTOMATED COUNT: 14.2 % (ref 10–15)
HCT VFR BLD AUTO: 40.8 % (ref 35–47)
HGB BLD-MCNC: 13.2 G/DL (ref 11.7–15.7)
LYMPHOCYTES # BLD AUTO: 4.1 10E9/L (ref 0.8–5.3)
LYMPHOCYTES NFR BLD AUTO: 35 %
MCH RBC QN AUTO: 28.8 PG (ref 26.5–33)
MCHC RBC AUTO-ENTMCNC: 32.4 G/DL (ref 31.5–36.5)
MCV RBC AUTO: 89 FL (ref 78–100)
MONOCYTES # BLD AUTO: 1.7 10E9/L (ref 0–1.3)
MONOCYTES NFR BLD AUTO: 15 %
NEUTROPHILS # BLD AUTO: 5.7 10E9/L (ref 1.6–8.3)
NEUTROPHILS NFR BLD AUTO: 49 %
PLATELET # BLD AUTO: 34 10E9/L (ref 150–450)
RBC # BLD AUTO: 4.58 10E12/L (ref 3.8–5.2)
WBC # BLD AUTO: 11.6 10E9/L (ref 4–11)

## 2017-10-27 NOTE — PROGRESS NOTES
Called and reviewed results with the patient per Dr. Rankin. Pt has been set up for an appt next week. Patient had no further questions or concerns at this time and also verbalized understanding. Direct line provided if any further questions/concerns arise.

## 2017-10-30 NOTE — PROGRESS NOTES
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      HISTORY OF PRESENT ILLNESS:  she presented with 2-3 months of easy bruising and also fatigue.  She presented to her Primary Care Physician's office on 07/25/2016 and was found to have critical thrombocytopenia with a platelet count of 3.    She was offered IVIG 1 gram on 7/25/2016 and 4 days of   Dexamethasone. She was d/c with PL of 38 on 7/27/2016. PL dip to 13 on 8/4 and 7 on 8/11. She was offered wklyI IVIG 1g/kg starting 8/4/2016, and 2nd cycle of dex x 4 days on 8/11/2016. PL improves to 77 on 8/15, then dip to 31 on 8/18.   Due to rapid drop of her PL when off dex, tx was changed to po prednisone 8/18/2017 with wkly IVIG. R was added on 8/30/2016. She had no PL response despite R, steroid and IVIG.   Nplate was started 9/23/2016. PL up from 20 to 110 after 2 doses of it. Then dip down again.   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   Course was complicated with portal vein and SMV thromboses end of 12/2016. She was admitted to , had IR thrombectomy and catheter-directed lytic therapy to portal vein. Following the procedure she was placed on high intensity heparin drip which was then bridged with warfarin starting 1/9/2017 with an INR goal of 2-3.   Due to progression of portal venous branch thrombosis and SMV thrombosis by CT in 7/2017. Tx is changed to Lovenox. She could not tolerates the shots. tx is changed to Xarelto.     She finished prednisone slow taper 4/2017.      PL dip down to around 50's in Aug 2017. Xarelto put on hold in Sep due to PL less than 50 and stable SMV thromboses.     She saw [facility] as 2nd opinion, Dr. Key had nl BM without fibrosis at [facility], ITP dx is assured.  Cyclosporine is also recommended to start at 200 mg po qd if PL less than 30, with nl renal function, and adjust the dose.     PAST MEDICAL HISTORY:      1.  Cystocele.    2.  Morbid obesity.    3.  Reflux.    4.  Intermittent asthma.    5.  Effusion of lower leg joints.  "   6. 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  She denies the use of alcohol or tobacco.      ROS  She is back to her baseline energy level.   She denies bleeding episode.          PHYSICAL EXAMINATION:    VITAL SIGNS:  Blood pressure 124/58, pulse 79, temperature 98.1  F (36.7  C), temperature source Tympanic, resp. rate 20, height 1.689 m (5' 6.5\"), weight 134.5 kg (296 lb 9.6 oz), last menstrual period 09/15/2007, SpO2 95 %, not currently breastfeeding.    GENERAL APPEARANCE:  A middle-aged woman who appears her stated age, very pleasant, morbidly obese, sitting comfortably in a chair and knitting.    HEENT: The patient is normocephalic, atraumatic. Pupils are equally reactive to light.  Sclerae are anicteric.  There is moist oral mucosa, negative pharynx, no oral thrush.    NECK:  Supple, no jugular venous distention, thyroid not palpable.    LYMPH NODES:  Superficial lymphadenopathy is not appreciable in the bilateral cervical, supraclavicular, axillary or inguinal areas.    CARDIOVASCULAR:  S1, S2 regular with no murmurs or gallops, no carotid or abdominal bruits.    PULMONARY:  Lungs are clear to auscultation and percussion bilaterally.  There is no wheezing or rhonchi.    GASTROINTESTINAL:  Abdomen is soft, nontender with no hepatosplenomegaly, no signs of ascites and no mass appreciable.    MUSCULOSKELETAL/EXTREMITIES:  No edema, no cyanotic changes, no signs of joint deformity, no lymphedema.    NEUROLOGIC:  Cranial nerves II-XII are grossly intact, sensation intact, muscle strength and muscle tone symmetrical, 5/5 throughout.    BACK:  No spinal or paraspinal tenderness, no CVA tenderness.    SKIN:  No petechiae, no rash and no signs of cellulitis.  There is " scattered bruising on forearms and thighs, which are healing.        LABORATORY DATA reviewed  PL around 40 in the last wks, LFT/Cr nl in 10/2017      in 7/2107 from nl 6/2017  Hb nl, wbc/diff nl.     BM at [facility] 10/2017 - nl including cyto.     Current Image reviewed  CT a/P 9/2017: No significant change. Findings suggest chronic thrombosis of the left portal vein, posterior branch of the right portal vein and branches of the superior mesenteric vein as above.    CT a/p 7/2017 : Progression of left portal venous branch thrombosis and SMV thrombosis is seen compared to the prior study.    US 4/2017  1.  Recanalized main portal and splenic veins. Persistent occlusion of left portal vein.  2.  Splenectomy.    CT 1/9/2017   1. Status post splenectomy with postsurgical changes in the left upper quadrant. Persistent thrombosis of the splenic vein.  2. Main portal vein now appears patent with possible peripheral nonocclusive thrombosis/periportal edema. Residual thrombosis of portion of the left branch of the portal vein and the right posterior  branch of the portal vein.  3. Residual partial thrombosis of the distal portion of the superior mesenteric vein.   4. Small focal nodular opacities in both lower lobes, likely of infectious etiology.    Old data reviewed with summary  12/31/2016 CT abd - thrombosis of the portal vein, splenic vein.    Right after splenectomy 12/13/2016 wbc 14, neutrophiila, PL >600, Hb 9.2  PL at 91 on 12/8/2016, at 51 in 11/2016 at 28 from 43 from 110 early Oct, from 31 on 8/18, at 77 on 8/15, at 7 on 8/11, at 14 on 8/4/2016, at 38 7/27/2016  On 07/26/2016 platelets 8, lymphopenia with lymphocyte count 0.6.    On 07/25/2016 platelets 3.   smear: no dysplasia or schistocytes.     HepB DNA by PCR 10/2016 : negative    9/2016 H. Pylori breath test/stool antigen: negative.     8/2016 hep Bs Ag -,BsAb+, BcAb+, HepC -, for which she saw [facility] and started on hepB med Tenofovir 9/8/2016.     CT a/p  10/2016: The spleen is borderline enlarged at 13.3 cm.   CT neck 9/15/2016: No enlarged or necrotic-appearing cervical lymph nodes are seen. No metastatic lesions are identified.  CT head 8/11: negative.           ASSESSMENT/PLAN:    1.  Refractory ITP dx first in 7/2016 . Failed dexa +/- R, IVIG, Nplate  PL up to > 600 post splenectomy in 12/2016.   S/p prednisone taper off in 4/2017.  PL dip down below 50 in 9/2017. Then around 40's after.     We discussed the tx option:   Azathioprine or cyclosporin immunosuppression.   Cytoxan 750mg - 1g/m2 IV q month or po (maybe 100mg/m2 D1-14 q 28days)  vicristine 1-2mg IV wkly for several wks  vinblastin 0.1mg/kg  combination chemo -CVP, CVP+ -16.  Promacta po - 3-4% risk of thrombosis    The side effects associated with the above options. This is a tough situation.     She saw [facility] as 2nd opinion in 10/2017, Dr. Key had nl BM without fibrosis at [facility], ITP dx is assures.  Cyclosporine is also recommended to start at 200 mg po qd if PL less than 30,  with nl renal function, and adjust the dose.     She is not excited on all the above options due to side effects. The top choices are oral CTX, cyclosporine, or Promacta.     She is on q 2 wks cbc check.     2. + hepBcAb, - HepBsAg, HepBsAg-,HebB DNA - by PCR, she saw GI due to the use of Rituxan, started on Tenofovir 9/8/2016 till 4/2017.    Advice limit Tylenol intake.     3. 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 S/p thrombolysis to portal vein, lovenox. Has been on Coumadin, lovenox again, Xarelto for 9 months.   Her clots are chronic and stable per CT 9/2017.   Due to her severe low PL. Recommend off anticoagulation to be observed now.     Total time is 25 minutes, more than 15 minutes spent in counseling regarding her complicated ITP and thrombosis issues, and plan of actions.

## 2017-11-01 DIAGNOSIS — D69.3 IDIOPATHIC THROMBOCYTOPENIC PURPURA (H): ICD-10-CM

## 2017-11-01 PROCEDURE — 85025 COMPLETE CBC W/AUTO DIFF WBC: CPT | Performed by: INTERNAL MEDICINE

## 2017-11-01 PROCEDURE — 36415 COLL VENOUS BLD VENIPUNCTURE: CPT | Performed by: INTERNAL MEDICINE

## 2017-11-02 ENCOUNTER — ONCOLOGY VISIT (OUTPATIENT)
Dept: ONCOLOGY | Facility: CLINIC | Age: 62
End: 2017-11-02
Attending: INTERNAL MEDICINE
Payer: COMMERCIAL

## 2017-11-02 VITALS
TEMPERATURE: 98.1 F | HEIGHT: 67 IN | DIASTOLIC BLOOD PRESSURE: 58 MMHG | RESPIRATION RATE: 20 BRPM | BODY MASS INDEX: 45.99 KG/M2 | WEIGHT: 293 LBS | SYSTOLIC BLOOD PRESSURE: 124 MMHG | HEART RATE: 79 BPM | OXYGEN SATURATION: 95 %

## 2017-11-02 DIAGNOSIS — I81 PORTAL VEIN THROMBOSIS: ICD-10-CM

## 2017-11-02 DIAGNOSIS — D69.3 IDIOPATHIC THROMBOCYTOPENIC PURPURA (H): Primary | ICD-10-CM

## 2017-11-02 LAB
BASOPHILS # BLD AUTO: 0.1 10E9/L (ref 0–0.2)
BASOPHILS NFR BLD AUTO: 0.9 %
DIFFERENTIAL METHOD BLD: ABNORMAL
EOSINOPHIL # BLD AUTO: 0.3 10E9/L (ref 0–0.7)
EOSINOPHIL NFR BLD AUTO: 2.6 %
ERYTHROCYTE [DISTWIDTH] IN BLOOD BY AUTOMATED COUNT: 14.6 % (ref 10–15)
HCT VFR BLD AUTO: 40.3 % (ref 35–47)
HGB BLD-MCNC: 13.1 G/DL (ref 11.7–15.7)
IMM GRANULOCYTES # BLD: 0 10E9/L (ref 0–0.4)
IMM GRANULOCYTES NFR BLD: 0.1 %
LYMPHOCYTES # BLD AUTO: 3.6 10E9/L (ref 0.8–5.3)
LYMPHOCYTES NFR BLD AUTO: 37 %
MCH RBC QN AUTO: 29.3 PG (ref 26.5–33)
MCHC RBC AUTO-ENTMCNC: 32.5 G/DL (ref 31.5–36.5)
MCV RBC AUTO: 90 FL (ref 78–100)
MONOCYTES # BLD AUTO: 1.4 10E9/L (ref 0–1.3)
MONOCYTES NFR BLD AUTO: 14.3 %
NEUTROPHILS # BLD AUTO: 4.4 10E9/L (ref 1.6–8.3)
NEUTROPHILS NFR BLD AUTO: 45.1 %
PLATELET # BLD AUTO: 41 10E9/L (ref 150–450)
PLATELET # BLD EST: ABNORMAL 10*3/UL
RBC # BLD AUTO: 4.47 10E12/L (ref 3.8–5.2)
RBC MORPH BLD: NORMAL
WBC # BLD AUTO: 9.7 10E9/L (ref 4–11)

## 2017-11-02 PROCEDURE — 99214 OFFICE O/P EST MOD 30 MIN: CPT | Performed by: INTERNAL MEDICINE

## 2017-11-02 PROCEDURE — 99211 OFF/OP EST MAY X REQ PHY/QHP: CPT

## 2017-11-02 ASSESSMENT — PAIN SCALES - GENERAL: PAINLEVEL: MILD PAIN (3)

## 2017-11-02 NOTE — LETTER
11/2/2017         RE: Charlee Marks  [address]  [place] [address]        Dear Colleague,    Thank you for referring your patient, Charlee Marks, to the [facility]. Please see a copy of my visit note below.        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      HISTORY OF PRESENT ILLNESS:  she presented with 2-3 months of easy bruising and also fatigue.  She presented to her Primary Care Physician's office on 07/25/2016 and was found to have critical thrombocytopenia with a platelet count of 3.    She was offered IVIG 1 gram on 7/25/2016 and 4 days of   Dexamethasone. She was d/c with PL of 38 on 7/27/2016. PL dip to 13 on 8/4 and 7 on 8/11. She was offered wklyI IVIG 1g/kg starting 8/4/2016, and 2nd cycle of dex x 4 days on 8/11/2016. PL improves to 77 on 8/15, then dip to 31 on 8/18.   Due to rapid drop of her PL when off dex, tx was changed to po prednisone 8/18/2017 with wkly IVIG. R was added on 8/30/2016. She had no PL response despite R, steroid and IVIG.   Nplate was started 9/23/2016. PL up from 20 to 110 after 2 doses of it. Then dip down again.   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   Course was complicated with portal vein and SMV thromboses end of 12/2016. She was admitted to , had IR thrombectomy and catheter-directed lytic therapy to portal vein. Following the procedure she was placed on high intensity heparin drip which was then bridged with warfarin starting 1/9/2017 with an INR goal of 2-3.   Due to progression of portal venous branch thrombosis and SMV thrombosis by CT in 7/2017. Tx is changed to Lovenox. She could not tolerates the shots. tx is changed to Xarelto.     She finished prednisone slow taper 4/2017.      PL dip down to around 50's in Aug 2017. Xarelto put on hold in Sep due to PL less than 50 and stable SMV thromboses.     She saw [facility] as 2nd opinion, Dr. Key had nl BM without fibrosis at [facility], ITP dx is assured.  Cyclosporine is also recommended to  "start at 200 mg po qd if PL less than 30, with nl renal function, and adjust the dose.     PAST MEDICAL HISTORY:      1.  Cystocele.    2.  Morbid obesity.    3.  Reflux.    4.  Intermittent asthma.    5.  Effusion of lower leg joints.    6. 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  She denies the use of alcohol or tobacco.      ROS  She is back to her baseline energy level.   She denies bleeding episode.          PHYSICAL EXAMINATION:    VITAL SIGNS:  Blood pressure 124/58, pulse 79, temperature 98.1  F (36.7  C), temperature source Tympanic, resp. rate 20, height 1.689 m (5' 6.5\"), weight 134.5 kg (296 lb 9.6 oz), last menstrual period 09/15/2007, SpO2 95 %, not currently breastfeeding.    GENERAL APPEARANCE:  A middle-aged woman who appears her stated age, very pleasant, morbidly obese, sitting comfortably in a chair and knitting.    HEENT: The patient is normocephalic, atraumatic. Pupils are equally reactive to light.  Sclerae are anicteric.  There is moist oral mucosa, negative pharynx, no oral thrush.    NECK:  Supple, no jugular venous distention, thyroid not palpable.    LYMPH NODES:  Superficial lymphadenopathy is not appreciable in the bilateral cervical, supraclavicular, axillary or inguinal areas.    CARDIOVASCULAR:  S1, S2 regular with no murmurs or gallops, no carotid or abdominal bruits.    PULMONARY:  Lungs are clear to auscultation and percussion bilaterally.  There is no wheezing or rhonchi.    GASTROINTESTINAL:  Abdomen is soft, nontender with no hepatosplenomegaly, no signs of ascites and no mass appreciable.    MUSCULOSKELETAL/EXTREMITIES:  No edema, no cyanotic changes, no signs of joint deformity, no lymphedema.    NEUROLOGIC:  Cranial nerves II-XII " are grossly intact, sensation intact, muscle strength and muscle tone symmetrical, 5/5 throughout.    BACK:  No spinal or paraspinal tenderness, no CVA tenderness.    SKIN:  No petechiae, no rash and no signs of cellulitis.  There is scattered bruising on forearms and thighs, which are healing.        LABORATORY DATA reviewed  PL around 40 in the last wks, LFT/Cr nl in 10/2017      in 7/2107 from nl 6/2017  Hb nl, wbc/diff nl.     BM at [facility] 10/2017 - nl including cyto.     Current Image reviewed  CT a/P 9/2017: No significant change. Findings suggest chronic thrombosis of the left portal vein, posterior branch of the right portal vein and branches of the superior mesenteric vein as above.    CT a/p 7/2017 : Progression of left portal venous branch thrombosis and SMV thrombosis is seen compared to the prior study.    US 4/2017  1.  Recanalized main portal and splenic veins. Persistent occlusion of left portal vein.  2.  Splenectomy.    CT 1/9/2017   1. Status post splenectomy with postsurgical changes in the left upper quadrant. Persistent thrombosis of the splenic vein.  2. Main portal vein now appears patent with possible peripheral nonocclusive thrombosis/periportal edema. Residual thrombosis of portion of the left branch of the portal vein and the right posterior  branch of the portal vein.  3. Residual partial thrombosis of the distal portion of the superior mesenteric vein.   4. Small focal nodular opacities in both lower lobes, likely of infectious etiology.    Old data reviewed with summary  12/31/2016 CT abd - thrombosis of the portal vein, splenic vein.    Right after splenectomy 12/13/2016 wbc 14, neutrophiila, PL >600, Hb 9.2  PL at 91 on 12/8/2016, at 51 in 11/2016 at 28 from 43 from 110 early Oct, from 31 on 8/18, at 77 on 8/15, at 7 on 8/11, at 14 on 8/4/2016, at 38 7/27/2016  On 07/26/2016 platelets 8, lymphopenia with lymphocyte count 0.6.    On 07/25/2016 platelets 3.   smear: no dysplasia or  schistocytes.     HepB DNA by PCR 10/2016 : negative    9/2016 H. Pylori breath test/stool antigen: negative.     8/2016 hep Bs Ag -,BsAb+, BcAb+, HepC -, for which she saw [facility] and started on hepB med Tenofovir 9/8/2016.     CT a/p 10/2016: The spleen is borderline enlarged at 13.3 cm.   CT neck 9/15/2016: No enlarged or necrotic-appearing cervical lymph nodes are seen. No metastatic lesions are identified.  CT head 8/11: negative.           ASSESSMENT/PLAN:    1.  Refractory ITP dx first in 7/2016 . Failed dexa +/- R, IVIG, Nplate  PL up to > 600 post splenectomy in 12/2016.   S/p prednisone taper off in 4/2017.  PL dip down below 50 in 9/2017. Then around 40's after.     We discussed the tx option:   Azathioprine or cyclosporin immunosuppression.   Cytoxan 750mg - 1g/m2 IV q month or po (maybe 100mg/m2 D1-14 q 28days)  vicristine 1-2mg IV wkly for several wks  vinblastin 0.1mg/kg  combination chemo -CVP, CVP+ -16.  Promacta po - 3-4% risk of thrombosis    The side effects associated with the above options. This is a tough situation.     She saw [facility] as 2nd opinion in 10/2017, Dr. Key had nl BM without fibrosis at [facility], ITP dx is assures.  Cyclosporine is also recommended to start at 200 mg po qd if PL less than 30,  with nl renal function, and adjust the dose.     She is not excited on all the above options due to side effects. The top choices are oral CTX, cyclosporine, or Promacta.     She is on q 2 wks cbc check.     2. + hepBcAb, - HepBsAg, HepBsAg-,HebB DNA - by PCR, she saw [facility] due to the use of Rituxan, started on Tenofovir 9/8/2016 till 4/2017.    Advice limit Tylenol intake.     3. 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 S/p thrombolysis to portal vein, lovenox. Has been on Coumadin, lovenox again, Xarelto for 9 months.   Her clots are chronic and stable per CT 9/2017.   Due to her severe low PL. Recommend off anticoagulation to be observed now.     Total time is 25 minutes, more than  15 minutes spent in counseling regarding her complicated ITP and thrombosis issues, and plan of actions.       Again, thank you for allowing me to participate in the care of your patient.        Sincerely,        Kerrie Rankin MD, MD

## 2017-11-02 NOTE — PATIENT INSTRUCTIONS
Dr. Rankin would like you to have labs every 2 weeks and we will call you with results. Follow up plan at this point is open. When you are in need of a refill, please call your pharmacy and they will send us a request.  Copy of appointments, and after visit summary (AVS) given to patient.  If you have any questions please call Karla Scott RN, BSN Oncology Hematology  [facility] [phone]. For questions after business hours, or on holidays/weekends, please call our after hours Nurse Triage line [phone]. Thank you.             Print out education material of cyclosporin, Promacta to pt.   Continue cbc 2 wks

## 2017-11-02 NOTE — MR AVS SNAPSHOT
After Visit Summary   11/2/2017    Charlee Marks    MRN: [id number]           Patient Information     Date Of Birth          1955        Visit Information        Provider Department      11/2/2017 8:00 AM Kerrie Rankin MD [facility]        Today's Diagnoses     ITP (idiopathic thrombocytopenic purpura)    -  1    Portal vein thrombosis          Care Instructions    Dr. Rankin would like you to have labs every 2 weeks and we will call you with results. Follow up plan at this point is open. When you are in need of a refill, please call your pharmacy and they will send us a request.  Copy of appointments, and after visit summary (AVS) given to patient.  If you have any questions please call Karla Scott RN, BSN Oncology Hematology  [facility] [phone]. For questions after business hours, or on holidays/weekends, please call our after hours Nurse Triage line [phone]. Thank you.             Print out education material of cyclosporin, Promacta to pt.   Continue cbc 2 wks          Follow-ups after your visit        Your next 10 appointments already scheduled     Nov 16, 2017  3:30 PM CST   LAB with CL LAB   [facility])    [address] [address]   [phone]           Please do not eat 10-12 hours before your appointment if you are coming in fasting for labs on lipids, cholesterol, or glucose (sugar). This does not apply to pregnant women. Water, hot tea and black coffee (with nothing added) are okay. Do not drink other fluids, diet soda or chew gum.            Nov 30, 2017  3:30 PM CST   LAB with CL LAB   [facility] ([facility])    [address] [address]   [phone]           Please do not eat 10-12 hours before your appointment if you are coming in fasting for labs on lipids, cholesterol, or glucose (sugar). This  does not apply to pregnant women. Water, hot tea and black coffee (with nothing added) are okay. Do not drink other fluids, diet soda or chew gum.            Dec 14, 2017  3:30 PM CST   LAB with CL LAB   [facility] ([facility])    [address]  [facility] [address]   [phone]           Please do not eat 10-12 hours before your appointment if you are coming in fasting for labs on lipids, cholesterol, or glucose (sugar). This does not apply to pregnant women. Water, hot tea and black coffee (with nothing added) are okay. Do not drink other fluids, diet soda or chew gum.            Oct 18, 2018  8:00 AM CDT   Lab with  LAB   [facility] ([facility])    [address] [place] [address]  [address] [facility] [address] [phone]            Oct 18, 2018  9:00 AM CDT   (Arrive by 8:45 AM)   Return General Liver with Agustin Le MD   [facility] ([facility])    [address] [place] [address]  [address]rd [facility] [address] [phone]              Future tests that were ordered for you today     Open Standing Orders        Priority Remaining Interval Expires Ordered    CBC with platelets differential Routine 6/6 every 2 weeks 11/2/2018 11/2/2017            Who to contact     If you have questions or need follow up information about today's clinic visit or your schedule please contact [facility] directly at [phone].  Normal or non-critical lab and imaging results will be communicated to you by MyChart, letter or phone within 4 business days after the clinic has received the results. If you do not hear from us within 7 days, please contact the clinic through [employer] or phone. If you have a critical or abnormal lab result, we will notify you by phone as soon as possible.  Submit refill requests through [employer] or call your pharmacy and they will forward the refill request to us. Please allow 3 business  "days for your refill to be completed.          Additional Information About Your Visit        MyChart Information     [employer] gives you secure access to your electronic health record. If you see a primary care provider, you can also send messages to your care team and make appointments. If you have questions, please call your primary care clinic.  If you do not have a primary care provider, please call [phone] and they will assist you.        Care EveryWhere ID     This is your Care EveryWhere ID. This could be used by other organizations to access your [facility] medical records  [id number]        Your Vitals Were     Pulse Temperature Respirations Height Last Period Pulse Oximetry    79 98.1  F (36.7  C) (Tympanic) 20 1.689 m (5' 6.5\") 09/15/2007 95%    Breastfeeding? BMI (Body Mass Index)                No 47.16 kg/m2           Blood Pressure from Last 3 Encounters:   11/02/17 124/58   10/17/17 126/83   09/28/17 143/69    Weight from Last 3 Encounters:   11/02/17 134.5 kg (296 lb 9.6 oz)   10/17/17 134.3 kg (296 lb)   09/28/17 (!) 137.3 kg (302 lb 9.6 oz)                 Today's Medication Changes          These changes are accurate as of: 11/2/17  9:03 AM.  If you have any questions, ask your nurse or doctor.               These medicines have changed or have updated prescriptions.        Dose/Directions    ranitidine 150 MG tablet   Commonly known as:  ZANTAC   This may have changed:  See the new instructions.   Used for:  Esophageal reflux        ONE TABLET TWICE DAILY as needed will call when needed   Quantity:  180 Tab   Refills:  3                Primary Care Provider Office Phone # Fax #    Kay Briggs [phone] [address] [facility] [address]        Equal Access to Services     [facility] ROSALES AH: Mary Grace William, chantal bernal, rama mclaughlin, julieta mcguire. So [facility] [phone].    ATENCIÓN: Si andre chapa, " tiene a cosme disposición servicios gratuitos de asistencia lingüística. Aileen byrd [phone].    We comply with applicable federal civil rights laws and [place] laws. We do not discriminate on the basis of race, color, national origin, age, disability, sex, sexual orientation, or gender identity.            Thank you!     Thank you for choosing [facility]  for your care. Our goal is always to provide you with excellent care. Hearing back from our patients is one way we can continue to improve our services. Please take a few minutes to complete the written survey that you may receive in the mail after your visit with us. Thank you!             Your Updated Medication List - Protect others around you: Learn how to safely use, store and throw away your medicines at www.disposemymeds.org.          This list is accurate as of: 11/2/17  9:03 AM.  Always use your most recent med list.                   Brand Name Dispense Instructions for use Diagnosis    albuterol 108 (90 BASE) MCG/ACT Inhaler    PROAIR HFA/PROVENTIL HFA/VENTOLIN HFA    1 Inhaler    Inhale 2 puffs into the lungs every 4 hours as needed for shortness of breath / dyspnea    Mild intermittent asthma without complication       B-6 100 MG Tabs      Take 1 tablet by mouth daily        calcium carbonate-vitamin D 500-400 MG-UNIT Tabs per tablet      Take 1 tablet by mouth 2 times daily        GLUCOSAMINE CHONDRO COMPLEX OR      on hold        hydrochlorothiazide 25 MG tablet    HYDRODIURIL    90 tablet    Take 1 tablet (25 mg) by mouth daily    Peripheral edema       loratadine 10 MG tablet    CLARITIN    90 Tab    ONE DAILY    Allergy, unspecified not elsewhere classified       MULTIVITAMIN TABS   OR      1 TABLET DAILY        omega 3 1000 MG Caps      Reported on 4/20/2017        ranitidine 150 MG tablet    ZANTAC    180 Tab    ONE TABLET TWICE DAILY as needed will call when needed    Esophageal reflux       TYLENOL PO      Take 1,000 mg by mouth At  Bedtime

## 2017-11-02 NOTE — NURSING NOTE
"Oncology Rooming Note    November 2, 2017 8:13 AM   Charlee Marks is a 62 year old female who presents for:    Chief Complaint   Patient presents with     Hematology     Follow up Idiopathic thrombocytopenic purpura. Review lab results.       Initial Vitals: /58 (BP Location: Right arm, Patient Position: Sitting, Cuff Size: Adult Large)  Pulse 79  Temp 98.1  F (36.7  C) (Tympanic)  Resp 20  Ht 1.689 m (5' 6.5\")  Wt 134.5 kg (296 lb 9.6 oz)  LMP 09/15/2007  SpO2 95%  Breastfeeding? No  BMI 47.16 kg/m2 Estimated body mass index is 47.16 kg/(m^2) as calculated from the following:    Height as of this encounter: 1.689 m (5' 6.5\").    Weight as of this encounter: 134.5 kg (296 lb 9.6 oz). Body surface area is 2.51 meters squared.  Mild Pain (3) Comment: Data Unavailable   Patient's last menstrual period was 09/15/2007.  Allergies reviewed: Yes  Medications reviewed: Yes    Medications: Medication refills not needed today.  Pharmacy name entered into [employer]:    [facility] [place] [address] [facility] [place] [address]    Clinical concerns: Follow up Idiopathic thrombocytopenic purpura. Review lab results.  Still with fatigue, experiencing stiffness in her hands when waking up in the morning. Last weekend developed a head cold using benadryl and tylenol PRN.     7 minutes for nursing intake (face to face time)     Roxie Brink, JADE            "

## 2017-11-16 DIAGNOSIS — I81 PORTAL VEIN THROMBOSIS: ICD-10-CM

## 2017-11-16 DIAGNOSIS — D69.3 IDIOPATHIC THROMBOCYTOPENIC PURPURA (H): ICD-10-CM

## 2017-11-16 PROCEDURE — 36415 COLL VENOUS BLD VENIPUNCTURE: CPT | Performed by: INTERNAL MEDICINE

## 2017-11-16 PROCEDURE — 85025 COMPLETE CBC W/AUTO DIFF WBC: CPT | Performed by: INTERNAL MEDICINE

## 2017-11-17 LAB
BASOPHILS # BLD AUTO: 0.1 10E9/L (ref 0–0.2)
BASOPHILS NFR BLD AUTO: 0.6 %
DIFFERENTIAL METHOD BLD: ABNORMAL
EOSINOPHIL # BLD AUTO: 0.2 10E9/L (ref 0–0.7)
EOSINOPHIL NFR BLD AUTO: 1.7 %
ERYTHROCYTE [DISTWIDTH] IN BLOOD BY AUTOMATED COUNT: 14.2 % (ref 10–15)
HCT VFR BLD AUTO: 41 % (ref 35–47)
HGB BLD-MCNC: 13.3 G/DL (ref 11.7–15.7)
IMM GRANULOCYTES # BLD: 0 10E9/L (ref 0–0.4)
IMM GRANULOCYTES NFR BLD: 0.2 %
LYMPHOCYTES # BLD AUTO: 4 10E9/L (ref 0.8–5.3)
LYMPHOCYTES NFR BLD AUTO: 36.9 %
MCH RBC QN AUTO: 29.3 PG (ref 26.5–33)
MCHC RBC AUTO-ENTMCNC: 32.4 G/DL (ref 31.5–36.5)
MCV RBC AUTO: 90 FL (ref 78–100)
MONOCYTES # BLD AUTO: 1.4 10E9/L (ref 0–1.3)
MONOCYTES NFR BLD AUTO: 12.5 %
NEUTROPHILS # BLD AUTO: 5.3 10E9/L (ref 1.6–8.3)
NEUTROPHILS NFR BLD AUTO: 48.1 %
PLATELET # BLD AUTO: 31 10E9/L (ref 150–450)
RBC # BLD AUTO: 4.54 10E12/L (ref 3.8–5.2)
WBC # BLD AUTO: 10.9 10E9/L (ref 4–11)

## 2017-11-30 ENCOUNTER — APPOINTMENT (OUTPATIENT)
Dept: CT IMAGING | Facility: CLINIC | Age: 62
End: 2017-11-30
Attending: STUDENT IN AN ORGANIZED HEALTH CARE EDUCATION/TRAINING PROGRAM
Payer: COMMERCIAL

## 2017-11-30 ENCOUNTER — HOSPITAL ENCOUNTER (EMERGENCY)
Facility: CLINIC | Age: 62
Discharge: HOME | End: 2017-12-01
Attending: STUDENT IN AN ORGANIZED HEALTH CARE EDUCATION/TRAINING PROGRAM | Admitting: STUDENT IN AN ORGANIZED HEALTH CARE EDUCATION/TRAINING PROGRAM
Payer: COMMERCIAL

## 2017-11-30 ENCOUNTER — TELEPHONE (OUTPATIENT)
Dept: ONCOLOGY | Facility: CLINIC | Age: 62
End: 2017-11-30

## 2017-11-30 DIAGNOSIS — D69.3 IDIOPATHIC THROMBOCYTOPENIC PURPURA (H): ICD-10-CM

## 2017-11-30 DIAGNOSIS — G44.89 OTHER HEADACHE SYNDROME: ICD-10-CM

## 2017-11-30 DIAGNOSIS — D69.6 THROMBOCYTOPENIA (H): ICD-10-CM

## 2017-11-30 DIAGNOSIS — D69.3 IDIOPATHIC THROMBOCYTOPENIC PURPURA (H): Primary | ICD-10-CM

## 2017-11-30 DIAGNOSIS — I81 PORTAL VEIN THROMBOSIS: ICD-10-CM

## 2017-11-30 LAB
ABO + RH BLD: NORMAL
ABO + RH BLD: NORMAL
ALBUMIN SERPL-MCNC: 4 G/DL (ref 3.4–5)
ALP SERPL-CCNC: 142 U/L (ref 40–150)
ALT SERPL W P-5'-P-CCNC: 44 U/L (ref 0–50)
ANION GAP SERPL CALCULATED.3IONS-SCNC: 7 MMOL/L (ref 3–14)
APTT PPP: 25 SEC (ref 22–37)
AST SERPL W P-5'-P-CCNC: 38 U/L (ref 0–45)
BASOPHILS # BLD AUTO: 0.1 10E9/L (ref 0–0.2)
BASOPHILS NFR BLD AUTO: 0.8 %
BILIRUB SERPL-MCNC: 0.5 MG/DL (ref 0.2–1.3)
BLD GP AB SCN SERPL QL: NORMAL
BLOOD BANK CMNT PATIENT-IMP: NORMAL
BUN SERPL-MCNC: 14 MG/DL (ref 7–30)
CALCIUM SERPL-MCNC: 8.8 MG/DL (ref 8.5–10.1)
CHLORIDE SERPL-SCNC: 101 MMOL/L (ref 94–109)
CO2 SERPL-SCNC: 30 MMOL/L (ref 20–32)
CREAT SERPL-MCNC: 0.62 MG/DL (ref 0.52–1.04)
DIFFERENTIAL METHOD BLD: ABNORMAL
EOSINOPHIL # BLD AUTO: 0.2 10E9/L (ref 0–0.7)
EOSINOPHIL NFR BLD AUTO: 1.7 %
ERYTHROCYTE [DISTWIDTH] IN BLOOD BY AUTOMATED COUNT: 13.9 % (ref 10–15)
GFR SERPL CREATININE-BSD FRML MDRD: >90 ML/MIN/1.7M2
GLUCOSE SERPL-MCNC: 96 MG/DL (ref 70–99)
HCT VFR BLD AUTO: 41.2 % (ref 35–47)
HGB BLD-MCNC: 13.6 G/DL (ref 11.7–15.7)
IMM GRANULOCYTES # BLD: 0 10E9/L (ref 0–0.4)
IMM GRANULOCYTES NFR BLD: 0.2 %
INR PPP: 0.93 (ref 0.86–1.14)
LYMPHOCYTES # BLD AUTO: 4 10E9/L (ref 0.8–5.3)
LYMPHOCYTES NFR BLD AUTO: 38.4 %
MCH RBC QN AUTO: 29.4 PG (ref 26.5–33)
MCHC RBC AUTO-ENTMCNC: 33 G/DL (ref 31.5–36.5)
MCV RBC AUTO: 89 FL (ref 78–100)
MONOCYTES # BLD AUTO: 1.2 10E9/L (ref 0–1.3)
MONOCYTES NFR BLD AUTO: 11.8 %
NEUTROPHILS # BLD AUTO: 4.9 10E9/L (ref 1.6–8.3)
NEUTROPHILS NFR BLD AUTO: 47.1 %
PLATELET # BLD AUTO: 15 10E9/L (ref 150–450)
POTASSIUM SERPL-SCNC: 3.4 MMOL/L (ref 3.4–5.3)
PROT SERPL-MCNC: 7.6 G/DL (ref 6.8–8.8)
RBC # BLD AUTO: 4.63 10E12/L (ref 3.8–5.2)
SODIUM SERPL-SCNC: 138 MMOL/L (ref 133–144)
SPECIMEN EXP DATE BLD: NORMAL
WBC # BLD AUTO: 10.4 10E9/L (ref 4–11)

## 2017-11-30 PROCEDURE — 85610 PROTHROMBIN TIME: CPT | Performed by: STUDENT IN AN ORGANIZED HEALTH CARE EDUCATION/TRAINING PROGRAM

## 2017-11-30 PROCEDURE — 80053 COMPREHEN METABOLIC PANEL: CPT | Performed by: STUDENT IN AN ORGANIZED HEALTH CARE EDUCATION/TRAINING PROGRAM

## 2017-11-30 PROCEDURE — 86900 BLOOD TYPING SEROLOGIC ABO: CPT | Performed by: STUDENT IN AN ORGANIZED HEALTH CARE EDUCATION/TRAINING PROGRAM

## 2017-11-30 PROCEDURE — 85730 THROMBOPLASTIN TIME PARTIAL: CPT | Performed by: STUDENT IN AN ORGANIZED HEALTH CARE EDUCATION/TRAINING PROGRAM

## 2017-11-30 PROCEDURE — 99284 EMERGENCY DEPT VISIT MOD MDM: CPT | Mod: Z6 | Performed by: STUDENT IN AN ORGANIZED HEALTH CARE EDUCATION/TRAINING PROGRAM

## 2017-11-30 PROCEDURE — 25000132 ZZH RX MED GY IP 250 OP 250 PS 637: Performed by: STUDENT IN AN ORGANIZED HEALTH CARE EDUCATION/TRAINING PROGRAM

## 2017-11-30 PROCEDURE — 85025 COMPLETE CBC W/AUTO DIFF WBC: CPT | Performed by: STUDENT IN AN ORGANIZED HEALTH CARE EDUCATION/TRAINING PROGRAM

## 2017-11-30 PROCEDURE — 99284 EMERGENCY DEPT VISIT MOD MDM: CPT | Mod: 25 | Performed by: STUDENT IN AN ORGANIZED HEALTH CARE EDUCATION/TRAINING PROGRAM

## 2017-11-30 PROCEDURE — 86901 BLOOD TYPING SEROLOGIC RH(D): CPT | Performed by: STUDENT IN AN ORGANIZED HEALTH CARE EDUCATION/TRAINING PROGRAM

## 2017-11-30 PROCEDURE — 85025 COMPLETE CBC W/AUTO DIFF WBC: CPT | Performed by: INTERNAL MEDICINE

## 2017-11-30 PROCEDURE — 36415 COLL VENOUS BLD VENIPUNCTURE: CPT | Performed by: INTERNAL MEDICINE

## 2017-11-30 PROCEDURE — 96366 THER/PROPH/DIAG IV INF ADDON: CPT | Performed by: STUDENT IN AN ORGANIZED HEALTH CARE EDUCATION/TRAINING PROGRAM

## 2017-11-30 PROCEDURE — 96375 TX/PRO/DX INJ NEW DRUG ADDON: CPT | Performed by: STUDENT IN AN ORGANIZED HEALTH CARE EDUCATION/TRAINING PROGRAM

## 2017-11-30 PROCEDURE — 25000128 H RX IP 250 OP 636: Performed by: STUDENT IN AN ORGANIZED HEALTH CARE EDUCATION/TRAINING PROGRAM

## 2017-11-30 PROCEDURE — 96365 THER/PROPH/DIAG IV INF INIT: CPT | Performed by: STUDENT IN AN ORGANIZED HEALTH CARE EDUCATION/TRAINING PROGRAM

## 2017-11-30 PROCEDURE — 70450 CT HEAD/BRAIN W/O DYE: CPT

## 2017-11-30 PROCEDURE — 86850 RBC ANTIBODY SCREEN: CPT | Performed by: STUDENT IN AN ORGANIZED HEALTH CARE EDUCATION/TRAINING PROGRAM

## 2017-11-30 RX ORDER — PROCHLORPERAZINE MALEATE 5 MG
5 TABLET ORAL ONCE
Status: COMPLETED | OUTPATIENT
Start: 2017-11-30 | End: 2017-11-30

## 2017-11-30 RX ORDER — DEXAMETHASONE SODIUM PHOSPHATE 10 MG/ML
40 INJECTION, SOLUTION INTRAMUSCULAR; INTRAVENOUS ONCE
Status: COMPLETED | OUTPATIENT
Start: 2017-11-30 | End: 2017-11-30

## 2017-11-30 RX ORDER — DIPHENHYDRAMINE HCL 25 MG
25 CAPSULE ORAL ONCE
Status: COMPLETED | OUTPATIENT
Start: 2017-11-30 | End: 2017-11-30

## 2017-11-30 RX ORDER — ACETAMINOPHEN 325 MG/1
650 TABLET ORAL ONCE
Status: COMPLETED | OUTPATIENT
Start: 2017-11-30 | End: 2017-11-30

## 2017-11-30 RX ADMIN — RANITIDINE HYDROCHLORIDE 150 MG: 150 TABLET, FILM COATED ORAL at 22:44

## 2017-11-30 RX ADMIN — PROCHLORPERAZINE MALEATE 5 MG: 5 TABLET, FILM COATED ORAL at 23:14

## 2017-11-30 RX ADMIN — HUMAN IMMUNOGLOBULIN G 55 G: 40 LIQUID INTRAVENOUS at 20:23

## 2017-11-30 RX ADMIN — ACETAMINOPHEN 650 MG: 325 TABLET, FILM COATED ORAL at 18:55

## 2017-11-30 RX ADMIN — DEXAMETHASONE SODIUM PHOSPHATE 40 MG: 10 INJECTION, SOLUTION INTRAMUSCULAR; INTRAVENOUS at 19:28

## 2017-11-30 RX ADMIN — DIPHENHYDRAMINE HYDROCHLORIDE 25 MG: 25 CAPSULE ORAL at 23:14

## 2017-11-30 NOTE — TELEPHONE ENCOUNTER
DATE:  11/30/2017   TIME OF RECEIPT FROM LAB:  5211  LAB TEST:  Platelet (prelim)  LAB VALUE:  8 (prelim)  RESULTS GIVEN WITH READ-BACK TO (PROVIDER):  Dr. Rankin  TIME LAB VALUE REPORTED TO PROVIDER:   5337          Dr. Rankin initially advised pt to be on Dexamethasone 40 mg x4 days until we get the final result back with no NSAIDS or ASA unless bleeding. Called to update pt and pt reports petechiae on the upper arms, Dr. Rankin advises pt be evaluated in the ER. Pt verbalized understanding and will head there now.

## 2017-11-30 NOTE — ED AVS SNAPSHOT
[facility] Emergency Department    [address] [facility] [address]    Phone:  [phone]    Fax:  [phone]                                       Charlee Marks   MRN: [id number]    Department:  [facility] Emergency Department   Date of Visit:  11/30/2017           After Visit Summary Signature Page     I have received my discharge instructions, and my questions have been answered. I have discussed any challenges I see with this plan with the nurse or doctor.    ..........................................................................................................................................  Patient/Patient Representative Signature      ..........................................................................................................................................  Patient Representative Print Name and Relationship to Patient    ..................................................               ................................................  Date                                            Time    ..........................................................................................................................................  Reviewed by Signature/Title    ...................................................              ..............................................  Date                                                            Time

## 2017-11-30 NOTE — ED NOTES
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 She c/o of a slight h/a x 2 days, she has not needed any pain control for it today. Yesterday she had taken some tylenol which helped. Pt is eating and drinking normal. Denies CP, SOB, fever/chills, N/V/D. Pt is a/o x 4.

## 2017-11-30 NOTE — ED PROVIDER NOTES
History     Chief Complaint   Patient presents with     Abnormal Labs     platelets 8. told to come in by dr ibrahima RUIZ  Charlee Marks is a 62 year old female with past medical history which includes asthma, morbid obesity, and ITP with remote history of splenectomy and portal vein thrombosis who presents for evaluation after receiving a call from oncology clinic at her platelets were unexpectedly low. Patient explains that she has been feeling relatively well over the past week but mild intermittent frontal headache over the past 2 days. At one point she thought she had an aura which could have been indicative of developing severe migraine headache but the migraine had not developed. Patient denies chills, sore throat, neck stiffness, cough, chest pain, shortness of breath, abdominal pain, blood with bowel movements, hematuria, petechial rash or recent injury.      Problem List:    Patient Active Problem List    Diagnosis Date Noted     Long-term (current) use of anticoagulants [Z79.01] 01/16/2017     Priority: Medium     Portal vein thrombosis 12/31/2016     Priority: Medium     S/P splenectomy 12/13/2016     Priority: Medium     Received Menactra, Bexsero, HIB and Prevnar 13 11/7/2016  Due for Menactra  booster 1/7/2017 then every 5 years  Due for second Pneumovax 23 1/7/2017  For age 56 years and older Give two doses of Menactra or Menveo 2 months apart   and either Trumenba or Bexsero*. Boost every 5 years with Menactra or Menveo  . (Up to Date)         Hepatitis B core antibody positive 09/08/2016     Priority: Medium     ITP (idiopathic thrombocytopenic purpura) 07/25/2016     Priority: Medium     Cystocele 08/28/2013     Priority: Medium     Advanced directives, counseling/discussion 05/03/2013     Priority: Medium     Advance Care Planning:   Initial facilitation introduction:   Charlee Marks presented for initial session regarding ACP at the clinic. She was accompanied by self. Honoring Choices  information provided and resources reviewed. She currently will bring home honoring choices information to go over with family members.  She currently has the following questions or concerns about Advance Care Planning: none known.  Confirmed/documented designated decision maker(s). See permanent comments section of demographics in clinical tab. Confirmed code status reflects current choices . Follow-up meeting: none.  Added by Pilar Garay on 5/3/2013           CARDIOVASCULAR SCREENING; LDL GOAL LESS THAN 160 10/31/2010     Priority: Medium     Morbid obesity (H) 08/20/2007     Priority: Medium     BMI 45       ESOPHAGEAL REFLUX 12/20/2006     Priority: Medium     Edema 08/10/2006     Priority: Medium     Effusion of lower leg joint 08/10/2006     Priority: Medium     Plantar fascial fibromatosis 08/25/2005     Priority: Medium     Mild intermittent asthma 08/25/2005     Priority: Medium     Allergic state 08/25/2005     Priority: Medium     Problem list name updated by automated process. Provider to review          Past Medical History:    Past Medical History:   Diagnosis Date     Asthma      GERD (gastroesophageal reflux disease)      Idiopathic thrombocytopenic purpura (H)      Obesity      Other specified gastritis without mention of hemorrhage      Splenomegaly        Past Surgical History:    Past Surgical History:   Procedure Laterality Date     C LIGATE FALLOPIAN TUBE       COLONOSCOPY  10/19/2007     LAPAROSCOPIC SPLENECTOMY N/A 12/13/2016    Procedure: LAPAROSCOPIC SPLENECTOMY;  Surgeon: Catalina Brian MD;  Location: [facility] OR     PICC INSERTION Right 1/4/2017    5fr DL BioFlo PICC, 44cm (3cm external) in the R cephalic vein w/ tip in the low SVC.     SURGICAL HISTORY OF -   1976    Breast Cyst Excision      SURGICAL HISTORY OF -   8/1997    Open Bladder Neck Suspension for stress urinary incontinence     SURGICAL HISTORY OF -   Age 6    Tonsillectomy     SURGICAL HISTORY OF -   10/83 & 2/84    Oral  Surgery [place]        Family History:    Family History   Problem Relation Age of Onset     Breast Cancer Mother      70's     Breast Cancer Maternal Grandmother      DIABETES Father      C.A.D. Father      Respiratory Maternal Grandfather      Allergies Sister      Respiratory Sister      Allergies Daughter        Social History:  Marital Status:   [2]  Social History   Substance Use Topics     Smoking status: Never Smoker     Smokeless tobacco: Never Used     Alcohol use 0.0 oz/week     0 Standard drinks or equivalent per week      Comment: 2-3/week, none since Summer 2016        Medications:      DiphenhydrAMINE HCl (BENADRYL PO)   albuterol (PROAIR HFA/PROVENTIL HFA/VENTOLIN HFA) 108 (90 BASE) MCG/ACT Inhaler   hydrochlorothiazide (HYDRODIURIL) 25 MG tablet   Pyridoxine HCl (B-6) 100 MG TABS   Acetaminophen (TYLENOL PO)   calcium carbonate-vitamin D 500-400 MG-UNIT TABS tablt   RANITIDINE  MG PO TABS   GLUCOSAMINE CHONDRO COMPLEX OR   OMEGA 3 1000 MG OR CAPS   MULTIVITAMIN TABS   OR   LORATADINE 10 MG PO TABS         Review of Systems  Constitutional: Negative for fever or recent illness.  HENT: Negative oral or throat pain.  Eye: Negative for visual changes from baseline.  Respiratory: Negative for cough or shortness of breath.  Cardiovascular: Negative for chest pain.  Gastrointestinal: Negative for abdominal pain, nausea, vomiting, or diarrhea. Denies blood with bowel movements.  Genitourinary: Negative for dysuria or hematuria.  Musculoskeletal: Negative for neck pain, neck pain, or recent injury.  Neurological: Positive for dull achy headache without dizziness, sensory deficits, or focal weakness. Denies maximal severity or thunderclap onset of headache.  Skin: Negative for spontaneous bruising or petechiae.    All others reviewed and are negative.      Physical Exam   BP: (!) 187/94  Pulse: 83  Heart Rate: 65  Temp: 98.8  F (37.1  C)  Resp: 18  Weight: 134.5 kg (296 lb 8.3 oz)  SpO2: 96  %      Physical Exam  Constitutional: Well developed, well nourished. Appears nontoxic and in no acute distress. Resting comfortably on the gurney.  Head: Normocephalic and atraumatic. Symmetric in appearance.  Eyes: Conjunctivae are normal. EOMI. Denies diplopia.  Neck: Neck supple.  Cardiovascular: No cyanosis. RRR. No audible murmurs noted.  Respiratory: Effort normal, no respiratory distress. CTAB without diminished regions. No wheezing, rhonchi, or crackles.  Gastrointestinal: Soft, nondistended abdomen. Nontender and without guarding. No rigidity or rebound tenderness. Negative McBurney's point. Negative for Bejarano's sign.   Musculoskeletal: Moves all extremities spontaneously and without complaint.  Neuro: Patient is alert.  Skin: Skin is warm and dry, not diaphoretic.  Psych: Appears to have a normal mood and affect.      ED Course     ED Course     Procedures              Critical Care time:  none               Results for orders placed or performed during the hospital encounter of 11/30/17 (from the past 24 hour(s))   CBC with platelets, differential   Result Value Ref Range    WBC 10.4 4.0 - 11.0 10e9/L    RBC Count 4.63 3.8 - 5.2 10e12/L    Hemoglobin 13.6 11.7 - 15.7 g/dL    Hematocrit 41.2 35.0 - 47.0 %    MCV 89 78 - 100 fl    MCH 29.4 26.5 - 33.0 pg    MCHC 33.0 31.5 - 36.5 g/dL    RDW 13.9 10.0 - 15.0 %    Platelet Count 15 (LL) 150 - 450 10e9/L    Diff Method Automated Method     % Neutrophils 47.1 %    % Lymphocytes 38.4 %    % Monocytes 11.8 %    % Eosinophils 1.7 %    % Basophils 0.8 %    % Immature Granulocytes 0.2 %    Absolute Neutrophil 4.9 1.6 - 8.3 10e9/L    Absolute Lymphocytes 4.0 0.8 - 5.3 10e9/L    Absolute Monocytes 1.2 0.0 - 1.3 10e9/L    Absolute Eosinophils 0.2 0.0 - 0.7 10e9/L    Absolute Basophils 0.1 0.0 - 0.2 10e9/L    Abs Immature Granulocytes 0.0 0 - 0.4 10e9/L   Comprehensive metabolic panel   Result Value Ref Range    Sodium 138 133 - 144 mmol/L    Potassium 3.4 3.4 - 5.3  mmol/L    Chloride 101 94 - 109 mmol/L    Carbon Dioxide 30 20 - 32 mmol/L    Anion Gap 7 3 - 14 mmol/L    Glucose 96 70 - 99 mg/dL    Urea Nitrogen 14 7 - 30 mg/dL    Creatinine 0.62 0.52 - 1.04 mg/dL    GFR Estimate >90 >60 mL/min/1.7m2    GFR Estimate If Black >90 >60 mL/min/1.7m2    Calcium 8.8 8.5 - 10.1 mg/dL    Bilirubin Total 0.5 0.2 - 1.3 mg/dL    Albumin 4.0 3.4 - 5.0 g/dL    Protein Total 7.6 6.8 - 8.8 g/dL    Alkaline Phosphatase 142 40 - 150 U/L    ALT 44 0 - 50 U/L    AST 38 0 - 45 U/L   INR   Result Value Ref Range    INR 0.93 0.86 - 1.14   PTT   Result Value Ref Range    PTT 25 22 - 37 sec   ABO/Rh type and screen   Result Value Ref Range    ABO A     RH(D) Pos     Antibody Screen Neg     Test Valid Only At [facility]        Specimen Expires 12/03/2017    Head CT w/o contrast    Narrative    CT HEAD W/O CONTRAST  11/30/2017 5:57 PM    HISTORY: Headache.    TECHNIQUE: Scans were obtained through the head without IV contrast.   Radiation dose for this scan was reduced using automated exposure  control, adjustment of the mA and/or kV according to patient size, or  iterative reconstruction technique.    COMPARISON: Head CT 8/11/2016.    FINDINGS: No hemorrhage, mass lesion, or focal area of acute  infarction identified. Paranasal sinuses are normal. No bony  abnormality.      Impression    IMPRESSION: Negative CT scan of the head. No interval change.    RANDAL RIVAS MD         Assessments & Plan (with Medical Decision Making)   Charlee Marks is a 62 year old female who presents to the department for evaluation after receiving preliminary results from recent CBC which included a low platelet count. Patient was told that her platelets valued at 8, significantly lower than her value of 31 on 11/16/17. She admits that she had a strange sensation in her nose yesterday which has been an aura for migraine headaches in the past, but instead she has only had a mild frontal headache which improves  with acetaminophen. Her CT scan of head/brain is without identifiable intracranial hemorrhage or other acute pathology. We did discuss the duration of her headache but atypical nature for intracranial hemorrhage and possible complications from performing a lumbar puncture for low suspicion patient agrees with plan to defer. CBC value in the department reveals a platelet count of 15, her oncologist Dr. Rankin was consulted and requested that patient receive 400 mg/kg of IVIG and 40 mg of IV dexamethasone in the department. She believes that the patient is safe for discharge and will plan to see the patient at clinic tomorrow for continued medication management. Patient will be signed out to overnight physician Dr. Adams for reevaluation before discharge after IVIG infusion.    Prior to discharge, I made it clear that illness can unexpectedly develop/progress so she has been instructed to return to the emergency department for reevaluation of evolving symptoms, change in severity, bleeding, bruising, or other concerns. She seems comfortable with the discharge plan we discussed including follow up with oncology clinic tomorrow.    Disclaimer: This note consists of symbols derived from keyboarding, dictation, and/or voice recognition software. As a result, there may be errors in the script that have gone undetected.  Please consider this when interpreting information found in the chart.        I have reviewed the nursing notes.    I have reviewed the findings, diagnosis, plan and need for follow up with the patient.       New Prescriptions    No medications on file       Final diagnoses:   Thrombocytopenia (H)   Other headache syndrome       11/30/2017   [facility] EMERGENCY DEPARTMENT     Jakob Van, DO  11/30/17 2001         Jakob Van, DO  12/01/17 0045

## 2017-11-30 NOTE — ED AVS SNAPSHOT
[facility] Emergency Department    [address] [address]    Phone:  [phone]    Fax:  [phone]                                       Charlee Marks   MRN: [id number]    Department:  [facility] Emergency Department   Date of Visit:  11/30/2017           Patient Information     Date Of Birth          1955        Your diagnoses for this visit were:     Thrombocytopenia (H)     Other headache syndrome        You were seen by Jakob Van DO.      Follow-up Information     Follow up with Kerrie Rankin MD. Call in 1 day.    Specialties:  Oncology, Hematology    Why:  Call tomorrow morning to schedule an clinic appointment and management plan.    Contact information:    [facility]  [address] [address] [phone]        Discharge References/Attachments     THROMBOCYTOPENIA (ENGLISH)      Future Appointments        Provider Department Dept Phone Center    12/14/2017 3:30 PM CL Lab [facility] [place] [phone] [facility]    10/18/2018 8:00 AM Lab [facility] Lab [phone] [facility]    10/18/2018 9:00 AM Agustin Le MD [facility] Hepatology [phone] [facility]      24 Hour Appointment Hotline       To make an appointment at any [facility], call [phone] ([phone]). If you don't have a family doctor or clinic, we will help you find one. [facility] are conveniently located to serve the needs of you and your family.             Review of your medicines      Our records show that you are taking the medicines listed below. If these are incorrect, please call your family doctor or clinic.        Dose / Directions Last dose taken    albuterol 108 (90 BASE) MCG/ACT Inhaler   Commonly known as:  PROAIR HFA/PROVENTIL HFA/VENTOLIN HFA   Dose:  2 puff   Quantity:  1 Inhaler        Inhale 2 puffs into the lungs every 4 hours as needed for shortness of breath / dyspnea   Refills:  5        B-6 100 MG Tabs   Dose:  1 tablet    Indication:  Disease of the Peripheral Nerves        Take 1 tablet by mouth daily   Refills:  0        BENADRYL PO   Dose:  25 mg        Take 25 mg by mouth every 6 hours as needed   Refills:  0        calcium carbonate-vitamin D 500-400 MG-UNIT Tabs per tablet   Dose:  1 tablet        Take 1 tablet by mouth daily   Refills:  0        GLUCOSAMINE CHONDRO COMPLEX OR        daily   Refills:  0        hydrochlorothiazide 25 MG tablet   Commonly known as:  HYDRODIURIL   Dose:  25 mg   Quantity:  90 tablet        Take 1 tablet (25 mg) by mouth daily   Refills:  1        loratadine 10 MG tablet   Commonly known as:  CLARITIN   Quantity:  90 Tab        ONE DAILY   Refills:  3        MULTIVITAMIN TABS   OR        1 TABLET DAILY   Refills:  0        omega 3 1000 MG Caps        daily   Refills:  0        ranitidine 150 MG tablet   Commonly known as:  ZANTAC   Quantity:  180 Tab        ONE TABLET TWICE DAILY as needed will call when needed   Refills:  3        TYLENOL PO   Dose:  1000 mg        Take 1,000 mg by mouth At Bedtime   Refills:  0                Procedures and tests performed during your visit     ABO/Rh type and screen    CBC with platelets, differential    Comprehensive metabolic panel    Head CT w/o contrast    INR    PTT      Orders Needing Specimen Collection     None      Pending Results     Date and Time Order Name Status Description    11/30/2017 1516 CBC WITH PLATELETS DIFFERENTIAL In process             Pending Culture Results     No orders found for last 3 day(s).            Pending Results Instructions     If you had any lab results that were not finalized at the time of your Discharge, you can call the ED Lab Result RN at [phone]. You will be contacted by this team for any positive Lab results or changes in treatment. The nurses are available 7 days a week from 10A to 6:30P.  You can leave a message 24 hours per day and they will return your call.        Test Results From Your Hospital Stay         11/30/2017  6:09 PM      Component Results     Component Value Ref Range & Units Status    WBC 10.4 4.0 - 11.0 10e9/L Final    RBC Count 4.63 3.8 - 5.2 10e12/L Final    Hemoglobin 13.6 11.7 - 15.7 g/dL Final    Hematocrit 41.2 35.0 - 47.0 % Final    MCV 89 78 - 100 fl Final    MCH 29.4 26.5 - 33.0 pg Final    MCHC 33.0 31.5 - 36.5 g/dL Final    RDW 13.9 10.0 - 15.0 % Final    Platelet Count 15 (LL) 150 - 450 10e9/L Final    This result has been called to . by Agustin Paul on 11 30 2017 at 1809, and has   been read back.   Consistent with previous critical result      Diff Method Automated Method  Final    % Neutrophils 47.1 % Final    % Lymphocytes 38.4 % Final    % Monocytes 11.8 % Final    % Eosinophils 1.7 % Final    % Basophils 0.8 % Final    % Immature Granulocytes 0.2 % Final    Absolute Neutrophil 4.9 1.6 - 8.3 10e9/L Final    Absolute Lymphocytes 4.0 0.8 - 5.3 10e9/L Final    Absolute Monocytes 1.2 0.0 - 1.3 10e9/L Final    Absolute Eosinophils 0.2 0.0 - 0.7 10e9/L Final    Absolute Basophils 0.1 0.0 - 0.2 10e9/L Final    Abs Immature Granulocytes 0.0 0 - 0.4 10e9/L Final         11/30/2017  6:18 PM      Component Results     Component Value Ref Range & Units Status    Sodium 138 133 - 144 mmol/L Final    Potassium 3.4 3.4 - 5.3 mmol/L Final    Chloride 101 94 - 109 mmol/L Final    Carbon Dioxide 30 20 - 32 mmol/L Final    Anion Gap 7 3 - 14 mmol/L Final    Glucose 96 70 - 99 mg/dL Final    Urea Nitrogen 14 7 - 30 mg/dL Final    Creatinine 0.62 0.52 - 1.04 mg/dL Final    GFR Estimate >90 >60 mL/min/1.7m2 Final    Non  GFR Calc    GFR Estimate If Black >90 >60 mL/min/1.7m2 Final    African American GFR Calc    Calcium 8.8 8.5 - 10.1 mg/dL Final    Bilirubin Total 0.5 0.2 - 1.3 mg/dL Final    Albumin 4.0 3.4 - 5.0 g/dL Final    Protein Total 7.6 6.8 - 8.8 g/dL Final    Alkaline Phosphatase 142 40 - 150 U/L Final    ALT 44 0 - 50 U/L Final    AST 38 0 - 45 U/L Final         11/30/2017  6:04 PM       Component Results     Component Value Ref Range & Units Status    INR 0.93 0.86 - 1.14 Final         11/30/2017  6:04 PM      Component Results     Component Value Ref Range & Units Status    PTT 25 22 - 37 sec Final         11/30/2017  7:59 PM      Component Results     Component Value Ref Range & Units Status    ABO A  Final    RH(D) Pos  Final    Antibody Screen Neg  Final    Test Valid Only At [facility]     Final    Specimen Expires 12/03/2017  Final         11/30/2017  6:00 PM      Narrative     CT HEAD W/O CONTRAST  11/30/2017 5:57 PM    HISTORY: Headache.    TECHNIQUE: Scans were obtained through the head without IV contrast.   Radiation dose for this scan was reduced using automated exposure  control, adjustment of the mA and/or kV according to patient size, or  iterative reconstruction technique.    COMPARISON: Head CT 8/11/2016.    FINDINGS: No hemorrhage, mass lesion, or focal area of acute  infarction identified. Paranasal sinuses are normal. No bony  abnormality.        Impression     IMPRESSION: Negative CT scan of the head. No interval change.    RANDAL RIVAS MD                Thank you for choosing [facility]       Thank you for choosing [facility] for your care. Our goal is always to provide you with excellent care. Hearing back from our patients is one way we can continue to improve our services. Please take a few minutes to complete the written survey that you may receive in the mail after you visit with us. Thank you!        [employer] Information     [employer] gives you secure access to your electronic health record. If you see a primary care provider, you can also send messages to your care team and make appointments. If you have questions, please call your primary care clinic.  If you do not have a primary care provider, please call [phone] and they will assist you.        Care EveryWhere ID     This is your Care EveryWhere ID. This could be used by other organizations to  access your [place] medical records  [id number]        Equal Access to Services     LAVERN CABA : Mary Grace William, chantal bernal, julieta ambriz. So [facility] [phone].    ATENCIÓN: Si habla español, tiene a cosme disposición servicios gratuitos de asistencia lingüística. Llame al [phone].    We comply with applicable federal civil rights laws and [place] laws. We do not discriminate on the basis of race, color, national origin, age, disability, sex, sexual orientation, or gender identity.            After Visit Summary       This is your record. Keep this with you and show to your community pharmacist(s) and doctor(s) at your next visit.

## 2017-12-01 ENCOUNTER — HOSPITAL ENCOUNTER (OUTPATIENT)
Dept: LAB | Facility: CLINIC | Age: 62
End: 2017-12-01
Attending: INTERNAL MEDICINE
Payer: COMMERCIAL

## 2017-12-01 VITALS
WEIGHT: 293 LBS | SYSTOLIC BLOOD PRESSURE: 123 MMHG | DIASTOLIC BLOOD PRESSURE: 68 MMHG | RESPIRATION RATE: 9 BRPM | HEART RATE: 83 BPM | BODY MASS INDEX: 47.14 KG/M2 | TEMPERATURE: 98.8 F | OXYGEN SATURATION: 96 %

## 2017-12-01 DIAGNOSIS — D69.3 IDIOPATHIC THROMBOCYTOPENIC PURPURA (H): ICD-10-CM

## 2017-12-01 LAB
BASOPHILS # BLD AUTO: 0 10E9/L (ref 0–0.2)
BASOPHILS # BLD AUTO: 0.1 10E9/L (ref 0–0.2)
BASOPHILS NFR BLD AUTO: 0.1 %
BASOPHILS NFR BLD AUTO: 0.8 %
DIFFERENTIAL METHOD BLD: ABNORMAL
DIFFERENTIAL METHOD BLD: ABNORMAL
EOSINOPHIL # BLD AUTO: 0 10E9/L (ref 0–0.7)
EOSINOPHIL # BLD AUTO: 0.2 10E9/L (ref 0–0.7)
EOSINOPHIL NFR BLD AUTO: 0 %
EOSINOPHIL NFR BLD AUTO: 1.7 %
ERYTHROCYTE [DISTWIDTH] IN BLOOD BY AUTOMATED COUNT: 13.8 % (ref 10–15)
ERYTHROCYTE [DISTWIDTH] IN BLOOD BY AUTOMATED COUNT: 14.6 % (ref 10–15)
HCT VFR BLD AUTO: 41 % (ref 35–47)
HCT VFR BLD AUTO: 41.8 % (ref 35–47)
HGB BLD-MCNC: 13.4 G/DL (ref 11.7–15.7)
HGB BLD-MCNC: 13.5 G/DL (ref 11.7–15.7)
IMM GRANULOCYTES # BLD: 0 10E9/L (ref 0–0.4)
IMM GRANULOCYTES # BLD: 0 10E9/L (ref 0–0.4)
IMM GRANULOCYTES NFR BLD: 0.2 %
IMM GRANULOCYTES NFR BLD: 0.3 %
LYMPHOCYTES # BLD AUTO: 1.9 10E9/L (ref 0.8–5.3)
LYMPHOCYTES # BLD AUTO: 3.9 10E9/L (ref 0.8–5.3)
LYMPHOCYTES NFR BLD AUTO: 26.3 %
LYMPHOCYTES NFR BLD AUTO: 39 %
MCH RBC QN AUTO: 29.3 PG (ref 26.5–33)
MCH RBC QN AUTO: 29.4 PG (ref 26.5–33)
MCHC RBC AUTO-ENTMCNC: 32.1 G/DL (ref 31.5–36.5)
MCHC RBC AUTO-ENTMCNC: 32.9 G/DL (ref 31.5–36.5)
MCV RBC AUTO: 89 FL (ref 78–100)
MCV RBC AUTO: 91 FL (ref 78–100)
MONOCYTES # BLD AUTO: 0.1 10E9/L (ref 0–1.3)
MONOCYTES # BLD AUTO: 1.1 10E9/L (ref 0–1.3)
MONOCYTES NFR BLD AUTO: 1.4 %
MONOCYTES NFR BLD AUTO: 10.7 %
NEUTROPHILS # BLD AUTO: 4.8 10E9/L (ref 1.6–8.3)
NEUTROPHILS # BLD AUTO: 5.3 10E9/L (ref 1.6–8.3)
NEUTROPHILS NFR BLD AUTO: 47.6 %
NEUTROPHILS NFR BLD AUTO: 71.9 %
PLATELET # BLD AUTO: 13 10E9/L (ref 150–450)
PLATELET # BLD AUTO: 45 10E9/L (ref 150–450)
PLATELET # BLD EST: ABNORMAL 10*3/UL
PLATELET # BLD EST: ABNORMAL 10*3/UL
RBC # BLD AUTO: 4.58 10E12/L (ref 3.8–5.2)
RBC # BLD AUTO: 4.59 10E12/L (ref 3.8–5.2)
RBC MORPH BLD: NORMAL
RBC MORPH BLD: NORMAL
WBC # BLD AUTO: 10.1 10E9/L (ref 4–11)
WBC # BLD AUTO: 7.3 10E9/L (ref 4–11)

## 2017-12-01 PROCEDURE — 25000132 ZZH RX MED GY IP 250 OP 250 PS 637

## 2017-12-01 PROCEDURE — 36415 COLL VENOUS BLD VENIPUNCTURE: CPT | Performed by: INTERNAL MEDICINE

## 2017-12-01 PROCEDURE — 85025 COMPLETE CBC W/AUTO DIFF WBC: CPT | Performed by: INTERNAL MEDICINE

## 2017-12-01 PROCEDURE — 96366 THER/PROPH/DIAG IV INF ADDON: CPT | Performed by: STUDENT IN AN ORGANIZED HEALTH CARE EDUCATION/TRAINING PROGRAM

## 2017-12-01 RX ORDER — ACETAMINOPHEN 325 MG/1
975 TABLET ORAL ONCE
Status: COMPLETED | OUTPATIENT
Start: 2017-12-01 | End: 2017-12-01

## 2017-12-01 RX ORDER — ACETAMINOPHEN 325 MG/1
TABLET ORAL
Status: COMPLETED
Start: 2017-12-01 | End: 2017-12-01

## 2017-12-01 RX ORDER — DEXAMETHASONE 4 MG/1
40 TABLET ORAL DAILY
Qty: 30 TABLET | Refills: 0 | Status: SHIPPED | OUTPATIENT
Start: 2017-12-01 | End: 2017-12-04

## 2017-12-01 RX ADMIN — ACETAMINOPHEN 975 MG: 325 TABLET ORAL at 02:45

## 2017-12-01 RX ADMIN — ACETAMINOPHEN 975 MG: 325 TABLET, FILM COATED ORAL at 02:45

## 2017-12-01 NOTE — TELEPHONE ENCOUNTER
Pt called this morning to check in after going to the ER, as she was supposed to call to set up an appt. Set up for Monday with labs prior. Will review with Dr. Rankin to make sure nothing else is needed. Pt verbalized understanding.

## 2017-12-01 NOTE — ED NOTES
Patient given another warm pack    Tylenol given for mild headache.   Patient walked to   Then resettled  In bed

## 2017-12-01 NOTE — TELEPHONE ENCOUNTER
Dr. Rankin called this morning and wants pt to have labs this morning right away to see if IVIG is required. Will plan for pt to have dexamethasone regardless 40 mg x 3 days. Called to update pt and pt will come here shortly for a quicker results. Dr. Rankin would like to be paged with the results. Pt verbalized understanding.     Platelet count was 45, plan to have pt continue with the 3 days of steroids and f/u on Monday with labs prior. Pt verbalized understanding.

## 2017-12-01 NOTE — ED NOTES
Patient  Vital signs  Changed to  Every 30 minutes   Patient given water   Rate change on  medication

## 2017-12-01 NOTE — ED NOTES
SaO2 dropping while pt slept placed on 2lpm NC  Pt reports she has been on oxygen at night while in [facility] hospital in the past denies sleep apnea has not had a sleep study completed

## 2017-12-03 NOTE — PROGRESS NOTES
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      HISTORY OF PRESENT ILLNESS:  she presented with 2-3 months of easy bruising and also fatigue.  She presented to her Primary Care Physician's office on 07/25/2016 and was found to have critical thrombocytopenia with a platelet count of 3.    She was offered IVIG 1 gram on 7/25/2016 and 4 days of   Dexamethasone. She was d/c with PL of 38 on 7/27/2016. PL dip to 13 on 8/4 and 7 on 8/11. She was offered wklyI IVIG 1g/kg starting 8/4/2016, and 2nd cycle of dex x 4 days on 8/11/2016. PL improves to 77 on 8/15, then dip to 31 on 8/18.   Due to rapid drop of her PL when off dex, tx was changed to po prednisone 8/18/2017 with wkly IVIG. R was added on 8/30/2016. She had no PL response despite R, steroid and IVIG.   Nplate was started 9/23/2016. PL up from 20 to 110 after 2 doses of it. Then dip down again.   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   Course was complicated with portal vein and SMV thromboses end of 12/2016. She was admitted to , had IR thrombectomy and catheter-directed lytic therapy to portal vein. Following the procedure she was placed on high intensity heparin drip which was then bridged with warfarin starting 1/9/2017 with an INR goal of 2-3.   Due to progression of portal venous branch thrombosis and SMV thrombosis by CT in 7/2017. Tx is changed to Lovenox. She could not tolerates the shots. tx was changed to Xarelto.     She finished prednisone slow taper 4/2017.      PL dip down to around 50's in Aug 2017. Xarelto put on hold in Sep 2017 due to PL less than 50 and stable SMV thromboses, she also had acne type of rash.      She saw [facility] as 2nd opinion, Dr. Key had nl BM without fibrosis at [facility], ITP dx is assured.  Cyclosporine was also recommended to start at 200 mg po qd if PL less than 30, with nl renal function, and adjust the dose.     She elected to be observed due to concern of side effects from chemo and immunosuppression agents. PL  "dip to 13-15 on 11/30/2017 required dex 40 mg 4 days and IVIG, PL up to 190 on 12/4/2017.     PAST MEDICAL HISTORY:      1.  Cystocele.    2.  Morbid obesity.    3.  Reflux.    4.  Intermittent asthma.    5.  Effusion of lower leg joints.    6. 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    Mother had anemia, great aunt had anemia.       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  She denies the use of alcohol or tobacco.      ROS  She is back to her baseline energy level.   She denies bleeding episode.     She reports HA from IVIG, maybe also a little from dex.        PHYSICAL EXAMINATION:    VITAL SIGNS:  Blood pressure 131/70, pulse 73, temperature 98.6  F (37  C), temperature source Tympanic, height 1.689 m (5' 6.5\"), weight (!) 136.4 kg (300 lb 9.6 oz), last menstrual period 09/15/2007, SpO2 97 %, not currently breastfeeding.    GENERAL APPEARANCE:  A middle-aged woman who appears her stated age, very pleasant, morbidly obese, sitting comfortably in a chair and knitting.    HEENT: The patient is normocephalic, atraumatic. Pupils are equally reactive to light.  Sclerae are anicteric.  There is moist oral mucosa, negative pharynx, no oral thrush.    NECK:  Supple, no jugular venous distention, thyroid not palpable.    LYMPH NODES:  Superficial lymphadenopathy is not appreciable in the bilateral cervical, supraclavicular, axillary or inguinal areas.    CARDIOVASCULAR:  S1, S2 regular with no murmurs or gallops, no carotid or abdominal bruits.    PULMONARY:  Lungs are clear to auscultation and percussion bilaterally.  There is no wheezing or rhonchi.    GASTROINTESTINAL:  Abdomen is soft, nontender with no hepatosplenomegaly, no signs of ascites and no mass appreciable.    MUSCULOSKELETAL/EXTREMITIES:  No edema, no " cyanotic changes, no signs of joint deformity, no lymphedema.    NEUROLOGIC:  Cranial nerves II-XII are grossly intact, sensation intact, muscle strength and muscle tone symmetrical, 5/5 throughout.    BACK:  No spinal or paraspinal tenderness, no CVA tenderness.    SKIN:  No petechiae, no rash and no signs of cellulitis.  There is scattered bruising on forearms and thighs, which are healing.        LABORATORY DATA reviewed  PL 12/4 at 190.  PL in teens around 11/30, then had IVIG 400 mg /kg and dex 40 mg po qd x4.  around 40 in the last wks, LFT/Cr nl in 10/2017      in 7/2107 from nl 6/2017  Hb nl, wbc/diff nl.     BM at [facility] 10/2017 - nl including cyto.     Current Image reviewed  CT head 11/30/2017 - no bleeding.     CT a/P 9/2017: No significant change. Findings suggest chronic thrombosis of the left portal vein, posterior branch of the right portal vein and branches of the superior mesenteric vein as above.    CT a/p 7/2017 : Progression of left portal venous branch thrombosis and SMV thrombosis is seen compared to the prior study.    US 4/2017  1.  Recanalized main portal and splenic veins. Persistent occlusion of left portal vein.  2.  Splenectomy.    CT 1/9/2017   1. Status post splenectomy with postsurgical changes in the left upper quadrant. Persistent thrombosis of the splenic vein.  2. Main portal vein now appears patent with possible peripheral nonocclusive thrombosis/periportal edema. Residual thrombosis of portion of the left branch of the portal vein and the right posterior  branch of the portal vein.  3. Residual partial thrombosis of the distal portion of the superior mesenteric vein.   4. Small focal nodular opacities in both lower lobes, likely of infectious etiology.    Old data reviewed with summary  12/31/2016 CT abd - thrombosis of the portal vein, splenic vein.    Right after splenectomy 12/13/2016 wbc 14, neutrophiila, PL >600, Hb 9.2  PL at 91 on 12/8/2016, at 51 in 11/2016 at 28  from 43 from 110 early Oct, from 31 on 8/18, at 77 on 8/15, at 7 on 8/11, at 14 on 8/4/2016, at 38 7/27/2016  On 07/26/2016 platelets 8, lymphopenia with lymphocyte count 0.6.    On 07/25/2016 platelets 3.   smear: no dysplasia or schistocytes.     HepB DNA by PCR 10/2016 : negative    9/2016 H. Pylori breath test/stool antigen: negative.     8/2016 hep Bs Ag -,BsAb+, BcAb+, HepC -, for which she saw U[facility] and started on hepB med Tenofovir 9/8/2016.     CT a/p 10/2016: The spleen is borderline enlarged at 13.3 cm.   CT neck 9/15/2016: No enlarged or necrotic-appearing cervical lymph nodes are seen. No metastatic lesions are identified.  CT head 8/11: negative.           ASSESSMENT/PLAN:    1.  Refractory ITP dx first in 7/2016 . Failed dexa +/- R, IVIG, Nplate  PL up to > 600 post splenectomy in 12/2016.   S/p prednisone taper off in 4/2017.  PL dip down below 50 in 9/2017. Then around 40's after.   PL dip to 13-15 on 11/30/2017 required dex 40 mg 4 days and IVIG. Then up to 190 on 12/4/2017.     She saw [facility] as 2nd opinion in 10/2017, Dr. Key had nl BM without fibrosis at [facility], ITP dx is assures.  Cyclosporine is also recommended to start at 200 mg po qd if PL less than 30,  with nl renal function, and adjust the dose.       We discussed the tx option:   Azathioprine or cyclosporin immunosuppression.   Cytoxan 750mg - 1g/m2 IV q month or po (maybe 100mg/m2 D1-14 q 28days)  vicristine 1-2mg IV wkly for several wks  vinblastin 0.1mg/kg  combination chemo -CVP, CVP+ -16.  Promacta po - 3-4% risk of thrombosis    The side effects associated with the above options. This is a tough situation. The top choices are oral CTX, cyclosporine, or Promacta or wkly IVIG prn.     She is on q wk cbc check. IVIG 0.5 g/kg prn if PL less than 50 for now.     2. + hepBcAb, - HepBsAg, HepBsAg-,HebB DNA - by PCR, she saw GI due to the use of Rituxan, on Tenofovir 9/8/2016 till 4/2017.    Advice limit Tylenol intake.     3. 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 S/p thrombolysis to portal vein, lovenox. Has been on Coumadin, lovenox again, Xarelto for 9 months.   Her clots are chronic and stable per CT 9/2017.   Due to her severe low PL. Recommend off anticoagulation in Sep.    Advise to resume full dose ASA on 12/4/2017 due to PL rebound to 190.

## 2017-12-04 ENCOUNTER — ONCOLOGY VISIT (OUTPATIENT)
Dept: ONCOLOGY | Facility: CLINIC | Age: 62
End: 2017-12-04
Attending: INTERNAL MEDICINE
Payer: COMMERCIAL

## 2017-12-04 ENCOUNTER — HOSPITAL ENCOUNTER (OUTPATIENT)
Dept: LAB | Facility: CLINIC | Age: 62
Discharge: HOME | End: 2017-12-04
Attending: INTERNAL MEDICINE | Admitting: INTERNAL MEDICINE
Payer: COMMERCIAL

## 2017-12-04 VITALS
HEART RATE: 73 BPM | BODY MASS INDEX: 47.09 KG/M2 | OXYGEN SATURATION: 97 % | SYSTOLIC BLOOD PRESSURE: 131 MMHG | HEIGHT: 66 IN | WEIGHT: 293 LBS | TEMPERATURE: 98.6 F | DIASTOLIC BLOOD PRESSURE: 70 MMHG

## 2017-12-04 DIAGNOSIS — Z90.81 S/P SPLENECTOMY: ICD-10-CM

## 2017-12-04 DIAGNOSIS — D69.3 IDIOPATHIC THROMBOCYTOPENIC PURPURA (H): Primary | ICD-10-CM

## 2017-12-04 DIAGNOSIS — D69.3 IDIOPATHIC THROMBOCYTOPENIC PURPURA (H): ICD-10-CM

## 2017-12-04 DIAGNOSIS — I81 PORTAL VEIN THROMBOSIS: ICD-10-CM

## 2017-12-04 DIAGNOSIS — K75.9 HEPATITIS: ICD-10-CM

## 2017-12-04 LAB
BASOPHILS # BLD AUTO: 0 10E9/L (ref 0–0.2)
BASOPHILS NFR BLD AUTO: 0 %
DIFFERENTIAL METHOD BLD: NORMAL
EOSINOPHIL # BLD AUTO: 0 10E9/L (ref 0–0.7)
EOSINOPHIL NFR BLD AUTO: 0 %
ERYTHROCYTE [DISTWIDTH] IN BLOOD BY AUTOMATED COUNT: 14.6 % (ref 10–15)
HCT VFR BLD AUTO: 40.8 % (ref 35–47)
HGB BLD-MCNC: 13.3 G/DL (ref 11.7–15.7)
IMM GRANULOCYTES # BLD: 0 10E9/L (ref 0–0.4)
IMM GRANULOCYTES NFR BLD: 0.4 %
LYMPHOCYTES # BLD AUTO: 2.3 10E9/L (ref 0.8–5.3)
LYMPHOCYTES NFR BLD AUTO: 21.5 %
MCH RBC QN AUTO: 29.3 PG (ref 26.5–33)
MCHC RBC AUTO-ENTMCNC: 32.6 G/DL (ref 31.5–36.5)
MCV RBC AUTO: 90 FL (ref 78–100)
MONOCYTES # BLD AUTO: 1 10E9/L (ref 0–1.3)
MONOCYTES NFR BLD AUTO: 9.2 %
NEUTROPHILS # BLD AUTO: 7.3 10E9/L (ref 1.6–8.3)
NEUTROPHILS NFR BLD AUTO: 68.9 %
PLATELET # BLD AUTO: 190 10E9/L (ref 150–450)
RBC # BLD AUTO: 4.54 10E12/L (ref 3.8–5.2)
WBC # BLD AUTO: 10.6 10E9/L (ref 4–11)

## 2017-12-04 PROCEDURE — 99214 OFFICE O/P EST MOD 30 MIN: CPT | Performed by: INTERNAL MEDICINE

## 2017-12-04 PROCEDURE — 85025 COMPLETE CBC W/AUTO DIFF WBC: CPT | Performed by: INTERNAL MEDICINE

## 2017-12-04 PROCEDURE — 99211 OFF/OP EST MAY X REQ PHY/QHP: CPT

## 2017-12-04 PROCEDURE — 36415 COLL VENOUS BLD VENIPUNCTURE: CPT | Performed by: INTERNAL MEDICINE

## 2017-12-04 ASSESSMENT — PAIN SCALES - GENERAL: PAINLEVEL: MILD PAIN (2)

## 2017-12-04 NOTE — PATIENT INSTRUCTIONS
Dr. Rankin would like you to have weekly labs and resume Aspirin 325 mg daily. We would like to see you back in for a follow up appointment as needed pending labs. When you are in need of a refill, please call your pharmacy and they will send us a request.  Copy of appointments, and after visit summary (AVS) given to patient.  If you have any questions please call Karla Scott RN, BSN Oncology Hematology  [facility] [phone]. For questions after business hours, or on holidays/weekends, please call our after hours Nurse Triage line [phone]. Thank you.           Wkly cbc diff. Resume  mg po qd.  F/u prn.

## 2017-12-04 NOTE — NURSING NOTE
"Oncology Rooming Note    December 4, 2017 8:13 AM   Charlee Marks is a 62 year old female who presents for:    Chief Complaint   Patient presents with     Hematology     Recheck ITP, review Labs for low platelets     Initial Vitals: /70 (BP Location: Right arm, Patient Position: Sitting, Cuff Size: Adult Large)  Pulse 73  Temp 98.6  F (37  C) (Tympanic)  Ht 1.689 m (5' 6.5\")  Wt (!) 136.4 kg (300 lb 9.6 oz)  LMP 09/15/2007  SpO2 97%  Breastfeeding? No  BMI 47.8 kg/m2 Estimated body mass index is 47.8 kg/(m^2) as calculated from the following:    Height as of this encounter: 1.689 m (5' 6.5\").    Weight as of this encounter: 136.4 kg (300 lb 9.6 oz). Body surface area is 2.53 meters squared.  Mild Pain (2) Comment: Data Unavailable   Patient's last menstrual period was 09/15/2007.  Allergies reviewed: Yes  Medications reviewed: Yes    Medications: Medication refills not needed today.  Pharmacy name entered into [facility]: [facility] [place], [place] - [address]    Clinical concerns: Recheck ITP, review Labs for low platelets.     Patient reports she has a headache x 6 days.   Family history of Anemia on Maternal side.     7 minutes for nursing intake (face to face time)     Lorena Wei CMA              "

## 2017-12-04 NOTE — LETTER
12/4/2017         RE: Charlee Marks  [address]  [place] [address]        Dear Colleague,    Thank you for referring your patient, Charlee Marks, to the [facility]. Please see a copy of my visit note below.        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      HISTORY OF PRESENT ILLNESS:  she presented with 2-3 months of easy bruising and also fatigue.  She presented to her Primary Care Physician's office on 07/25/2016 and was found to have critical thrombocytopenia with a platelet count of 3.    She was offered IVIG 1 gram on 7/25/2016 and 4 days of   Dexamethasone. She was d/c with PL of 38 on 7/27/2016. PL dip to 13 on 8/4 and 7 on 8/11. She was offered wklyI IVIG 1g/kg starting 8/4/2016, and 2nd cycle of dex x 4 days on 8/11/2016. PL improves to 77 on 8/15, then dip to 31 on 8/18.   Due to rapid drop of her PL when off dex, tx was changed to po prednisone 8/18/2017 with wkly IVIG. R was added on 8/30/2016. She had no PL response despite R, steroid and IVIG.   Nplate was started 9/23/2016. PL up from 20 to 110 after 2 doses of it. Then dip down again.   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   Course was complicated with portal vein and SMV thromboses end of 12/2016. She was admitted to , had IR thrombectomy and catheter-directed lytic therapy to portal vein. Following the procedure she was placed on high intensity heparin drip which was then bridged with warfarin starting 1/9/2017 with an INR goal of 2-3.   Due to progression of portal venous branch thrombosis and SMV thrombosis by CT in 7/2017. Tx is changed to Lovenox. She could not tolerates the shots. tx was changed to Xarelto.     She finished prednisone slow taper 4/2017.      PL dip down to around 50's in Aug 2017. Xarelto put on hold in Sep 2017 due to PL less than 50 and stable SMV thromboses, she also had acne type of rash.      She saw [facility] as 2nd opinion, Dr. Key had nl BM without fibrosis at [facility], ITP dx is  "assured.  Cyclosporine was also recommended to start at 200 mg po qd if PL less than 30, with nl renal function, and adjust the dose.     She elected to be observed due to concern of side effects from chemo and immunosuppression agents. PL dip to 13-15 on 11/30/2017 required dex 40 mg 4 days and IVIG, PL up to 190 on 12/4/2017.     PAST MEDICAL HISTORY:      1.  Cystocele.    2.  Morbid obesity.    3.  Reflux.    4.  Intermittent asthma.    5.  Effusion of lower leg joints.    6. 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    Mother had anemia, great aunt had anemia.       SOCIAL HISTORY:  The patient lives in [facility] and works in the school district.  She is  with children.  She denies the use of alcohol or tobacco.      ROS  She is back to her baseline energy level.   She denies bleeding episode.     She reports HA from IVIG, maybe also a little from dex.        PHYSICAL EXAMINATION:    VITAL SIGNS:  Blood pressure 131/70, pulse 73, temperature 98.6  F (37  C), temperature source Tympanic, height 1.689 m (5' 6.5\"), weight (!) 136.4 kg (300 lb 9.6 oz), last menstrual period 09/15/2007, SpO2 97 %, not currently breastfeeding.    GENERAL APPEARANCE:  A middle-aged woman who appears her stated age, very pleasant, morbidly obese, sitting comfortably in a chair and knitting.    HEENT: The patient is normocephalic, atraumatic. Pupils are equally reactive to light.  Sclerae are anicteric.  There is moist oral mucosa, negative pharynx, no oral thrush.    NECK:  Supple, no jugular venous distention, thyroid not palpable.    LYMPH NODES:  Superficial lymphadenopathy is not appreciable in the bilateral cervical, supraclavicular, axillary or inguinal areas.    CARDIOVASCULAR:  S1, S2 regular with no murmurs or gallops, no carotid or abdominal bruits.    PULMONARY:  Lungs are clear to " auscultation and percussion bilaterally.  There is no wheezing or rhonchi.    GASTROINTESTINAL:  Abdomen is soft, nontender with no hepatosplenomegaly, no signs of ascites and no mass appreciable.    MUSCULOSKELETAL/EXTREMITIES:  No edema, no cyanotic changes, no signs of joint deformity, no lymphedema.    NEUROLOGIC:  Cranial nerves II-XII are grossly intact, sensation intact, muscle strength and muscle tone symmetrical, 5/5 throughout.    BACK:  No spinal or paraspinal tenderness, no CVA tenderness.    SKIN:  No petechiae, no rash and no signs of cellulitis.  There is scattered bruising on forearms and thighs, which are healing.        LABORATORY DATA reviewed  PL 12/4 at 190.  PL in teens around 11/30, then had IVIG 400 mg /kg and dex 40 mg po qd x4.  around 40 in the last wks, LFT/Cr nl in 10/2017      in 7/2107 from nl 6/2017  Hb nl, wbc/diff nl.     BM at [facility] 10/2017 - nl including cyto.     Current Image reviewed  CT head 11/30/2017 - no bleeding.     CT a/P 9/2017: No significant change. Findings suggest chronic thrombosis of the left portal vein, posterior branch of the right portal vein and branches of the superior mesenteric vein as above.    CT a/p 7/2017 : Progression of left portal venous branch thrombosis and SMV thrombosis is seen compared to the prior study.    US 4/2017  1.  Recanalized main portal and splenic veins. Persistent occlusion of left portal vein.  2.  Splenectomy.    CT 1/9/2017   1. Status post splenectomy with postsurgical changes in the left upper quadrant. Persistent thrombosis of the splenic vein.  2. Main portal vein now appears patent with possible peripheral nonocclusive thrombosis/periportal edema. Residual thrombosis of portion of the left branch of the portal vein and the right posterior  branch of the portal vein.  3. Residual partial thrombosis of the distal portion of the superior mesenteric vein.   4. Small focal nodular opacities in both lower lobes, likely of  infectious etiology.    Old data reviewed with summary  12/31/2016 CT abd - thrombosis of the portal vein, splenic vein.    Right after splenectomy 12/13/2016 wbc 14, neutrophiila, PL >600, Hb 9.2  PL at 91 on 12/8/2016, at 51 in 11/2016 at 28 from 43 from 110 early Oct, from 31 on 8/18, at 77 on 8/15, at 7 on 8/11, at 14 on 8/4/2016, at 38 7/27/2016  On 07/26/2016 platelets 8, lymphopenia with lymphocyte count 0.6.    On 07/25/2016 platelets 3.   smear: no dysplasia or schistocytes.     HepB DNA by PCR 10/2016 : negative    9/2016 H. Pylori breath test/stool antigen: negative.     8/2016 hep Bs Ag -,BsAb+, BcAb+, HepC -, for which she saw UGI and started on hepB med Tenofovir 9/8/2016.     CT a/p 10/2016: The spleen is borderline enlarged at 13.3 cm.   CT neck 9/15/2016: No enlarged or necrotic-appearing cervical lymph nodes are seen. No metastatic lesions are identified.  CT head 8/11: negative.           ASSESSMENT/PLAN:    1.  Refractory ITP dx first in 7/2016 . Failed dexa +/- R, IVIG, Nplate  PL up to > 600 post splenectomy in 12/2016.   S/p prednisone taper off in 4/2017.  PL dip down below 50 in 9/2017. Then around 40's after.   PL dip to 13-15 on 11/30/2017 required dex 40 mg 4 days and IVIG. Then up to 190 on 12/4/2017.     She saw [facility] as 2nd opinion in 10/2017, Dr. Key had nl BM without fibrosis at [facility], ITP dx is assures.  Cyclosporine is also recommended to start at 200 mg po qd if PL less than 30,  with nl renal function, and adjust the dose.       We discussed the tx option:   Azathioprine or cyclosporin immunosuppression.   Cytoxan 750mg - 1g/m2 IV q month or po (maybe 100mg/m2 D1-14 q 28days)  vicristine 1-2mg IV wkly for several wks  vinblastin 0.1mg/kg  combination chemo -CVP, CVP+ -16.  Promacta po - 3-4% risk of thrombosis    The side effects associated with the above options. This is a tough situation. The top choices are oral CTX, cyclosporine, or Promacta or wkly IVIG prn.     She is  on q wk cbc check. IVIG prn if PL less than 30 for now.     2. + hepBcAb, - HepBsAg, HepBsAg-,HebB DNA - by PCR, she saw GI due to the use of Rituxan, on Tenofovir 9/8/2016 till 4/2017.    Advice limit Tylenol intake.     3. 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 S/p thrombolysis to portal vein, lovenox. Has been on Coumadin, lovenox again, Xarelto for 9 months.   Her clots are chronic and stable per CT 9/2017.   Due to her severe low PL. Recommend off anticoagulation in Sep.    Advise to resume full dose ASA on 12/4/2017 due to PL rebound to 190.         Again, thank you for allowing me to participate in the care of your patient.        Sincerely,        Kerrie Rankin MD, MD

## 2017-12-04 NOTE — MR AVS SNAPSHOT
After Visit Summary   12/4/2017    Charlee Marks    MRN: [id number]           Patient Information     Date Of Birth          1955        Visit Information        Provider Department      12/4/2017 8:00 AM Kerrie Rankin MD [facility]        Today's Diagnoses     ITP (idiopathic thrombocytopenic purpura)    -  1    Portal vein thrombosis        S/P splenectomy        Hepatitis          Care Instructions    Dr. Rankin would like you to have weekly labs and resume Aspirin 325 mg daily. We would like to see you back in for a follow up appointment as needed pending labs. When you are in need of a refill, please call your pharmacy and they will send us a request.  Copy of appointments, and after visit summary (AVS) given to patient.  If you have any questions please call Karla Scott RN, BSN Oncology Hematology  [facility] [phone]. For questions after business hours, or on holidays/weekends, please call our after hours Nurse Triage line [phone]. Thank you.           Wkly cbc diff. Resume  mg po qd.  F/u prn.           Follow-ups after your visit        Your next 10 appointments already scheduled     Dec 11, 2017  8:30 AM CST   LAB with CL LAB   [facility])    [address] [address]   [phone]           Please do not eat 10-12 hours before your appointment if you are coming in fasting for labs on lipids, cholesterol, or glucose (sugar). This does not apply to pregnant women. Water, hot tea and black coffee (with nothing added) are okay. Do not drink other fluids, diet soda or chew gum.            Dec 18, 2017  8:30 AM CST   LAB [facility] CL LAB   [facility]    [address] [address]   [phone]           Please do not eat 10-12 hours before your appointment if you are coming in fasting for labs on  lipids, cholesterol, or glucose (sugar). This does not apply to pregnant women. Water, hot tea and black coffee (with nothing added) are okay. Do not drink other fluids, diet soda or chew gum.            Dec 26, 2017  8:30 AM CST   LAB with CL LAB   [facility]    [address] [address]   [phone]           Please do not eat 10-12 hours before your appointment if you are coming in fasting for labs on lipids, cholesterol, or glucose (sugar). This does not apply to pregnant women. Water, hot tea and black coffee (with nothing added) are okay. Do not drink other fluids, diet soda or chew gum.            Jan 02, 2018  8:30 AM CST   LAB with CL LAB   [facility] ([facility])    [address] [address]   [phone]           Please do not eat 10-12 hours before your appointment if you are coming in fasting for labs on lipids, cholesterol, or glucose (sugar). This does not apply to pregnant women. Water, hot tea and black coffee (with nothing added) are okay. Do not drink other fluids, diet soda or chew gum.            Jan 08, 2018  8:30 AM CST   LAB with CL LAB   [facility] ([facility])    [address] [address]   [phone]           Please do not eat 10-12 hours before your appointment if you are coming in fasting for labs on lipids, cholesterol, or glucose (sugar). This does not apply to pregnant women. Water, hot tea and black coffee (with nothing added) are okay. Do not drink other fluids, diet soda or chew gum.            Jan 16, 2018  8:30 AM CST   LAB with CL LAB   [facility] ([facility])    [address] [address]   [phone]           Please do not eat 10-12 hours before your appointment if you are coming in fasting for labs on lipids, cholesterol, or  glucose (sugar). This does not apply to pregnant women. Water, hot tea and black coffee (with nothing added) are okay. Do not drink other fluids, diet soda or chew gum.            Jan 22, 2018  8:30 AM CST   LAB with CL LAB   [facility])    [address] [address]   [phone]           Please do not eat 10-12 hours before your appointment if you are coming in fasting for labs on lipids, cholesterol, or glucose (sugar). This does not apply to pregnant women. Water, hot tea and black coffee (with nothing added) are okay. Do not drink other fluids, diet soda or chew gum.            Jan 29, 2018  8:30 AM CST   LAB with CL LAB   [facility] ([facility])    [address] [address]   [phone]           Please do not eat 10-12 hours before your appointment if you are coming in fasting for labs on lipids, cholesterol, or glucose (sugar). This does not apply to pregnant women. Water, hot tea and black coffee (with nothing added) are okay. Do not drink other fluids, diet soda or chew gum.            Feb 05, 2018  8:30 AM CST   LAB with CL LAB   [facility] ([facility])    [address] [address]   [phone]           Please do not eat 10-12 hours before your appointment if you are coming in fasting for labs on lipids, cholesterol, or glucose (sugar). This does not apply to pregnant women. Water, hot tea and black coffee (with nothing added) are okay. Do not drink other fluids, diet soda or chew gum.            Feb 12, 2018  8:30 AM CST   LAB with CL LAB   [facility] ([facility])    [address] [address]   [phone]           Please do not eat 10-12 hours before your appointment if you are coming in fasting for labs on lipids, cholesterol, or glucose (sugar). This  "does not apply to pregnant women. Water, hot tea and black coffee (with nothing added) are okay. Do not drink other fluids, diet soda or chew gum.              Future tests that were ordered for you today     Open Standing Orders        Priority Remaining Interval Expires Ordered    CBC with platelets differential Routine 12/12 weekly 12/4/2018 12/4/2017            Who to contact     If you have questions or need follow up information about today's clinic visit or your schedule please contact [facility] directly at [phone].  Normal or non-critical lab and imaging results will be communicated to you by [email or web address], letter or phone within 4 business days after the clinic has received the results. If you do not hear from us within 7 days, please contact the clinic through [employer] or phone. If you have a critical or abnormal lab result, we will notify you by phone as soon as possible.  Submit refill requests through [employer] or call your pharmacy and they will forward the refill request to us. Please allow 3 business days for your refill to be completed.          Additional Information About Your Visit        [employer] Information     [employer] gives you secure access to your electronic health record. If you see a primary care provider, you can also send messages to your care team and make appointments. If you have questions, please call your primary care clinic.  If you do not have a primary care provider, please call [phone] and they will assist you.        Care EveryWhere ID     This is your Care EveryWhere ID. This could be used by other organizations to access your [facility] medical records  [id number]        Your Vitals Were     Pulse Temperature Height Last Period Pulse Oximetry Breastfeeding?    73 98.6  F (37  C) (Tympanic) 1.689 m (5' 6.5\") 09/15/2007 97% No    BMI (Body Mass Index)                   47.8 kg/m2            Blood Pressure from Last 3 Encounters:   12/04/17 131/70   12/01/17 123/68 "   11/02/17 124/58    Weight from Last 3 Encounters:   12/04/17 (!) 136.4 kg (300 lb 9.6 oz)   11/30/17 134.5 kg (296 lb 8.3 oz)   11/02/17 134.5 kg (296 lb 9.6 oz)                 Today's Medication Changes          These changes are accurate as of: 12/4/17  8:43 AM.  If you have any questions, ask your nurse or doctor.               These medicines have changed or have updated prescriptions.        Dose/Directions    ranitidine 150 MG tablet   Commonly known as:  ZANTAC   This may have changed:  See the new instructions.   Used for:  Esophageal reflux        ONE TABLET TWICE DAILY as needed will call when needed   Quantity:  180 Tab   Refills:  3                Primary Care Provider Office Phone # Fax #    Kay Briggs [phone] [address] [facility] [address]        Equal Access to Services     [facility]: Hadii sourav aleman Soelizabeth, waaxda luqcarmen, qaybta kaalmada arnoldo, julieta khanna . So [facility] [phone].    ATENCIÓN: Si habla español, tiene a cosme disposición servicios gratuitos de asistencia lingüística. Llame al [phone].    We comply with applicable federal civil rights laws and [place] laws. We do not discriminate on the basis of race, color, national origin, age, disability, sex, sexual orientation, or gender identity.            Thank you!     Thank you for choosing [facility]  for your care. Our goal is always to provide you with excellent care. Hearing back from our patients is one way we can continue to improve our services. Please take a few minutes to complete the written survey that you may receive in the mail after your visit with us. Thank you!             Your Updated Medication List - Protect others around you: Learn how to safely use, store and throw away your medicines at www.disposemymeds.org.          This list is accurate as of: 12/4/17  8:43 AM.  Always use your most recent med list.                    Brand Name Dispense Instructions for use Diagnosis    albuterol 108 (90 BASE) MCG/ACT Inhaler    PROAIR HFA/PROVENTIL HFA/VENTOLIN HFA    1 Inhaler    Inhale 2 puffs into the lungs every 4 hours as needed for shortness of breath / dyspnea    Mild intermittent asthma without complication       B-6 100 MG Tabs      Take 1 tablet by mouth daily        BENADRYL PO      Take 25 mg by mouth every 6 hours as needed        calcium carbonate-vitamin D 500-400 MG-UNIT Tabs per tablet      Take 1 tablet by mouth daily        GLUCOSAMINE CHONDRO COMPLEX OR      daily        hydrochlorothiazide 25 MG tablet    HYDRODIURIL    90 tablet    Take 1 tablet (25 mg) by mouth daily    Peripheral edema       loratadine 10 MG tablet    CLARITIN    90 Tab    ONE DAILY    Allergy, unspecified not elsewhere classified       MULTIVITAMIN TABS   OR      1 TABLET DAILY        omega 3 1000 MG Caps      daily        ranitidine 150 MG tablet    ZANTAC    180 Tab    ONE TABLET TWICE DAILY as needed will call when needed    Esophageal reflux       TYLENOL PO      Take 1,000 mg by mouth At Bedtime

## 2017-12-06 ENCOUNTER — OFFICE VISIT (OUTPATIENT)
Dept: FAMILY MEDICINE | Facility: CLINIC | Age: 62
End: 2017-12-06
Payer: COMMERCIAL

## 2017-12-06 VITALS
HEIGHT: 67 IN | TEMPERATURE: 97.7 F | SYSTOLIC BLOOD PRESSURE: 137 MMHG | HEART RATE: 70 BPM | OXYGEN SATURATION: 98 % | BODY MASS INDEX: 45.99 KG/M2 | DIASTOLIC BLOOD PRESSURE: 80 MMHG | WEIGHT: 293 LBS

## 2017-12-06 DIAGNOSIS — D69.3 IDIOPATHIC THROMBOCYTOPENIC PURPURA (H): ICD-10-CM

## 2017-12-06 DIAGNOSIS — M54.2 CERVICALGIA: Primary | ICD-10-CM

## 2017-12-06 PROCEDURE — 99213 OFFICE O/P EST LOW 20 MIN: CPT | Performed by: FAMILY MEDICINE

## 2017-12-06 ASSESSMENT — PAIN SCALES - GENERAL: PAINLEVEL: MILD PAIN (2)

## 2017-12-06 NOTE — PROGRESS NOTES
SUBJECTIVE:   Charlee Marks is a 62 year old female who presents to clinic today for the following health issues:      ED/UC Followup:    Facility:  [facility]  Date of visit: 11-30-17  Reason for visit: from ER visit:   Charlee Marks is a 62 year old female with past medical history which includes asthma, morbid obesity, and ITP with remote history of splenectomy and portal vein thrombosis who presents for evaluation after receiving a call from oncology clinic at her platelets were unexpectedly low. Patient explains that she has been feeling relatively well over the past week but mild intermittent frontal headache over the past 2 days. At one point she thought she had an aura which could have been indicative of developing severe migraine headache but the migraine had not developed. Patient denies chills, sore throat, neck stiffness, cough, chest pain, shortness of breath, abdominal pain, blood with bowel movements, hematuria, petechial rash or recent injury.     Current Status: Patient is still having pain in neck on majority of left side. Patient also has noticed a unusual taste in her mouth. Patient rates neck pain at 2/10, discomfort 4/10.      Diagnosed with ITP 7/2016 - sees Dr. Rankin and was just evaluated again by her two days ago.  Last December had a splenectomy and platelets have steadily gone down again.   Got IVIG last week and steroids/dexamethasone.  Now platelets have improved.  Now having weekly labs to evaluate.      Started on aspirin on Monday.  Has a portal thrombosis after her splenectomy.  Had side effects from blood thinners.  Warfarin x 6 months, then lovenox injections, then xarelto for less than a month and plts dropped below 50 and also developed rosacea on her face.    See Dr. Rankin's note for full details.     Here primarily because of back/neck pain.  Took the day off of work yesterday due to diarrhea.  Works at [employer] as a .    Late yesterday afternoon noted the neck  "discomfort, bad taste in her mouth due to postnasal drainage.         /80  Pulse 70  Temp 97.7  F (36.5  C) (Tympanic)  Ht 5' 6.5\" (1.689 m)  Wt (!) 301 lb (136.5 kg)  LMP 09/15/2007  SpO2 98%  Breastfeeding? No  BMI 47.85 kg/m2  EXAM: GENERAL APPEARANCE: Alert, no acute distress  HENT: Ears and TMs normal, oral mucosa and posterior oropharynx normal  RESP: lungs clear to auscultation   CV: normal rate, regular rhythm, no murmur or gallop  MS: upper trapezius is tight, tender.      ASSESSMENT/PLAN:      ICD-10-CM    1. Cervicalgia M54.2    2. ITP (idiopathic thrombocytopenic purpura) D69.3      URI with postnasal drainage, muscle tightness.  No indication for need for antibiotic at this time.  CT scan with negative paranasal sinuses a week ago.     Symptomatic treatment, stretching, heating pad, etc recommended.    Kay Briggs M.D.      Patient Instructions         Thank you for choosing [facility].  You may be receiving a survey in the mail from [employer] regarding your visit today.  Please take a few minutes to complete and return the survey to let us know how we are doing.      Our Clinic hours are:  Mondays    7:20 am - 7 pm  Tues -  Fri  7:20 am - 5 pm    Clinic Phone: [phone]    The clinic lab opens at 7:30 am Mon - Fri and appointments are required.    [facility]. [phone]  Monday-Thursday 8 am - 7pm  Tues/Wed/Fri 8 am - 5:30 pm                   "

## 2017-12-06 NOTE — PATIENT INSTRUCTIONS
Thank you for choosing [facility].  You may be receiving a survey in the mail from [facility] regarding your visit today.  Please take a few minutes to complete and return the survey to let us know how we are doing.      Our Clinic hours are:  Mondays    7:20 am - 7 pm  Tues -  Fri  7:20 am - 5 pm    Clinic Phone: [phone]    The clinic lab opens at 7:30 am Mon - Fri and appointments are required.    [facility]. [phone]  Monday-Thursday 8 am - 7pm  Tues/Wed/Fri 8 am - 5:30 pm

## 2017-12-06 NOTE — MR AVS SNAPSHOT
After Visit Summary   12/6/2017    Charlee Marks    MRN: [id number]           Patient Information     Date Of Birth          1955        Visit Information        Provider Department      12/6/2017 10:00 AM Kay Briggs MD [facility]        Today's Diagnoses     Cervicalgia    -  1    ITP (idiopathic thrombocytopenic purpura)          Care Instructions          Thank you for choosing [facility].  You may be receiving a survey in the mail from [employer] regarding your visit today.  Please take a few minutes to complete and return the survey to let us know how we are doing.      Our Clinic hours are:  Mondays    7:20 am - 7 pm  Tues -  Fri  7:20 am - 5 pm    Clinic Phone: [phone]    The clinic lab opens at 7:30 am Mon - Fri and appointments are required.    [facility]. [phone]  Monday-Thursday 8 am - 7pm  Tues/Wed/Fri 8 am - 5:30 pm                 Follow-ups after your visit        Your next 10 appointments already scheduled     Dec 11, 2017  8:30 AM CST   LAB with CL LAB   [facility] ([facility])    [address] [address]   [phone]           Please do not eat 10-12 hours before your appointment if you are coming in fasting for labs on lipids, cholesterol, or glucose (sugar). This does not apply to pregnant women. Water, hot tea and black coffee (with nothing added) are okay. Do not drink other fluids, diet soda or chew gum.            Dec 18, 2017  8:30 AM CST   LAB with CL LAB   [facility] ([facility])    [address] [address]   [phone]           Please do not eat 10-12 hours before your appointment if you are coming in fasting for labs on lipids, cholesterol, or glucose (sugar). This does not apply to pregnant women. Water, hot tea and black coffee (with nothing added) are okay. Do not drink other  fluids, diet soda or chew gum.            Dec 26, 2017  8:30 AM CST   LAB with CL LAB   [facility])    [address] [address]   [phone]           Please do not eat 10-12 hours before your appointment if you are coming in fasting for labs on lipids, cholesterol, or glucose (sugar). This does not apply to pregnant women. Water, hot tea and black coffee (with nothing added) are okay. Do not drink other fluids, diet soda or chew gum.            Jan 02, 2018  8:30 AM CST   LAB with CL LAB   [facility] ([facility])    [address] [address]   [phone]           Please do not eat 10-12 hours before your appointment if you are coming in fasting for labs on lipids, cholesterol, or glucose (sugar). This does not apply to pregnant women. Water, hot tea and black coffee (with nothing added) are okay. Do not drink other fluids, diet soda or chew gum.            Jan 08, 2018  8:30 AM CST   LAB with CL LAB   [facility] ([facility])    [address] [address]   [phone]           Please do not eat 10-12 hours before your appointment if you are coming in fasting for labs on lipids, cholesterol, or glucose (sugar). This does not apply to pregnant women. Water, hot tea and black coffee (with nothing added) are okay. Do not drink other fluids, diet soda or chew gum.            Jan 16, 2018  8:30 AM CST   LAB with CL LAB   [facility] ([facility])    [address] [address]   [phone]           Please do not eat 10-12 hours before your appointment if you are coming in fasting for labs on lipids, cholesterol, or glucose (sugar). This does not apply to pregnant women. Water, hot tea and black coffee (with nothing added) are okay. Do not drink other fluids, diet soda or chew  gum.            Jan 22, 2018  8:30 AM CST   LAB with CL LAB   [facility] ([facility]    [address] [address]   [phone]           Please do not eat 10-12 hours before your appointment if you are coming in fasting for labs on lipids, cholesterol, or glucose (sugar). This does not apply to pregnant women. Water, hot tea and black coffee (with nothing added) are okay. Do not drink other fluids, diet soda or chew gum.            Jan 29, 2018  8:30 AM CST   LAB with CL LAB   [facility] ([facility])    [address] [address]   [phone]           Please do not eat 10-12 hours before your appointment if you are coming in fasting for labs on lipids, cholesterol, or glucose (sugar). This does not apply to pregnant women. Water, hot tea and black coffee (with nothing added) are okay. Do not drink other fluids, diet soda or chew gum.            Feb 05, 2018  8:30 AM CST   LAB with CL LAB   [facility] ([facility])    [address] [address]   [phone]           Please do not eat 10-12 hours before your appointment if you are coming in fasting for labs on lipids, cholesterol, or glucose (sugar). This does not apply to pregnant women. Water, hot tea and black coffee (with nothing added) are okay. Do not drink other fluids, diet soda or chew gum.            Feb 12, 2018  8:30 AM CST   LAB with CL LAB   [facility] ([facility])    [address] [address]   [phone]           Please do not eat 10-12 hours before your appointment if you are coming in fasting for labs on lipids, cholesterol, or glucose (sugar). This does not apply to pregnant women. Water, hot tea and black coffee (with nothing added) are okay. Do not drink other fluids, diet soda or chew gum.              Who to  "contact     If you have questions or need follow up information about today's clinic visit or your schedule please contact [facility] directly at [phone].  Normal or non-critical lab and imaging results will be communicated to you by [employer], letter or phone within 4 business days after the clinic has received the results. If you do not hear from us within 7 days, please contact the clinic through [employer] or phone. If you have a critical or abnormal lab result, we will notify you by phone as soon as possible.  Submit refill requests through [employer] or call your pharmacy and they will forward the refill request to us. Please allow 3 business days for your refill to be completed.          Additional Information About Your Visit        [employer] Information     [employer] gives you secure access to your electronic health record. If you see a primary care provider, you can also send messages to your care team and make appointments. If you have questions, please call your primary care clinic.  If you do not have a primary care provider, please call [phone] and they will assist you.        Care EveryWhere ID     This is your Care EveryWhere ID. This could be used by other organizations to access your [place] medical records  [id number]        Your Vitals Were     Pulse Temperature Height Last Period Pulse Oximetry Breastfeeding?    70 97.7  F (36.5  C) (Tympanic) 5' 6.5\" (1.689 m) 09/15/2007 98% No    BMI (Body Mass Index)                   47.85 kg/m2            Blood Pressure from Last 3 Encounters:   12/06/17 137/80   12/04/17 131/70   12/01/17 123/68    Weight from Last 3 Encounters:   12/06/17 (!) 301 lb (136.5 kg)   12/04/17 (!) 300 lb 9.6 oz (136.4 kg)   11/30/17 296 lb 8.3 oz (134.5 kg)              Today, you had the following     No orders found for display         Today's Medication Changes          These changes are accurate as of: 12/6/17 10:16 AM.  If you have any questions, ask " your nurse or doctor.               These medicines have changed or have updated prescriptions.        Dose/Directions    ranitidine 150 MG tablet   Commonly known as:  ZANTAC   This may have changed:  See the new instructions.   Used for:  Esophageal reflux        ONE TABLET TWICE DAILY as needed will call when needed   Quantity:  180 Tab   Refills:  3                Primary Care Provider Office Phone # Fax #    Kay Briggs [phone] [address] [address]        Equal Access to Services     LAVERN CABA : Hadii aad ku hadasho Soomaali, waaxda luqadaha, qaybta kaalmada adeegyada, waxay idiin hayaan adeeg kharash la'aan . So [facility] [phone].    ATENCIÓN: Si habla español, tiene a cosme disposición servicios gratuitos de asistencia lingüística. [facility] [phone].    We comply with applicable federal civil rights laws and [place] laws. We do not discriminate on the basis of race, color, national origin, age, disability, sex, sexual orientation, or gender identity.            Thank you!     Thank you for choosing [facility]  for your care. Our goal is always to provide you with excellent care. Hearing back from our patients is one way we can continue to improve our services. Please take a few minutes to complete the written survey that you may receive in the mail after your visit with us. Thank you!             Your Updated Medication List - Protect others around you: Learn how to safely use, store and throw away your medicines at www.disposemymeds.org.          This list is accurate as of: 12/6/17 10:16 AM.  Always use your most recent med list.                   Brand Name Dispense Instructions for use Diagnosis    albuterol 108 (90 BASE) MCG/ACT Inhaler    PROAIR HFA/PROVENTIL HFA/VENTOLIN HFA    1 Inhaler    Inhale 2 puffs into the lungs every 4 hours as needed for shortness of breath / dyspnea    Mild intermittent asthma without complication        B-6 100 MG Tabs      Take 1 tablet by mouth daily        BENADRYL PO      Take 25 mg by mouth every 6 hours as needed        calcium carbonate-vitamin D 500-400 MG-UNIT Tabs per tablet      Take 1 tablet by mouth daily        GLUCOSAMINE CHONDRO COMPLEX OR      daily        hydrochlorothiazide 25 MG tablet    HYDRODIURIL    90 tablet    Take 1 tablet (25 mg) by mouth daily    Peripheral edema       loratadine 10 MG tablet    CLARITIN    90 Tab    ONE DAILY    Allergy, unspecified not elsewhere classified       MULTIVITAMIN TABS   OR      1 TABLET DAILY        omega 3 1000 MG Caps      daily        ranitidine 150 MG tablet    ZANTAC    180 Tab    ONE TABLET TWICE DAILY as needed will call when needed    Esophageal reflux       TYLENOL PO      Take 1,000 mg by mouth At Bedtime

## 2017-12-06 NOTE — NURSING NOTE
"Chief Complaint   Patient presents with     Neck Pain       Initial /80  Pulse 70  Temp 97.7  F (36.5  C) (Tympanic)  Ht 5' 6.5\" (1.689 m)  Wt (!) 301 lb (136.5 kg)  LMP 09/15/2007  SpO2 98%  Breastfeeding? No  BMI 47.85 kg/m2 Estimated body mass index is 47.85 kg/(m^2) as calculated from the following:    Height as of this encounter: 5' 6.5\" (1.689 m).    Weight as of this encounter: 301 lb (136.5 kg).  Medication Reconciliation: complete    "

## 2017-12-11 DIAGNOSIS — D69.3 IDIOPATHIC THROMBOCYTOPENIC PURPURA (H): ICD-10-CM

## 2017-12-11 LAB
BASOPHILS # BLD AUTO: 0 10E9/L (ref 0–0.2)
BASOPHILS NFR BLD AUTO: 0.3 %
DIFFERENTIAL METHOD BLD: ABNORMAL
EOSINOPHIL # BLD AUTO: 0.4 10E9/L (ref 0–0.7)
EOSINOPHIL NFR BLD AUTO: 2.7 %
ERYTHROCYTE [DISTWIDTH] IN BLOOD BY AUTOMATED COUNT: 15.1 % (ref 10–15)
HCT VFR BLD AUTO: 42.4 % (ref 35–47)
HGB BLD-MCNC: 13.4 G/DL (ref 11.7–15.7)
LYMPHOCYTES # BLD AUTO: 4.7 10E9/L (ref 0.8–5.3)
LYMPHOCYTES NFR BLD AUTO: 32.6 %
MCH RBC QN AUTO: 29.3 PG (ref 26.5–33)
MCHC RBC AUTO-ENTMCNC: 31.6 G/DL (ref 31.5–36.5)
MCV RBC AUTO: 93 FL (ref 78–100)
MONOCYTES # BLD AUTO: 1.8 10E9/L (ref 0–1.3)
MONOCYTES NFR BLD AUTO: 12.2 %
NEUTROPHILS # BLD AUTO: 7.6 10E9/L (ref 1.6–8.3)
NEUTROPHILS NFR BLD AUTO: 52.2 %
PLATELET # BLD AUTO: 192 10E9/L (ref 150–450)
RBC # BLD AUTO: 4.57 10E12/L (ref 3.8–5.2)
WBC # BLD AUTO: 14.5 10E9/L (ref 4–11)

## 2017-12-11 PROCEDURE — 36415 COLL VENOUS BLD VENIPUNCTURE: CPT | Performed by: INTERNAL MEDICINE

## 2017-12-11 PROCEDURE — 85025 COMPLETE CBC W/AUTO DIFF WBC: CPT | Performed by: INTERNAL MEDICINE

## 2017-12-18 DIAGNOSIS — D69.3 IDIOPATHIC THROMBOCYTOPENIC PURPURA (H): ICD-10-CM

## 2017-12-18 LAB
BASOPHILS # BLD AUTO: 0.1 10E9/L (ref 0–0.2)
BASOPHILS NFR BLD AUTO: 0.9 %
DIFFERENTIAL METHOD BLD: ABNORMAL
EOSINOPHIL # BLD AUTO: 0.3 10E9/L (ref 0–0.7)
EOSINOPHIL NFR BLD AUTO: 3.2 %
ERYTHROCYTE [DISTWIDTH] IN BLOOD BY AUTOMATED COUNT: 14.7 % (ref 10–15)
HCT VFR BLD AUTO: 41.1 % (ref 35–47)
HGB BLD-MCNC: 13.1 G/DL (ref 11.7–15.7)
IMM GRANULOCYTES # BLD: 0 10E9/L (ref 0–0.4)
IMM GRANULOCYTES NFR BLD: 0.3 %
LYMPHOCYTES # BLD AUTO: 3.3 10E9/L (ref 0.8–5.3)
LYMPHOCYTES NFR BLD AUTO: 34 %
MCH RBC QN AUTO: 28.9 PG (ref 26.5–33)
MCHC RBC AUTO-ENTMCNC: 31.9 G/DL (ref 31.5–36.5)
MCV RBC AUTO: 91 FL (ref 78–100)
MONOCYTES # BLD AUTO: 1.5 10E9/L (ref 0–1.3)
MONOCYTES NFR BLD AUTO: 15.3 %
NEUTROPHILS # BLD AUTO: 4.4 10E9/L (ref 1.6–8.3)
NEUTROPHILS NFR BLD AUTO: 46.3 %
PLATELET # BLD AUTO: 66 10E9/L (ref 150–450)
PLATELET # BLD EST: ABNORMAL 10*3/UL
RBC # BLD AUTO: 4.54 10E12/L (ref 3.8–5.2)
RBC MORPH BLD: NORMAL
WBC # BLD AUTO: 9.6 10E9/L (ref 4–11)

## 2017-12-18 PROCEDURE — 36415 COLL VENOUS BLD VENIPUNCTURE: CPT | Performed by: INTERNAL MEDICINE

## 2017-12-18 PROCEDURE — 85025 COMPLETE CBC W/AUTO DIFF WBC: CPT | Performed by: INTERNAL MEDICINE

## 2017-12-19 DIAGNOSIS — D69.3 IDIOPATHIC THROMBOCYTOPENIC PURPURA (H): Primary | ICD-10-CM

## 2017-12-19 DIAGNOSIS — I81 PORTAL VEIN THROMBOSIS: ICD-10-CM

## 2017-12-19 RX ORDER — DIPHENHYDRAMINE HYDROCHLORIDE 50 MG/ML
25 INJECTION INTRAMUSCULAR; INTRAVENOUS ONCE
Status: CANCELLED
Start: 2017-12-20 | End: 2017-12-20

## 2017-12-19 RX ORDER — ACETAMINOPHEN 325 MG/1
650 TABLET ORAL ONCE
Status: CANCELLED
Start: 2017-12-20 | End: 2017-12-20

## 2017-12-26 DIAGNOSIS — D69.3 IDIOPATHIC THROMBOCYTOPENIC PURPURA (H): ICD-10-CM

## 2017-12-26 LAB
BASOPHILS # BLD AUTO: 0 10E9/L (ref 0–0.2)
BASOPHILS NFR BLD AUTO: 0.4 %
DIFFERENTIAL METHOD BLD: ABNORMAL
EOSINOPHIL # BLD AUTO: 0.5 10E9/L (ref 0–0.7)
EOSINOPHIL NFR BLD AUTO: 4.5 %
ERYTHROCYTE [DISTWIDTH] IN BLOOD BY AUTOMATED COUNT: 15.5 % (ref 10–15)
HCT VFR BLD AUTO: 42.4 % (ref 35–47)
HGB BLD-MCNC: 13.3 G/DL (ref 11.7–15.7)
LYMPHOCYTES # BLD AUTO: 4 10E9/L (ref 0.8–5.3)
LYMPHOCYTES NFR BLD AUTO: 39.6 %
MCH RBC QN AUTO: 29.2 PG (ref 26.5–33)
MCHC RBC AUTO-ENTMCNC: 31.4 G/DL (ref 31.5–36.5)
MCV RBC AUTO: 93 FL (ref 78–100)
MONOCYTES # BLD AUTO: 1.2 10E9/L (ref 0–1.3)
MONOCYTES NFR BLD AUTO: 11.8 %
NEUTROPHILS # BLD AUTO: 4.4 10E9/L (ref 1.6–8.3)
NEUTROPHILS NFR BLD AUTO: 43.7 %
PLATELET # BLD AUTO: 139 10E9/L (ref 150–450)
RBC # BLD AUTO: 4.56 10E12/L (ref 3.8–5.2)
WBC # BLD AUTO: 10 10E9/L (ref 4–11)

## 2017-12-26 PROCEDURE — 85025 COMPLETE CBC W/AUTO DIFF WBC: CPT | Performed by: INTERNAL MEDICINE

## 2017-12-26 PROCEDURE — 36415 COLL VENOUS BLD VENIPUNCTURE: CPT | Performed by: INTERNAL MEDICINE

## 2018-01-02 DIAGNOSIS — D69.3 IDIOPATHIC THROMBOCYTOPENIC PURPURA (H): ICD-10-CM

## 2018-01-02 LAB
BASOPHILS # BLD AUTO: 0.1 10E9/L (ref 0–0.2)
BASOPHILS NFR BLD AUTO: 0.8 %
DIFFERENTIAL METHOD BLD: ABNORMAL
EOSINOPHIL # BLD AUTO: 0.4 10E9/L (ref 0–0.7)
EOSINOPHIL NFR BLD AUTO: 3.9 %
ERYTHROCYTE [DISTWIDTH] IN BLOOD BY AUTOMATED COUNT: 14.9 % (ref 10–15)
HCT VFR BLD AUTO: 41 % (ref 35–47)
HGB BLD-MCNC: 13 G/DL (ref 11.7–15.7)
IMM GRANULOCYTES # BLD: 0 10E9/L (ref 0–0.4)
IMM GRANULOCYTES NFR BLD: 0.3 %
LYMPHOCYTES # BLD AUTO: 4 10E9/L (ref 0.8–5.3)
LYMPHOCYTES NFR BLD AUTO: 41.1 %
MCH RBC QN AUTO: 29.1 PG (ref 26.5–33)
MCHC RBC AUTO-ENTMCNC: 31.7 G/DL (ref 31.5–36.5)
MCV RBC AUTO: 92 FL (ref 78–100)
MONOCYTES # BLD AUTO: 1.3 10E9/L (ref 0–1.3)
MONOCYTES NFR BLD AUTO: 13.3 %
NEUTROPHILS # BLD AUTO: 3.9 10E9/L (ref 1.6–8.3)
NEUTROPHILS NFR BLD AUTO: 40.6 %
PLATELET # BLD AUTO: 58 10E9/L (ref 150–450)
RBC # BLD AUTO: 4.47 10E12/L (ref 3.8–5.2)
WBC # BLD AUTO: 9.6 10E9/L (ref 4–11)

## 2018-01-02 PROCEDURE — 36415 COLL VENOUS BLD VENIPUNCTURE: CPT | Performed by: INTERNAL MEDICINE

## 2018-01-02 PROCEDURE — 85025 COMPLETE CBC W/AUTO DIFF WBC: CPT | Performed by: INTERNAL MEDICINE

## 2018-01-08 DIAGNOSIS — D69.3 IDIOPATHIC THROMBOCYTOPENIC PURPURA (H): ICD-10-CM

## 2018-01-08 DIAGNOSIS — I81 PORTAL VEIN THROMBOSIS: ICD-10-CM

## 2018-01-08 PROCEDURE — 36415 COLL VENOUS BLD VENIPUNCTURE: CPT | Performed by: INTERNAL MEDICINE

## 2018-01-08 PROCEDURE — 85025 COMPLETE CBC W/AUTO DIFF WBC: CPT | Performed by: INTERNAL MEDICINE

## 2018-01-12 LAB
BASOPHILS # BLD AUTO: 0 10E9/L (ref 0–0.2)
BASOPHILS NFR BLD AUTO: 0 %
DIFFERENTIAL METHOD BLD: ABNORMAL
EOSINOPHIL # BLD AUTO: 0.2 10E9/L (ref 0–0.7)
EOSINOPHIL NFR BLD AUTO: 2 %
ERYTHROCYTE [DISTWIDTH] IN BLOOD BY AUTOMATED COUNT: 15.1 % (ref 10–15)
HCT VFR BLD AUTO: 42.1 % (ref 35–47)
HGB BLD-MCNC: 13.3 G/DL (ref 11.7–15.7)
HOWELL-JOLLY BOD BLD QL SMEAR: PRESENT
LYMPHOCYTES # BLD AUTO: 5.2 10E9/L (ref 0.8–5.3)
LYMPHOCYTES NFR BLD AUTO: 43 %
MCH RBC QN AUTO: 29.2 PG (ref 26.5–33)
MCHC RBC AUTO-ENTMCNC: 31.6 G/DL (ref 31.5–36.5)
MCV RBC AUTO: 92 FL (ref 78–100)
MONOCYTES # BLD AUTO: 1.3 10E9/L (ref 0–1.3)
MONOCYTES NFR BLD AUTO: 11 %
NEUTROPHILS # BLD AUTO: 5.3 10E9/L (ref 1.6–8.3)
NEUTROPHILS NFR BLD AUTO: 44 %
PLATELET # BLD AUTO: 89 10E9/L (ref 150–450)
PLATELET # BLD EST: ABNORMAL 10*3/UL
RBC # BLD AUTO: 4.56 10E12/L (ref 3.8–5.2)
VARIANT LYMPHS BLD QL SMEAR: PRESENT
WBC # BLD AUTO: 12.1 10E9/L (ref 4–11)

## 2018-01-16 DIAGNOSIS — D69.3 IDIOPATHIC THROMBOCYTOPENIC PURPURA (H): ICD-10-CM

## 2018-01-16 DIAGNOSIS — I81 PORTAL VEIN THROMBOSIS: ICD-10-CM

## 2018-01-16 LAB
BASOPHILS # BLD AUTO: 0.1 10E9/L (ref 0–0.2)
BASOPHILS NFR BLD AUTO: 1 %
DIFFERENTIAL METHOD BLD: ABNORMAL
EOSINOPHIL # BLD AUTO: 0.2 10E9/L (ref 0–0.7)
EOSINOPHIL NFR BLD AUTO: 2.1 %
ERYTHROCYTE [DISTWIDTH] IN BLOOD BY AUTOMATED COUNT: 15.2 % (ref 10–15)
HCT VFR BLD AUTO: 40.9 % (ref 35–47)
HGB BLD-MCNC: 13 G/DL (ref 11.7–15.7)
IMM GRANULOCYTES # BLD: 0 10E9/L (ref 0–0.4)
IMM GRANULOCYTES NFR BLD: 0.2 %
LYMPHOCYTES # BLD AUTO: 4.3 10E9/L (ref 0.8–5.3)
LYMPHOCYTES NFR BLD AUTO: 40.8 %
MCH RBC QN AUTO: 29.4 PG (ref 26.5–33)
MCHC RBC AUTO-ENTMCNC: 31.8 G/DL (ref 31.5–36.5)
MCV RBC AUTO: 93 FL (ref 78–100)
MONOCYTES # BLD AUTO: 1.1 10E9/L (ref 0–1.3)
MONOCYTES NFR BLD AUTO: 10.9 %
NEUTROPHILS # BLD AUTO: 4.7 10E9/L (ref 1.6–8.3)
NEUTROPHILS NFR BLD AUTO: 45 %
PLATELET # BLD AUTO: 84 10E9/L (ref 150–450)
RBC # BLD AUTO: 4.42 10E12/L (ref 3.8–5.2)
WBC # BLD AUTO: 10.5 10E9/L (ref 4–11)

## 2018-01-16 PROCEDURE — 36415 COLL VENOUS BLD VENIPUNCTURE: CPT | Performed by: INTERNAL MEDICINE

## 2018-01-16 PROCEDURE — 85025 COMPLETE CBC W/AUTO DIFF WBC: CPT | Performed by: INTERNAL MEDICINE

## 2018-01-22 DIAGNOSIS — I81 PORTAL VEIN THROMBOSIS: ICD-10-CM

## 2018-01-22 DIAGNOSIS — D69.3 IDIOPATHIC THROMBOCYTOPENIC PURPURA (H): ICD-10-CM

## 2018-01-22 LAB
BASOPHILS # BLD AUTO: 0.1 10E9/L (ref 0–0.2)
BASOPHILS NFR BLD AUTO: 0.5 %
DIFFERENTIAL METHOD BLD: ABNORMAL
EOSINOPHIL # BLD AUTO: 0.3 10E9/L (ref 0–0.7)
EOSINOPHIL NFR BLD AUTO: 3.4 %
ERYTHROCYTE [DISTWIDTH] IN BLOOD BY AUTOMATED COUNT: 15.4 % (ref 10–15)
HCT VFR BLD AUTO: 41.2 % (ref 35–47)
HGB BLD-MCNC: 13.3 G/DL (ref 11.7–15.7)
LYMPHOCYTES # BLD AUTO: 4.4 10E9/L (ref 0.8–5.3)
LYMPHOCYTES NFR BLD AUTO: 43.8 %
MCH RBC QN AUTO: 30 PG (ref 26.5–33)
MCHC RBC AUTO-ENTMCNC: 32.3 G/DL (ref 31.5–36.5)
MCV RBC AUTO: 93 FL (ref 78–100)
MONOCYTES # BLD AUTO: 1.2 10E9/L (ref 0–1.3)
MONOCYTES NFR BLD AUTO: 11.7 %
NEUTROPHILS # BLD AUTO: 4.1 10E9/L (ref 1.6–8.3)
NEUTROPHILS NFR BLD AUTO: 40.6 %
PLATELET # BLD AUTO: 118 10E9/L (ref 150–450)
RBC # BLD AUTO: 4.44 10E12/L (ref 3.8–5.2)
WBC # BLD AUTO: 10 10E9/L (ref 4–11)

## 2018-01-22 PROCEDURE — 36415 COLL VENOUS BLD VENIPUNCTURE: CPT | Performed by: INTERNAL MEDICINE

## 2018-01-22 PROCEDURE — 85025 COMPLETE CBC W/AUTO DIFF WBC: CPT | Performed by: INTERNAL MEDICINE

## 2018-01-24 ENCOUNTER — TELEPHONE (OUTPATIENT)
Dept: FAMILY MEDICINE | Facility: CLINIC | Age: 63
End: 2018-01-24

## 2018-01-29 DIAGNOSIS — I81 PORTAL VEIN THROMBOSIS: ICD-10-CM

## 2018-01-29 DIAGNOSIS — D69.3 IDIOPATHIC THROMBOCYTOPENIC PURPURA (H): ICD-10-CM

## 2018-01-29 LAB
BASOPHILS # BLD AUTO: 0.1 10E9/L (ref 0–0.2)
BASOPHILS NFR BLD AUTO: 0.6 %
DIFFERENTIAL METHOD BLD: ABNORMAL
EOSINOPHIL # BLD AUTO: 0.2 10E9/L (ref 0–0.7)
EOSINOPHIL NFR BLD AUTO: 2.2 %
ERYTHROCYTE [DISTWIDTH] IN BLOOD BY AUTOMATED COUNT: 14.9 % (ref 10–15)
HCT VFR BLD AUTO: 42.2 % (ref 35–47)
HGB BLD-MCNC: 13.4 G/DL (ref 11.7–15.7)
IMM GRANULOCYTES # BLD: 0 10E9/L (ref 0–0.4)
IMM GRANULOCYTES NFR BLD: 0.2 %
LYMPHOCYTES # BLD AUTO: 4.8 10E9/L (ref 0.8–5.3)
LYMPHOCYTES NFR BLD AUTO: 45.1 %
MCH RBC QN AUTO: 29.7 PG (ref 26.5–33)
MCHC RBC AUTO-ENTMCNC: 31.8 G/DL (ref 31.5–36.5)
MCV RBC AUTO: 94 FL (ref 78–100)
MONOCYTES # BLD AUTO: 1.2 10E9/L (ref 0–1.3)
MONOCYTES NFR BLD AUTO: 11.3 %
NEUTROPHILS # BLD AUTO: 4.3 10E9/L (ref 1.6–8.3)
NEUTROPHILS NFR BLD AUTO: 40.6 %
PLATELET # BLD AUTO: 71 10E9/L (ref 150–450)
RBC # BLD AUTO: 4.51 10E12/L (ref 3.8–5.2)
WBC # BLD AUTO: 10.7 10E9/L (ref 4–11)

## 2018-01-29 PROCEDURE — 85025 COMPLETE CBC W/AUTO DIFF WBC: CPT | Performed by: INTERNAL MEDICINE

## 2018-01-29 PROCEDURE — 36415 COLL VENOUS BLD VENIPUNCTURE: CPT | Performed by: INTERNAL MEDICINE

## 2018-02-05 DIAGNOSIS — D69.3 IDIOPATHIC THROMBOCYTOPENIC PURPURA (H): ICD-10-CM

## 2018-02-05 DIAGNOSIS — I81 PORTAL VEIN THROMBOSIS: ICD-10-CM

## 2018-02-05 LAB
BASOPHILS # BLD AUTO: 0.1 10E9/L (ref 0–0.2)
BASOPHILS NFR BLD AUTO: 0.6 %
DIFFERENTIAL METHOD BLD: ABNORMAL
EOSINOPHIL # BLD AUTO: 0.3 10E9/L (ref 0–0.7)
EOSINOPHIL NFR BLD AUTO: 2.2 %
ERYTHROCYTE [DISTWIDTH] IN BLOOD BY AUTOMATED COUNT: 14.9 % (ref 10–15)
HCT VFR BLD AUTO: 42.3 % (ref 35–47)
HGB BLD-MCNC: 13.5 G/DL (ref 11.7–15.7)
IMM GRANULOCYTES # BLD: 0 10E9/L (ref 0–0.4)
IMM GRANULOCYTES NFR BLD: 0.3 %
LYMPHOCYTES # BLD AUTO: 4.4 10E9/L (ref 0.8–5.3)
LYMPHOCYTES NFR BLD AUTO: 37.9 %
MCH RBC QN AUTO: 29.3 PG (ref 26.5–33)
MCHC RBC AUTO-ENTMCNC: 31.9 G/DL (ref 31.5–36.5)
MCV RBC AUTO: 92 FL (ref 78–100)
MONOCYTES # BLD AUTO: 1.4 10E9/L (ref 0–1.3)
MONOCYTES NFR BLD AUTO: 12.5 %
NEUTROPHILS # BLD AUTO: 5.4 10E9/L (ref 1.6–8.3)
NEUTROPHILS NFR BLD AUTO: 46.5 %
PLATELET # BLD AUTO: 106 10E9/L (ref 150–450)
RBC # BLD AUTO: 4.6 10E12/L (ref 3.8–5.2)
WBC # BLD AUTO: 11.5 10E9/L (ref 4–11)

## 2018-02-05 PROCEDURE — 36415 COLL VENOUS BLD VENIPUNCTURE: CPT | Performed by: INTERNAL MEDICINE

## 2018-02-05 PROCEDURE — 85025 COMPLETE CBC W/AUTO DIFF WBC: CPT | Performed by: INTERNAL MEDICINE

## 2018-02-19 DIAGNOSIS — I81 PORTAL VEIN THROMBOSIS: ICD-10-CM

## 2018-02-19 DIAGNOSIS — D69.3 IDIOPATHIC THROMBOCYTOPENIC PURPURA (H): ICD-10-CM

## 2018-02-19 LAB
BASOPHILS # BLD AUTO: 0.1 10E9/L (ref 0–0.2)
BASOPHILS NFR BLD AUTO: 0.6 %
DIFFERENTIAL METHOD BLD: ABNORMAL
EOSINOPHIL # BLD AUTO: 0.3 10E9/L (ref 0–0.7)
EOSINOPHIL NFR BLD AUTO: 2.7 %
ERYTHROCYTE [DISTWIDTH] IN BLOOD BY AUTOMATED COUNT: 14.8 % (ref 10–15)
HCT VFR BLD AUTO: 42.1 % (ref 35–47)
HGB BLD-MCNC: 13 G/DL (ref 11.7–15.7)
LYMPHOCYTES # BLD AUTO: 3.7 10E9/L (ref 0.8–5.3)
LYMPHOCYTES NFR BLD AUTO: 35.9 %
MCH RBC QN AUTO: 29.1 PG (ref 26.5–33)
MCHC RBC AUTO-ENTMCNC: 30.9 G/DL (ref 31.5–36.5)
MCV RBC AUTO: 94 FL (ref 78–100)
MONOCYTES # BLD AUTO: 1.2 10E9/L (ref 0–1.3)
MONOCYTES NFR BLD AUTO: 12.1 %
NEUTROPHILS # BLD AUTO: 5 10E9/L (ref 1.6–8.3)
NEUTROPHILS NFR BLD AUTO: 48.7 %
PLATELET # BLD AUTO: 116 10E9/L (ref 150–450)
RBC # BLD AUTO: 4.46 10E12/L (ref 3.8–5.2)
WBC # BLD AUTO: 10.2 10E9/L (ref 4–11)

## 2018-02-19 PROCEDURE — 36415 COLL VENOUS BLD VENIPUNCTURE: CPT | Performed by: INTERNAL MEDICINE

## 2018-02-19 PROCEDURE — 85025 COMPLETE CBC W/AUTO DIFF WBC: CPT | Performed by: INTERNAL MEDICINE

## 2018-03-05 DIAGNOSIS — D69.3 IDIOPATHIC THROMBOCYTOPENIC PURPURA (H): ICD-10-CM

## 2018-03-05 DIAGNOSIS — I81 PORTAL VEIN THROMBOSIS: ICD-10-CM

## 2018-03-05 LAB
BASOPHILS # BLD AUTO: 0.1 10E9/L (ref 0–0.2)
BASOPHILS NFR BLD AUTO: 0.6 %
DIFFERENTIAL METHOD BLD: ABNORMAL
EOSINOPHIL # BLD AUTO: 0.3 10E9/L (ref 0–0.7)
EOSINOPHIL NFR BLD AUTO: 3.4 %
ERYTHROCYTE [DISTWIDTH] IN BLOOD BY AUTOMATED COUNT: 14.1 % (ref 10–15)
HCT VFR BLD AUTO: 40.8 % (ref 35–47)
HGB BLD-MCNC: 13.2 G/DL (ref 11.7–15.7)
IMM GRANULOCYTES # BLD: 0 10E9/L (ref 0–0.4)
IMM GRANULOCYTES NFR BLD: 0.2 %
LYMPHOCYTES # BLD AUTO: 4.2 10E9/L (ref 0.8–5.3)
LYMPHOCYTES NFR BLD AUTO: 42.4 %
MCH RBC QN AUTO: 29.7 PG (ref 26.5–33)
MCHC RBC AUTO-ENTMCNC: 32.4 G/DL (ref 31.5–36.5)
MCV RBC AUTO: 92 FL (ref 78–100)
MONOCYTES # BLD AUTO: 1.1 10E9/L (ref 0–1.3)
MONOCYTES NFR BLD AUTO: 11.4 %
NEUTROPHILS # BLD AUTO: 4.2 10E9/L (ref 1.6–8.3)
NEUTROPHILS NFR BLD AUTO: 42 %
PLATELET # BLD AUTO: 74 10E9/L (ref 150–450)
RBC # BLD AUTO: 4.44 10E12/L (ref 3.8–5.2)
WBC # BLD AUTO: 10 10E9/L (ref 4–11)

## 2018-03-05 PROCEDURE — 85025 COMPLETE CBC W/AUTO DIFF WBC: CPT | Performed by: INTERNAL MEDICINE

## 2018-03-05 PROCEDURE — 36415 COLL VENOUS BLD VENIPUNCTURE: CPT | Performed by: INTERNAL MEDICINE

## 2018-03-06 DIAGNOSIS — D69.3 IDIOPATHIC THROMBOCYTOPENIC PURPURA (H): ICD-10-CM

## 2018-03-06 DIAGNOSIS — I81 PORTAL VEIN THROMBOSIS: Primary | ICD-10-CM

## 2018-03-06 NOTE — PROGRESS NOTES
Will plan for a f/u CT scan q 6 months so evaluate the clot status. PT will be set up either end of this month or early next month with a f/u Dr. Rankin.

## 2018-03-19 DIAGNOSIS — D69.3 IDIOPATHIC THROMBOCYTOPENIC PURPURA (H): ICD-10-CM

## 2018-03-19 DIAGNOSIS — I81 PORTAL VEIN THROMBOSIS: ICD-10-CM

## 2018-03-19 LAB
BASOPHILS # BLD AUTO: 0.1 10E9/L (ref 0–0.2)
BASOPHILS NFR BLD AUTO: 0.6 %
DIFFERENTIAL METHOD BLD: ABNORMAL
EOSINOPHIL # BLD AUTO: 0.3 10E9/L (ref 0–0.7)
EOSINOPHIL NFR BLD AUTO: 1.9 %
ERYTHROCYTE [DISTWIDTH] IN BLOOD BY AUTOMATED COUNT: 13.8 % (ref 10–15)
HCT VFR BLD AUTO: 42.6 % (ref 35–47)
HGB BLD-MCNC: 13.8 G/DL (ref 11.7–15.7)
IMM GRANULOCYTES # BLD: 0 10E9/L (ref 0–0.4)
IMM GRANULOCYTES NFR BLD: 0.3 %
LYMPHOCYTES # BLD AUTO: 5.7 10E9/L (ref 0.8–5.3)
LYMPHOCYTES NFR BLD AUTO: 39.7 %
MCH RBC QN AUTO: 29.8 PG (ref 26.5–33)
MCHC RBC AUTO-ENTMCNC: 32.4 G/DL (ref 31.5–36.5)
MCV RBC AUTO: 92 FL (ref 78–100)
MONOCYTES # BLD AUTO: 1.6 10E9/L (ref 0–1.3)
MONOCYTES NFR BLD AUTO: 11.3 %
NEUTROPHILS # BLD AUTO: 6.6 10E9/L (ref 1.6–8.3)
NEUTROPHILS NFR BLD AUTO: 46.2 %
PLATELET # BLD AUTO: 51 10E9/L (ref 150–450)
PLATELET # BLD EST: ABNORMAL 10*3/UL
RBC # BLD AUTO: 4.63 10E12/L (ref 3.8–5.2)
WBC # BLD AUTO: 14.4 10E9/L (ref 4–11)

## 2018-03-19 PROCEDURE — 36415 COLL VENOUS BLD VENIPUNCTURE: CPT | Performed by: INTERNAL MEDICINE

## 2018-03-19 PROCEDURE — 85025 COMPLETE CBC W/AUTO DIFF WBC: CPT | Performed by: INTERNAL MEDICINE

## 2018-03-20 DIAGNOSIS — D69.3 IDIOPATHIC THROMBOCYTOPENIC PURPURA (H): Primary | ICD-10-CM

## 2018-03-20 NOTE — PROGRESS NOTES
Called and reviewed results with the patient per Dr. Rankin. Patient had no further questions or concerns at this time and also verbalized understanding. She will call and set up an appt for next week. Direct line provided if any further questions/concerns arise.

## 2018-03-26 DIAGNOSIS — D69.3 IDIOPATHIC THROMBOCYTOPENIC PURPURA (H): ICD-10-CM

## 2018-03-26 LAB
BASOPHILS # BLD AUTO: 0.1 10E9/L (ref 0–0.2)
BASOPHILS NFR BLD AUTO: 0.5 %
DIFFERENTIAL METHOD BLD: ABNORMAL
EOSINOPHIL # BLD AUTO: 0.3 10E9/L (ref 0–0.7)
EOSINOPHIL NFR BLD AUTO: 2 %
ERYTHROCYTE [DISTWIDTH] IN BLOOD BY AUTOMATED COUNT: 13.6 % (ref 10–15)
HCT VFR BLD AUTO: 41 % (ref 35–47)
HGB BLD-MCNC: 13.5 G/DL (ref 11.7–15.7)
IMM GRANULOCYTES # BLD: 0 10E9/L (ref 0–0.4)
IMM GRANULOCYTES NFR BLD: 0.2 %
LYMPHOCYTES # BLD AUTO: 4.4 10E9/L (ref 0.8–5.3)
LYMPHOCYTES NFR BLD AUTO: 34.1 %
MCH RBC QN AUTO: 30 PG (ref 26.5–33)
MCHC RBC AUTO-ENTMCNC: 32.9 G/DL (ref 31.5–36.5)
MCV RBC AUTO: 91 FL (ref 78–100)
MONOCYTES # BLD AUTO: 1.4 10E9/L (ref 0–1.3)
MONOCYTES NFR BLD AUTO: 10.8 %
NEUTROPHILS # BLD AUTO: 6.7 10E9/L (ref 1.6–8.3)
NEUTROPHILS NFR BLD AUTO: 52.4 %
PLATELET # BLD AUTO: 96 10E9/L (ref 150–450)
RBC # BLD AUTO: 4.5 10E12/L (ref 3.8–5.2)
WBC # BLD AUTO: 12.8 10E9/L (ref 4–11)

## 2018-03-26 PROCEDURE — 85025 COMPLETE CBC W/AUTO DIFF WBC: CPT | Performed by: INTERNAL MEDICINE

## 2018-03-26 PROCEDURE — 36415 COLL VENOUS BLD VENIPUNCTURE: CPT | Performed by: INTERNAL MEDICINE

## 2018-03-29 RX ORDER — IOPAMIDOL 755 MG/ML
100 INJECTION, SOLUTION INTRAVASCULAR ONCE
Status: COMPLETED | OUTPATIENT
Start: 2018-03-30 | End: 2018-03-30

## 2018-03-30 ENCOUNTER — HOSPITAL ENCOUNTER (OUTPATIENT)
Dept: CT IMAGING | Facility: CLINIC | Age: 63
Discharge: HOME | End: 2018-03-30
Attending: INTERNAL MEDICINE | Admitting: INTERNAL MEDICINE
Payer: COMMERCIAL

## 2018-03-30 DIAGNOSIS — D69.3 IDIOPATHIC THROMBOCYTOPENIC PURPURA (H): ICD-10-CM

## 2018-03-30 DIAGNOSIS — I81 PORTAL VEIN THROMBOSIS: ICD-10-CM

## 2018-03-30 PROCEDURE — 25000128 H RX IP 250 OP 636: Performed by: RADIOLOGY

## 2018-03-30 PROCEDURE — 25000125 ZZHC RX 250: Performed by: RADIOLOGY

## 2018-03-30 PROCEDURE — 74177 CT ABD & PELVIS W/CONTRAST: CPT

## 2018-03-30 RX ADMIN — SODIUM CHLORIDE 74 ML: 9 INJECTION, SOLUTION INTRAVENOUS at 09:01

## 2018-03-30 RX ADMIN — IOPAMIDOL 100 ML: 755 INJECTION, SOLUTION INTRAVENOUS at 09:01

## 2018-04-02 DIAGNOSIS — D69.3 IDIOPATHIC THROMBOCYTOPENIC PURPURA (H): ICD-10-CM

## 2018-04-02 LAB
BASOPHILS # BLD AUTO: 0.1 10E9/L (ref 0–0.2)
BASOPHILS NFR BLD AUTO: 0.5 %
DIFFERENTIAL METHOD BLD: ABNORMAL
EOSINOPHIL # BLD AUTO: 0.3 10E9/L (ref 0–0.7)
EOSINOPHIL NFR BLD AUTO: 2.9 %
ERYTHROCYTE [DISTWIDTH] IN BLOOD BY AUTOMATED COUNT: 13.9 % (ref 10–15)
HCT VFR BLD AUTO: 43 % (ref 35–47)
HGB BLD-MCNC: 13.9 G/DL (ref 11.7–15.7)
IMM GRANULOCYTES # BLD: 0 10E9/L (ref 0–0.4)
IMM GRANULOCYTES NFR BLD: 0.2 %
LYMPHOCYTES # BLD AUTO: 4.6 10E9/L (ref 0.8–5.3)
LYMPHOCYTES NFR BLD AUTO: 42.6 %
MCH RBC QN AUTO: 29.7 PG (ref 26.5–33)
MCHC RBC AUTO-ENTMCNC: 32.3 G/DL (ref 31.5–36.5)
MCV RBC AUTO: 92 FL (ref 78–100)
MONOCYTES # BLD AUTO: 1 10E9/L (ref 0–1.3)
MONOCYTES NFR BLD AUTO: 9.4 %
NEUTROPHILS # BLD AUTO: 4.8 10E9/L (ref 1.6–8.3)
NEUTROPHILS NFR BLD AUTO: 44.4 %
PLATELET # BLD AUTO: 51 10E9/L (ref 150–450)
RBC # BLD AUTO: 4.68 10E12/L (ref 3.8–5.2)
WBC # BLD AUTO: 10.8 10E9/L (ref 4–11)

## 2018-04-02 PROCEDURE — 36415 COLL VENOUS BLD VENIPUNCTURE: CPT | Performed by: INTERNAL MEDICINE

## 2018-04-02 PROCEDURE — 85025 COMPLETE CBC W/AUTO DIFF WBC: CPT | Performed by: INTERNAL MEDICINE

## 2018-04-03 ENCOUNTER — ONCOLOGY VISIT (OUTPATIENT)
Dept: ONCOLOGY | Facility: CLINIC | Age: 63
End: 2018-04-03
Attending: INTERNAL MEDICINE
Payer: COMMERCIAL

## 2018-04-03 VITALS
HEIGHT: 66 IN | RESPIRATION RATE: 20 BRPM | SYSTOLIC BLOOD PRESSURE: 146 MMHG | WEIGHT: 293 LBS | BODY MASS INDEX: 47.09 KG/M2 | HEART RATE: 69 BPM | TEMPERATURE: 98.6 F | OXYGEN SATURATION: 95 % | DIASTOLIC BLOOD PRESSURE: 73 MMHG

## 2018-04-03 DIAGNOSIS — D69.3 IDIOPATHIC THROMBOCYTOPENIC PURPURA (H): Primary | ICD-10-CM

## 2018-04-03 DIAGNOSIS — I81 PORTAL VEIN THROMBOSIS: ICD-10-CM

## 2018-04-03 DIAGNOSIS — G62.9 NEUROPATHY: ICD-10-CM

## 2018-04-03 PROCEDURE — G0463 HOSPITAL OUTPT CLINIC VISIT: HCPCS

## 2018-04-03 PROCEDURE — 99214 OFFICE O/P EST MOD 30 MIN: CPT | Performed by: INTERNAL MEDICINE

## 2018-04-03 ASSESSMENT — PAIN SCALES - GENERAL: PAINLEVEL: MODERATE PAIN (5)

## 2018-04-03 NOTE — MR AVS SNAPSHOT
After Visit Summary   4/3/2018    Charlee Marks    MRN: [id number]           Patient Information     Date Of Birth          1955        Visit Information        Provider Department      4/3/2018 11:15 AM Kerrie Rankin MD [facility]        Today's Diagnoses     ITP (idiopathic thrombocytopenic purpura)    -  1    Portal vein thrombosis        Neuropathy          Care Instructions    Dr. Rankin would like you to continue labs every 2 weeks and IVIG as needed.     We would like to see you back in 3 months for a follow up appointment.     When you are in need of a refill, please call your pharmacy and they will send us a request.      Copy of appointments, and after visit summary (AVS) given to patient.      If you have any questions please call Karla Scott RN, BSN Oncology Hematology  [facility] [phone]. For questions after business hours, or on holidays/weekends, please call our after hours Nurse Triage line [phone]. Thank you.         Continue cbc diff q 2wks and IVIG prn.   3 months f/u.           Follow-ups after your visit        Your next 10 appointments already scheduled     Apr 16, 2018  3:30 PM CDT   LAB with CL LAB   [facility]    [address] [address] [phone]           Please do not eat 10-12 hours before your appointment if you are coming in fasting for labs on lipids, cholesterol, or glucose (sugar). This does not apply to pregnant women. Water, hot tea and black coffee (with nothing added) are okay. Do not drink other fluids, diet soda or chew gum.            Apr 30, 2018  3:30 PM CDT   LAB with CL LAB   [facility]    [address] [address] [phone]           Please do not eat 10-12 hours before your appointment if you are coming in fasting for labs on lipids, cholesterol,  or glucose (sugar). This does not apply to pregnant women. Water, hot tea and black coffee (with nothing added) are okay. Do not drink other fluids, diet soda or chew gum.            Oct 18, 2018  8:00 AM CDT   Lab with  LAB   [facility] ([facility])    [address]  [address]  [facility] [address] [phone]            Oct 18, 2018  9:00 AM CDT   (Arrive by 8:45 AM)   Return General Liver with Agustin Le MD   [facility] ([facility])    [address]  [address]  [facility] [address] [phone]              Who to contact     If you have questions or need follow up information about today's clinic visit or your schedule please contact [facility] directly at [phone].  Normal or non-critical lab and imaging results will be communicated to you by MyChart, letter or phone within 4 business days after the clinic has received the results. If you do not hear from us within 7 days, please contact the clinic through [employer] or phone. If you have a critical or abnormal lab result, we will notify you by phone as soon as possible.  Submit refill requests through [employer] or call your pharmacy and they will forward the refill request to us. Please allow 3 business days for your refill to be completed.          Additional Information About Your Visit        [employer] Information     [employer] gives you secure access to your electronic health record. If you see a primary care provider, you can also send messages to your care team and make appointments. If you have questions, please call your primary care clinic.  If you do not have a primary care provider, please call [phone] and they will assist you.        Care EveryWhere ID     This is your Care EveryWhere ID. This could be used by other organizations to access your [place] medical records  [id number]        Your Vitals Were     Pulse Temperature Respirations Height  "Last Period Pulse Oximetry    69 98.6  F (37  C) (Tympanic) 20 1.676 m (5' 6\") 09/15/2007 95%    Breastfeeding? BMI (Body Mass Index)                No 49.29 kg/m2           Blood Pressure from Last 3 Encounters:   04/03/18 146/73   12/06/17 137/80   12/04/17 131/70    Weight from Last 3 Encounters:   04/03/18 138.5 kg (305 lb 6.4 oz)   12/06/17 (!) 136.5 kg (301 lb)   12/04/17 (!) 136.4 kg (300 lb 9.6 oz)              Today, you had the following     No orders found for display         Today's Medication Changes          These changes are accurate as of 4/3/18 12:01 PM.  If you have any questions, ask your nurse or doctor.               These medicines have changed or have updated prescriptions.        Dose/Directions    ranitidine 150 MG tablet   Commonly known as:  ZANTAC   This may have changed:  See the new instructions.   Used for:  Esophageal reflux        ONE TABLET TWICE DAILY as needed will call when needed   Quantity:  180 Tab   Refills:  3                Primary Care Provider Office Phone # Fax #    Kay Briggs [phone] [address] [address]        Equal Access to Services     LAVERN CABA AH: Mary Grace mcdonaldo Soelizabeth, waaxda luqadaha, qaybta kaalmada adeegyada, julieta mcguire. So [facility] [phone].    ATENCIÓN: Si habla español, tiene a cosme disposición servicios gratuitos de asistencia lingüística. Llame al [phone].    We comply with applicable federal civil rights laws and [place] laws. We do not discriminate on the basis of race, color, national origin, age, disability, sex, sexual orientation, or gender identity.            Thank you!     Thank you for choosing [facility]  for your care. Our goal is always to provide you with excellent care. Hearing back from our patients is one way we can continue to improve our services. Please take a few minutes to complete the written survey that you may receive in the " mail after your visit with us. Thank you!             Your Updated Medication List - Protect others around you: Learn how to safely use, store and throw away your medicines at www.disposemymeds.org.          This list is accurate as of 4/3/18 12:01 PM.  Always use your most recent med list.                   Brand Name Dispense Instructions for use Diagnosis    albuterol 108 (90 BASE) MCG/ACT Inhaler    PROAIR HFA/PROVENTIL HFA/VENTOLIN HFA    1 Inhaler    Inhale 2 puffs into the lungs every 4 hours as needed for shortness of breath / dyspnea    Mild intermittent asthma without complication       B-6 100 MG Tabs      Take 1 tablet by mouth daily        BENADRYL PO      Take 25 mg by mouth every 6 hours as needed        calcium carbonate-vitamin D 500-400 MG-UNIT Tabs per tablet      Take 1 tablet by mouth daily        GLUCOSAMINE CHONDRO COMPLEX OR      daily        hydrochlorothiazide 25 MG tablet    HYDRODIURIL    90 tablet    Take 1 tablet (25 mg) by mouth daily    Peripheral edema       loratadine 10 MG tablet    CLARITIN    90 Tab    ONE DAILY    Allergy, unspecified not elsewhere classified       MULTIVITAMIN TABS   OR      1 TABLET DAILY        omega 3 1000 MG Caps      daily        ranitidine 150 MG tablet    ZANTAC    180 Tab    ONE TABLET TWICE DAILY as needed will call when needed    Esophageal reflux       TYLENOL PO      Take 1,000 mg by mouth At Bedtime

## 2018-04-03 NOTE — PROGRESS NOTES
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      HISTORY OF PRESENT ILLNESS:  she presented with 2-3 months of easy bruising and also fatigue.  She presented to her Primary Care Physician's office on 07/25/2016 and was found to have critical thrombocytopenia with a platelet count of 3.    She was offered IVIG 1 gram on 7/25/2016 and 4 days of   Dexamethasone. She was d/c with PL of 38 on 7/27/2016. PL dip to 13 on 8/4 and 7 on 8/11. She was offered wklyI IVIG 1g/kg starting 8/4/2016, and 2nd cycle of dex x 4 days on 8/11/2016. PL improves to 77 on 8/15, then dip to 31 on 8/18.   Due to rapid drop of her PL when off dex, tx was changed to po prednisone 8/18/2017 with wkly IVIG. R was added on 8/30/2016. She had no PL response despite R, steroid and IVIG.   Nplate was started 9/23/2016. PL up from 20 to 110 after 2 doses of it. Then dip down again.   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   Course was complicated with portal vein and SMV thromboses end of 12/2016. She was admitted to , had IR thrombectomy and catheter-directed lytic therapy to portal vein. Following the procedure she was placed on high intensity heparin drip which was then bridged with warfarin starting 1/9/2017 with an INR goal of 2-3.   Due to progression of portal venous branch thrombosis and SMV thrombosis by CT in 7/2017. Tx is changed to Lovenox. She could not tolerates the shots. tx was changed to Xarelto.     She finished prednisone slow taper 4/2017.      PL dip down to around 50's in Aug 2017. Xarelto put on hold in Sep 2017 due to PL less than 50 and stable SMV thromboses, she also had acne type of rash.      She saw [facility] as 2nd opinion, Dr. Key had nl BM without fibrosis at [facility], ITP dx is assured.  Cyclosporine was also recommended to start at 200 mg po qd if PL less than 30, with nl renal function, and adjust the dose.     She elected to be observed due to concern of side effects from chemo and immunosuppression agents. PL  "dip to 13-15 on 11/30/2017 required dex 40 mg 4 days and IVIG, PL up to 190 on 12/4/2017.     She then is on q 1-2 wks prn IVIG.    PAST MEDICAL HISTORY:      1.  Cystocele.    2.  Morbid obesity.    3.  Reflux.    4.  Intermittent asthma.    5.  Effusion of lower leg joints.    6. 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    Mother had anemia, great aunt had anemia.       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  She is  with children.  She denies the use of alcohol or tobacco.      ROS  She is back to her baseline energy level.   She denies bleeding episode.     She reports HA from IVIG, maybe also a little from dex.   Still has neuropathy on digits.        PHYSICAL EXAMINATION:    VITAL SIGNS:  Blood pressure 146/73, pulse 69, temperature 98.6  F (37  C), temperature source Tympanic, resp. rate 20, height 1.676 m (5' 6\"), weight 138.5 kg (305 lb 6.4 oz), last menstrual period 09/15/2007, SpO2 95 %, not currently breastfeeding.    GENERAL APPEARANCE:  A middle-aged woman who appears her stated age, very pleasant, morbidly obese, sitting comfortably in a chair and knitting.    HEENT: The patient is normocephalic, atraumatic. Pupils are equally reactive to light.  Sclerae are anicteric.  There is moist oral mucosa, negative pharynx, no oral thrush.    NECK:  Supple, no jugular venous distention, thyroid not palpable.    LYMPH NODES:  Superficial lymphadenopathy is not appreciable in the bilateral cervical, supraclavicular, axillary or inguinal areas.    CARDIOVASCULAR:  S1, S2 regular with no murmurs or gallops, no carotid or abdominal bruits.    PULMONARY:  Lungs are clear to auscultation and percussion bilaterally.  There is no wheezing or rhonchi.    GASTROINTESTINAL:  Abdomen is soft, nontender with no hepatosplenomegaly, no signs of " ascites and no mass appreciable.    MUSCULOSKELETAL/EXTREMITIES:  No edema, no cyanotic changes, no signs of joint deformity, no lymphedema.    NEUROLOGIC:  Cranial nerves II-XII are grossly intact, sensation intact, muscle strength and muscle tone symmetrical, 5/5 throughout.    BACK:  No spinal or paraspinal tenderness, no CVA tenderness.    SKIN:  No petechiae, no rash and no signs of cellulitis.  There is scattered bruising on forearms and thighs, which are healing.        LABORATORY DATA reviewed  PL 50-90 in 2018    Current Image reviewed  CT a/p 3/2018 - Chronic thrombosis of the left portal vein and posterior branch of the right portal vein are again seen. The main portal vein is patent. 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        Old data reviewed with summary  PL 12/4/2017 at 190.  PL in teens around 11/30, then had IVIG 400 mg /kg and dex 40 mg po qd x4.  around 40 in the last wks, LFT/Cr nl in 10/2017      in 7/2107 from nl 6/2017  Hb nl, wbc/diff nl.     BM at [facility] 10/2017 - nl including cyto.     CT head 11/30/2017 - no bleeding.     CT a/P 9/2017: No significant change. Findings suggest chronic thrombosis of the left portal vein, posterior branch of the right portal vein and branches of the superior mesenteric vein as above.    CT a/p 7/2017 : Progression of left portal venous branch thrombosis and SMV thrombosis is seen compared to the prior study.    US 4/2017  1.  Recanalized main portal and splenic veins. Persistent occlusion of left portal vein.  2.  Splenectomy.    CT 1/9/2017   1. Status post splenectomy with postsurgical changes in the left upper quadrant. Persistent thrombosis of the splenic vein.  2. Main portal vein now appears patent with possible peripheral nonocclusive thrombosis/periportal edema. Residual thrombosis of portion of the left branch of the portal vein and the right posterior  branch of the portal vein.  3. Residual partial thrombosis of the distal  portion of the superior mesenteric vein.   4. Small focal nodular opacities in both lower lobes, likely of infectious etiology.            ASSESSMENT/PLAN:    1.  Refractory ITP dx first in 7/2016 .   She saw [facility] as 2nd opinion in 10/2017, Dr. Key had nl BM without fibrosis at [facility], ITP dx is assures.  Cyclosporine is also recommended to start at 200 mg po qd if PL less than 30,  with nl renal function, and adjust the dose.       We discussed the tx option:   Azathioprine or cyclosporin immunosuppression.   Cytoxan 750mg - 1g/m2 IV q month or po (maybe 100mg/m2 D1-14 q 28days)  vicristine 1-2mg IV wkly for several wks  vinblastin 0.1mg/kg  combination chemo -CVP, CVP+ -16.  Promacta po - 3-4% risk of thrombosis    The side effects associated with the above options. This is a tough situation. The top choices are oral CTX, cyclosporine, or Promacta or wkly IVIG prn.     She is on q 1-2 wk cbc check. IVIG 0.5 g/kg prn if PL less than 50 for now.       2. 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 S/p thrombolysis to portal vein, lovenox. Has been on Coumadin, lovenox again, Xarelto for 9 months.   Her clots are chronic and stable per CT 9/2017 and 3/2018.  Due to her low PL. Recommended off anticoagulation in Sep.    Advised full dose ASA on 12/4/2017 due to PL rebound to 190, then fluctuating.    ASA dose is reduced to  in 3/2018 due to PL .      3. 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 She feels it is helpful.

## 2018-04-03 NOTE — LETTER
4/3/2018         RE: Charlee Marks  [address]  [place] [address]        Dear Colleague,    Thank you for referring your patient, Charlee Marks, to the [facility]. Please see a copy of my visit note below.        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      HISTORY OF PRESENT ILLNESS:  she presented with 2-3 months of easy bruising and also fatigue.  She presented to her Primary Care Physician's office on 07/25/2016 and was found to have critical thrombocytopenia with a platelet count of 3.    She was offered IVIG 1 gram on 7/25/2016 and 4 days of   Dexamethasone. She was d/c with PL of 38 on 7/27/2016. PL dip to 13 on 8/4 and 7 on 8/11. She was offered wklyI IVIG 1g/kg starting 8/4/2016, and 2nd cycle of dex x 4 days on 8/11/2016. PL improves to 77 on 8/15, then dip to 31 on 8/18.   Due to rapid drop of her PL when off dex, tx was changed to po prednisone 8/18/2017 with wkly IVIG. R was added on 8/30/2016. She had no PL response despite R, steroid and IVIG.   Nplate was started 9/23/2016. PL up from 20 to 110 after 2 doses of it. Then dip down again.   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   Course was complicated with portal vein and SMV thromboses end of 12/2016. She was admitted to , had IR thrombectomy and catheter-directed lytic therapy to portal vein. Following the procedure she was placed on high intensity heparin drip which was then bridged with warfarin starting 1/9/2017 with an INR goal of 2-3.   Due to progression of portal venous branch thrombosis and SMV thrombosis by CT in 7/2017. Tx is changed to Lovenox. She could not tolerates the shots. tx was changed to Xarelto.     She finished prednisone slow taper 4/2017.      PL dip down to around 50's in Aug 2017. Xarelto put on hold in Sep 2017 due to PL less than 50 and stable SMV thromboses, she also had acne type of rash.      She saw [facility] as 2nd opinion, Dr. Key had nl BM without fibrosis at [facility], ITP dx is assured.  " Cyclosporine was also recommended to start at 200 mg po qd if PL less than 30, with nl renal function, and adjust the dose.     She elected to be observed due to concern of side effects from chemo and immunosuppression agents. PL dip to 13-15 on 11/30/2017 required dex 40 mg 4 days and IVIG, PL up to 190 on 12/4/2017.     She then is on q 1-2 wks prn IVIG.    PAST MEDICAL HISTORY:      1.  Cystocele.    2.  Morbid obesity.    3.  Reflux.    4.  Intermittent asthma.    5.  Effusion of lower leg joints.    6. 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    Mother had anemia, great aunt had anemia.       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  She denies the use of alcohol or tobacco.      ROS  She is back to her baseline energy level.   She denies bleeding episode.     She reports HA from IVIG, maybe also a little from dex.   Still has neuropathy on digits.        PHYSICAL EXAMINATION:    VITAL SIGNS:  Blood pressure 146/73, pulse 69, temperature 98.6  F (37  C), temperature source Tympanic, resp. rate 20, height 1.676 m (5' 6\"), weight 138.5 kg (305 lb 6.4 oz), last menstrual period 09/15/2007, SpO2 95 %, not currently breastfeeding.    GENERAL APPEARANCE:  A middle-aged woman who appears her stated age, very pleasant, morbidly obese, sitting comfortably in a chair and knitting.    HEENT: The patient is normocephalic, atraumatic. Pupils are equally reactive to light.  Sclerae are anicteric.  There is moist oral mucosa, negative pharynx, no oral thrush.    NECK:  Supple, no jugular venous distention, thyroid not palpable.    LYMPH NODES:  Superficial lymphadenopathy is not appreciable in the bilateral cervical, supraclavicular, axillary or inguinal areas.    CARDIOVASCULAR:  S1, S2 regular with no murmurs or gallops, no " carotid or abdominal bruits.    PULMONARY:  Lungs are clear to auscultation and percussion bilaterally.  There is no wheezing or rhonchi.    GASTROINTESTINAL:  Abdomen is soft, nontender with no hepatosplenomegaly, no signs of ascites and no mass appreciable.    MUSCULOSKELETAL/EXTREMITIES:  No edema, no cyanotic changes, no signs of joint deformity, no lymphedema.    NEUROLOGIC:  Cranial nerves II-XII are grossly intact, sensation intact, muscle strength and muscle tone symmetrical, 5/5 throughout.    BACK:  No spinal or paraspinal tenderness, no CVA tenderness.    SKIN:  No petechiae, no rash and no signs of cellulitis.  There is scattered bruising on forearms and thighs, which are healing.        LABORATORY DATA reviewed  PL 50-90 in 2018    Current Image reviewed  CT a/p 3/2018 - Chronic thrombosis of the left portal vein and posterior branch of the right portal vein are again seen. The main portal vein is patent. 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        Old data reviewed with summary  PL 12/4/2017 at 190.  PL in teens around 11/30, then had IVIG 400 mg /kg and dex 40 mg po qd x4.  around 40 in the last wks, LFT/Cr nl in 10/2017      in 7/2107 from nl 6/2017  Hb nl, wbc/diff nl.     BM at [facility] 10/2017 - nl including cyto.     CT head 11/30/2017 - no bleeding.     CT a/P 9/2017: No significant change. Findings suggest chronic thrombosis of the left portal vein, posterior branch of the right portal vein and branches of the superior mesenteric vein as above.    CT a/p 7/2017 : Progression of left portal venous branch thrombosis and SMV thrombosis is seen compared to the prior study.    US 4/2017  1.  Recanalized main portal and splenic veins. Persistent occlusion of left portal vein.  2.  Splenectomy.    CT 1/9/2017   1. Status post splenectomy with postsurgical changes in the left upper quadrant. Persistent thrombosis of the splenic vein.  2. Main portal vein now appears patent with possible  peripheral nonocclusive thrombosis/periportal edema. Residual thrombosis of portion of the left branch of the portal vein and the right posterior  branch of the portal vein.  3. Residual partial thrombosis of the distal portion of the superior mesenteric vein.   4. Small focal nodular opacities in both lower lobes, likely of infectious etiology.            ASSESSMENT/PLAN:    1.  Refractory ITP dx first in 7/2016 .   She saw [facility] as 2nd opinion in 10/2017, Dr. Key had nl BM without fibrosis at [facility], ITP dx is assures.  Cyclosporine is also recommended to start at 200 mg po qd if PL less than 30,  with nl renal function, and adjust the dose.       We discussed the tx option:   Azathioprine or cyclosporin immunosuppression.   Cytoxan 750mg - 1g/m2 IV q month or po (maybe 100mg/m2 D1-14 q 28days)  vicristine 1-2mg IV wkly for several wks  vinblastin 0.1mg/kg  combination chemo -CVP, CVP+ -16.  Promacta po - 3-4% risk of thrombosis    The side effects associated with the above options. This is a tough situation. The top choices are oral CTX, cyclosporine, or Promacta or wkly IVIG prn.     She is on q 1-2 wk cbc check. IVIG 0.5 g/kg prn if PL less than 50 for now.       2. 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 S/p thrombolysis to portal vein, lovenox. Has been on Coumadin, lovenox again, Xarelto for 9 months.   Her clots are chronic and stable per CT 9/2017 and 3/2018.  Due to her low PL. Recommended off anticoagulation in Sep.    Advised full dose ASA on 12/4/2017 due to PL rebound to 190, then fluctuating.    ASA dose is reduced to  in 3/2018 due to PL .      3. 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 She feels it is helpful.         Again, thank you for allowing me to participate in the care of your patient.        Sincerely,        Kerrie Rankin MD, MD

## 2018-04-03 NOTE — PATIENT INSTRUCTIONS
Dr. Rankin would like you to continue labs every 2 weeks and IVIG as needed.     We would like to see you back in 3 months for a follow up appointment.     When you are in need of a refill, please call your pharmacy and they will send us a request.      Copy of appointments, and after visit summary (AVS) given to patient.      If you have any questions please call Karla Scott RN, BSN Oncology Hematology  [facility] [phone]. For questions after business hours, or on holidays/weekends, please call our after hours Nurse Triage line [phone]. Thank you.         Continue cbc diff q 2wks and IVIG prn.   3 months f/u.

## 2018-04-03 NOTE — NURSING NOTE
"Oncology Rooming Note    April 3, 2018 11:16 AM   Charlee Marks is a 62 year old female who presents for:    Chief Complaint   Patient presents with     Hematology     Recheck ITP, review Labs & CT      Initial Vitals: /73 (BP Location: Right arm, Patient Position: Sitting, Cuff Size: Adult Large)  Pulse 69  Temp 98.6  F (37  C) (Tympanic)  Resp 20  Ht 1.676 m (5' 6\")  Wt 138.5 kg (305 lb 6.4 oz)  LMP 09/15/2007  SpO2 95%  Breastfeeding? No  BMI 49.29 kg/m2 Estimated body mass index is 49.29 kg/(m^2) as calculated from the following:    Height as of this encounter: 1.676 m (5' 6\").    Weight as of this encounter: 138.5 kg (305 lb 6.4 oz). Body surface area is 2.54 meters squared.  Moderate Pain (5) Comment: abdominal 2/10   Patient's last menstrual period was 09/15/2007.  Allergies reviewed: Yes  Medications reviewed: Yes    Medications: Medication refills not needed today.  Pharmacy name entered into [employer]: [facility] [place], [place] - [address]    Clinical concerns:Recheck ITP, review Labs & CT.     7  minutes for nursing intake (face to face time)     Lorena Wei CMA              "

## 2018-04-16 DIAGNOSIS — D69.3 IDIOPATHIC THROMBOCYTOPENIC PURPURA (H): ICD-10-CM

## 2018-04-16 LAB
BASOPHILS # BLD AUTO: 0.1 10E9/L (ref 0–0.2)
BASOPHILS NFR BLD AUTO: 0.6 %
DIFFERENTIAL METHOD BLD: ABNORMAL
EOSINOPHIL # BLD AUTO: 0.4 10E9/L (ref 0–0.7)
EOSINOPHIL NFR BLD AUTO: 3.7 %
ERYTHROCYTE [DISTWIDTH] IN BLOOD BY AUTOMATED COUNT: 13.6 % (ref 10–15)
HCT VFR BLD AUTO: 42.4 % (ref 35–47)
HGB BLD-MCNC: 13.9 G/DL (ref 11.7–15.7)
IMM GRANULOCYTES # BLD: 0 10E9/L (ref 0–0.4)
IMM GRANULOCYTES NFR BLD: 0.3 %
LYMPHOCYTES # BLD AUTO: 3.4 10E9/L (ref 0.8–5.3)
LYMPHOCYTES NFR BLD AUTO: 34.2 %
MCH RBC QN AUTO: 29.6 PG (ref 26.5–33)
MCHC RBC AUTO-ENTMCNC: 32.8 G/DL (ref 31.5–36.5)
MCV RBC AUTO: 90 FL (ref 78–100)
MONOCYTES # BLD AUTO: 1.2 10E9/L (ref 0–1.3)
MONOCYTES NFR BLD AUTO: 12.1 %
NEUTROPHILS # BLD AUTO: 4.9 10E9/L (ref 1.6–8.3)
NEUTROPHILS NFR BLD AUTO: 49.1 %
PLATELET # BLD AUTO: 74 10E9/L (ref 150–450)
RBC # BLD AUTO: 4.69 10E12/L (ref 3.8–5.2)
WBC # BLD AUTO: 10 10E9/L (ref 4–11)

## 2018-04-16 PROCEDURE — 85025 COMPLETE CBC W/AUTO DIFF WBC: CPT | Performed by: INTERNAL MEDICINE

## 2018-04-16 PROCEDURE — 36415 COLL VENOUS BLD VENIPUNCTURE: CPT | Performed by: INTERNAL MEDICINE

## 2018-04-30 DIAGNOSIS — D69.3 IDIOPATHIC THROMBOCYTOPENIC PURPURA (H): ICD-10-CM

## 2018-04-30 PROCEDURE — 36415 COLL VENOUS BLD VENIPUNCTURE: CPT | Performed by: INTERNAL MEDICINE

## 2018-04-30 PROCEDURE — 85025 COMPLETE CBC W/AUTO DIFF WBC: CPT | Performed by: INTERNAL MEDICINE

## 2018-05-01 LAB
BASOPHILS # BLD AUTO: 0.1 10E9/L (ref 0–0.2)
BASOPHILS NFR BLD AUTO: 0.7 %
DIFFERENTIAL METHOD BLD: ABNORMAL
EOSINOPHIL # BLD AUTO: 0.2 10E9/L (ref 0–0.7)
EOSINOPHIL NFR BLD AUTO: 2 %
ERYTHROCYTE [DISTWIDTH] IN BLOOD BY AUTOMATED COUNT: 13.8 % (ref 10–15)
HCT VFR BLD AUTO: 41.3 % (ref 35–47)
HGB BLD-MCNC: 13.6 G/DL (ref 11.7–15.7)
IMM GRANULOCYTES # BLD: 0 10E9/L (ref 0–0.4)
IMM GRANULOCYTES NFR BLD: 0.2 %
LYMPHOCYTES # BLD AUTO: 4.1 10E9/L (ref 0.8–5.3)
LYMPHOCYTES NFR BLD AUTO: 38.9 %
MCH RBC QN AUTO: 29.6 PG (ref 26.5–33)
MCHC RBC AUTO-ENTMCNC: 32.9 G/DL (ref 31.5–36.5)
MCV RBC AUTO: 90 FL (ref 78–100)
MONOCYTES # BLD AUTO: 1.2 10E9/L (ref 0–1.3)
MONOCYTES NFR BLD AUTO: 11.1 %
NEUTROPHILS # BLD AUTO: 5 10E9/L (ref 1.6–8.3)
NEUTROPHILS NFR BLD AUTO: 47.1 %
PLATELET # BLD AUTO: 70 10E9/L (ref 150–450)
RBC # BLD AUTO: 4.6 10E12/L (ref 3.8–5.2)
WBC # BLD AUTO: 10.6 10E9/L (ref 4–11)

## 2018-05-14 ENCOUNTER — TELEPHONE (OUTPATIENT)
Dept: ONCOLOGY | Facility: CLINIC | Age: 63
End: 2018-05-14

## 2018-05-14 DIAGNOSIS — D69.3 IDIOPATHIC THROMBOCYTOPENIC PURPURA (H): ICD-10-CM

## 2018-05-14 PROCEDURE — 85025 COMPLETE CBC W/AUTO DIFF WBC: CPT | Performed by: INTERNAL MEDICINE

## 2018-05-14 PROCEDURE — 36415 COLL VENOUS BLD VENIPUNCTURE: CPT | Performed by: INTERNAL MEDICINE

## 2018-05-14 NOTE — TELEPHONE ENCOUNTER
DATE:  5/14/2018   TIME OF RECEIPT FROM LAB:  3:44 pm  LAB TEST:  PLT (preliminary)  LAB VALUE:  35  RESULTS GIVEN WITH READ-BACK TO (PROVIDER):  Dr. Rankin  TIME LAB VALUE REPORTED TO PROVIDER:   3:50 pm

## 2018-05-15 LAB
BASOPHILS # BLD AUTO: 0.1 10E9/L (ref 0–0.2)
BASOPHILS NFR BLD AUTO: 0.6 %
DIFFERENTIAL METHOD BLD: ABNORMAL
EOSINOPHIL # BLD AUTO: 0.3 10E9/L (ref 0–0.7)
EOSINOPHIL NFR BLD AUTO: 2.4 %
ERYTHROCYTE [DISTWIDTH] IN BLOOD BY AUTOMATED COUNT: 13.6 % (ref 10–15)
HCT VFR BLD AUTO: 40.9 % (ref 35–47)
HGB BLD-MCNC: 13.5 G/DL (ref 11.7–15.7)
IMM GRANULOCYTES # BLD: 0 10E9/L (ref 0–0.4)
IMM GRANULOCYTES NFR BLD: 0.2 %
LYMPHOCYTES # BLD AUTO: 4 10E9/L (ref 0.8–5.3)
LYMPHOCYTES NFR BLD AUTO: 34.5 %
MCH RBC QN AUTO: 29.7 PG (ref 26.5–33)
MCHC RBC AUTO-ENTMCNC: 33 G/DL (ref 31.5–36.5)
MCV RBC AUTO: 90 FL (ref 78–100)
MONOCYTES # BLD AUTO: 1.4 10E9/L (ref 0–1.3)
MONOCYTES NFR BLD AUTO: 12 %
NEUTROPHILS # BLD AUTO: 5.8 10E9/L (ref 1.6–8.3)
NEUTROPHILS NFR BLD AUTO: 50.3 %
PLATELET # BLD AUTO: 34 10E9/L (ref 150–450)
PLATELET # BLD EST: ABNORMAL 10*3/UL
RBC # BLD AUTO: 4.55 10E12/L (ref 3.8–5.2)
RBC MORPH BLD: NORMAL
WBC # BLD AUTO: 11.5 10E9/L (ref 4–11)

## 2018-05-15 NOTE — TELEPHONE ENCOUNTER
Reviewed with Dr. Rankin and the plan is for IVIG if less than 30. Pt verbalized understanding and may get a lab draw next week pending on how she feels otherwise she is set up for 5/29.

## 2018-05-29 ENCOUNTER — TELEPHONE (OUTPATIENT)
Dept: ONCOLOGY | Facility: CLINIC | Age: 63
End: 2018-05-29

## 2018-05-29 DIAGNOSIS — D69.3 IDIOPATHIC THROMBOCYTOPENIC PURPURA (H): ICD-10-CM

## 2018-05-29 LAB
BASOPHILS # BLD AUTO: 0.1 10E9/L (ref 0–0.2)
BASOPHILS NFR BLD AUTO: 0.6 %
DIFFERENTIAL METHOD BLD: ABNORMAL
EOSINOPHIL # BLD AUTO: 0.3 10E9/L (ref 0–0.7)
EOSINOPHIL NFR BLD AUTO: 2.6 %
ERYTHROCYTE [DISTWIDTH] IN BLOOD BY AUTOMATED COUNT: 13.8 % (ref 10–15)
HCT VFR BLD AUTO: 41.7 % (ref 35–47)
HGB BLD-MCNC: 13.6 G/DL (ref 11.7–15.7)
IMM GRANULOCYTES # BLD: 0 10E9/L (ref 0–0.4)
IMM GRANULOCYTES NFR BLD: 0.2 %
LYMPHOCYTES # BLD AUTO: 3.9 10E9/L (ref 0.8–5.3)
LYMPHOCYTES NFR BLD AUTO: 33.1 %
MCH RBC QN AUTO: 29.3 PG (ref 26.5–33)
MCHC RBC AUTO-ENTMCNC: 32.6 G/DL (ref 31.5–36.5)
MCV RBC AUTO: 90 FL (ref 78–100)
MONOCYTES # BLD AUTO: 1.4 10E9/L (ref 0–1.3)
MONOCYTES NFR BLD AUTO: 11.7 %
NEUTROPHILS # BLD AUTO: 6.1 10E9/L (ref 1.6–8.3)
NEUTROPHILS NFR BLD AUTO: 51.8 %
PLATELET # BLD AUTO: 44 10E9/L (ref 150–450)
RBC # BLD AUTO: 4.64 10E12/L (ref 3.8–5.2)
WBC # BLD AUTO: 11.9 10E9/L (ref 4–11)

## 2018-05-29 PROCEDURE — 85025 COMPLETE CBC W/AUTO DIFF WBC: CPT | Performed by: INTERNAL MEDICINE

## 2018-05-29 PROCEDURE — 36415 COLL VENOUS BLD VENIPUNCTURE: CPT | Performed by: INTERNAL MEDICINE

## 2018-05-29 NOTE — TELEPHONE ENCOUNTER
DATE:  5/29/2018   TIME OF RECEIPT FROM LAB:  1526  LAB TEST:  Platelet (prelim)  LAB VALUE:  33  RESULTS GIVEN WITH READ-BACK TO (PROVIDER):  Dr. Rankin  TIME LAB VALUE REPORTED TO PROVIDER:   1528 per in basket

## 2018-06-11 DIAGNOSIS — D69.3 IDIOPATHIC THROMBOCYTOPENIC PURPURA (H): ICD-10-CM

## 2018-06-11 LAB
BASOPHILS # BLD AUTO: 0.1 10E9/L (ref 0–0.2)
BASOPHILS NFR BLD AUTO: 0.6 %
DIFFERENTIAL METHOD BLD: ABNORMAL
EOSINOPHIL # BLD AUTO: 0.3 10E9/L (ref 0–0.7)
EOSINOPHIL NFR BLD AUTO: 2.8 %
ERYTHROCYTE [DISTWIDTH] IN BLOOD BY AUTOMATED COUNT: 13.7 % (ref 10–15)
HCT VFR BLD AUTO: 43.2 % (ref 35–47)
HGB BLD-MCNC: 13.6 G/DL (ref 11.7–15.7)
IMM GRANULOCYTES # BLD: 0 10E9/L (ref 0–0.4)
IMM GRANULOCYTES NFR BLD: 0.2 %
LYMPHOCYTES # BLD AUTO: 3.2 10E9/L (ref 0.8–5.3)
LYMPHOCYTES NFR BLD AUTO: 32.3 %
MCH RBC QN AUTO: 29.6 PG (ref 26.5–33)
MCHC RBC AUTO-ENTMCNC: 31.5 G/DL (ref 31.5–36.5)
MCV RBC AUTO: 94 FL (ref 78–100)
MONOCYTES # BLD AUTO: 1 10E9/L (ref 0–1.3)
MONOCYTES NFR BLD AUTO: 9.9 %
NEUTROPHILS # BLD AUTO: 5.3 10E9/L (ref 1.6–8.3)
NEUTROPHILS NFR BLD AUTO: 54.2 %
NRBC # BLD AUTO: 0 10*3/UL
NRBC BLD AUTO-RTO: 0 /100
PLATELET # BLD AUTO: 84 10E9/L (ref 150–450)
RBC # BLD AUTO: 4.6 10E12/L (ref 3.8–5.2)
WBC # BLD AUTO: 9.7 10E9/L (ref 4–11)

## 2018-06-11 PROCEDURE — 85025 COMPLETE CBC W/AUTO DIFF WBC: CPT | Performed by: INTERNAL MEDICINE

## 2018-06-11 PROCEDURE — 36415 COLL VENOUS BLD VENIPUNCTURE: CPT | Performed by: INTERNAL MEDICINE

## 2018-06-25 ENCOUNTER — TELEPHONE (OUTPATIENT)
Dept: ONCOLOGY | Facility: CLINIC | Age: 63
End: 2018-06-25

## 2018-06-25 DIAGNOSIS — D69.3 IDIOPATHIC THROMBOCYTOPENIC PURPURA (H): ICD-10-CM

## 2018-06-25 LAB
BASOPHILS # BLD AUTO: 0.1 10E9/L (ref 0–0.2)
BASOPHILS NFR BLD AUTO: 1.1 %
DIFFERENTIAL METHOD BLD: ABNORMAL
EOSINOPHIL # BLD AUTO: 0.4 10E9/L (ref 0–0.7)
EOSINOPHIL NFR BLD AUTO: 3.4 %
ERYTHROCYTE [DISTWIDTH] IN BLOOD BY AUTOMATED COUNT: 13.2 % (ref 10–15)
HCT VFR BLD AUTO: 41.5 % (ref 35–47)
HGB BLD-MCNC: 13.5 G/DL (ref 11.7–15.7)
IMM GRANULOCYTES # BLD: 0 10E9/L (ref 0–0.4)
IMM GRANULOCYTES NFR BLD: 0.3 %
LYMPHOCYTES # BLD AUTO: 4.1 10E9/L (ref 0.8–5.3)
LYMPHOCYTES NFR BLD AUTO: 37.8 %
MCH RBC QN AUTO: 30.4 PG (ref 26.5–33)
MCHC RBC AUTO-ENTMCNC: 32.5 G/DL (ref 31.5–36.5)
MCV RBC AUTO: 94 FL (ref 78–100)
MONOCYTES # BLD AUTO: 1.1 10E9/L (ref 0–1.3)
MONOCYTES NFR BLD AUTO: 9.8 %
NEUTROPHILS # BLD AUTO: 5.1 10E9/L (ref 1.6–8.3)
NEUTROPHILS NFR BLD AUTO: 47.6 %
NRBC # BLD AUTO: 0 10*3/UL
NRBC BLD AUTO-RTO: 0 /100
PLATELET # BLD AUTO: 53 10E9/L (ref 150–450)
RBC # BLD AUTO: 4.44 10E12/L (ref 3.8–5.2)
WBC # BLD AUTO: 10.7 10E9/L (ref 4–11)

## 2018-06-25 PROCEDURE — 36415 COLL VENOUS BLD VENIPUNCTURE: CPT | Performed by: INTERNAL MEDICINE

## 2018-06-25 PROCEDURE — 85025 COMPLETE CBC W/AUTO DIFF WBC: CPT | Performed by: INTERNAL MEDICINE

## 2018-06-25 NOTE — TELEPHONE ENCOUNTER
DATE:  6/25/2018   TIME OF RECEIPT FROM LAB:  0843  LAB TEST:  PLT  LAB VALUE:  36 (Prelim)  RESULTS GIVEN WITH READ-BACK TO (PROVIDER):  Dr. Rankin  TIME LAB VALUE REPORTED TO PROVIDER:   0846 via in basket, will await final result.

## 2018-06-29 ENCOUNTER — DOCUMENTATION ONLY (OUTPATIENT)
Dept: LAB | Facility: CLINIC | Age: 63
End: 2018-06-29

## 2018-07-03 ENCOUNTER — DOCUMENTATION ONLY (OUTPATIENT)
Dept: LAB | Facility: CLINIC | Age: 63
End: 2018-07-03

## 2018-07-03 DIAGNOSIS — D69.3 IDIOPATHIC THROMBOCYTOPENIC PURPURA (H): Primary | ICD-10-CM

## 2018-07-03 NOTE — PROGRESS NOTES
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 Her order has . Please place a new standing order. Thank you.

## 2018-07-09 DIAGNOSIS — D69.3 IDIOPATHIC THROMBOCYTOPENIC PURPURA (H): ICD-10-CM

## 2018-07-09 LAB
BASOPHILS # BLD AUTO: 0.1 10E9/L (ref 0–0.2)
BASOPHILS NFR BLD AUTO: 0.9 %
DIFFERENTIAL METHOD BLD: ABNORMAL
EOSINOPHIL # BLD AUTO: 0.4 10E9/L (ref 0–0.7)
EOSINOPHIL NFR BLD AUTO: 3.3 %
ERYTHROCYTE [DISTWIDTH] IN BLOOD BY AUTOMATED COUNT: 13.4 % (ref 10–15)
HCT VFR BLD AUTO: 41.7 % (ref 35–47)
HGB BLD-MCNC: 13.3 G/DL (ref 11.7–15.7)
IMM GRANULOCYTES # BLD: 0 10E9/L (ref 0–0.4)
IMM GRANULOCYTES NFR BLD: 0.2 %
LYMPHOCYTES # BLD AUTO: 3.5 10E9/L (ref 0.8–5.3)
LYMPHOCYTES NFR BLD AUTO: 32.3 %
MCH RBC QN AUTO: 30 PG (ref 26.5–33)
MCHC RBC AUTO-ENTMCNC: 31.9 G/DL (ref 31.5–36.5)
MCV RBC AUTO: 94 FL (ref 78–100)
MONOCYTES # BLD AUTO: 1.1 10E9/L (ref 0–1.3)
MONOCYTES NFR BLD AUTO: 10.5 %
NEUTROPHILS # BLD AUTO: 5.7 10E9/L (ref 1.6–8.3)
NEUTROPHILS NFR BLD AUTO: 52.8 %
NRBC # BLD AUTO: 0 10*3/UL
NRBC BLD AUTO-RTO: 0 /100
PLATELET # BLD AUTO: 84 10E9/L (ref 150–450)
RBC # BLD AUTO: 4.43 10E12/L (ref 3.8–5.2)
WBC # BLD AUTO: 10.8 10E9/L (ref 4–11)

## 2018-07-09 PROCEDURE — 85025 COMPLETE CBC W/AUTO DIFF WBC: CPT | Performed by: INTERNAL MEDICINE

## 2018-07-09 PROCEDURE — 36415 COLL VENOUS BLD VENIPUNCTURE: CPT | Performed by: INTERNAL MEDICINE

## 2018-07-10 ENCOUNTER — ONCOLOGY VISIT (OUTPATIENT)
Dept: ONCOLOGY | Facility: CLINIC | Age: 63
End: 2018-07-10
Attending: INTERNAL MEDICINE
Payer: COMMERCIAL

## 2018-07-10 VITALS
RESPIRATION RATE: 22 BRPM | WEIGHT: 293 LBS | HEART RATE: 72 BPM | TEMPERATURE: 98.6 F | BODY MASS INDEX: 47.09 KG/M2 | SYSTOLIC BLOOD PRESSURE: 151 MMHG | HEIGHT: 66 IN | DIASTOLIC BLOOD PRESSURE: 77 MMHG | OXYGEN SATURATION: 95 %

## 2018-07-10 DIAGNOSIS — I81 PORTAL VEIN THROMBOSIS: ICD-10-CM

## 2018-07-10 DIAGNOSIS — G62.9 NEUROPATHY: ICD-10-CM

## 2018-07-10 DIAGNOSIS — D69.3 IDIOPATHIC THROMBOCYTOPENIC PURPURA (H): Primary | ICD-10-CM

## 2018-07-10 PROCEDURE — 99214 OFFICE O/P EST MOD 30 MIN: CPT | Performed by: INTERNAL MEDICINE

## 2018-07-10 PROCEDURE — G0463 HOSPITAL OUTPT CLINIC VISIT: HCPCS

## 2018-07-10 ASSESSMENT — PAIN SCALES - GENERAL: PAINLEVEL: MILD PAIN (2)

## 2018-07-10 NOTE — PROGRESS NOTES
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      HISTORY OF PRESENT ILLNESS:  she presented with 2-3 months of easy bruising and also fatigue.  She presented to her Primary Care Physician's office on 07/25/2016 and was found to have critical thrombocytopenia with a platelet count of 3.    She was offered IVIG 1 gram on 7/25/2016 and 4 days of   Dexamethasone. She was d/c with PL of 38 on 7/27/2016. PL dip to 13 on 8/4 and 7 on 8/11. She was offered wklyI IVIG 1g/kg starting 8/4/2016, and 2nd cycle of dex x 4 days on 8/11/2016. PL improves to 77 on 8/15, then dip to 31 on 8/18.   Due to rapid drop of her PL when off dex, tx was changed to po prednisone 8/18/2017 with wkly IVIG. R was added on 8/30/2016. She had no PL response despite R, steroid and IVIG.   Nplate was started 9/23/2016. PL up from 20 to 110 after 2 doses of it. Then dip down again.   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   Course was complicated with portal vein and SMV thromboses end of 12/2016. She was admitted to , had IR thrombectomy and catheter-directed lytic therapy to portal vein. Following the procedure she was placed on high intensity heparin drip which was then bridged with warfarin starting 1/9/2017 with an INR goal of 2-3.   Due to progression of portal venous branch thrombosis and SMV thrombosis by CT in 7/2017. Tx is changed to Lovenox. She could not tolerates the shots. tx was changed to Xarelto.     She finished prednisone slow taper 4/2017.      PL dip down to around 50's in Aug 2017. Xarelto put on hold in Sep 2017 due to PL less than 50 and stable SMV thromboses, she also had acne type of rash.      She saw [facility] as 2nd opinion, Dr. Key had nl BM without fibrosis at [facility], ITP dx is assured.  Cyclosporine was also recommended to start at 200 mg po qd if PL less than 30, with nl renal function, and adjust the dose.     She elected to be observed due to concern of side effects from chemo and immunosuppression agents. PL  "dip to 13-15 on 11/30/2017 required dex 40 mg 4 days and IVIG, PL up to 190 on 12/4/2017.     She then is on q 1-2 wks prn IVIG.    PAST MEDICAL HISTORY:      1.  Cystocele.    2.  Morbid obesity.    3.  Reflux.    4.  Intermittent asthma.    5.  Effusion of lower leg joints.    6. 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    Mother had anemia, great aunt had anemia.       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  She denies the use of alcohol or tobacco.      ROS  She is back to her baseline energy level.   She denies bleeding episode.     She reports HA from IVIG, maybe also a little from dex.   Still has neuropathy on digit on B6.        PHYSICAL EXAMINATION:    VITAL SIGNS:  Blood pressure 151/77, pulse 72, temperature 98.6  F (37  C), temperature source Tympanic, resp. rate 22, height 1.676 m (5' 5.98\"), weight 141.3 kg (311 lb 6.4 oz), last menstrual period 09/15/2007, SpO2 95 %, not currently breastfeeding.    GENERAL APPEARANCE:  A middle-aged woman who appears her stated age, very pleasant, morbidly obese, sitting comfortably in a chair and knitting.    HEENT: The patient is normocephalic, atraumatic. Pupils are equally reactive to light.  Sclerae are anicteric.  There is moist oral mucosa, negative pharynx, no oral thrush.    NECK:  Supple, no jugular venous distention, thyroid not palpable.    LYMPH NODES:  Superficial lymphadenopathy is not appreciable in the bilateral cervical, supraclavicular, axillary or inguinal areas.    CARDIOVASCULAR:  S1, S2 regular with no murmurs or gallops, no carotid or abdominal bruits.    PULMONARY:  Lungs are clear to auscultation and percussion bilaterally.  There is no wheezing or rhonchi.    GASTROINTESTINAL:  Abdomen is soft, nontender with no hepatosplenomegaly, no signs " of ascites and no mass appreciable.    MUSCULOSKELETAL/EXTREMITIES:  No edema, no cyanotic changes, no signs of joint deformity, no lymphedema.    NEUROLOGIC:  Cranial nerves II-XII are grossly intact, sensation intact, muscle strength and muscle tone symmetrical, 5/5 throughout.    BACK:  No spinal or paraspinal tenderness, no CVA tenderness.    SKIN:  No petechiae, no rash and no signs of cellulitis.  There is scattered bruising on forearms and thighs, which are healing.        LABORATORY DATA reviewed  PL 50-90 in 2018    Current Image reviewed  CT a/p 3/2018 - Chronic thrombosis of the left portal vein and posterior branch of the right portal vein are again seen. The main portal vein is patent. 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        Old data reviewed with summary  PL 12/4/2017 at 190.  PL in teens around 11/30, then had IVIG 400 mg /kg and dex 40 mg po qd x4.  around 40 in the last wks, LFT/Cr nl in 10/2017      in 7/2107 from nl 6/2017  Hb nl, wbc/diff nl.     BM at [facility] 10/2017 - nl including cyto.     CT head 11/30/2017 - no bleeding.     CT a/P 9/2017: No significant change. Findings suggest chronic thrombosis of the left portal vein, posterior branch of the right portal vein and branches of the superior mesenteric vein as above.    CT a/p 7/2017 : Progression of left portal venous branch thrombosis and SMV thrombosis is seen compared to the prior study.    US 4/2017  1.  Recanalized main portal and splenic veins. Persistent occlusion of left portal vein.  2.  Splenectomy.    CT 1/9/2017   1. Status post splenectomy with postsurgical changes in the left upper quadrant. Persistent thrombosis of the splenic vein.  2. Main portal vein now appears patent with possible peripheral nonocclusive thrombosis/periportal edema. Residual thrombosis of portion of the left branch of the portal vein and the right posterior  branch of the portal vein.  3. Residual partial thrombosis of the distal  portion of the superior mesenteric vein.   4. Small focal nodular opacities in both lower lobes, likely of infectious etiology.            ASSESSMENT/PLAN:    1.  Refractory ITP dx first in 7/2016 .   She saw [facility] as 2nd opinion in 10/2017, Dr. Key had nl BM without fibrosis at [facility], ITP dx is assured.  Cyclosporine was also recommended to start at 200 mg po qd if PL less than 30,  with nl renal function, and adjust the dose.       We discussed the tx option:   Azathioprine or cyclosporin immunosuppression.   Cytoxan 750mg - 1g/m2 IV q month or po (maybe 100mg/m2 D1-14 q 28days)  vicristine 1-2mg IV wkly for several wks  vinblastin 0.1mg/kg  combination chemo -CVP, CVP+ -16.  Promacta po - 3-4% risk of thrombosis    The side effects associated with the above options. This is a tough situation. The top choices are oral CTX, cyclosporine, or Promacta or wkly IVIG prn.     She is on q 2 wk cbc check. IVIG 0.5 g/kg prn if PL less than 30 for now.   She is happy with this plan. Does not want to get on immuno suppresion and chemo.     2. 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 S/p thrombolysis to portal vein, lovenox. Has been on Coumadin, lovenox again, Xarelto for 9 months.   Her clots are chronic and stable per CT 9/2017 and 3/2018.  Due to her low PL. Recommended off anticoagulation in Sep 2017   Advised full dose ASA on 12/4/2017 due to PL rebound to 190, then fluctuating.    ASA dose was reduced to  in 3/2018 due to PL .      3. 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 She feels it is helpful.

## 2018-07-10 NOTE — MR AVS SNAPSHOT
After Visit Summary   7/10/2018    Charlee Marks    MRN: [id number]           Patient Information     Date Of Birth          1955        Visit Information        Provider Department      7/10/2018 10:45 AM Kerrie Rankin MD [facility]        Today's Diagnoses     ITP (idiopathic thrombocytopenic purpura)    -  1    Portal vein thrombosis        Neuropathy          Care Instructions    PL check q 2 wks with prn IVIG in therapy plan. F/u in 6 months.           Follow-ups after your visit        Your next 10 appointments already scheduled     Jul 23, 2018  8:30 AM CDT   LAB with [facility] LAB   [facility]    [address] [facility] [id number] [phone]           Please do not eat 10-12 hours before your appointment if you are coming in fasting for labs on lipids, cholesterol, or glucose (sugar). This does not apply to pregnant women. Water, hot tea and black coffee (with nothing added) are okay. Do not drink other fluids, diet soda or chew gum.            Oct 18, 2018  8:00 AM CDT   Lab with  LAB   [facility] ([facility])    [address]  1st Floor  [place] [place] [facility] [address] [phone]            Oct 18, 2018  9:00 AM CDT   (Arrive by 8:45 AM)   Return General Liver with Agustin Le MD   [facility] Hepatology ([facility])    [address]  [address]  [place] [place] [address] [phone]              Future tests that were ordered for you today     Open Standing Orders        Priority Remaining Interval Expires Ordered    Platelet count Routine 12/12 q 2 wks 7/10/2019 7/10/2018            Who to contact     If you have questions or need follow up information about today's clinic visit or your schedule please contact [facility] directly at [phone].  Normal or non-critical lab and imaging results will be communicated to you by MyChart, letter or  "phone within 4 business days after the clinic has received the results. If you do not hear from us within 7 days, please contact the clinic through [employer] or phone. If you have a critical or abnormal lab result, we will notify you by phone as soon as possible.  Submit refill requests through [employer] or call your pharmacy and they will forward the refill request to us. Please allow 3 business days for your refill to be completed.          Additional Information About Your Visit        [employer] Information     [employer] gives you secure access to your electronic health record. If you see a primary care provider, you can also send messages to your care team and make appointments. If you have questions, please call your primary care clinic.  If you do not have a primary care provider, please call [phone] and they will assist you.        Care EveryWhere ID     This is your Care EveryWhere ID. This could be used by other organizations to access your [place] medical records  [id number]        Your Vitals Were     Pulse Temperature Respirations Height Last Period Pulse Oximetry    72 98.6  F (37  C) (Tympanic) 22 1.676 m (5' 5.98\") 09/15/2007 95%    Breastfeeding? BMI (Body Mass Index)                No 50.29 kg/m2           Blood Pressure from Last 3 Encounters:   07/10/18 151/77   04/03/18 146/73   12/06/17 137/80    Weight from Last 3 Encounters:   07/10/18 141.3 kg (311 lb 6.4 oz)   04/03/18 138.5 kg (305 lb 6.4 oz)   12/06/17 (!) 136.5 kg (301 lb)                 Today's Medication Changes          These changes are accurate as of 7/10/18 11:25 AM.  If you have any questions, ask your nurse or doctor.               These medicines have changed or have updated prescriptions.        Dose/Directions    ranitidine 150 MG tablet   Commonly known as:  ZANTAC   This may have changed:  See the new instructions.   Used for:  Esophageal reflux        ONE TABLET TWICE DAILY as needed will call when needed   Quantity:  180 " Tab   Refills:  3                Primary Care Provider Office Phone # Fax #    Kay Briggs [phone] [address] [facility] [address]        Equal Access to Services     LAVERN CABA : Mary Grace sourav altman tamarao Sonyali, wasamida luqadaha, qaybta kaalmada arnoldo, julieta amesalejandro mcguire. So [facility] [phone].    ATENCIÓN: Si habla español, tiene a cosme disposición servicios gratuitos de asistencia lingüística. Llame al [phone].    We comply with applicable federal civil rights laws and [place] laws. We do not discriminate on the basis of race, color, national origin, age, disability, sex, sexual orientation, or gender identity.            Thank you!     Thank you for choosing [facility]  for your care. Our goal is always to provide you with excellent care. Hearing back from our patients is one way we can continue to improve our services. Please take a few minutes to complete the written survey that you may receive in the mail after your visit with us. Thank you!             Your Updated Medication List - Protect others around you: Learn how to safely use, store and throw away your medicines at www.disposemymeds.org.          This list is accurate as of 7/10/18 11:25 AM.  Always use your most recent med list.                   Brand Name Dispense Instructions for use Diagnosis    albuterol 108 (90 Base) MCG/ACT Inhaler    PROAIR HFA/PROVENTIL HFA/VENTOLIN HFA    1 Inhaler    Inhale 2 puffs into the lungs every 4 hours as needed for shortness of breath / dyspnea    Mild intermittent asthma without complication       B-6 100 MG Tabs      Take 1 tablet by mouth daily        BENADRYL PO      Take 25 mg by mouth every 6 hours as needed        calcium carbonate-vitamin D 500-400 MG-UNIT Tabs per tablet      Take 1 tablet by mouth daily        GLUCOSAMINE CHONDRO COMPLEX OR      daily        hydrochlorothiazide 25 MG tablet    HYDRODIURIL    90 tablet    Take  1 tablet (25 mg) by mouth daily    Peripheral edema       loratadine 10 MG tablet    CLARITIN    90 Tab    ONE DAILY    Allergy, unspecified not elsewhere classified       MULTIVITAMIN TABS   OR      1 TABLET DAILY        omega 3 1000 MG Caps      daily        ranitidine 150 MG tablet    ZANTAC    180 Tab    ONE TABLET TWICE DAILY as needed will call when needed    Esophageal reflux       TYLENOL PO      Take 1,000 mg by mouth At Bedtime

## 2018-07-10 NOTE — NURSING NOTE
"Oncology Rooming Note    July 10, 2018 10:54 AM   Charlee Marks is a 62 year old female who presents for:    Chief Complaint   Patient presents with     Hematology     3 month Follow up ITP, Review Labs     Initial Vitals: /77 (BP Location: Left arm, Patient Position: Sitting, Cuff Size: Adult Large)  Pulse 72  Temp 98.6  F (37  C) (Tympanic)  Resp 22  Ht 1.676 m (5' 5.98\")  Wt 141.3 kg (311 lb 6.4 oz)  LMP 09/15/2007  SpO2 95%  Breastfeeding? No  BMI 50.29 kg/m2 Estimated body mass index is 50.29 kg/(m^2) as calculated from the following:    Height as of this encounter: 1.676 m (5' 5.98\").    Weight as of this encounter: 141.3 kg (311 lb 6.4 oz). Body surface area is 2.56 meters squared.  Mild Pain (2) Comment: Left side    Patient's last menstrual period was 09/15/2007.  Allergies reviewed: Yes  Medications reviewed: Yes    Medications: Medication refills not needed today.  Pharmacy name entered into [employer]: [facility] [place], [place] - [address]    Clinical concerns: 3 month Follow up ITP, Review Labs.     Patient reports Left eye vision changes and left side of head headache for 1 week.   7 minutes for nursing intake (face to face time)     Lorena Wei [facility]              "

## 2018-07-10 NOTE — PATIENT INSTRUCTIONS
Dr. Rankin would like you to continue with labs every 2 weeks and PRN IVIG if PLT is less than 30.     We would like to see you back in 6 months for a follow up appointment.     When you are in need of a refill, please call your pharmacy and they will send us a request.      Copy of appointments, and after visit summary (AVS) given to patient.      If you have any questions please call Karla Scott RN, BSN Oncology Hematology  [facility] [phone]. For questions after business hours, or on holidays/weekends, please call our after hours Nurse Triage line [phone]. Thank you.           PL check q 2 wks with prn IVIG in therapy plan. F/u in 6 months.

## 2018-07-10 NOTE — LETTER
7/10/2018         RE: Charlee Marks  [address]  [place] [address]        Dear Colleague,    Thank you for referring your patient, Charlee Marks, to the [facility]. Please see a copy of my visit note below.        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      HISTORY OF PRESENT ILLNESS:  she presented with 2-3 months of easy bruising and also fatigue.  She presented to her Primary Care Physician's office on 07/25/2016 and was found to have critical thrombocytopenia with a platelet count of 3.    She was offered IVIG 1 gram on 7/25/2016 and 4 days of   Dexamethasone. She was d/c with PL of 38 on 7/27/2016. PL dip to 13 on 8/4 and 7 on 8/11. She was offered wklyI IVIG 1g/kg starting 8/4/2016, and 2nd cycle of dex x 4 days on 8/11/2016. PL improves to 77 on 8/15, then dip to 31 on 8/18.   Due to rapid drop of her PL when off dex, tx was changed to po prednisone 8/18/2017 with wkly IVIG. R was added on 8/30/2016. She had no PL response despite R, steroid and IVIG.   Nplate was started 9/23/2016. PL up from 20 to 110 after 2 doses of it. Then dip down again.   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   Course was complicated with portal vein and SMV thromboses end of 12/2016. She was admitted to , had IR thrombectomy and catheter-directed lytic therapy to portal vein. Following the procedure she was placed on high intensity heparin drip which was then bridged with warfarin starting 1/9/2017 with an INR goal of 2-3.   Due to progression of portal venous branch thrombosis and SMV thrombosis by CT in 7/2017. Tx is changed to Lovenox. She could not tolerates the shots. tx was changed to Xarelto.     She finished prednisone slow taper 4/2017.      PL dip down to around 50's in Aug 2017. Xarelto put on hold in Sep 2017 due to PL less than 50 and stable SMV thromboses, she also had acne type of rash.      She saw [facility] as 2nd opinion, Dr. Key had nl BM without fibrosis at [facility], ITP dx is  "assured.  Cyclosporine was also recommended to start at 200 mg po qd if PL less than 30, with nl renal function, and adjust the dose.     She elected to be observed due to concern of side effects from chemo and immunosuppression agents. PL dip to 13-15 on 11/30/2017 required dex 40 mg 4 days and IVIG, PL up to 190 on 12/4/2017.     She then is on q 1-2 wks prn IVIG.    PAST MEDICAL HISTORY:      1.  Cystocele.    2.  Morbid obesity.    3.  Reflux.    4.  Intermittent asthma.    5.  Effusion of lower leg joints.    6. 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    Mother had anemia, great aunt had anemia.       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  She denies the use of alcohol or tobacco.      ROS  She is back to her baseline energy level.   She denies bleeding episode.     She reports HA from IVIG, maybe also a little from dex.   Still has neuropathy on digit on B6.        PHYSICAL EXAMINATION:    VITAL SIGNS:  Blood pressure 151/77, pulse 72, temperature 98.6  F (37  C), temperature source Tympanic, resp. rate 22, height 1.676 m (5' 5.98\"), weight 141.3 kg (311 lb 6.4 oz), last menstrual period 09/15/2007, SpO2 95 %, not currently breastfeeding.    GENERAL APPEARANCE:  A middle-aged woman who appears her stated age, very pleasant, morbidly obese, sitting comfortably in a chair and knitting.    HEENT: The patient is normocephalic, atraumatic. Pupils are equally reactive to light.  Sclerae are anicteric.  There is moist oral mucosa, negative pharynx, no oral thrush.    NECK:  Supple, no jugular venous distention, thyroid not palpable.    LYMPH NODES:  Superficial lymphadenopathy is not appreciable in the bilateral cervical, supraclavicular, axillary or inguinal areas.    CARDIOVASCULAR:  S1, S2 regular with no murmurs " or gallops, no carotid or abdominal bruits.    PULMONARY:  Lungs are clear to auscultation and percussion bilaterally.  There is no wheezing or rhonchi.    GASTROINTESTINAL:  Abdomen is soft, nontender with no hepatosplenomegaly, no signs of ascites and no mass appreciable.    MUSCULOSKELETAL/EXTREMITIES:  No edema, no cyanotic changes, no signs of joint deformity, no lymphedema.    NEUROLOGIC:  Cranial nerves II-XII are grossly intact, sensation intact, muscle strength and muscle tone symmetrical, 5/5 throughout.    BACK:  No spinal or paraspinal tenderness, no CVA tenderness.    SKIN:  No petechiae, no rash and no signs of cellulitis.  There is scattered bruising on forearms and thighs, which are healing.        LABORATORY DATA reviewed  PL 50-90 in 2018    Current Image reviewed  CT a/p 3/2018 - Chronic thrombosis of the left portal vein and posterior branch of the right portal vein are again seen. The main portal vein is patent. 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        Old data reviewed with summary  PL 12/4/2017 at 190.  PL in teens around 11/30, then had IVIG 400 mg /kg and dex 40 mg po qd x4.  around 40 in the last wks, LFT/Cr nl in 10/2017      in 7/2107 from nl 6/2017  Hb nl, wbc/diff nl.     BM at [place] 10/2017 - nl including cyto.     CT head 11/30/2017 - no bleeding.     CT a/P 9/2017: No significant change. Findings suggest chronic thrombosis of the left portal vein, posterior branch of the right portal vein and branches of the superior mesenteric vein as above.    CT a/p 7/2017 : Progression of left portal venous branch thrombosis and SMV thrombosis is seen compared to the prior study.    US 4/2017  1.  Recanalized main portal and splenic veins. Persistent occlusion of left portal vein.  2.  Splenectomy.    CT 1/9/2017   1. Status post splenectomy with postsurgical changes in the left upper quadrant. Persistent thrombosis of the splenic vein.  2. Main portal vein now appears  patent with possible peripheral nonocclusive thrombosis/periportal edema. Residual thrombosis of portion of the left branch of the portal vein and the right posterior  branch of the portal vein.  3. Residual partial thrombosis of the distal portion of the superior mesenteric vein.   4. Small focal nodular opacities in both lower lobes, likely of infectious etiology.            ASSESSMENT/PLAN:    1.  Refractory ITP dx first in 7/2016 .   She saw [place] as 2nd opinion in 10/2017, Dr. Key had nl BM without fibrosis at [place], ITP dx is assured.  Cyclosporine was also recommended to start at 200 mg po qd if PL less than 30,  with nl renal function, and adjust the dose.       We discussed the tx option:   Azathioprine or cyclosporin immunosuppression.   Cytoxan 750mg - 1g/m2 IV q month or po (maybe 100mg/m2 D1-14 q 28days)  vicristine 1-2mg IV wkly for several wks  vinblastin 0.1mg/kg  combination chemo -CVP, CVP+ -16.  Promacta po - 3-4% risk of thrombosis    The side effects associated with the above options. This is a tough situation. The top choices are oral CTX, cyclosporine, or Promacta or wkly IVIG prn.     She is on q 2 wk cbc check. IVIG 0.5 g/kg prn if PL less than 30 for now.   She is happy with this plan. Does not want to get on immuno suppresion and chemo.     2. 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 S/p thrombolysis to portal vein, lovenox. Has been on Coumadin, lovenox again, Xarelto for 9 months.   Her clots are chronic and stable per CT 9/2017 and 3/2018.  Due to her low PL. Recommended off anticoagulation in Sep 2017   Advised full dose ASA on 12/4/2017 due to PL rebound to 190, then fluctuating.    ASA dose was reduced to  in 3/2018 due to PL .      3. 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 She feels it is helpful.         Again, thank you for allowing me to participate in the care of your patient.        Sincerely,        Kerrie Rankin MD, MD

## 2018-07-23 ENCOUNTER — TELEPHONE (OUTPATIENT)
Dept: ONCOLOGY | Facility: CLINIC | Age: 63
End: 2018-07-23

## 2018-07-23 DIAGNOSIS — D69.3 IDIOPATHIC THROMBOCYTOPENIC PURPURA (H): ICD-10-CM

## 2018-07-23 LAB — PLATELET # BLD AUTO: 48 10E9/L (ref 150–450)

## 2018-07-23 PROCEDURE — 85049 AUTOMATED PLATELET COUNT: CPT | Performed by: INTERNAL MEDICINE

## 2018-07-23 PROCEDURE — 36415 COLL VENOUS BLD VENIPUNCTURE: CPT | Performed by: INTERNAL MEDICINE

## 2018-07-23 NOTE — TELEPHONE ENCOUNTER
DATE:  7/23/2018   TIME OF RECEIPT FROM LAB:  3:18pm  LAB TEST:  PLT  LAB VALUE:  48, 000  RESULTS GIVEN WITH READ-BACK TO (PROVIDER):  Dr. Rankin  TIME LAB VALUE REPORTED TO PROVIDER:   4:07 via in chayo Scott RN on 7/23/2018 at 4:07 PM

## 2018-08-06 DIAGNOSIS — D69.3 IDIOPATHIC THROMBOCYTOPENIC PURPURA (H): ICD-10-CM

## 2018-08-06 LAB — PLATELET # BLD AUTO: 55 10E9/L (ref 150–450)

## 2018-08-06 PROCEDURE — 36415 COLL VENOUS BLD VENIPUNCTURE: CPT | Performed by: INTERNAL MEDICINE

## 2018-08-06 PROCEDURE — 85049 AUTOMATED PLATELET COUNT: CPT | Performed by: INTERNAL MEDICINE

## 2018-08-06 NOTE — TELEPHONE ENCOUNTER
Reviewed today's (08.06.18) PLT results with patient and recommendations. Denies questions or concerns at this time. Will call back if any arise. Direct line provided.

## 2018-08-11 NOTE — PROGRESS NOTES
Infusion Nursing Note:  Charlee Marks presents today for PICC labs.      Patient seen by provider today: No    Note: Labs drawn via her PICC line. The white lumen did not have a blood return so heparin was instilled. The purple lumen was flushed with saline and the cap was changed. Pt. To return on Friday for labs.    Intravenous Access:  Labs drawn without difficulty.  PICC.    Treatment Conditions:  INR.      Post Infusion Assessment:  PICC flushed.    Discharge Plan:   Patient and/or family verbalized understanding of discharge instructions and all questions answered.  Patient discharged in stable condition accompanied by: self.  Departure Mode: Ambulatory.    Ellen Rizvi RN                         oral

## 2018-08-20 ENCOUNTER — TELEPHONE (OUTPATIENT)
Dept: ONCOLOGY | Facility: CLINIC | Age: 63
End: 2018-08-20

## 2018-08-20 DIAGNOSIS — D69.3 IDIOPATHIC THROMBOCYTOPENIC PURPURA (H): ICD-10-CM

## 2018-08-20 LAB — PLATELET # BLD AUTO: 40 10E9/L (ref 150–450)

## 2018-08-20 PROCEDURE — 85049 AUTOMATED PLATELET COUNT: CPT | Performed by: INTERNAL MEDICINE

## 2018-08-20 PROCEDURE — 36415 COLL VENOUS BLD VENIPUNCTURE: CPT | Performed by: INTERNAL MEDICINE

## 2018-08-20 NOTE — TELEPHONE ENCOUNTER
DATE:  8/20/2018   TIME OF RECEIPT FROM LAB:  3:24pm  LAB TEST:  PLT  LAB VALUE:  40  RESULTS GIVEN WITH READ-BACK TO (PROVIDER):  Dr. aRnkin  TIME LAB VALUE REPORTED TO PROVIDER:   3:35 pm via inbasket.

## 2018-09-04 ENCOUNTER — TELEPHONE (OUTPATIENT)
Dept: ONCOLOGY | Facility: CLINIC | Age: 63
End: 2018-09-04

## 2018-09-04 DIAGNOSIS — D69.3 IDIOPATHIC THROMBOCYTOPENIC PURPURA (H): Primary | ICD-10-CM

## 2018-09-04 DIAGNOSIS — D69.3 IDIOPATHIC THROMBOCYTOPENIC PURPURA (H): ICD-10-CM

## 2018-09-04 LAB — PLATELET # BLD AUTO: 8 10E9/L (ref 150–450)

## 2018-09-04 PROCEDURE — 85049 AUTOMATED PLATELET COUNT: CPT | Performed by: INTERNAL MEDICINE

## 2018-09-04 PROCEDURE — 36415 COLL VENOUS BLD VENIPUNCTURE: CPT | Performed by: INTERNAL MEDICINE

## 2018-09-04 RX ORDER — DEXAMETHASONE 4 MG/1
40 TABLET ORAL DAILY
Qty: 40 TABLET | Refills: 0 | Status: SHIPPED | OUTPATIENT
Start: 2018-09-04 | End: 2018-09-10

## 2018-09-04 NOTE — TELEPHONE ENCOUNTER
DATE:  9/4/2018   TIME OF RECEIPT FROM LAB:  7808  LAB TEST:  Platelet (Prelim)  LAB VALUE:  4  RESULTS GIVEN WITH READ-BACK TO (PROVIDER):  Dr. Rankin in person  TIME LAB VALUE REPORTED TO PROVIDER:   4424

## 2018-09-04 NOTE — TELEPHONE ENCOUNTER
Reviewed with Dr. Rankin. Pt is to start on Decadron 40 mg x 4 days and to have IVIG ASAP. [facility] is not able to accommodate until Friday and this needs to be done sooner.  will call to see if this can be done elsewhere otherwise in the ER. Pt denies any bleeding but does have a 50 cent bruise on her arm where she his it this weekend. Updated pt with plan and she will head over to get the Decadron now. Will call her back with IVIG plan.     Karla Scott RN on 9/4/2018 at 4:29 PM

## 2018-09-05 ENCOUNTER — INFUSION THERAPY VISIT (OUTPATIENT)
Dept: INFUSION THERAPY | Facility: CLINIC | Age: 63
End: 2018-09-05
Attending: INTERNAL MEDICINE
Payer: COMMERCIAL

## 2018-09-05 VITALS
TEMPERATURE: 98.2 F | WEIGHT: 293 LBS | RESPIRATION RATE: 18 BRPM | DIASTOLIC BLOOD PRESSURE: 73 MMHG | BODY MASS INDEX: 50.41 KG/M2 | OXYGEN SATURATION: 97 % | HEART RATE: 78 BPM | SYSTOLIC BLOOD PRESSURE: 146 MMHG

## 2018-09-05 DIAGNOSIS — D69.3 IDIOPATHIC THROMBOCYTOPENIC PURPURA (H): Primary | ICD-10-CM

## 2018-09-05 DIAGNOSIS — D69.3 IDIOPATHIC THROMBOCYTOPENIC PURPURA (H): ICD-10-CM

## 2018-09-05 PROCEDURE — 96367 TX/PROPH/DG ADDL SEQ IV INF: CPT

## 2018-09-05 PROCEDURE — 96365 THER/PROPH/DIAG IV INF INIT: CPT

## 2018-09-05 PROCEDURE — 25000125 ZZHC RX 250: Performed by: INTERNAL MEDICINE

## 2018-09-05 PROCEDURE — 96366 THER/PROPH/DIAG IV INF ADDON: CPT

## 2018-09-05 PROCEDURE — 96375 TX/PRO/DX INJ NEW DRUG ADDON: CPT

## 2018-09-05 PROCEDURE — 25000128 H RX IP 250 OP 636: Performed by: INTERNAL MEDICINE

## 2018-09-05 RX ORDER — DIPHENHYDRAMINE HYDROCHLORIDE 50 MG/ML
25 INJECTION INTRAMUSCULAR; INTRAVENOUS ONCE
Status: CANCELLED
Start: 2018-09-05 | End: 2018-09-05

## 2018-09-05 RX ORDER — ACETAMINOPHEN 325 MG/1
650 TABLET ORAL ONCE
Status: CANCELLED
Start: 2018-09-05 | End: 2018-09-05

## 2018-09-05 RX ORDER — DIPHENHYDRAMINE HYDROCHLORIDE 50 MG/ML
25 INJECTION INTRAMUSCULAR; INTRAVENOUS ONCE
Status: COMPLETED | OUTPATIENT
Start: 2018-09-05 | End: 2018-09-05

## 2018-09-05 RX ADMIN — FAMOTIDINE 20 MG: 20 INJECTION, SOLUTION INTRAVENOUS at 12:57

## 2018-09-05 RX ADMIN — HUMAN IMMUNOGLOBULIN G 70 G: 40 LIQUID INTRAVENOUS at 13:23

## 2018-09-05 RX ADMIN — DIPHENHYDRAMINE HYDROCHLORIDE 25 MG: 50 INJECTION, SOLUTION INTRAMUSCULAR; INTRAVENOUS at 12:55

## 2018-09-05 ASSESSMENT — PAIN SCALES - GENERAL: PAINLEVEL: MODERATE PAIN (4)

## 2018-09-05 NOTE — TELEPHONE ENCOUNTER
We were able to get pt in at [facility] today at noon. Pt started her Dexamethasone last rafia and will have her platelet level rechecked tomorrow.     Karla Scott RN on 9/5/2018 at 9:06 AM

## 2018-09-05 NOTE — MR AVS SNAPSHOT
After Visit Summary   9/5/2018    Charlee Marks    MRN: [id number]           Patient Information     Date Of Birth          1955        Visit Information        Provider Department      9/5/2018 12:00 PM  INFUSION CHAIR 8 [facility]        Today's Diagnoses     Idiopathic thrombocytopenic purpura (H)    -  1    ITP (idiopathic thrombocytopenic purpura)           Follow-ups after your visit        Your next 10 appointments already scheduled     Sep 06, 2018  3:30 PM CDT   LAB with CL LAB   [facility] ([facility]    [address] [address]   [phone]           Please do not eat 10-12 hours before your appointment if you are coming in fasting for labs on lipids, cholesterol, or glucose (sugar). This does not apply to pregnant women. Water, hot tea and black coffee (with nothing added) are okay. Do not drink other fluids, diet soda or chew gum.            Sep 10, 2018  3:20 PM CDT   PHYSICAL with Kay Briggs MD   [facility])    [address] [address]   [phone]            Sep 17, 2018  3:30 PM CDT   LAB with CL LAB   [facility]note 2[facility] ([facility] [place] 2[facility]    [address] [address]   [phone]           Please do not eat 10-12 hours before your appointment if you are coming in fasting for labs on lipids, cholesterol, or glucose (sugar). This does not apply to pregnant women. Water, hot tea and black coffee (with nothing added) are okay. Do not drink other fluids, diet soda or chew gum.            Oct 01, 2018  3:30 PM CDT   LAB with CL LAB   [facility] ([facility] [place]    [address] [address]   [phone]           Please do not eat 10-12 hours before your appointment if you are coming in fasting for labs on lipids,  cholesterol, or glucose (sugar). This does not apply to pregnant women. Water, hot tea and black coffee (with nothing added) are okay. Do not drink other fluids, diet soda or chew gum.            Oct 15, 2018  3:30 PM CDT   LAB with CL LAB   [facility] ([facility]    [address] [address]   [phone]           Please do not eat 10-12 hours before your appointment if you are coming in fasting for labs on lipids, cholesterol, or glucose (sugar). This does not apply to pregnant women. Water, hot tea and black coffee (with nothing added) are okay. Do not drink other fluids, diet soda or chew gum.            Oct 18, 2018  8:00 AM CDT   Lab with  LAB    [facility]    [address]  [address]  [facility] [address]   [phone]            Oct 18, 2018  9:00 AM CDT   (Arrive by 8:45 AM)   Return General Liver with Agustin Le MD   [facility] ([facility])    [address]  [address]  [place] [address]   [phone]            Oct 29, 2018  3:30 PM CDT   LAB with CL LAB   [facility] ([facility])    [address] [address]   [phone]           Please do not eat 10-12 hours before your appointment if you are coming in fasting for labs on lipids, cholesterol, or glucose (sugar). This does not apply to pregnant women. Water, hot tea and black coffee (with nothing added) are okay. Do not drink other fluids, diet soda or chew gum.            Nov 12, 2018  3:30 PM CST   LAB with CL LAB   [facility] ([facility])    [address] [address]   [phone]           Please do not eat 10-12 hours before your appointment if you are coming in fasting for labs on lipids, cholesterol, or glucose (sugar). This does not apply to pregnant women. Water,  hot tea and black coffee (with nothing added) are okay. Do not drink other fluids, diet soda or chew gum.            Nov 26, 2018  3:30 PM CST   LAB with CL LAB   [facility] ([facility])    [address]  [facility] [address]   [phone]           Please do not eat 10-12 hours before your appointment if you are coming in fasting for labs on lipids, cholesterol, or glucose (sugar). This does not apply to pregnant women. Water, hot tea and black coffee (with nothing added) are okay. Do not drink other fluids, diet soda or chew gum.              Future tests that were ordered for you today     Open Future Orders        Priority Expected Expires Ordered    CBC with platelets differential Routine  9/5/2019 9/5/2018            Who to contact     If you have questions or need follow up information about today's clinic visit or your schedule please contact [facility] directly at [phone].  Normal or non-critical lab and imaging results will be communicated to you by [employer], letter or phone within 4 business days after the clinic has received the results. If you do not hear from us within 7 days, please contact the clinic through [employer] or phone. If you have a critical or abnormal lab result, we will notify you by phone as soon as possible.  Submit refill requests through [employer] or call your pharmacy and they will forward the refill request to us. Please allow 3 business days for your refill to be completed.          Additional Information About Your Visit        [employer] Information     [employer] gives you secure access to your electronic health record. If you see a primary care provider, you can also send messages to your care team and make appointments. If you have questions, please call your primary care clinic.  If you do not have a primary care provider, please call [phone] and they will assist you.        Care EveryWhere ID      This is your Care EveryWhere ID. This could be used by other organizations to access your [facility] medical records  [id number]        Your Vitals Were     Pulse Temperature Respirations Last Period Pulse Oximetry BMI (Body Mass Index)    78 98.2  F (36.8  C) (Oral) 18 09/15/2007 97% 50.41 kg/m2       Blood Pressure from Last 3 Encounters:   09/05/18 146/73   07/10/18 151/77   04/03/18 146/73    Weight from Last 3 Encounters:   09/05/18 141.6 kg (312 lb 3.2 oz)   07/10/18 141.3 kg (311 lb 6.4 oz)   04/03/18 138.5 kg (305 lb 6.4 oz)                 Today's Medication Changes          These changes are accurate as of 9/5/18  3:47 PM.  If you have any questions, ask your nurse or doctor.               These medicines have changed or have updated prescriptions.        Dose/Directions    ranitidine 150 MG tablet   Commonly known as:  ZANTAC   This may have changed:  See the new instructions.   Used for:  Esophageal reflux        ONE TABLET TWICE DAILY as needed will call when needed   Quantity:  180 Tab   Refills:  3                Primary Care Provider Office Phone # Fax #    Kay Briggs [phone] [address] [address] [facility] [address]        Equal Access to Services     LAVERN CABA AH: Mary Grace mcdonaldo Soelizabeth, waaxda luqadaha, qaybta kaalmada adeegyada, julieta mcguire. So [facility] [phone].    ATENCIÓN: Si habla español, tiene a cosme disposición servicios gratuitos de asistencia lingüística. Llame al [phone].    We comply with applicable federal civil rights laws and [place] laws. We do not discriminate on the basis of race, color, national origin, age, disability, sex, sexual orientation, or gender identity.            Thank you!     Thank you for choosing [facility]  for your care. Our goal is always to provide you with excellent care. Hearing back from our patients is one way we can continue to improve our  services. Please take a few minutes to complete the written survey that you may receive in the mail after your visit with us. Thank you!             Your Updated Medication List - Protect others around you: Learn how to safely use, store and throw away your medicines at www.disposemymeds.org.          This list is accurate as of 9/5/18  3:47 PM.  Always use your most recent med list.                   Brand Name Dispense Instructions for use Diagnosis    albuterol 108 (90 Base) MCG/ACT inhaler    PROAIR HFA/PROVENTIL HFA/VENTOLIN HFA    1 Inhaler    Inhale 2 puffs into the lungs every 4 hours as needed for shortness of breath / dyspnea    Mild intermittent asthma without complication       B-6 100 MG Tabs      Take 1 tablet by mouth daily        BENADRYL PO      Take 25 mg by mouth every 6 hours as needed        calcium carbonate-vitamin D 500-400 MG-UNIT Tabs per tablet      Take 1 tablet by mouth daily        dexamethasone 4 MG tablet    DECADRON    40 tablet    Take 10 tablets (40 mg) by mouth daily for 4 days    Idiopathic thrombocytopenic purpura (H)       GLUCOSAMINE CHONDRO COMPLEX OR      daily        hydrochlorothiazide 25 MG tablet    HYDRODIURIL    90 tablet    Take 1 tablet (25 mg) by mouth daily    Peripheral edema       loratadine 10 MG tablet    CLARITIN    90 Tab    ONE DAILY    Allergy, unspecified not elsewhere classified       MULTIVITAMIN TABS   OR      1 TABLET DAILY        omega 3 1000 MG Caps      daily        ranitidine 150 MG tablet    ZANTAC    180 Tab    ONE TABLET TWICE DAILY as needed will call when needed    Esophageal reflux       TYLENOL PO      Take 1,000 mg by mouth At Bedtime

## 2018-09-05 NOTE — PROGRESS NOTES
"Infusion Nursing Note:  Charlee Marks presents today for IVIG.    Patient seen by provider today: No   present during visit today: Not Applicable.    Note: patient states she took 1000mg of Tylenol between 4707-5243; Will hold this as a pre med today. Patient also states she does not tolerate IVIG if it is infusing at a rate >250-280mL/hr. Could not find any documentation or orders related to this. Patient states she feels comfortable going beyond that rate and will let us know at the first sign of reaction or increased headache.    Intravenous Access:  Peripheral IV placed.    Treatment Conditions:  Rheumatology Infusion Checklist: PRIOR TO INFUSION OF BIOLOGICAL MEDICATIONS OR ANY OF THESE AS LISTED: Immune Globulin (IVIG) \".rheumbiologicalchecklist\"    Prior to Infusion of biological medications or any of these as listed:    1. Elevated temperature, fever, chills, productive cough or abnormal vital signs, night sweats, coughing up blood or sputum, no appetite or abnormal vital signs : NO    2. Open wounds or new incisions: NO    3. Recent hospitalization: NO    4.  Recent surgeries:  NO    5. Any upcoming surgeries or dental procedures?:NO    6. Any current or recent bouts of illness or infection? On any antibiotics? : NO    7. Any new, sudden or worsening abdominal pain :NO    8. Vaccination within 4 weeks? Patient or someone in the household is scheduled to receive vaccination? No live virus vaccines prior to or during treatment :NO    9. Any nervous system diseases [i.e. multiple sclerosis, Guillain-[place], seizures, neurological  changes]: NO    10. Pregnant or breast feeding; or plans on pregnancy in the future: NO    11. Signs of worsening depression or suicidal ideations while taking benlysta:NO    12. New-onset medical symptoms: NO    13.  New cancer diagnosis or on chemotherapy or radiation NO    14.  Evaluate for any sign of active TB [Unexplained weight loss, Loss of appetite, Night sweats, " Fever, Fatigue, Chills, Coughing for 3 weeks or longer, Hemoptysis (coughing up blood), Chest pain]: NO    **Note: If answered yes to any of the above, hold the infusion and contact ordering rheumatologist or on-call rheumatologist.     Post Infusion Assessment:  Patient tolerated infusion without incident.  Site patent and intact, free from redness, edema or discomfort.  No evidence of extravasations.  Access discontinued per protocol.    Discharge Plan:   Discharge instructions reviewed with: Patient.  Patient and/or family verbalized understanding of discharge instructions and all questions answered.  AVS to patient via [employer].  Patient will return 9/6/2018 for labs for next appointment.   Patient discharged in stable condition accompanied by: self and .  Departure Mode: Ambulatory.    Anupama Allen RN

## 2018-09-06 DIAGNOSIS — D69.3 IDIOPATHIC THROMBOCYTOPENIC PURPURA (H): ICD-10-CM

## 2018-09-06 LAB
BASOPHILS # BLD AUTO: 0 10E9/L (ref 0–0.2)
BASOPHILS NFR BLD AUTO: 0.1 %
DIFFERENTIAL METHOD BLD: ABNORMAL
EOSINOPHIL # BLD AUTO: 0 10E9/L (ref 0–0.7)
EOSINOPHIL NFR BLD AUTO: 0 %
ERYTHROCYTE [DISTWIDTH] IN BLOOD BY AUTOMATED COUNT: 14 % (ref 10–15)
HCT VFR BLD AUTO: 38.3 % (ref 35–47)
HGB BLD-MCNC: 12.4 G/DL (ref 11.7–15.7)
LYMPHOCYTES # BLD AUTO: 2.1 10E9/L (ref 0.8–5.3)
LYMPHOCYTES NFR BLD AUTO: 13.7 %
MCH RBC QN AUTO: 30 PG (ref 26.5–33)
MCHC RBC AUTO-ENTMCNC: 32.4 G/DL (ref 31.5–36.5)
MCV RBC AUTO: 93 FL (ref 78–100)
MONOCYTES # BLD AUTO: 1.4 10E9/L (ref 0–1.3)
MONOCYTES NFR BLD AUTO: 8.8 %
NEUTROPHILS # BLD AUTO: 11.9 10E9/L (ref 1.6–8.3)
NEUTROPHILS NFR BLD AUTO: 77.4 %
PLATELET # BLD AUTO: 92 10E9/L (ref 150–450)
RBC # BLD AUTO: 4.14 10E12/L (ref 3.8–5.2)
WBC # BLD AUTO: 15.4 10E9/L (ref 4–11)

## 2018-09-06 PROCEDURE — 36415 COLL VENOUS BLD VENIPUNCTURE: CPT | Performed by: INTERNAL MEDICINE

## 2018-09-06 PROCEDURE — 85025 COMPLETE CBC W/AUTO DIFF WBC: CPT | Performed by: INTERNAL MEDICINE

## 2018-09-07 NOTE — TELEPHONE ENCOUNTER
Called pt with her Platelet result of 92. Plan for labs again on 09.17.18. Pt has her last day of steroids today. Pt understands plan.     Karla Scott RN on 9/7/2018 at 8:42 AM

## 2018-09-10 ENCOUNTER — OFFICE VISIT (OUTPATIENT)
Dept: FAMILY MEDICINE | Facility: CLINIC | Age: 63
End: 2018-09-10
Payer: COMMERCIAL

## 2018-09-10 VITALS
OXYGEN SATURATION: 97 % | HEIGHT: 66 IN | HEART RATE: 76 BPM | WEIGHT: 293 LBS | SYSTOLIC BLOOD PRESSURE: 142 MMHG | TEMPERATURE: 98.8 F | RESPIRATION RATE: 18 BRPM | DIASTOLIC BLOOD PRESSURE: 84 MMHG | BODY MASS INDEX: 47.09 KG/M2

## 2018-09-10 DIAGNOSIS — Z12.11 SPECIAL SCREENING FOR MALIGNANT NEOPLASMS, COLON: ICD-10-CM

## 2018-09-10 DIAGNOSIS — J45.20 MILD INTERMITTENT ASTHMA WITHOUT COMPLICATION: ICD-10-CM

## 2018-09-10 DIAGNOSIS — I10 BENIGN ESSENTIAL HYPERTENSION: ICD-10-CM

## 2018-09-10 DIAGNOSIS — D69.3 IDIOPATHIC THROMBOCYTOPENIC PURPURA (H): ICD-10-CM

## 2018-09-10 DIAGNOSIS — R60.0 PERIPHERAL EDEMA: ICD-10-CM

## 2018-09-10 DIAGNOSIS — Z23 NEED FOR PROPHYLACTIC VACCINATION AND INOCULATION AGAINST INFLUENZA: ICD-10-CM

## 2018-09-10 DIAGNOSIS — Z00.00 ROUTINE GENERAL MEDICAL EXAMINATION AT A HEALTH CARE FACILITY: Primary | ICD-10-CM

## 2018-09-10 LAB — PLATELET # BLD AUTO: 221 10E9/L (ref 150–450)

## 2018-09-10 PROCEDURE — G0145 SCR C/V CYTO,THINLAYER,RESCR: HCPCS | Performed by: FAMILY MEDICINE

## 2018-09-10 PROCEDURE — 90686 IIV4 VACC NO PRSV 0.5 ML IM: CPT | Performed by: FAMILY MEDICINE

## 2018-09-10 PROCEDURE — 87624 HPV HI-RISK TYP POOLED RSLT: CPT | Performed by: FAMILY MEDICINE

## 2018-09-10 PROCEDURE — 90471 IMMUNIZATION ADMIN: CPT | Performed by: FAMILY MEDICINE

## 2018-09-10 PROCEDURE — 85049 AUTOMATED PLATELET COUNT: CPT | Performed by: INTERNAL MEDICINE

## 2018-09-10 PROCEDURE — 36415 COLL VENOUS BLD VENIPUNCTURE: CPT | Performed by: INTERNAL MEDICINE

## 2018-09-10 PROCEDURE — 99396 PREV VISIT EST AGE 40-64: CPT | Performed by: FAMILY MEDICINE

## 2018-09-10 RX ORDER — HYDROCHLOROTHIAZIDE 25 MG/1
25 TABLET ORAL DAILY
Qty: 90 TABLET | Refills: 3 | Status: SHIPPED | OUTPATIENT
Start: 2018-09-10

## 2018-09-10 RX ORDER — ALBUTEROL SULFATE 90 UG/1
2 AEROSOL, METERED RESPIRATORY (INHALATION) EVERY 4 HOURS PRN
Qty: 1 INHALER | Refills: 5 | Status: SHIPPED | OUTPATIENT
Start: 2018-09-10

## 2018-09-10 ASSESSMENT — PAIN SCALES - GENERAL: PAINLEVEL: MODERATE PAIN (4)

## 2018-09-10 NOTE — MR AVS SNAPSHOT
After Visit Summary   9/10/2018    Charlee Marks    MRN: [id number]           Patient Information     Date Of Birth          1955        Visit Information        Provider Department      9/10/2018 3:20 PM Kay Briggs MD [facility] [place][note 2]        Today's Diagnoses     Routine general medical examination at a health care facility    -  1    Special screening for malignant neoplasms, colon        Mild intermittent asthma without complication        ITP (idiopathic thrombocytopenic purpura)        Peripheral edema        Benign essential hypertension          Care Instructions    Health Maintenance reviewed and plan for update discussed.  Patient asked to schedule her mammogram  Patient asked to schedule colonoscopy  [phone] to schedule mammogram    Start hydrochlorothiazide 25 mg regularly  Have blood pressure checked in 3-4 weeks (with RN) as well as blood work (lab only)    Kay Briggs M.D.        Thank you for choosing [facility].  You may be receiving a survey in the mail from [employer] regarding your visit today.  Please take a few minutes to complete and return the survey to let us know how we are doing.      Our Clinic hours are:  Mondays    7:20 am - 7 pm  Tues -  Fri  7:20 am - 5 pm    Clinic Phone: [phone]    The clinic lab opens at 7:30 am Mon - Fri and appointments are required.    [facility] [place]  Ph. [phone]  Monday  8 am - 7pm  Tues - Fri 8 am - 5:30 pm           Preventive Health Recommendations  Female Ages 50 - 64    Yearly exam: See your health care provider every year in order to  o Review health changes.   o Discuss preventive care.    o Review your medicines if your doctor has prescribed any.      Get a Pap test every three years (unless you have an abnormal result and your provider advises testing more often).    If you get Pap tests with HPV test, you only need to test every 5 years, unless you have an abnormal result.      You do not need a Pap test if your uterus was removed (hysterectomy) and you have not had cancer.    You should be tested each year for STDs (sexually transmitted diseases) if you're at risk.     Have a mammogram every 1 to 2 years.    Have a colonoscopy at age 50, or have a yearly FIT test (stool test). These exams screen for colon cancer.      Have a cholesterol test every 5 years, or more often if advised.    Have a diabetes test (fasting glucose) every three years. If you are at risk for diabetes, you should have this test more often.     If you are at risk for osteoporosis (brittle bone disease), think about having a bone density scan (DEXA).    Shots: Get a flu shot each year. Get a tetanus shot every 10 years.    Nutrition:     Eat at least 5 servings of fruits and vegetables each day.    Eat whole-grain bread, whole-wheat pasta and brown rice instead of white grains and rice.    Get adequate Calcium and Vitamin D.     Lifestyle    Exercise at least 150 minutes a week (30 minutes a day, 5 days a week). This will help you control your weight and prevent disease.    Limit alcohol to one drink per day.    No smoking.     Wear sunscreen to prevent skin cancer.     See your dentist every six months for an exam and cleaning.    See your eye doctor every 1 to 2 years.            Follow-ups after your visit        Additional Services     GASTROENTEROLOGY ADULT REF PROCEDURE ONLY [facility] [phone]; No Provider Preference                 Your next 10 appointments already scheduled     Sep 17, 2018  3:30 PM CDT   LAB with [facility] LAB   [facility]    [address] [address] [phone]           Please do not eat 10-12 hours before your appointment if you are coming in fasting for labs on lipids, cholesterol, or glucose (sugar). This does not apply to pregnant women. Water, hot tea and black coffee (with nothing added) are okay. Do not  drink other fluids, diet soda or chew gum.            Oct 01, 2018  3:30 PM CDT   LAB with CL LAB   [facility]    [address] [address]   [phone]           Please do not eat 10-12 hours before your appointment if you are coming in fasting for labs on lipids, cholesterol, or glucose (sugar). This does not apply to pregnant women. Water, hot tea and black coffee (with nothing added) are okay. Do not drink other fluids, diet soda or chew gum.            Oct 15, 2018  3:30 PM CDT   LAB with CL LAB   [facility]    [address] [address]   [phone]           Please do not eat 10-12 hours before your appointment if you are coming in fasting for labs on lipids, cholesterol, or glucose (sugar). This does not apply to pregnant women. Water, hot tea and black coffee (with nothing added) are okay. Do not drink other fluids, diet soda or chew gum.            Oct 18, 2018  8:00 AM CDT   Lab with [facility] LAB    [facility])    [address]  1st Floor  [place] [id number] [phone]            Oct 18, 2018  9:00 AM CDT   (Arrive by 8:45 AM)   Return General Liver with Agustin Le MD   [facility] ([facility])    [address]  [address]  [place] [address] [phone]            Oct 29, 2018  3:30 PM CDT   LAB with CL LAB   [facility]    [address] [address]   [phone]           Please do not eat 10-12 hours before your appointment if you are coming in fasting for labs on lipids, cholesterol, or glucose (sugar). This does not apply to pregnant women. Water, hot tea and black coffee (with nothing added) are okay. Do not drink other fluids, diet soda or chew gum.            Nov 12, 2018  3:30 PM CST    LAB with CL LAB   [facility] ([facility])    [address] [address]   [phone]           Please do not eat 10-12 hours before your appointment if you are coming in fasting for labs on lipids, cholesterol, or glucose (sugar). This does not apply to pregnant women. Water, hot tea and black coffee (with nothing added) are okay. Do not drink other fluids, diet soda or chew gum.            Nov 26, 2018  3:30 PM CST   LAB with CL LAB   [facility] ([facility])    [address] [address]   [phone]           Please do not eat 10-12 hours before your appointment if you are coming in fasting for labs on lipids, cholesterol, or glucose (sugar). This does not apply to pregnant women. Water, hot tea and black coffee (with nothing added) are okay. Do not drink other fluids, diet soda or chew gum.            Dec 10, 2018  3:30 PM CST   LAB with CL LAB   [facility] ([facility])    [address] [address]   [phone]           Please do not eat 10-12 hours before your appointment if you are coming in fasting for labs on lipids, cholesterol, or glucose (sugar). This does not apply to pregnant women. Water, hot tea and black coffee (with nothing added) are okay. Do not drink other fluids, diet soda or chew gum.            Dec 24, 2018  8:30 AM CST   LAB with CL LAB   [facility] ([facility])    [address] [address]   [phone]           Please do not eat 10-12 hours before your appointment if you are coming in fasting for labs on lipids, cholesterol, or glucose (sugar). This does not apply to pregnant women. Water, hot tea and black coffee (with nothing added) are okay. Do not drink other fluids, diet soda or chew gum.              Future tests that were ordered for you today      "Open Future Orders        Priority Expected Expires Ordered    Basic metabolic panel Routine  10/11/2018 9/10/2018            Who to contact     If you have questions or need follow up information about today's clinic visit or your schedule please contact [facility] directly at [phone].  Normal or non-critical lab and imaging results will be communicated to you by MyChart, letter or phone within 4 business days after the clinic has received the results. If you do not hear from us within 7 days, please contact the clinic through [employer] or phone. If you have a critical or abnormal lab result, we will notify you by phone as soon as possible.  Submit refill requests through [employer] or call your pharmacy and they will forward the refill request to us. Please allow 3 business days for your refill to be completed.          Additional Information About Your Visit        MyChart Information     [employer] gives you secure access to your electronic health record. If you see a primary care provider, you can also send messages to your care team and make appointments. If you have questions, please call your primary care clinic.  If you do not have a primary care provider, please call [phone] and they will assist you.        Care EveryWhere ID     This is your Care EveryWhere ID. This could be used by other organizations to access your [facility] medical records  [id number]        Your Vitals Were     Pulse Temperature Respirations Height Last Period Pulse Oximetry    76 98.8  F (37.1  C) (Tympanic) 18 5' 6\" (1.676 m) 09/15/2007 97%    Breastfeeding? BMI (Body Mass Index)                No 51 kg/m2           Blood Pressure from Last 3 Encounters:   09/10/18 142/84   09/05/18 146/73   07/10/18 151/77    Weight from Last 3 Encounters:   09/10/18 316 lb (143.3 kg)   09/05/18 312 lb 3.2 oz (141.6 kg)   07/10/18 311 lb 6.4 oz (141.3 kg)              We Performed the Following     Asthma Action Plan (AAP)     " GASTROENTEROLOGY ADULT REF PROCEDURE ONLY [facility] [phone]; No Provider Preference     HPV High Risk Types DNA Cervical     Pap imaged thin layer screen with HPV - recommended age 30 - 65     Platelet count          Today's Medication Changes          These changes are accurate as of 9/10/18  3:43 PM.  If you have any questions, ask your nurse or doctor.               These medicines have changed or have updated prescriptions.        Dose/Directions    ranitidine 150 MG tablet   Commonly known as:  ZANTAC   This may have changed:  See the new instructions.   Used for:  Esophageal reflux        ONE TABLET TWICE DAILY as needed will call when needed   Quantity:  180 Tab   Refills:  3            Where to get your medicines      These medications were sent to [facility] [address]  [address] [address] [address]     Phone:  [phone]     albuterol 108 (90 Base) MCG/ACT inhaler    hydrochlorothiazide 25 MG tablet                Primary Care Provider Office Phone # Fax #    Kay Brigsg [phone] [address] [address] [address]        Equal Access to Services     [facility] : Hadii sourav ku hadasho Soomaali, waaxda luqadaha, qaybta kaalmada adeegyada, julieta khanna . So [facility] [phone].    ATENCIÓN: Si habla español, tiene a cosme disposición servicios gratuitos de asistencia lingüística. Llame al [phone].    We comply with applicable federal civil rights laws and [place] laws. We do not discriminate on the basis of race, color, national origin, age, disability, sex, sexual orientation, or gender identity.            Thank you!     Thank you for choosing [facility]  for your care. Our goal is always to provide you with excellent care. Hearing back from our patients is one way we can continue to improve our services. Please take a few minutes to  complete the written survey that you may receive in the mail after your visit with us. Thank you!             Your Updated Medication List - Protect others around you: Learn how to safely use, store and throw away your medicines at www.disposemymeds.org.          This list is accurate as of 9/10/18  3:43 PM.  Always use your most recent med list.                   Brand Name Dispense Instructions for use Diagnosis    albuterol 108 (90 Base) MCG/ACT inhaler    PROAIR HFA/PROVENTIL HFA/VENTOLIN HFA    1 Inhaler    Inhale 2 puffs into the lungs every 4 hours as needed for shortness of breath / dyspnea    Mild intermittent asthma without complication       B-6 100 MG Tabs      Take 1 tablet by mouth daily        BENADRYL PO      Take 25 mg by mouth every 6 hours as needed        calcium carbonate-vitamin D 500-400 MG-UNIT Tabs per tablet      Take 1 tablet by mouth daily        GLUCOSAMINE CHONDRO COMPLEX OR      daily        hydrochlorothiazide 25 MG tablet    HYDRODIURIL    90 tablet    Take 1 tablet (25 mg) by mouth daily    Peripheral edema       loratadine 10 MG tablet    CLARITIN    90 Tab    ONE DAILY    Allergy, unspecified not elsewhere classified       MULTIVITAMIN TABS   OR      1 TABLET DAILY        omega 3 1000 MG Caps      daily        ranitidine 150 MG tablet    ZANTAC    180 Tab    ONE TABLET TWICE DAILY as needed will call when needed    Esophageal reflux       TYLENOL PO      Take 1,000 mg by mouth At Bedtime

## 2018-09-10 NOTE — PROGRESS NOTES
Called and left a message with results with the patient per Dr. Rankin. Patient had no further questions or concerns at this time and also verbalized understanding. Direct line provided if any further questions/concerns arise.

## 2018-09-10 NOTE — PROGRESS NOTES
SUBJECTIVE:   CC: Charlee Marks is an 63 year old woman who presents for preventive health visit.     Healthy Habits:    Do you get at least three servings of calcium containing foods daily (dairy, green leafy vegetables, etc.)? yes    Amount of exercise or daily activities, outside of work: limited due to ipt    Problems taking medications regularly No    Medication side effects: Yes, acid in stomach    Have you had an eye exam in the past two years? yes    Do you see a dentist twice per year? no    Do you have sleep apnea, excessive snoring or daytime drowsiness?no      Asthma Follow-Up    Was ACT completed today?    Yes    ACT Total Scores 9/10/2018   ACT TOTAL SCORE -   ASTHMA ER VISITS -   ASTHMA HOSPITALIZATIONS -   ACT TOTAL SCORE (Goal Greater than or Equal to 20) 25   In the past 12 months, how many times did you visit the emergency room for your asthma without being admitted to the hospital? 0   In the past 12 months, how many times were you hospitalized overnight because of your asthma? 0       Recent asthma triggers that patient is dealing with: None        Today's PHQ-2 Score:   PHQ-2 ( 1999 [employer]) 9/10/2018 12/6/2017   Q1: Little interest or pleasure in doing things 0 0   Q2: Feeling down, depressed or hopeless 0 0   PHQ-2 Score 0 0       Abuse: Current or Past(Physical, Sexual or Emotional)- No  Do you feel safe in your environment - Yes    Social History   Substance Use Topics     Smoking status: Never Smoker     Smokeless tobacco: Never Used     Alcohol use 0.0 oz/week     0 Standard drinks or equivalent per week      Comment: 2-3/week, none since Summer 2016     If you drink alcohol do you typically have >3 drinks per day or >7 drinks per week? No                     Reviewed orders with patient.  Reviewed health maintenance and updated orders accordingly - Yes  Labs reviewed in EPIC  BP Readings from Last 3 Encounters:   09/10/18 142/84   09/05/18 146/73   07/10/18 151/77    Wt Readings from  "Last 3 Encounters:   09/10/18 316 lb (143.3 kg)   09/05/18 312 lb 3.2 oz (141.6 kg)   07/10/18 311 lb 6.4 oz (141.3 kg)               BP Readings from Last 6 Encounters:   09/10/18 142/84   09/05/18 146/73   07/10/18 151/77   04/03/18 146/73   12/06/17 137/80   12/04/17 131/70       Patient continues to see Dr. Rankin for ITP, she is s/p splenectomy.  No s/sx of bleeding.       Patient over age 50, mutual decision to screen reflected in health maintenance.    Pertinent mammograms are reviewed under the imaging tab.  History of abnormal Pap smear: NO - age 30-65 PAP every 5 years with negative HPV co-testing recommended  PAP / HPV 8/28/2013 8/23/2010 9/9/2009   PAP NIL NIL NIL     Reviewed and updated as needed this visit by clinical staff  Tobacco  Meds  Med Hx  Surg Hx  Fam Hx  Soc Hx        Reviewed and updated as needed this visit by Provider            ROS:  CONSTITUTIONAL: NEGATIVE for fever, chills, change in weight  INTEGUMENTARY/SKIN: some bruises from the ITP  EYES: NEGATIVE for vision changes or irritation  ENT: NEGATIVE for ear, mouth and throat problems  RESP: NEGATIVE for significant cough or SOB  BREAST: NEGATIVE for masses, tenderness or discharge  CV: NEGATIVE for chest pain, palpitations or peripheral edema  GI: NEGATIVE for nausea, abdominal pain, heartburn, or change in bowel habits  : NEGATIVE for unusual urinary or vaginal symptoms. No vaginal bleeding.  MUSCULOSKELETAL: NEGATIVE for significant arthralgias or myalgia  NEURO: NEGATIVE for weakness, dizziness or paresthesias  PSYCHIATRIC: NEGATIVE for changes in mood or affect     OBJECTIVE:   /84  Pulse 76  Temp 98.8  F (37.1  C) (Tympanic)  Resp 18  Ht 5' 6\" (1.676 m)  Wt 316 lb (143.3 kg)  LMP 09/15/2007  SpO2 97%  Breastfeeding? No  BMI 51 kg/m2  EXAM:  GENERAL: healthy, alert, no distress and morbidly obese  EYES: Eyes grossly normal to inspection, PERRL and conjunctivae and sclerae normal  HENT: ear canals and TM's normal, " nose and mouth without ulcers or lesions  NECK: no adenopathy, no asymmetry, masses, or scars and thyroid normal to palpation  RESP: lungs clear to auscultation - no rales, rhonchi or wheezes  BREAST: normal without masses, tenderness or nipple discharge and no palpable axillary masses or adenopathy  CV: regular rate and rhythm, normal S1 S2, no S3 or S4, no murmur, click or rub, no peripheral edema and peripheral pulses strong  ABDOMEN: soft, nontender, no hepatosplenomegaly, no masses and bowel sounds normal   (female): normal female external genitalia, normal urethral meatus , vaginal mucosa pink, moist, well rugated, normal cervix, adnexae, and uterus without masses. and pap obtained  MS: no gross musculoskeletal defects noted, no edema  SKIN: erythema of medial thighs from friction.  No cellulitis  NEURO: Normal strength and tone, mentation intact and speech normal  PSYCH: mentation appears normal, affect normal/bright    Diagnostic Test Results:  none     ASSESSMENT/PLAN:   1. Routine general medical examination at a health care facility     - Pap imaged thin layer screen with HPV - recommended age 30 - 65  - HPV High Risk Types DNA Cervical    2. Special screening for malignant neoplasms, colon     - GASTROENTEROLOGY ADULT REF PROCEDURE ONLY [facility] [phone]; No Provider Preference    3. Mild intermittent asthma without complication     - albuterol (PROAIR HFA/PROVENTIL HFA/VENTOLIN HFA) 108 (90 Base) MCG/ACT inhaler; Inhale 2 puffs into the lungs every 4 hours as needed for shortness of breath / dyspnea  Dispense: 1 Inhaler; Refill: 5    4. ITP (idiopathic thrombocytopenic purpura)  Needed to have plt checked for Dr. Rankin.   - Platelet count    5. Peripheral edema  Blood pressure is elevated, will have her take the hydrochlorothiazide daily.   - hydrochlorothiazide (HYDRODIURIL) 25 MG tablet; Take 1 tablet (25 mg) by mouth daily  Dispense: 90 tablet; Refill: 3    6. Benign essential  "hypertension  Recheck blood pressure with RN in 2-3 weeks with BMP at the same time to check renal function and electrolytes.   - Basic metabolic panel; Future    COUNSELING:   Reviewed preventive health counseling, as reflected in patient instructions       Regular exercise       Healthy diet/nutrition    BP Readings from Last 1 Encounters:   09/10/18 142/84     Estimated body mass index is 51 kg/(m^2) as calculated from the following:    Height as of this encounter: 5' 6\" (1.676 m).    Weight as of this encounter: 316 lb (143.3 kg).      Weight management plan: Discussed healthy diet and exercise guidelines and patient will follow up in 12 months in clinic to re-evaluate.     reports that she has never smoked. She has never used smokeless tobacco.      Counseling Resources:  ATP IV Guidelines  Pooled Cohorts Equation Calculator  Breast Cancer Risk Calculator  FRAX Risk Assessment  ICSI Preventive Guidelines  Dietary Guidelines for Americans, 2010  USDA's MyPlate  ASA Prophylaxis  Lung CA Screening    Kay Briggs MD  [facility]  "

## 2018-09-10 NOTE — PATIENT INSTRUCTIONS
Health Maintenance reviewed and plan for update discussed.  Patient asked to schedule her mammogram  Patient asked to schedule colonoscopy  [phone] to schedule mammogram    Start hydrochlorothiazide 25 mg regularly  Have blood pressure checked in 3-4 weeks (with RN) as well as blood work (lab only)    Kay Briggs M.D.        Thank you for choosing [facility].  You may be receiving a survey in the mail from [employer] regarding your visit today.  Please take a few minutes to complete and return the survey to let us know how we are doing.      Our Clinic hours are:  Mondays    7:20 am - 7 pm  Tues - Fri  7:20 am - 5 pm    Clinic Phone: [phone]    The clinic lab opens at 7:30 am Mon - Fri and appointments are required.    [facility]. [phone]  Monday  8 am - 7pm  Tues - Fri 8 am - 5:30 pm           Preventive Health Recommendations  Female Ages 50 - 64    Yearly exam: See your health care provider every year in order to  o Review health changes.   o Discuss preventive care.    o Review your medicines if your doctor has prescribed any.      Get a Pap test every three years (unless you have an abnormal result and your provider advises testing more often).    If you get Pap tests with HPV test, you only need to test every 5 years, unless you have an abnormal result.     You do not need a Pap test if your uterus was removed (hysterectomy) and you have not had cancer.    You should be tested each year for STDs (sexually transmitted diseases) if you're at risk.     Have a mammogram every 1 to 2 years.    Have a colonoscopy at age 50, or have a yearly FIT test (stool test). These exams screen for colon cancer.      Have a cholesterol test every 5 years, or more often if advised.    Have a diabetes test (fasting glucose) every three years. If you are at risk for diabetes, you should have this test more often.     If you are at risk for osteoporosis (brittle bone disease),  think about having a bone density scan (DEXA).    Shots: Get a flu shot each year. Get a tetanus shot every 10 years.    Nutrition:     Eat at least 5 servings of fruits and vegetables each day.    Eat whole-grain bread, whole-wheat pasta and brown rice instead of white grains and rice.    Get adequate Calcium and Vitamin D.     Lifestyle    Exercise at least 150 minutes a week (30 minutes a day, 5 days a week). This will help you control your weight and prevent disease.    Limit alcohol to one drink per day.    No smoking.     Wear sunscreen to prevent skin cancer.     See your dentist every six months for an exam and cleaning.    See your eye doctor every 1 to 2 years.

## 2018-09-11 ASSESSMENT — ASTHMA QUESTIONNAIRES: ACT_TOTALSCORE: 25

## 2018-09-12 LAB
COPATH REPORT: NORMAL
PAP: NORMAL

## 2018-09-13 LAB
FINAL DIAGNOSIS: NORMAL
HPV HR 12 DNA CVX QL NAA+PROBE: NEGATIVE
HPV16 DNA SPEC QL NAA+PROBE: NEGATIVE
HPV18 DNA SPEC QL NAA+PROBE: NEGATIVE
SPECIMEN DESCRIPTION: NORMAL
SPECIMEN SOURCE CVX/VAG CYTO: NORMAL

## 2018-09-17 ENCOUNTER — TELEPHONE (OUTPATIENT)
Dept: ONCOLOGY | Facility: CLINIC | Age: 63
End: 2018-09-17

## 2018-09-17 DIAGNOSIS — D69.3 IDIOPATHIC THROMBOCYTOPENIC PURPURA (H): ICD-10-CM

## 2018-09-17 DIAGNOSIS — I81 PORTAL VEIN THROMBOSIS: Primary | ICD-10-CM

## 2018-09-17 LAB — PLATELET # BLD AUTO: 8 10E9/L (ref 150–450)

## 2018-09-17 PROCEDURE — 36415 COLL VENOUS BLD VENIPUNCTURE: CPT | Performed by: INTERNAL MEDICINE

## 2018-09-17 PROCEDURE — 85049 AUTOMATED PLATELET COUNT: CPT | Performed by: INTERNAL MEDICINE

## 2018-09-17 NOTE — TELEPHONE ENCOUNTER
DATE:  9/17/2018   TIME OF RECEIPT FROM LAB:  3:50  LAB TEST:  PLT-prelim  LAB VALUE:  8  RESULTS GIVEN WITH READ-BACK TO (PROVIDER):  Dr. Rankin  TIME LAB VALUE REPORTED TO PROVIDER:   3:53 verbally to Dr. Rankin. Dr. Rankin would like pt to have IVIG tomorrow and to have a f/u appt.    Plan for pt to have IVIG 09.18 at 8am with MD visit. Pt verbalized understanding.    Karla Scott RN on 9/17/2018 at 3:56 PM

## 2018-09-18 ENCOUNTER — ONCOLOGY VISIT (OUTPATIENT)
Dept: ONCOLOGY | Facility: CLINIC | Age: 63
End: 2018-09-18
Attending: INTERNAL MEDICINE
Payer: COMMERCIAL

## 2018-09-18 ENCOUNTER — INFUSION THERAPY VISIT (OUTPATIENT)
Dept: INFUSION THERAPY | Facility: CLINIC | Age: 63
End: 2018-09-18
Attending: INTERNAL MEDICINE
Payer: COMMERCIAL

## 2018-09-18 ENCOUNTER — HOSPITAL ENCOUNTER (OUTPATIENT)
Dept: CT IMAGING | Facility: CLINIC | Age: 63
Discharge: HOME | End: 2018-09-18
Attending: INTERNAL MEDICINE | Admitting: INTERNAL MEDICINE
Payer: COMMERCIAL

## 2018-09-18 VITALS
SYSTOLIC BLOOD PRESSURE: 151 MMHG | DIASTOLIC BLOOD PRESSURE: 69 MMHG | OXYGEN SATURATION: 95 % | WEIGHT: 293 LBS | HEART RATE: 71 BPM | RESPIRATION RATE: 20 BRPM | HEIGHT: 66 IN | BODY MASS INDEX: 47.09 KG/M2 | TEMPERATURE: 98.4 F

## 2018-09-18 VITALS — SYSTOLIC BLOOD PRESSURE: 158 MMHG | HEART RATE: 72 BPM | DIASTOLIC BLOOD PRESSURE: 71 MMHG

## 2018-09-18 DIAGNOSIS — G44.209 TENSION HEADACHE: ICD-10-CM

## 2018-09-18 DIAGNOSIS — D69.3 IDIOPATHIC THROMBOCYTOPENIC PURPURA (H): Primary | ICD-10-CM

## 2018-09-18 DIAGNOSIS — K55.069 SUPERIOR MESENTERIC VEIN THROMBOSIS (H): ICD-10-CM

## 2018-09-18 DIAGNOSIS — D69.3 IDIOPATHIC THROMBOCYTOPENIC PURPURA (H): ICD-10-CM

## 2018-09-18 DIAGNOSIS — Z90.81 S/P SPLENECTOMY: ICD-10-CM

## 2018-09-18 DIAGNOSIS — G62.9 NEUROPATHY: ICD-10-CM

## 2018-09-18 PROCEDURE — 25000132 ZZH RX MED GY IP 250 OP 250 PS 637: Performed by: INTERNAL MEDICINE

## 2018-09-18 PROCEDURE — 99215 OFFICE O/P EST HI 40 MIN: CPT | Performed by: INTERNAL MEDICINE

## 2018-09-18 PROCEDURE — 25000125 ZZHC RX 250: Performed by: INTERNAL MEDICINE

## 2018-09-18 PROCEDURE — 25000128 H RX IP 250 OP 636: Performed by: INTERNAL MEDICINE

## 2018-09-18 PROCEDURE — 96375 TX/PRO/DX INJ NEW DRUG ADDON: CPT

## 2018-09-18 PROCEDURE — 96366 THER/PROPH/DIAG IV INF ADDON: CPT

## 2018-09-18 PROCEDURE — 70450 CT HEAD/BRAIN W/O DYE: CPT

## 2018-09-18 PROCEDURE — 96365 THER/PROPH/DIAG IV INF INIT: CPT

## 2018-09-18 RX ORDER — PREDNISONE 10 MG/1
TABLET ORAL
Qty: 200 TABLET | Refills: 3 | Status: CANCELLED | OUTPATIENT
Start: 2018-09-18

## 2018-09-18 RX ORDER — PREDNISONE 10 MG/1
TABLET ORAL
Qty: 200 TABLET | Refills: 3 | Status: SHIPPED | OUTPATIENT
Start: 2018-09-18 | End: 2018-11-08

## 2018-09-18 RX ORDER — ACETAMINOPHEN 325 MG/1
650 TABLET ORAL ONCE
Status: CANCELLED
Start: 2018-09-18 | End: 2018-09-18

## 2018-09-18 RX ORDER — DIPHENHYDRAMINE HYDROCHLORIDE 50 MG/ML
25 INJECTION INTRAMUSCULAR; INTRAVENOUS ONCE
Status: CANCELLED
Start: 2018-09-18 | End: 2018-09-18

## 2018-09-18 RX ORDER — DIPHENHYDRAMINE HYDROCHLORIDE 50 MG/ML
25 INJECTION INTRAMUSCULAR; INTRAVENOUS ONCE
Status: COMPLETED | OUTPATIENT
Start: 2018-09-18 | End: 2018-09-18

## 2018-09-18 RX ORDER — ACETAMINOPHEN 325 MG/1
650 TABLET ORAL ONCE
Status: COMPLETED | OUTPATIENT
Start: 2018-09-18 | End: 2018-09-18

## 2018-09-18 RX ADMIN — ACETAMINOPHEN 650 MG: 325 TABLET, FILM COATED ORAL at 08:19

## 2018-09-18 RX ADMIN — HUMAN IMMUNOGLOBULIN G 70 G: 40 LIQUID INTRAVENOUS at 09:15

## 2018-09-18 RX ADMIN — SODIUM CHLORIDE 250 ML: 9 INJECTION, SOLUTION INTRAVENOUS at 08:31

## 2018-09-18 RX ADMIN — DIPHENHYDRAMINE HYDROCHLORIDE 25 MG: 50 INJECTION, SOLUTION INTRAMUSCULAR; INTRAVENOUS at 08:50

## 2018-09-18 RX ADMIN — FAMOTIDINE 20 MG: 20 INJECTION, SOLUTION INTRAVENOUS at 08:32

## 2018-09-18 ASSESSMENT — PAIN SCALES - GENERAL: PAINLEVEL: MILD PAIN (3)

## 2018-09-18 NOTE — NURSING NOTE
"Oncology Rooming Note    September 18, 2018 8:39 AM   Charlee Marks is a 63 year old female who presents for:    Chief Complaint   Patient presents with     Hematology     Recheck ITP (idiopathic thrombocytopenic purpura) abnormal platelets count     Initial Vitals: Ht 1.676 m (5' 5.98\")  Wt 143.3 kg (316 lb)  LMP 09/15/2007  BMI 51.03 kg/m2 Estimated body mass index is 51.03 kg/(m^2) as calculated from the following:    Height as of this encounter: 1.676 m (5' 5.98\").    Weight as of this encounter: 143.3 kg (316 lb). Body surface area is 2.58 meters squared.  Data Unavailable Comment: Data Unavailable   Patient's last menstrual period was 09/15/2007.  Allergies reviewed: Yes  Medications reviewed: Yes    Medications: Medication refills not needed today.  Pharmacy name entered into [facility]: [facility] [address]    Clinical concerns: Recheck ITP (idiopathic thrombocytopenic purpura) abnormal platelets count.     Charlee reports she is taking 1 Asprin 81 mg daily.     7 minutes for nursing intake (face to face time)     Lorena Wei CMA              "

## 2018-09-18 NOTE — PROGRESS NOTES
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      HISTORY OF PRESENT ILLNESS:  she presented with 2-3 months of easy bruising and also fatigue.  She presented to her Primary Care Physician's office on 07/25/2016 and was found to have critical thrombocytopenia with a platelet count of 3.    She was offered IVIG 1 gram on 7/25/2016 and 4 days of   Dexamethasone. She was d/c with PL of 38 on 7/27/2016. PL dip to 13 on 8/4 and 7 on 8/11. She was offered wklyI IVIG 1g/kg starting 8/4/2016, and 2nd cycle of dex x 4 days on 8/11/2016. PL improves to 77 on 8/15, then dip to 31 on 8/18.   Due to rapid drop of her PL when off dex, tx was changed to po prednisone 8/18/2017 with wkly IVIG. R was added on 8/30/2016. She had no PL response despite R, steroid and IVIG.   Nplate was started 9/23/2016. PL up from 20 to 110 after 2 doses of it. Then dip down again.   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   Course was complicated with portal vein and SMV thromboses end of 12/2016. She was admitted to , had IR thrombectomy and catheter-directed lytic therapy to portal vein. Following the procedure she was placed on high intensity heparin drip which was then bridged with warfarin starting 1/9/2017 with an INR goal of 2-3.   Due to progression of portal venous branch thrombosis and SMV thrombosis by CT in 7/2017. Tx is changed to Lovenox. She could not tolerates the shots. tx was changed to Xarelto.     She finished prednisone slow taper 4/2017.      PL dip down to around 50's in Aug 2017. Xarelto put on hold in Sep 2017 due to PL less than 50 and stable SMV thromboses, she also had acne type of rash.      She saw [place] as 2nd opinion, Dr. Key had nl BM without fibrosis at [place], ITP dx is assured.  Cyclosporine was also recommended to start at 200 mg po qd if PL less than 30, with nl renal function, and adjust the dose.     She elected to be observed due to concern of side effects from chemo and  "immunosuppression agents. PL dip to 13-15 on 11/30/2017 required dex 40 mg 4 days and IVIG, PL up to 190 on 12/4/2017.     She then is on q 1-2 wks prn IVIG.PL dip down again to single digit 9/2018 required IVIG and steroid.     PAST MEDICAL HISTORY:      1.  Cystocele.    2.  Morbid obesity.    3.  Reflux.    4.  Intermittent asthma.    5.  Effusion of lower leg joints.    6. 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    Mother had anemia, great aunt had anemia.       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  She is  with children.  She denies the use of alcohol or tobacco.      ROS  She is back to her baseline energy level.   She denies bleeding episode.     She reports HA from IVIG, maybe also a little from dex.   Still has neuropathy on digit on B6.        PHYSICAL EXAMINATION:    VITAL SIGNS:  Blood pressure 151/69, pulse 71, temperature 98.4  F (36.9  C), temperature source Tympanic, resp. rate 20, height 1.676 m (5' 5.98\"), weight 143.3 kg (316 lb), last menstrual period 09/15/2007, SpO2 95 %, not currently breastfeeding.  ECOG 0    GENERAL APPEARANCE:  A middle-aged woman who appears her stated age, very pleasant, morbidly obese, sitting comfortably in a chair and knitting.    HEENT: The patient is normocephalic, atraumatic. Pupils are equally reactive to light.  Sclerae are anicteric.  There is moist oral mucosa, negative pharynx, no oral thrush.    NECK:  Supple, no jugular venous distention, thyroid not palpable.    LYMPH NODES:  Superficial lymphadenopathy is not appreciable in the bilateral cervical, supraclavicular, axillary or inguinal areas.    CARDIOVASCULAR:  S1, S2 regular with no murmurs or gallops, no carotid or abdominal bruits.    PULMONARY:  Lungs are clear to auscultation and percussion bilaterally.  There is no " wheezing or rhonchi.    GASTROINTESTINAL:  Abdomen is soft, nontender with no hepatosplenomegaly, no signs of ascites and no mass appreciable.    MUSCULOSKELETAL/EXTREMITIES:  No edema, no cyanotic changes, no signs of joint deformity, no lymphedema.    NEUROLOGIC:  Cranial nerves II-XII are grossly intact, sensation intact, muscle strength and muscle tone symmetrical, 5/5 throughout.    BACK:  No spinal or paraspinal tenderness, no CVA tenderness.    SKIN:  No petechiae, no rash and no signs of cellulitis.  There is scattered bruising on forearms and thighs, which are healing.        LABORATORY DATA reviewed  PL 8 from 50-90 in 2018    Current Image reviewed  CT a/p 3/2018 - Chronic thrombosis of the left portal vein and posterior branch of the right portal vein are again seen. The main portal vein is patent. 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        Old data reviewed with summary  PL 12/4/2017 at 190.  PL in teens around 11/30, then had IVIG 400 mg /kg and dex 40 mg po qd x4.  around 40 in the last wks, LFT/Cr nl in 10/2017      in 7/2107 from nl 6/2017  Hb nl, wbc/diff nl.     BM at [place] 10/2017 - nl including cyto.     CT head 11/30/2017 - no bleeding.     CT a/P 9/2017: No significant change. Findings suggest chronic thrombosis of the left portal vein, posterior branch of the right portal vein and branches of the superior mesenteric vein as above.    CT a/p 7/2017 : Progression of left portal venous branch thrombosis and SMV thrombosis is seen compared to the prior study.    US 4/2017  1.  Recanalized main portal and splenic veins. Persistent occlusion of left portal vein.  2.  Splenectomy.    CT 1/9/2017   1. Status post splenectomy with postsurgical changes in the left upper quadrant. Persistent thrombosis of the splenic vein.  2. Main portal vein now appears patent with possible peripheral nonocclusive thrombosis/periportal edema. Residual thrombosis of portion of the left branch of the  portal vein and the right posterior  branch of the portal vein.  3. Residual partial thrombosis of the distal portion of the superior mesenteric vein.   4. Small focal nodular opacities in both lower lobes, likely of infectious etiology.            ASSESSMENT/PLAN:    1.  Refractory ITP dx first in 7/2016 .   She saw [place] as 2nd opinion in 10/2017, Dr. Key had nl BM without fibrosis at [place], ITP dx is assured.  Cyclosporine was also recommended to start at 200 mg po qd if PL less than 30,  with nl renal function, and adjust the dose.       We discussed the tx option:   Azathioprine or cyclosporin immunosuppression.   Cytoxan 750mg - 1g/m2 IV q month or po (maybe 100mg/m2 D1-14 q 28days)  vicristine 1-2mg IV wkly for several wks  vinblastin 0.1mg/kg  combination chemo -CVP, CVP+ -16.  Promacta po - 3-4% risk of thrombosis    The side effects associated with the above options. This is a tough situation. The top choices are oral CTX, cyclosporine, or Promacta or wkly IVIG prn.   She is very hesitated to get on immunosuppression and oral chemo.     Due to new severe thromobocytopenia. Advice 1g/kg IVIG divided in 2-3 doses prn wkly if PL less than 30.   Start prednisone 100 mg po every day taper by 10 mg per wk.     She is happy with this plan. Does not want to get on immuno suppresion and chemo.     2. 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 S/p thrombolysis to portal vein, lovenox. Has been on Coumadin, lovenox again, Xarelto for 9 months.   Her clots are chronic and stable per CT 9/2017 and 3/2018.  Due to her low PL. Recommended off anticoagulation in Sep 2017   Advised full dose ASA on 12/4/2017 due to PL rebound to 190, then fluctuating.    ASA dose was reduced to  in 3/2018 due to PL .  Advice discontinue it if PL less than 50.     3. 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 She feels it is helpful.     4. None specific HA - likely from IVIG, advice CT head  to r/o bleed due to low PL.

## 2018-09-18 NOTE — LETTER
9/18/2018         RE: Charlee Marks  [address]  [place] [address]        Dear Colleague,    Thank you for referring your patient, Charlee Marks, to the [facility]. Please see a copy of my visit note below.              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      HISTORY OF PRESENT ILLNESS:  she presented with 2-3 months of easy bruising and also fatigue.  She presented to her Primary Care Physician's office on 07/25/2016 and was found to have critical thrombocytopenia with a platelet count of 3.    She was offered IVIG 1 gram on 7/25/2016 and 4 days of   Dexamethasone. She was d/c with PL of 38 on 7/27/2016. PL dip to 13 on 8/4 and 7 on 8/11. She was offered wklyI IVIG 1g/kg starting 8/4/2016, and 2nd cycle of dex x 4 days on 8/11/2016. PL improves to 77 on 8/15, then dip to 31 on 8/18.   Due to rapid drop of her PL when off dex, tx was changed to po prednisone 8/18/2017 with wkly IVIG. R was added on 8/30/2016. She had no PL response despite R, steroid and IVIG.   Nplate was started 9/23/2016. PL up from 20 to 110 after 2 doses of it. Then dip down again.   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   Course was complicated with portal vein and SMV thromboses end of 12/2016. She was admitted to , had IR thrombectomy and catheter-directed lytic therapy to portal vein. Following the procedure she was placed on high intensity heparin drip which was then bridged with warfarin starting 1/9/2017 with an INR goal of 2-3.   Due to progression of portal venous branch thrombosis and SMV thrombosis by CT in 7/2017. Tx is changed to Lovenox. She could not tolerates the shots. tx was changed to Xarelto.     She finished prednisone slow taper 4/2017.      PL dip down to around 50's in Aug 2017. Xarelto put on hold in Sep 2017 due to PL less than 50 and stable SMV thromboses, she also had acne type of rash.      She saw [place] as 2nd opinion, Dr. Key had nl BM without  "fibrosis at [place], ITP dx is assured.  Cyclosporine was also recommended to start at 200 mg po qd if PL less than 30, with nl renal function, and adjust the dose.     She elected to be observed due to concern of side effects from chemo and immunosuppression agents. PL dip to 13-15 on 11/30/2017 required dex 40 mg 4 days and IVIG, PL up to 190 on 12/4/2017.     She then is on q 1-2 wks prn IVIG.PL dip down again to single digit 9/2018 required IVIG and steroid.     PAST MEDICAL HISTORY:      1.  Cystocele.    2.  Morbid obesity.    3.  Reflux.    4.  Intermittent asthma.    5.  Effusion of lower leg joints.    6. 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    Mother had anemia, great aunt had anemia.       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  She denies the use of alcohol or tobacco.      ROS  She is back to her baseline energy level.   She denies bleeding episode.     She reports HA from IVIG, maybe also a little from dex.   Still has neuropathy on digit on B6.        PHYSICAL EXAMINATION:    VITAL SIGNS:  Blood pressure 151/69, pulse 71, temperature 98.4  F (36.9  C), temperature source Tympanic, resp. rate 20, height 1.676 m (5' 5.98\"), weight 143.3 kg (316 lb), last menstrual period 09/15/2007, SpO2 95 %, not currently breastfeeding.  ECOG 0    GENERAL APPEARANCE:  A middle-aged woman who appears her stated age, very pleasant, morbidly obese, sitting comfortably in a chair and knitting.    HEENT: The patient is normocephalic, atraumatic. Pupils are equally reactive to light.  Sclerae are anicteric.  There is moist oral mucosa, negative pharynx, no oral thrush.    NECK:  Supple, no jugular venous distention, thyroid not palpable.    LYMPH NODES:  Superficial lymphadenopathy is not appreciable in the bilateral " cervical, supraclavicular, axillary or inguinal areas.    CARDIOVASCULAR:  S1, S2 regular with no murmurs or gallops, no carotid or abdominal bruits.    PULMONARY:  Lungs are clear to auscultation and percussion bilaterally.  There is no wheezing or rhonchi.    GASTROINTESTINAL:  Abdomen is soft, nontender with no hepatosplenomegaly, no signs of ascites and no mass appreciable.    MUSCULOSKELETAL/EXTREMITIES:  No edema, no cyanotic changes, no signs of joint deformity, no lymphedema.    NEUROLOGIC:  Cranial nerves II-XII are grossly intact, sensation intact, muscle strength and muscle tone symmetrical, 5/5 throughout.    BACK:  No spinal or paraspinal tenderness, no CVA tenderness.    SKIN:  No petechiae, no rash and no signs of cellulitis.  There is scattered bruising on forearms and thighs, which are healing.        LABORATORY DATA reviewed  PL 8 from 50-90 in 2018    Current Image reviewed  CT a/p 3/2018 - Chronic thrombosis of the left portal vein and posterior branch of the right portal vein are again seen. The main portal vein is patent. 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        Old data reviewed with summary  PL 12/4/2017 at 190.  PL in teens around 11/30, then had IVIG 400 mg /kg and dex 40 mg po qd x4.  around 40 in the last wks, LFT/Cr nl in 10/2017      in 7/2107 from nl 6/2017  Hb nl, wbc/diff nl.     BM at [place] 10/2017 - nl including cyto.     CT head 11/30/2017 - no bleeding.     CT a/P 9/2017: No significant change. Findings suggest chronic thrombosis of the left portal vein, posterior branch of the right portal vein and branches of the superior mesenteric vein as above.    CT a/p 7/2017 : Progression of left portal venous branch thrombosis and SMV thrombosis is seen compared to the prior study.    US 4/2017  1.  Recanalized main portal and splenic veins. Persistent occlusion of left portal vein.  2.  Splenectomy.    CT 1/9/2017   1. Status post splenectomy with postsurgical  changes in the left upper quadrant. Persistent thrombosis of the splenic vein.  2. Main portal vein now appears patent with possible peripheral nonocclusive thrombosis/periportal edema. Residual thrombosis of portion of the left branch of the portal vein and the right posterior  branch of the portal vein.  3. Residual partial thrombosis of the distal portion of the superior mesenteric vein.   4. Small focal nodular opacities in both lower lobes, likely of infectious etiology.            ASSESSMENT/PLAN:    1.  Refractory ITP dx first in 7/2016 .   She saw [place] as 2nd opinion in 10/2017, Dr. Key had nl BM without fibrosis at [place], ITP dx is assured.  Cyclosporine was also recommended to start at 200 mg po qd if PL less than 30,  with nl renal function, and adjust the dose.       We discussed the tx option:   Azathioprine or cyclosporin immunosuppression.   Cytoxan 750mg - 1g/m2 IV q month or po (maybe 100mg/m2 D1-14 q 28days)  vicristine 1-2mg IV wkly for several wks  vinblastin 0.1mg/kg  combination chemo -CVP, CVP+ -16.  Promacta po - 3-4% risk of thrombosis    The side effects associated with the above options. This is a tough situation. The top choices are oral CTX, cyclosporine, or Promacta or wkly IVIG prn.   She is very hesitated to get on immunosuppression and oral chemo.     Due to new severe thromobocytopenia. Advice 1g/kg IVIG divided in 2-3 doses prn wkly if PL less than 30.   Start prednisone 100 mg po every day taper by 10 mg per wk.     She is happy with this plan. Does not want to get on immuno suppresion and chemo.     2. 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 S/p thrombolysis to portal vein, lovenox. Has been on Coumadin, lovenox again, Xarelto for 9 months.   Her clots are chronic and stable per CT 9/2017 and 3/2018.  Due to her low PL. Recommended off anticoagulation in Sep 2017   Advised full dose ASA on 12/4/2017 due to PL rebound to 190, then fluctuating.    ASA dose was  reduced to  in 3/2018 due to PL .  Advice discontinue it if PL less than 50.     3. 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 She feels it is helpful.     4. None specific HA - likely from IVIG, advice CT head to r/o bleed due to low PL.         Again, thank you for allowing me to participate in the care of your patient.        Sincerely,        Kerrie Rankin MD, MD

## 2018-09-18 NOTE — MR AVS SNAPSHOT
After Visit Summary   9/18/2018    Charlee Marks    MRN: [id number]           Patient Information     Date Of Birth          1955        Visit Information        Provider Department      9/18/2018 8:30 AM Kerrie Rankin MD [facility]        Today's Diagnoses     Idiopathic thrombocytopenic purpura (H)    -  1    Tension headache        Neuropathy        S/P splenectomy        Superior mesenteric vein thrombosis          Care Instructions    Dr. Rankin would like you to have IVIG today and tomorrow. We would like you to set up a CT scan of your head and to start oral prednisone with taper.     Plan for weekly labs again instead of every 2 weeks.      We would like to see you back in 2-3 for a follow up appointment.     A prescription for the Prednisone was sent to [facility], [place] - [address] [place] [address]  When you are in need of a refill, please call your pharmacy and they will send us a request.      Copy of appointments, and after visit summary (AVS) given to patient.      If you have any questions please call Karla Scott RN, BSN Oncology Hematology  [facility] [phone]. For questions after business hours, or on holidays/weekends, please call our after hours Nurse Triage line [phone]. Thank you.           IVIG 0.5g/kg today and tomorrow. CT head to be done. Start oral prednisone with taper. Wkly cbc diff with IVIG prn.   F/u in 2-3 wks.           Follow-ups after your visit        Your next 10 appointments already scheduled     Sep 18, 2018  2:00 PM CDT   CT HEAD W/O CONTRAST with W[place]CT1   [facility] ([facility])    [address] [address]   [phone]           How do I prepare for my exam? (Food and drink instructions) No Food and Drink Restrictions.  How do I prepare for my exam? (Other instructions) You do not need to do anything special to prepare for this exam. For a sinus  scan: Use your nose spray (nasal decongestant spray) as directed.  What should I wear: Please wear loose clothing, such as a sweat suit or jogging clothes. Avoid snaps, zippers and other metal. We may ask you to undress and put on a hospital gown.  How long does the exam take: Most scans take less than 20 minutes.  What should I bring: Please bring any scans or X-rays taken at other hospitals, if similar tests were done. Also bring a list of your medicines, including vitamins, minerals and over-the-counter drugs. It is safest to leave personal items at home.  Do I need a : No  is needed.  What do I need to tell my doctor? Be sure to tell your doctor: * If you have any allergies. * If there s any chance you are pregnant. * If you are breastfeeding.  What should I do after the exam: No restrictions, You may resume normal activities.  What is this test: A CT (computed tomography) scan is a series of pictures that allows us to look inside your body. The scanner creates images of the body in cross sections, much like slices of bread. This helps us see any problems more clearly.  Who should I call with questions: If you have any questions, please call the Imaging Department where you will have your exam. Directions, parking instructions, and other information is available on our website, [email or web address].            Sep 19, 2018 10:30 AM CDT   Level 4 with ROOM 11 [facility] ([facility])    [facility]  [address]  [facility] [address]   [phone]            Sep 24, 2018  3:30 PM CDT   LAB with CL LAB   [facility] ([facility])    [address]  [facility] [id number] [phone]           Please do not eat 10-12 hours before your appointment if you are coming in fasting for labs on lipids, cholesterol, or glucose (sugar). This does not apply to pregnant women. Water, hot tea and black  coffee (with nothing added) are okay. Do not drink other fluids, diet soda or chew gum.            Oct 01, 2018  3:30 PM CDT   LAB with CL LAB   [facility] ([facility])    [address] [address]   [phone]           Please do not eat 10-12 hours before your appointment if you are coming in fasting for labs on lipids, cholesterol, or glucose (sugar). This does not apply to pregnant women. Water, hot tea and black coffee (with nothing added) are okay. Do not drink other fluids, diet soda or chew gum.            Oct 02, 2018  2:15 PM CDT   Return Visit with Kerrie Rankin MD   [facility] ([facility])    [facility]  [address]  [facility] [address]   [phone]            Oct 08, 2018  3:30 PM CDT   LAB with CL LAB   [facility] ([facility])    [address] [address]   [phone]           Please do not eat 10-12 hours before your appointment if you are coming in fasting for labs on lipids, cholesterol, or glucose (sugar). This does not apply to pregnant women. Water, hot tea and black coffee (with nothing added) are okay. Do not drink other fluids, diet soda or chew gum.            Oct 15, 2018  3:30 PM CDT   LAB with CL LAB   [facility] ([facility])    [address] [address]   [phone]           Please do not eat 10-12 hours before your appointment if you are coming in fasting for labs on lipids, cholesterol, or glucose (sugar). This does not apply to pregnant women. Water, hot tea and black coffee (with nothing added) are okay. Do not drink other fluids, diet soda or chew gum.            Oct 18, 2018  8:00 AM CDT   Lab with  LAB    [facility] ([facility])    [address] [address] [phone]             Oct 18, 2018  9:00 AM CDT   (Arrive by 8:45 AM)   Return General Liver with Agustin Le MD   [facility] ([facility])    [address]  [address]  [facility] [address] [phone]            Oct 22, 2018  3:30 PM CDT   LAB with CL LAB   [facility] ([facility])    [address]  [facility] [id number] [phone]           Please do not eat 10-12 hours before your appointment if you are coming in fasting for labs on lipids, cholesterol, or glucose (sugar). This does not apply to pregnant women. Water, hot tea and black coffee (with nothing added) are okay. Do not drink other fluids, diet soda or chew gum.              Future tests that were ordered for you today     Open Standing Orders        Priority Remaining Interval Expires Ordered    CBC with platelets differential Routine 8/8 wkly 9/18/2019 9/18/2018          Open Future Orders        Priority Expected Expires Ordered    CT Head w/o Contrast Routine  9/18/2019 9/18/2018    Hepatic panel Routine 9/17/2018 12/16/2018 9/17/2018    CBC with platelets Routine 9/17/2018 12/16/2018 9/17/2018    INR Routine 9/17/2018 12/16/2018 9/17/2018    Basic metabolic panel Routine 9/17/2018 12/16/2018 9/17/2018    US abdomen complete Routine  9/17/2019 9/17/2018            Who to contact     If you have questions or need follow up information about today's clinic visit or your schedule please contact [facility] directly at [phone].  Normal or non-critical lab and imaging results will be communicated to you by MyChart, letter or phone within 4 business days after the clinic has received the results. If you do not hear from us within 7 days, please contact the clinic through MyChart or phone. If you have a critical or abnormal lab result, we will notify you by phone as soon as possible.  Submit refill requests through [employer] or call your pharmacy and they will forward  "the refill request to us. Please allow 3 business days for your refill to be completed.          Additional Information About Your Visit        [id number] Information     [employer] gives you secure access to your electronic health record. If you see a primary care provider, you can also send messages to your care team and make appointments. If you have questions, please call your primary care clinic.  If you do not have a primary care provider, please call [phone] and they will assist you.        Care EveryWhere ID     This is your Care EveryWhere ID. This could be used by other organizations to access your [facility] medical records  [id number]        Your Vitals Were     Pulse Temperature Respirations Height Last Period Pulse Oximetry    71 98.4  F (36.9  C) (Tympanic) 20 1.676 m (5' 5.98\") 09/15/2007 95%    Breastfeeding? BMI (Body Mass Index)                No 51.03 kg/m2           Blood Pressure from Last 3 Encounters:   09/18/18 151/69   09/10/18 142/84   09/05/18 146/73    Weight from Last 3 Encounters:   09/18/18 143.3 kg (316 lb)   09/10/18 143.3 kg (316 lb)   09/05/18 141.6 kg (312 lb 3.2 oz)                 Today's Medication Changes          These changes are accurate as of 9/18/18 10:34 AM.  If you have any questions, ask your nurse or doctor.               Start taking these medicines.        Dose/Directions    predniSONE 10 MG tablet   Commonly known as:  DELTASONE   Used for:  Idiopathic thrombocytopenic purpura (H)   Started by:  Kerrie Rankin MD        Start 100 mg po every day (can do bid divided dosing), taper 10 mg every wk. All taken post meal.   Quantity:  200 tablet   Refills:  3         These medicines have changed or have updated prescriptions.        Dose/Directions    ranitidine 150 MG tablet   Commonly known as:  ZANTAC   This may have changed:  See the new instructions.   Used for:  Esophageal reflux        ONE TABLET TWICE DAILY as needed will call when needed   Quantity:  180 Tab "   Refills:  3            Where to get your medicines      These medications were sent to [facility], [place] - [address]  [address], [place] [address]     Phone:  [phone]     predniSONE 10 MG tablet                Primary Care Provider Office Phone # Fax #    Kay Briggs [phone] [address]  [facility] [address]        Equal Access to Services     LAVERN CABA : Hadii aad ku hadasho Soomaali, waaxda luqadaha, qaybta kaalmada adeegyada, waxay idiin hayaan adeeg kharash lasoila . So [facility] [place] [phone].    ATENCIÓN: Si habla español, tiene a cosme disposición servicios gratuitos de asistencia lingüística. [facility] [phone].    We comply with applicable federal civil rights laws and [place] laws. We do not discriminate on the basis of race, color, national origin, age, disability, sex, sexual orientation, or gender identity.            Thank you!     Thank you for choosing [facility]  for your care. Our goal is always to provide you with excellent care. Hearing back from our patients is one way we can continue to improve our services. Please take a few minutes to complete the written survey that you may receive in the mail after your visit with us. Thank you!             Your Updated Medication List - Protect others around you: Learn how to safely use, store and throw away your medicines at www.disposemymeds.org.          This list is accurate as of 9/18/18 10:34 AM.  Always use your most recent med list.                   Brand Name Dispense Instructions for use Diagnosis    albuterol 108 (90 Base) MCG/ACT inhaler    PROAIR HFA/PROVENTIL HFA/VENTOLIN HFA    1 Inhaler    Inhale 2 puffs into the lungs every 4 hours as needed for shortness of breath / dyspnea    Mild intermittent asthma without complication       ASPIRIN PO      Take 81 mg by mouth daily        B-6 100 MG Tabs      Take 1 tablet by mouth daily        BENADRYL PO       Take 25 mg by mouth every 6 hours as needed        calcium carbonate-vitamin D 500-400 MG-UNIT Tabs per tablet      Take 1 tablet by mouth daily        GLUCOSAMINE CHONDRO COMPLEX OR      daily        hydrochlorothiazide 25 MG tablet    HYDRODIURIL    90 tablet    Take 1 tablet (25 mg) by mouth daily    Peripheral edema       loratadine 10 MG tablet    CLARITIN    90 Tab    ONE DAILY    Allergy, unspecified not elsewhere classified       MULTIVITAMIN TABS   OR      1 TABLET DAILY        omega 3 1000 MG Caps      daily        predniSONE 10 MG tablet    DELTASONE    200 tablet    Start 100 mg po every day (can do bid divided dosing), taper 10 mg every wk. All taken post meal.    Idiopathic thrombocytopenic purpura (H)       ranitidine 150 MG tablet    ZANTAC    180 Tab    ONE TABLET TWICE DAILY as needed will call when needed    Esophageal reflux       TYLENOL PO      Take 1,000 mg by mouth At Bedtime

## 2018-09-18 NOTE — PATIENT INSTRUCTIONS
Dr. Rankin would like you to have IVIG today and tomorrow. We would like you to set up a CT scan of your head and to start oral prednisone with taper.     Plan for weekly labs again instead of every 2 weeks.      We would like to see you back in 2-3 for a follow up appointment.     A prescription for the Prednisone was sent to [facility] [place], [place] - [address] [place] [address]  When you are in need of a refill, please call your pharmacy and they will send us a request.      Copy of appointments, and after visit summary (AVS) given to patient.      If you have any questions please call Karla Scott RN, BSN Oncology Hematology  [facility] [phone]. For questions after business hours, or on holidays/weekends, please call our after hours Nurse Triage line [phone]. Thank you.           IVIG 0.5g/kg today and tomorrow. CT head to be done. Start oral prednisone with taper. Wkly cbc diff with IVIG prn.   F/u in 2-3 wks.

## 2018-09-18 NOTE — NURSING NOTE
"Oncology Rooming Note    September 18, 2018 8:43 AM   Charlee Marks is a 63 year old female who presents for:    Chief Complaint   Patient presents with     Hematology     Recheck ITP (idiopathic thrombocytopenic purpura) abnormal platelets count     Initial Vitals: /69 (BP Location: Left arm, Patient Position: Sitting, Cuff Size: Adult Large)  Pulse 71  Temp 98.4  F (36.9  C) (Tympanic)  Resp 20  Ht 1.676 m (5' 5.98\")  Wt 143.3 kg (316 lb)  LMP 09/15/2007  SpO2 95%  Breastfeeding? No  BMI 51.03 kg/m2 Estimated body mass index is 51.03 kg/(m^2) as calculated from the following:    Height as of this encounter: 1.676 m (5' 5.98\").    Weight as of this encounter: 143.3 kg (316 lb). Body surface area is 2.58 meters squared.  Mild Pain (3) Comment: Data Unavailable   Patient's last menstrual period was 09/15/2007.  Allergies reviewed: Yes  Medications reviewed: Yes    Medications: Medication refills not needed today.  Pharmacy name entered into [employer]: [facility] [place], [place] - [address]    Clinical concerns: Recheck ITP (idiopathic thrombocytopenic purpura) abnormal platelets count  Charlee reports she is taking 1 Asprin 81 mg daily.     7 minutes for nursing intake (face to face time)     Lorena Wei CMA              "

## 2018-09-18 NOTE — MR AVS SNAPSHOT
After Visit Summary   9/18/2018    Charlee Marks    MRN: [id number]           Patient Information     Date Of Birth          1955        Visit Information        Provider Department      9/18/2018 8:00 AM ROOM 3 [facility]        Today's Diagnoses     ITP (idiopathic thrombocytopenic purpura)    -  1       Follow-ups after your visit        Your next 10 appointments already scheduled     Sep 18, 2018  2:00 PM CDT   CT HEAD W/O CONTRAST with W[place]CT1   [facility] CT ([facility])    [address] [address]   [phone]           How do I prepare for my exam? (Food and drink instructions) No Food and Drink Restrictions.  How do I prepare for my exam? (Other instructions) You do not need to do anything special to prepare for this exam. For a sinus scan: Use your nose spray (nasal decongestant spray) as directed.  What should I wear: Please wear loose clothing, such as a sweat suit or jogging clothes. Avoid snaps, zippers and other metal. We may ask you to undress and put on a hospital gown.  How long does the exam take: Most scans take less than 20 minutes.  What should I bring: Please bring any scans or X-rays taken at other hospitals, if similar tests were done. Also bring a list of your medicines, including vitamins, minerals and over-the-counter drugs. It is safest to leave personal items at home.  Do I need a : No  is needed.  What do I need to tell my doctor? Be sure to tell your doctor: * If you have any allergies. * If there s any chance you are pregnant. * If you are breastfeeding.  What should I do after the exam: No restrictions, You may resume normal activities.  What is this test: A CT (computed tomography) scan is a series of pictures that allows us to look inside your body. The scanner creates images of the body in cross sections, much like slices of bread. This helps us see any problems more clearly.  Who should I call  with questions: If you have any questions, please call the Imaging Department where you will have your exam. Directions, parking instructions, and other information is available on our website, [email or web address].            Sep 19, 2018 10:30 AM CDT   Level 4 with ROOM 11 [facility] ([facility])    [facility]  [address] [place] [address] [address]  [facility] [address]   [phone]            Sep 24, 2018  3:30 PM CDT   LAB with CL LAB   [facility] ([facility])    [address] [facility] [facility] [address]   [phone]           Please do not eat 10-12 hours before your appointment if you are coming in fasting for labs on lipids, cholesterol, or glucose (sugar). This does not apply to pregnant women. Water, hot tea and black coffee (with nothing added) are okay. Do not drink other fluids, diet soda or chew gum.            Oct 01, 2018  3:30 PM CDT   LAB with CL LAB   [facility] ([facility])    [address] [facility] [address]   [phone]           Please do not eat 10-12 hours before your appointment if you are coming in fasting for labs on lipids, cholesterol, or glucose (sugar). This does not apply to pregnant women. Water, hot tea and black coffee (with nothing added) are okay. Do not drink other fluids, diet soda or chew gum.            Oct 02, 2018  2:15 PM CDT   Return Visit with Kerrie Rankin MD   [facility] ([facility])    [facility]  [address] [place] [address] [address]  [facility] [address]   [phone]            Oct 08, 2018  3:30 PM CDT   LAB with CL LAB   [facility] ([facility])    [address] [facility] [address]   [phone]           Please do not eat 10-12 hours before your appointment if you are coming in fasting for labs on lipids, cholesterol, or glucose (sugar).  This does not apply to pregnant women. Water, hot tea and black coffee (with nothing added) are okay. Do not drink other fluids, diet soda or chew gum.            Oct 15, 2018  3:30 PM CDT   LAB with CL LAB   [facility] ([facility])    [address] [facility] [address]   [phone]           Please do not eat 10-12 hours before your appointment if you are coming in fasting for labs on lipids, cholesterol, or glucose (sugar). This does not apply to pregnant women. Water, hot tea and black coffee (with nothing added) are okay. Do not drink other fluids, diet soda or chew gum.            Oct 18, 2018  8:00 AM CDT   Lab with  LAB   [facility] ([facility])    [address]  1st [address]  [facility] [address] [phone]            Oct 18, 2018  9:00 AM CDT   (Arrive by 8:45 AM)   Return General Liver with Agustin Le MD   [facility] ([facility])    [address]  [address]  [facility] [address] [phone]            Oct 22, 2018  3:30 PM CDT   LAB with CL LAB   [facility] ([facility])    [address] [facility] [address]   [phone]           Please do not eat 10-12 hours before your appointment if you are coming in fasting for labs on lipids, cholesterol, or glucose (sugar). This does not apply to pregnant women. Water, hot tea and black coffee (with nothing added) are okay. Do not drink other fluids, diet soda or chew gum.              Future tests that were ordered for you today     Open Standing Orders        Priority Remaining Interval Expires Ordered    CBC with platelets differential Routine 8/8 wkly 9/18/2019 9/18/2018          Open Future Orders        Priority Expected Expires Ordered    CT Head w/o Contrast Routine  9/18/2019 9/18/2018    Hepatic panel Routine 9/17/2018 12/16/2018 9/17/2018    CBC with platelets  Routine 9/17/2018 12/16/2018 9/17/2018    INR Routine 9/17/2018 12/16/2018 9/17/2018    Basic metabolic panel Routine 9/17/2018 12/16/2018 9/17/2018    US abdomen complete Routine  9/17/2019 9/17/2018            Who to contact     If you have questions or need follow up information about today's clinic visit or your schedule please contact [facility] directly at [phone].  Normal or non-critical lab and imaging results will be communicated to you by XZEREShart, letter or phone within 4 business days after the clinic has received the results. If you do not hear from us within 7 days, please contact the clinic through Heatmapst or phone. If you have a critical or abnormal lab result, we will notify you by phone as soon as possible.  Submit refill requests through Launchr or call your pharmacy and they will forward the refill request to us. Please allow 3 business days for your refill to be completed.          Additional Information About Your Visit        XZERESharZwittle Information     Launchr gives you secure access to your electronic health record. If you see a primary care provider, you can also send messages to your care team and make appointments. If you have questions, please call your primary care clinic.  If you do not have a primary care provider, please call [phone] and they will assist you.        Care EveryWhere ID     This is your Care EveryWhere ID. This could be used by other organizations to access your [place] medical records  [id number]        Your Vitals Were     Pulse Last Period                72 09/15/2007           Blood Pressure from Last 3 Encounters:   09/18/18 151/69   09/18/18 158/71   09/10/18 142/84    Weight from Last 3 Encounters:   09/18/18 143.3 kg (316 lb)   09/10/18 143.3 kg (316 lb)   09/05/18 141.6 kg (312 lb 3.2 oz)              Today, you had the following     No orders found for display         Today's Medication Changes          These changes are accurate as of  9/18/18 12:53 PM.  If you have any questions, ask your nurse or doctor.               Start taking these medicines.        Dose/Directions    predniSONE 10 MG tablet   Commonly known as:  DELTASONE   Used for:  Idiopathic thrombocytopenic purpura (H)   Started by:  Kerrie Rankin MD        Start 100 mg po every day (can do bid divided dosing), taper 10 mg every wk. All taken post meal.   Quantity:  200 tablet   Refills:  3         These medicines have changed or have updated prescriptions.        Dose/Directions    ranitidine 150 MG tablet   Commonly known as:  ZANTAC   This may have changed:  See the new instructions.   Used for:  Esophageal reflux        ONE TABLET TWICE DAILY as needed will call when needed   Quantity:  180 Tab   Refills:  3            Where to get your medicines      These medications were sent to [facility] [address]  [address] [facility] [address]     Phone:  [phone]     predniSONE 10 MG tablet                Primary Care Provider Office Phone # Fax #    Kay Briggs [phone] [address] [facility] [address]        Equal Access to Services     [facility]: Hadii aad ku hadasho Soomaali, waaxda luqadaha, qaybta kaalmada adeegyasussy, julieta khanna . So [facility] [phone].    ATENCIÓN: Si habla español, tiene a cosme disposición servicios gratuitos de asistencia lingüística. [facility] [phone].    We comply with applicable federal civil rights laws and [place] laws. We do not discriminate on the basis of race, color, national origin, age, disability, sex, sexual orientation, or gender identity.            Thank you!     Thank you for choosing [facility]  for your care. Our goal is always to provide you with excellent care. Hearing back from our patients is one way we can continue to improve our services. Please take a few minutes to complete the written survey that you may  receive in the mail after your visit with us. Thank you!             Your Updated Medication List - Protect others around you: Learn how to safely use, store and throw away your medicines at www.disposemymeds.org.          This list is accurate as of 9/18/18 12:53 PM.  Always use your most recent med list.                   Brand Name Dispense Instructions for use Diagnosis    albuterol 108 (90 Base) MCG/ACT inhaler    PROAIR HFA/PROVENTIL HFA/VENTOLIN HFA    1 Inhaler    Inhale 2 puffs into the lungs every 4 hours as needed for shortness of breath / dyspnea    Mild intermittent asthma without complication       ASPIRIN PO      Take 81 mg by mouth daily        B-6 100 MG Tabs      Take 1 tablet by mouth daily        BENADRYL PO      Take 25 mg by mouth every 6 hours as needed        calcium carbonate-vitamin D 500-400 MG-UNIT Tabs per tablet      Take 1 tablet by mouth daily        GLUCOSAMINE CHONDRO COMPLEX OR      daily        hydrochlorothiazide 25 MG tablet    HYDRODIURIL    90 tablet    Take 1 tablet (25 mg) by mouth daily    Peripheral edema       loratadine 10 MG tablet    CLARITIN    90 Tab    ONE DAILY    Allergy, unspecified not elsewhere classified       MULTIVITAMIN TABS   OR      1 TABLET DAILY        omega 3 1000 MG Caps      daily        predniSONE 10 MG tablet    DELTASONE    200 tablet    Start 100 mg po every day (can do bid divided dosing), taper 10 mg every wk. All taken post meal.    Idiopathic thrombocytopenic purpura (H)       ranitidine 150 MG tablet    ZANTAC    180 Tab    ONE TABLET TWICE DAILY as needed will call when needed    Esophageal reflux       TYLENOL PO      Take 1,000 mg by mouth At Bedtime

## 2018-09-18 NOTE — PROGRESS NOTES
"Infusion Nursing Note:  Shellipaolo Marks presents today for IVIG.    Patient seen by provider today: Yes: Dr. Rankin   present during visit today: Not Applicable.    Note: N/A.    Intravenous Access:  Peripheral IV placed.    Treatment Conditions:  Rheumatology Infusion Checklist: PRIOR TO INFUSION OF BIOLOGICAL MEDICATIONS OR ANY OF THESE AS LISTED: IVIG \".rheumbiologicalchecklist\"    Prior to Infusion of biological medications or any of these as listed:    1. Elevated temperature, fever, chills, productive cough or abnormal vital signs, night sweats, coughing up blood or sputum, no appetite or abnormal vital signs : NO    2. Open wounds or new incisions: NO    3. Recent hospitalization: NO    4.  Recent surgeries:  NO    5. Any upcoming surgeries or dental procedures?: pt has colonoscopy scheduled for November 5th.    6. Any current or recent bouts of illness or infection? On any antibiotics? : NO    7. Any new, sudden or worsening abdominal pain :NO    8. Vaccination within 4 weeks? Patient or someone in the household is scheduled to receive vaccination? No live virus vaccines prior to or during treatment :NO    9. Any nervous system diseases [i.e. multiple sclerosis, Guillain-[facility], seizures, neurological  changes]: NO    10. Pregnant or breast feeding; or plans on pregnancy in the future: NO    11. Signs of worsening depression or suicidal ideations while taking benlysta:NO    12. New-onset medical symptoms: NO    13.  New cancer diagnosis or on chemotherapy or radiation NO    14.  Evaluate for any sign of active TB [Unexplained weight loss, Loss of appetite, Night sweats, Fever, Fatigue, Chills, Coughing for 3 weeks or longer, Hemoptysis (coughing up blood), Chest pain]: NO    **Note: If answered yes to any of the above, hold the infusion and contact ordering rheumatologist or on-call rheumatologist.       Post Infusion Assessment:  Patient tolerated infusion without incident although c/o headache at base " of skull after infusion was completed and she had gone to the restroom.  Blood return noted pre and post infusion.  Site patent and intact, free from redness, edema or discomfort.  No evidence of extravasations.  Access discontinued per protocol.    Discharge Plan:   Patient discharged in stable condition accompanied by: .  Departure Mode: Ambulatory; heading to Diagnostics for CT of head.    Pilar Modi RN

## 2018-09-19 ENCOUNTER — INFUSION THERAPY VISIT (OUTPATIENT)
Dept: INFUSION THERAPY | Facility: CLINIC | Age: 63
End: 2018-09-19
Attending: INTERNAL MEDICINE
Payer: COMMERCIAL

## 2018-09-19 VITALS — HEART RATE: 73 BPM | SYSTOLIC BLOOD PRESSURE: 143 MMHG | DIASTOLIC BLOOD PRESSURE: 57 MMHG | TEMPERATURE: 98.7 F

## 2018-09-19 DIAGNOSIS — D69.3 IDIOPATHIC THROMBOCYTOPENIC PURPURA (H): Primary | ICD-10-CM

## 2018-09-19 PROCEDURE — 96365 THER/PROPH/DIAG IV INF INIT: CPT

## 2018-09-19 PROCEDURE — 96366 THER/PROPH/DIAG IV INF ADDON: CPT

## 2018-09-19 PROCEDURE — 25000125 ZZHC RX 250: Performed by: INTERNAL MEDICINE

## 2018-09-19 PROCEDURE — 96367 TX/PROPH/DG ADDL SEQ IV INF: CPT

## 2018-09-19 PROCEDURE — 25000132 ZZH RX MED GY IP 250 OP 250 PS 637: Performed by: INTERNAL MEDICINE

## 2018-09-19 PROCEDURE — 25000128 H RX IP 250 OP 636: Performed by: INTERNAL MEDICINE

## 2018-09-19 PROCEDURE — 96375 TX/PRO/DX INJ NEW DRUG ADDON: CPT

## 2018-09-19 RX ORDER — ACETAMINOPHEN 325 MG/1
650 TABLET ORAL ONCE
Status: CANCELLED
Start: 2018-09-19 | End: 2018-09-19

## 2018-09-19 RX ORDER — DIPHENHYDRAMINE HYDROCHLORIDE 50 MG/ML
25 INJECTION INTRAMUSCULAR; INTRAVENOUS ONCE
Status: COMPLETED | OUTPATIENT
Start: 2018-09-19 | End: 2018-09-19

## 2018-09-19 RX ORDER — DIPHENHYDRAMINE HYDROCHLORIDE 50 MG/ML
25 INJECTION INTRAMUSCULAR; INTRAVENOUS ONCE
Status: CANCELLED
Start: 2018-09-19 | End: 2018-09-19

## 2018-09-19 RX ORDER — ACETAMINOPHEN 325 MG/1
650 TABLET ORAL ONCE
Status: COMPLETED | OUTPATIENT
Start: 2018-09-19 | End: 2018-09-19

## 2018-09-19 RX ADMIN — DIPHENHYDRAMINE HYDROCHLORIDE 25 MG: 50 INJECTION, SOLUTION INTRAMUSCULAR; INTRAVENOUS at 11:22

## 2018-09-19 RX ADMIN — FAMOTIDINE 20 MG: 20 INJECTION, SOLUTION INTRAVENOUS at 10:59

## 2018-09-19 RX ADMIN — HUMAN IMMUNOGLOBULIN G 70 G: 40 LIQUID INTRAVENOUS at 11:54

## 2018-09-19 RX ADMIN — ACETAMINOPHEN 650 MG: 325 TABLET, FILM COATED ORAL at 11:01

## 2018-09-19 RX ADMIN — SODIUM CHLORIDE 250 ML: 9 INJECTION, SOLUTION INTRAVENOUS at 10:59

## 2018-09-19 NOTE — MR AVS SNAPSHOT
After Visit Summary   9/19/2018    Charlee Marks    MRN: [id number]           Patient Information     Date Of Birth          1955        Visit Information        Provider Department      9/19/2018 10:30 AM ROOM 11 [facility]        Today's Diagnoses     ITP (idiopathic thrombocytopenic purpura)    -  1       Follow-ups after your visit        Your next 10 appointments already scheduled     Sep 24, 2018  3:30 PM CDT   LAB with CL LAB   [facility] ([facility])    [address] [address]   [phone]           Please do not eat 10-12 hours before your appointment if you are coming in fasting for labs on lipids, cholesterol, or glucose (sugar). This does not apply to pregnant women. Water, hot tea and black coffee (with nothing added) are okay. Do not drink other fluids, diet soda or chew gum.            Oct 01, 2018  3:30 PM CDT   LAB with CL LAB   [facility] ([facility])    [address] [address]   [phone]           Please do not eat 10-12 hours before your appointment if you are coming in fasting for labs on lipids, cholesterol, or glucose (sugar). This does not apply to pregnant women. Water, hot tea and black coffee (with nothing added) are okay. Do not drink other fluids, diet soda or chew gum.            Oct 02, 2018  2:15 PM CDT   Return Visit with Kerrie Rankin MD   [facility] ([facility])    [facility]  [address]  [place] [address]   [phone]            Oct 08, 2018  3:30 PM CDT   LAB with CL LAB   [facility] ([facility])    [address] [address] [phone]           Please do not eat 10-12 hours before your appointment if you are coming in fasting for labs on lipids, cholesterol, or glucose (sugar). This does not apply  to pregnant women. Water, hot tea and black coffee (with nothing added) are okay. Do not drink other fluids, diet soda or chew gum.            Oct 15, 2018  3:30 PM CDT   LAB with CL LAB   [facility] ([facility])    [address] [address]   [phone]           Please do not eat 10-12 hours before your appointment if you are coming in fasting for labs on lipids, cholesterol, or glucose (sugar). This does not apply to pregnant women. Water, hot tea and black coffee (with nothing added) are okay. Do not drink other fluids, diet soda or chew gum.            Oct 18, 2018  8:00 AM CDT   Lab with UC LAB   [facility] ([facility])    [address]  1st [address]  [facility] [address]   [phone]            Oct 18, 2018  9:00 AM CDT   (Arrive by 8:45 AM)   Return General Liver with Agustin Le MD   [facility] ([facility])    [address]  [address]  [facility] [address] [phone]            Oct 22, 2018  3:30 PM CDT   LAB with CL LAB   [facility] ([facility])    [address] [address]   [phone]           Please do not eat 10-12 hours before your appointment if you are coming in fasting for labs on lipids, cholesterol, or glucose (sugar). This does not apply to pregnant women. Water, hot tea and black coffee (with nothing added) are okay. Do not drink other fluids, diet soda or chew gum.            Oct 29, 2018  3:30 PM CDT   LAB with CL LAB   [facility] ([facility])    [address] [address]   [phone]           Please do not eat 10-12 hours before your appointment if you are coming in fasting for labs on lipids, cholesterol, or glucose (sugar). This does not apply to pregnant women. Water, hot tea and black coffee (with nothing added) are okay.  Do not drink other fluids, diet soda or chew gum.            Nov 05, 2018   Procedure with Brock Brower MD   [facility] ([facility])    [address] [address] [phone]           The medical center is located at [address] (between [address] in [place], [place] north of [place]).              Future tests that were ordered for you today     Open Standing Orders        Priority Remaining Interval Expires Ordered    CBC with platelets differential Routine 8/8 wkly 9/18/2019 9/18/2018          Open Future Orders        Priority Expected Expires Ordered    CT Head w/o Contrast Routine  9/18/2019 9/18/2018            Who to contact     If you have questions or need follow up information about today's clinic visit or your schedule please contact [facility] directly at [phone].  Normal or non-critical lab and imaging results will be communicated to you by [facility] [employer], letter or phone within 4 business days after the clinic has received the results. If you do not hear from us within 7 days, please contact the clinic through [employer] or phone. If you have a critical or abnormal lab result, we will notify you by phone as soon as possible.  Submit refill requests through [employer] or call your pharmacy and they will forward the refill request to us. Please allow 3 business days for your refill to be completed.          Additional Information About Your Visit        [employer] Information     [employer] gives you secure access to your electronic health record. If you see a primary care provider, you can also send messages to your care team and make appointments. If you have questions, please call your primary care clinic.  If you do not have a primary care provider, please call [phone] and they will assist you.        Care EveryWhere ID     This is your Care EveryWhere ID. This could be used by other organizations to access your [place]  medical records  [id number]        Your Vitals Were     Pulse Temperature Last Period             73 98.7  F (37.1  C) (Oral) 09/15/2007          Blood Pressure from Last 3 Encounters:   09/19/18 143/57   09/18/18 151/69   09/18/18 158/71    Weight from Last 3 Encounters:   09/18/18 143.3 kg (316 lb)   09/10/18 143.3 kg (316 lb)   09/05/18 141.6 kg (312 lb 3.2 oz)              Today, you had the following     No orders found for display         Today's Medication Changes          These changes are accurate as of 9/19/18  3:48 PM.  If you have any questions, ask your nurse or doctor.               These medicines have changed or have updated prescriptions.        Dose/Directions    ranitidine 150 MG tablet   Commonly known as:  ZANTAC   This may have changed:  See the new instructions.   Used for:  Esophageal reflux        ONE TABLET TWICE DAILY as needed will call when needed   Quantity:  180 Tab   Refills:  3                Primary Care Provider Office Phone # Fax #    Kay Briggs [phone] [address] [address]        Equal Access to Services     [facility] AH: Hadii sourav aleman Soelizabeth, waaxda lujames, qaybta kaalmada arnoldo, julieta khanna . So [facility] [phone].    ATENCIÓN: Si habla español, tiene a cosme disposición servicios gratuitos de asistencia lingüística. Llame al [phone].    We comply with applicable federal civil rights laws and [place] laws. We do not discriminate on the basis of race, color, national origin, age, disability, sex, sexual orientation, or gender identity.            Thank you!     Thank you for choosing [facility]  for your care. Our goal is always to provide you with excellent care. Hearing back from our patients is one way we can continue to improve our services. Please take a few minutes to complete the written survey that you may receive in the mail after your visit with us. Thank  you!             Your Updated Medication List - Protect others around you: Learn how to safely use, store and throw away your medicines at www.disposemymeds.org.          This list is accurate as of 9/19/18  3:48 PM.  Always use your most recent med list.                   Brand Name Dispense Instructions for use Diagnosis    albuterol 108 (90 Base) MCG/ACT inhaler    PROAIR HFA/PROVENTIL HFA/VENTOLIN HFA    1 Inhaler    Inhale 2 puffs into the lungs every 4 hours as needed for shortness of breath / dyspnea    Mild intermittent asthma without complication       ASPIRIN PO      Take 81 mg by mouth daily        B-6 100 MG Tabs      Take 1 tablet by mouth daily        BENADRYL PO      Take 25 mg by mouth every 6 hours as needed        calcium carbonate-vitamin D 500-400 MG-UNIT Tabs per tablet      Take 1 tablet by mouth daily        GLUCOSAMINE CHONDRO COMPLEX OR      daily        hydrochlorothiazide 25 MG tablet    HYDRODIURIL    90 tablet    Take 1 tablet (25 mg) by mouth daily    Peripheral edema       loratadine 10 MG tablet    CLARITIN    90 Tab    ONE DAILY    Allergy, unspecified not elsewhere classified       MULTIVITAMIN TABS   OR      1 TABLET DAILY        omega 3 1000 MG Caps      daily        predniSONE 10 MG tablet    DELTASONE    200 tablet    Start 100 mg po every day (can do bid divided dosing), taper 10 mg every wk. All taken post meal.    Idiopathic thrombocytopenic purpura (H)       ranitidine 150 MG tablet    ZANTAC    180 Tab    ONE TABLET TWICE DAILY as needed will call when needed    Esophageal reflux       TYLENOL PO      Take 1,000 mg by mouth At Bedtime

## 2018-09-19 NOTE — PROGRESS NOTES
"Infusion Nursing Note:  Charlee Marks presents today for IVIG.    Patient seen by provider today: No   present during visit today: Not Applicable.      Note:  Increased pt's rate every 30 minutes today and stopped at the top rate of 350 ml/hr.  Pt denied having any symptoms of headache or dizziness.  Will continue with this as pt needs her IVIG.     Intravenous Access:  Peripheral IV placed.    Treatment Conditions:  Rheumatology Infusion Checklist: PRIOR TO INFUSION OF BIOLOGICAL MEDICATIONS OR ANY OF THESE AS LISTED: Immune Globulin (IVIG) \".rheumbiologicalchecklist\"    Prior to Infusion of biological medications or any of these as listed:    1. Elevated temperature, fever, chills, productive cough or abnormal vital signs, night sweats, coughing up blood or sputum, no appetite or abnormal vital signs : NO    2. Open wounds or new incisions: NO    3. Recent hospitalization: NO    4.  Recent surgeries:  NO    5. Any upcoming surgeries or dental procedures?:NO    6. Any current or recent bouts of illness or infection? On any antibiotics? : NO    7. Any new, sudden or worsening abdominal pain :NO    8. Vaccination within 4 weeks? Patient or someone in the household is scheduled to receive vaccination? No live virus vaccines prior to or during treatment :NO    9. Any nervous system diseases [i.e. multiple sclerosis, Guillain-[place], seizures, neurological  changes]: NO    10. Pregnant or breast feeding; or plans on pregnancy in the future: NO    11. Signs of worsening depression or suicidal ideations while taking benlysta:NO    12. New-onset medical symptoms: NO    13.  New cancer diagnosis or on chemotherapy or radiation NO    14.  Evaluate for any sign of active TB [Unexplained weight loss, Loss of appetite, Night sweats, Fever, Fatigue, Chills, Coughing for 3 weeks or longer, Hemoptysis (coughing up blood), Chest pain]: NO    **Note: If answered yes to any of the above, hold the infusion and contact " ordering rheumatologist or on-call rheumatologist.   .      Post Infusion Assessment:  Patient tolerated infusion without incident.  Blood return noted pre and post infusion.  Site patent and intact, free from redness, edema or discomfort.  No evidence of extravasations.  Access discontinued per protocol.    Discharge Plan:   Patient discharged in stable condition accompanied by: self.  Departure Mode: Ambulatory.    Kellie Paredes RN

## 2018-09-24 DIAGNOSIS — I81 PORTAL VEIN THROMBOSIS: ICD-10-CM

## 2018-09-24 LAB
ERYTHROCYTE [DISTWIDTH] IN BLOOD BY AUTOMATED COUNT: 14.1 % (ref 10–15)
HCT VFR BLD AUTO: 38.9 % (ref 35–47)
HGB BLD-MCNC: 12.7 G/DL (ref 11.7–15.7)
MCH RBC QN AUTO: 30.3 PG (ref 26.5–33)
MCHC RBC AUTO-ENTMCNC: 32.6 G/DL (ref 31.5–36.5)
MCV RBC AUTO: 93 FL (ref 78–100)
PLATELET # BLD AUTO: 151 10E9/L (ref 150–450)
RBC # BLD AUTO: 4.19 10E12/L (ref 3.8–5.2)
WBC # BLD AUTO: 17.6 10E9/L (ref 4–11)

## 2018-09-24 PROCEDURE — 85027 COMPLETE CBC AUTOMATED: CPT | Performed by: INTERNAL MEDICINE

## 2018-09-24 PROCEDURE — 36415 COLL VENOUS BLD VENIPUNCTURE: CPT | Performed by: INTERNAL MEDICINE

## 2018-10-01 ENCOUNTER — ALLIED HEALTH/NURSE VISIT (OUTPATIENT)
Dept: FAMILY MEDICINE | Facility: CLINIC | Age: 63
End: 2018-10-01
Payer: COMMERCIAL

## 2018-10-01 VITALS — SYSTOLIC BLOOD PRESSURE: 126 MMHG | HEART RATE: 64 BPM | DIASTOLIC BLOOD PRESSURE: 78 MMHG

## 2018-10-01 DIAGNOSIS — I10 BENIGN ESSENTIAL HYPERTENSION: ICD-10-CM

## 2018-10-01 DIAGNOSIS — D69.3 IDIOPATHIC THROMBOCYTOPENIC PURPURA (H): ICD-10-CM

## 2018-10-01 DIAGNOSIS — I10 BENIGN ESSENTIAL HYPERTENSION: Primary | ICD-10-CM

## 2018-10-01 LAB
ANION GAP SERPL CALCULATED.3IONS-SCNC: 6 MMOL/L (ref 3–14)
BASOPHILS # BLD AUTO: 0 10E9/L (ref 0–0.2)
BASOPHILS NFR BLD AUTO: 0 %
BUN SERPL-MCNC: 25 MG/DL (ref 7–30)
CALCIUM SERPL-MCNC: 8.7 MG/DL (ref 8.5–10.1)
CHLORIDE SERPL-SCNC: 98 MMOL/L (ref 94–109)
CO2 SERPL-SCNC: 30 MMOL/L (ref 20–32)
CREAT SERPL-MCNC: 0.85 MG/DL (ref 0.52–1.04)
DIFFERENTIAL METHOD BLD: ABNORMAL
EOSINOPHIL # BLD AUTO: 0.2 10E9/L (ref 0–0.7)
EOSINOPHIL NFR BLD AUTO: 0.8 %
ERYTHROCYTE [DISTWIDTH] IN BLOOD BY AUTOMATED COUNT: 15.3 % (ref 10–15)
GFR SERPL CREATININE-BSD FRML MDRD: 68 ML/MIN/1.7M2
GLUCOSE SERPL-MCNC: 123 MG/DL (ref 70–99)
HCT VFR BLD AUTO: 42.1 % (ref 35–47)
HGB BLD-MCNC: 13.7 G/DL (ref 11.7–15.7)
LYMPHOCYTES # BLD AUTO: 3.4 10E9/L (ref 0.8–5.3)
LYMPHOCYTES NFR BLD AUTO: 15.4 %
MCH RBC QN AUTO: 30.6 PG (ref 26.5–33)
MCHC RBC AUTO-ENTMCNC: 32.5 G/DL (ref 31.5–36.5)
MCV RBC AUTO: 94 FL (ref 78–100)
MONOCYTES # BLD AUTO: 1.7 10E9/L (ref 0–1.3)
MONOCYTES NFR BLD AUTO: 7.7 %
NEUTROPHILS # BLD AUTO: 16.8 10E9/L (ref 1.6–8.3)
NEUTROPHILS NFR BLD AUTO: 76.1 %
PLATELET # BLD AUTO: 243 10E9/L (ref 150–450)
POTASSIUM SERPL-SCNC: 3.6 MMOL/L (ref 3.4–5.3)
RBC # BLD AUTO: 4.47 10E12/L (ref 3.8–5.2)
SODIUM SERPL-SCNC: 134 MMOL/L (ref 133–144)
WBC # BLD AUTO: 22.1 10E9/L (ref 4–11)

## 2018-10-01 PROCEDURE — 85025 COMPLETE CBC W/AUTO DIFF WBC: CPT | Performed by: INTERNAL MEDICINE

## 2018-10-01 PROCEDURE — 80048 BASIC METABOLIC PNL TOTAL CA: CPT | Performed by: FAMILY MEDICINE

## 2018-10-01 PROCEDURE — 36415 COLL VENOUS BLD VENIPUNCTURE: CPT | Performed by: INTERNAL MEDICINE

## 2018-10-01 PROCEDURE — 99207 ZZC NO CHARGE NURSE ONLY: CPT

## 2018-10-01 NOTE — MR AVS SNAPSHOT
After Visit Summary   10/1/2018    Charlee Marks    MRN: [id number]           Patient Information     Date Of Birth          1955        Visit Information        Provider Department      10/1/2018 3:45 PM FL CL RN [facility]        Today's Diagnoses     Benign essential hypertension    -  1       Follow-ups after your visit        Your next 10 appointments already scheduled     Oct 02, 2018  2:15 PM CDT   Return Visit with Kerrie Rankin MD   [facility] ([facility])    [facility]  [address]  [facility] [address]   [phone]            Oct 08, 2018  3:30 PM CDT   LAB with CL LAB   [facility] ([facility])    [address] [address]   [phone]           Please do not eat 10-12 hours before your appointment if you are coming in fasting for labs on lipids, cholesterol, or glucose (sugar). This does not apply to pregnant women. Water, hot tea and black coffee (with nothing added) are okay. Do not drink other fluids, diet soda or chew gum.            Oct 15, 2018  3:30 PM CDT   LAB with CL LAB   [facility] ([facility])    [address] [address]   [phone]           Please do not eat 10-12 hours before your appointment if you are coming in fasting for labs on lipids, cholesterol, or glucose (sugar). This does not apply to pregnant women. Water, hot tea and black coffee (with nothing added) are okay. Do not drink other fluids, diet soda or chew gum.            Oct 19, 2018 10:30 AM CDT   US ABDOMEN COMPLETE with UCUS2   [facility] ([facility])    [address]  1st [address]  [facility] [address] [phone]           How do I prepare for my exam? (Food and drink instructions) Adults: No eating, smoking, gum chewing or drinking for 8 hours before  the exam. You may take medicine with a small sip of water.  Children: * Infants, breast-fed: may have breast milk up to 2 hours before exam. * Infants, formula: may have bottle until 4 hours before exam. * Children 1-5 years: No food or drink for 4 hours before exam. * Children 6 -12 years: No food or drink for 6 hours before exam. * Children over 12 years: No food or drink for 8 hours before exam.  * J Tube Fed: No need to stop feedings.  What should I wear: Wear comfortable clothes.  How long does the exam take: Most ultrasounds take 30 to 60 minutes.  What should I bring: Bring a list of your medicines, including vitamins, minerals and over-the-counter drugs. It is safest to leave personal items at home.  Do I need a :  No  is needed.  What do I need to tell my doctor: Tell your doctor about any allergies you may have.  What should I do after the exam: No restrictions, You may resume normal activities.  What is this test: An ultrasound uses sound waves to make pictures of the body. Sound waves do not cause pain. The only discomfort may be the pressure of the wand against your skin or full bladder.  Who should I call with questions: If you have any questions, please call the Imaging Department where you will have your exam. Directions, parking instructions, and other information is available on our website, [email or web address].            Oct 19, 2018 11:30 AM CDT   (Arrive by 11:15 AM)   Return General Liver with Agustin Le MD   [facility] ([facility])    [address] [place] [address]  [address]  [facility] [address] [phone]            Oct 22, 2018  3:30 PM CDT   LAB with CL LAB   [facility]    [address]  [facility] [address] [phone]           Please do not eat 10-12 hours before your appointment if you are coming in fasting for labs on lipids, cholesterol, or glucose (sugar). This does not  apply to pregnant women. Water, hot tea and black coffee (with nothing added) are okay. Do not drink other fluids, diet soda or chew gum.            Oct 29, 2018  3:30 PM CDT   LAB with CL LAB   [facility]    [address] [address]   [phone]           Please do not eat 10-12 hours before your appointment if you are coming in fasting for labs on lipids, cholesterol, or glucose (sugar). This does not apply to pregnant women. Water, hot tea and black coffee (with nothing added) are okay. Do not drink other fluids, diet soda or chew gum.            Nov 05, 2018   Procedure with Brock Brower MD   [facility] ([facility])    [address] [place] [address]   [phone]           The medical center is located at [address] [place] [address]. (between [address] in [place], four miles north of [place]).            Nov 12, 2018  3:30 PM CST   LAB with CL LAB   [facility] ([facility])    [address] [address]   [phone]           Please do not eat 10-12 hours before your appointment if you are coming in fasting for labs on lipids, cholesterol, or glucose (sugar). This does not apply to pregnant women. Water, hot tea and black coffee (with nothing added) are okay. Do not drink other fluids, diet soda or chew gum.            Nov 26, 2018  3:30 PM CST   LAB with CL LAB   [facility]    [address] [address]   [phone]           Please do not eat 10-12 hours before your appointment if you are coming in fasting for labs on lipids, cholesterol, or glucose (sugar). This does not apply to pregnant women. Water, hot tea and black coffee (with nothing added) are okay. Do not drink other fluids, diet soda or chew gum.              Who to contact     If you have questions or  need follow up information about today's clinic visit or your schedule please contact [facility] directly at [phone].  Normal or non-critical lab and imaging results will be communicated to you by [employer], letter or phone within 4 business days after the clinic has received the results. If you do not hear from us within 7 days, please contact the clinic through [employer] or phone. If you have a critical or abnormal lab result, we will notify you by phone as soon as possible.  Submit refill requests through [employer] or call your pharmacy and they will forward the refill request to us. Please allow 3 business days for your refill to be completed.          Additional Information About Your Visit        [employer] Information     [employer] gives you secure access to your electronic health record. If you see a primary care provider, you can also send messages to your care team and make appointments. If you have questions, please call your primary care clinic.  If you do not have a primary care provider, please call [phone] and they will assist you.        Care EveryWhere ID     This is your Care EveryWhere ID. This could be used by other organizations to access your [place] medical records  [id number]        Your Vitals Were     Pulse Last Period                64 09/15/2007           Blood Pressure from Last 3 Encounters:   10/01/18 126/78   09/19/18 143/57   09/18/18 151/69    Weight from Last 3 Encounters:   09/18/18 316 lb (143.3 kg)   09/10/18 316 lb (143.3 kg)   09/05/18 312 lb 3.2 oz (141.6 kg)              Today, you had the following     No orders found for display         Today's Medication Changes          These changes are accurate as of 10/1/18  3:58 PM.  If you have any questions, ask your nurse or doctor.               These medicines have changed or have updated prescriptions.        Dose/Directions    ranitidine 150 MG tablet   Commonly known as:  ZANTAC   This may have changed:   See the new instructions.   Used for:  Esophageal reflux        ONE TABLET TWICE DAILY as needed will call when needed   Quantity:  180 Tab   Refills:  3                Primary Care Provider Office Phone # Fax #    Kay Briggs [phone] [address]  [facility] [address]        Equal Access to Services     GONZALEZFLY MUNOZAPPLE : Hadii aad ku hadasho Soomaali, waaxda luqadaha, qaybta kaalmada adeegyada, waxay idiin hayaan adeeg anahi larandolphn . So [facility] [phone].    ATENCIÓN: Si habla español, tiene a cosme disposición servicios gratuitos de asistencia lingüística. Llame al [phone].    We comply with applicable federal civil rights laws and [place] laws. We do not discriminate on the basis of race, color, national origin, age, disability, sex, sexual orientation, or gender identity.            Thank you!     Thank you for choosing [facility]  for your care. Our goal is always to provide you with excellent care. Hearing back from our patients is one way we can continue to improve our services. Please take a few minutes to complete the written survey that you may receive in the mail after your visit with us. Thank you!             Your Updated Medication List - Protect others around you: Learn how to safely use, store and throw away your medicines at www.disposemymeds.org.          This list is accurate as of 10/1/18  3:58 PM.  Always use your most recent med list.                   Brand Name Dispense Instructions for use Diagnosis    albuterol 108 (90 Base) MCG/ACT inhaler    PROAIR HFA/PROVENTIL HFA/VENTOLIN HFA    1 Inhaler    Inhale 2 puffs into the lungs every 4 hours as needed for shortness of breath / dyspnea    Mild intermittent asthma without complication       ASPIRIN PO      Take 81 mg by mouth daily        B-6 100 MG Tabs      Take 1 tablet by mouth daily        BENADRYL PO      Take 25 mg by mouth every 6 hours as needed        calcium  carbonate-vitamin D 500-400 MG-UNIT Tabs per tablet      Take 1 tablet by mouth daily        GLUCOSAMINE CHONDRO COMPLEX OR      daily        hydrochlorothiazide 25 MG tablet    HYDRODIURIL    90 tablet    Take 1 tablet (25 mg) by mouth daily    Peripheral edema       loratadine 10 MG tablet    CLARITIN    90 Tab    ONE DAILY    Allergy, unspecified not elsewhere classified       MULTIVITAMIN TABS   OR      1 TABLET DAILY        omega 3 1000 MG Caps      daily        predniSONE 10 MG tablet    DELTASONE    200 tablet    Start 100 mg po every day (can do bid divided dosing), taper 10 mg every wk. All taken post meal.    Idiopathic thrombocytopenic purpura (H)       ranitidine 150 MG tablet    ZANTAC    180 Tab    ONE TABLET TWICE DAILY as needed will call when needed    Esophageal reflux       TYLENOL PO      Take 1,000 mg by mouth At Bedtime

## 2018-10-01 NOTE — PROGRESS NOTES
Vitals:    10/01/18 1555   BP: 126/78   BP Location: Left arm   Patient Position: Sitting   Cuff Size: Adult Large   Pulse: 64     B/P check.  Pt on hydrochlorothiazide 25 mg/day.  Had labs done today.  Pt states she is urinating more, less swelling of ankles and discussed low salt diet.  KPajoannarwilda

## 2018-10-02 ENCOUNTER — ONCOLOGY VISIT (OUTPATIENT)
Dept: ONCOLOGY | Facility: CLINIC | Age: 63
End: 2018-10-02
Attending: INTERNAL MEDICINE
Payer: COMMERCIAL

## 2018-10-02 VITALS
RESPIRATION RATE: 18 BRPM | BODY MASS INDEX: 47.09 KG/M2 | OXYGEN SATURATION: 96 % | HEART RATE: 81 BPM | SYSTOLIC BLOOD PRESSURE: 141 MMHG | TEMPERATURE: 99.2 F | HEIGHT: 66 IN | WEIGHT: 293 LBS | DIASTOLIC BLOOD PRESSURE: 68 MMHG

## 2018-10-02 DIAGNOSIS — G62.9 NEUROPATHY: ICD-10-CM

## 2018-10-02 DIAGNOSIS — D69.3 IDIOPATHIC THROMBOCYTOPENIC PURPURA (H): Primary | ICD-10-CM

## 2018-10-02 DIAGNOSIS — Z90.81 S/P SPLENECTOMY: ICD-10-CM

## 2018-10-02 PROCEDURE — G0463 HOSPITAL OUTPT CLINIC VISIT: HCPCS

## 2018-10-02 PROCEDURE — 99214 OFFICE O/P EST MOD 30 MIN: CPT | Performed by: INTERNAL MEDICINE

## 2018-10-02 ASSESSMENT — PAIN SCALES - GENERAL: PAINLEVEL: MILD PAIN (3)

## 2018-10-02 NOTE — PROGRESS NOTES
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      HISTORY OF PRESENT ILLNESS:  she presented with 2-3 months of easy bruising and also fatigue.  She presented to her Primary Care Physician's office on 07/25/2016 and was found to have critical thrombocytopenia with a platelet count of 3.    She was offered IVIG 1 gram on 7/25/2016 and 4 days of   Dexamethasone. She was d/c with PL of 38 on 7/27/2016. PL dip to 13 on 8/4 and 7 on 8/11. She was offered wklyI IVIG 1g/kg starting 8/4/2016, and 2nd cycle of dex x 4 days on 8/11/2016. PL improves to 77 on 8/15, then dip to 31 on 8/18.   Due to rapid drop of her PL when off dex, tx was changed to po prednisone 8/18/2017 with wkly IVIG. R was added on 8/30/2016. She had no PL response despite R, steroid and IVIG.   Nplate was started 9/23/2016. PL up from 20 to 110 after 2 doses of it. Then dip down again.   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   Course was complicated with portal vein and SMV thromboses end of 12/2016. She was admitted to , had IR thrombectomy and catheter-directed lytic therapy to portal vein. Following the procedure she was placed on high intensity heparin drip which was then bridged with warfarin starting 1/9/2017 with an INR goal of 2-3.   Due to progression of portal venous branch thrombosis and SMV thrombosis by CT in 7/2017. Tx is changed to Lovenox. She could not tolerates the shots. tx was changed to Xarelto.     She finished prednisone slow taper 4/2017.      PL dip down to around 50's in Aug 2017. Xarelto put on hold in Sep 2017 due to PL less than 50 and stable SMV thromboses, she also had acne type of rash.      She saw [facility] as 2nd opinion, Dr. Key had nl BM without fibrosis at [facility], ITP dx is assured.  Cyclosporine was also recommended to start at 200 mg po qd if PL less than 30, with nl renal function, and adjust the dose.     She elected to be observed due to concern of side effects from chemo and  "immunosuppression agents. PL dip to 13-15 on 11/30/2017 required dex 40 mg 4 days and IVIG, PL up to 190 on 12/4/2017.     She then is on q 1-2 wks prn IVIG.PL dip down again to single digit 9/2018 required IVIG and steroid.     PAST MEDICAL HISTORY:      1.  Cystocele.    2.  Morbid obesity.    3.  Reflux.    4.  Intermittent asthma.    5.  Effusion of lower leg joints.    6. 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    Mother had anemia, great aunt had anemia.       SOCIAL HISTORY:  The patient lives in [facility] and works in the school district.  She is  with children.  She denies the use of alcohol or tobacco.      ROS  She is back to her baseline energy level.   She denies bleeding episode.     She reports HA from IVIG, maybe also a little from dex.   Still has neuropathy on digit on B6.        PHYSICAL EXAMINATION:    VITAL SIGNS:  Blood pressure 141/68, pulse 81, temperature 99.2  F (37.3  C), temperature source Tympanic, resp. rate 18, height 1.676 m (5' 5.98\"), weight 141.6 kg (312 lb 1.6 oz), last menstrual period 09/15/2007, SpO2 96 %, not currently breastfeeding.    ECOG 0    GENERAL APPEARANCE:  A middle-aged woman who appears her stated age, very pleasant, morbidly obese, sitting comfortably in a chair and knitting.    HEENT: The patient is normocephalic, atraumatic. Pupils are equally reactive to light.  Sclerae are anicteric.  There is moist oral mucosa, negative pharynx, no oral thrush.    NECK:  Supple, no jugular venous distention, thyroid not palpable.    LYMPH NODES:  Superficial lymphadenopathy is not appreciable in the bilateral cervical, supraclavicular, axillary or inguinal areas.    CARDIOVASCULAR:  S1, S2 regular with no murmurs or gallops, no carotid or abdominal bruits.    PULMONARY:  Lungs are clear to auscultation and percussion bilaterally.  There is " no wheezing or rhonchi.    GASTROINTESTINAL:  Abdomen is soft, nontender with no hepatosplenomegaly, no signs of ascites and no mass appreciable.    MUSCULOSKELETAL/EXTREMITIES:  No edema, no cyanotic changes, no signs of joint deformity, no lymphedema.    NEUROLOGIC:  Cranial nerves II-XII are grossly intact, sensation intact, muscle strength and muscle tone symmetrical, 5/5 throughout.    BACK:  No spinal or paraspinal tenderness, no CVA tenderness.    SKIN:  No petechiae, no rash and no signs of cellulitis.  There is scattered bruising on forearms and thighs, which are healing.        LABORATORY DATA reviewed   from 151 from 8 9/17/2018 from 50-90 in 2018    Current Image reviewed  CT a/p 3/2018 - Chronic thrombosis of the left portal vein and posterior branch of the right portal vein are again seen. The main portal vein is patent. 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        Old data reviewed with summary  PL 12/4/2017 at 190.  PL in teens around 11/30, then had IVIG 400 mg /kg and dex 40 mg po qd x4.  around 40 in the last wks, LFT/Cr nl in 10/2017      in 7/2107 from nl 6/2017  Hb nl, wbc/diff nl.     BM at [place] 10/2017 - nl including cyto.     CT head 11/30/2017 - no bleeding.     CT a/P 9/2017: No significant change. Findings suggest chronic thrombosis of the left portal vein, posterior branch of the right portal vein and branches of the superior mesenteric vein as above.    CT a/p 7/2017 : Progression of left portal venous branch thrombosis and SMV thrombosis is seen compared to the prior study.    US 4/2017  1.  Recanalized main portal and splenic veins. Persistent occlusion of left portal vein.  2.  Splenectomy.    CT 1/9/2017   1. Status post splenectomy with postsurgical changes in the left upper quadrant. Persistent thrombosis of the splenic vein.  2. Main portal vein now appears patent with possible peripheral nonocclusive thrombosis/periportal edema. Residual thrombosis of  portion of the left branch of the portal vein and the right posterior  branch of the portal vein.  3. Residual partial thrombosis of the distal portion of the superior mesenteric vein.   4. Small focal nodular opacities in both lower lobes, likely of infectious etiology.            ASSESSMENT/PLAN:    1.  Refractory ITP dx first in 7/2016 .   She saw [facility] as 2nd opinion in 10/2017, Dr. Key had nl BM without fibrosis at [place], ITP dx is assured.  Cyclosporine was also recommended to start at 200 mg po qd if PL less than 30,  with nl renal function, and adjust the dose.       We discussed the tx option:   Azathioprine or cyclosporin immunosuppression.   Cytoxan 750mg - 1g/m2 IV q month or po (maybe 100mg/m2 D1-14 q 28days)  vicristine 1-2mg IV wkly for several wks  vinblastin 0.1mg/kg  combination chemo -CVP, CVP+ -16.  Promacta po - 3-4% risk of thrombosis    The side effects associated with the above options. This is a tough situation. The top choices are oral CTX, cyclosporine, or Promacta or wkly IVIG prn.   She is very hesitated to get on immunosuppression and oral chemo.     Due to new severe thrombocytopenia mid Sep 2018.   Advice 1g/kg IVIG divided in 2-3 doses prn wkly if PL less than 30.   Start prednisone 100 mg po every day taper by 10 mg per wk.     She is happy with this plan. Does not want to get on immuno suppresion and chemo.     2. 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 S/p thrombolysis to portal vein, lovenox. Has been on Coumadin, lovenox again, Xarelto for 9 months.   Her clots are chronic and stable per CT 9/2017 and 3/2018.  Due to her low PL. Recommended off anticoagulation in Sep 2017   Advised full dose ASA on 12/4/2017 due to PL rebound to 190, then fluctuating.    ASA dose was reduced to  in 3/2018 due to PL .  Advice discontinue if PL less than 50. 81 mg ASA if PL , 160 mg ASA if PL > 100.    3. 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 She feels it is helpful.     4. None specific HA - likely from IVIG, CT head is negative for bleed.

## 2018-10-02 NOTE — LETTER
10/2/2018         RE: Charlee Marks  [address]  [place] [address]        Dear Colleague,    Thank you for referring your patient, Charlee Marks, to the [facility]. Please see a copy of my visit note below.    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      HISTORY OF PRESENT ILLNESS:  she presented with 2-3 months of easy bruising and also fatigue.  She presented to her Primary Care Physician's office on 07/25/2016 and was found to have critical thrombocytopenia with a platelet count of 3.    She was offered IVIG 1 gram on 7/25/2016 and 4 days of   Dexamethasone. She was d/c with PL of 38 on 7/27/2016. PL dip to 13 on 8/4 and 7 on 8/11. She was offered wklyI IVIG 1g/kg starting 8/4/2016, and 2nd cycle of dex x 4 days on 8/11/2016. PL improves to 77 on 8/15, then dip to 31 on 8/18.   Due to rapid drop of her PL when off dex, tx was changed to po prednisone 8/18/2017 with wkly IVIG. R was added on 8/30/2016. She had no PL response despite R, steroid and IVIG.   Nplate was started 9/23/2016. PL up from 20 to 110 after 2 doses of it. Then dip down again.   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   Course was complicated with portal vein and SMV thromboses end of 12/2016. She was admitted to , had IR thrombectomy and catheter-directed lytic therapy to portal vein. Following the procedure she was placed on high intensity heparin drip which was then bridged with warfarin starting 1/9/2017 with an INR goal of 2-3.   Due to progression of portal venous branch thrombosis and SMV thrombosis by CT in 7/2017. Tx is changed to Lovenox. She could not tolerates the shots. tx was changed to Xarelto.     She finished prednisone slow taper 4/2017.      PL dip down to around 50's in Aug 2017. Xarelto put on hold in Sep 2017 due to PL less than 50 and stable SMV thromboses, she also had acne type of rash.      She saw [facility] as 2nd opinion, Dr. Key had nl BM without fibrosis at  "[place], ITP dx is assured.  Cyclosporine was also recommended to start at 200 mg po qd if PL less than 30, with nl renal function, and adjust the dose.     She elected to be observed due to concern of side effects from chemo and immunosuppression agents. PL dip to 13-15 on 11/30/2017 required dex 40 mg 4 days and IVIG, PL up to 190 on 12/4/2017.     She then is on q 1-2 wks prn IVIG.PL dip down again to single digit 9/2018 required IVIG and steroid.     PAST MEDICAL HISTORY:      1.  Cystocele.    2.  Morbid obesity.    3.  Reflux.    4.  Intermittent asthma.    5.  Effusion of lower leg joints.    6. 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    Mother had anemia, great aunt had anemia.       SOCIAL HISTORY:  The patient lives in [facility] and works in the school district.  She is  with children.  She denies the use of alcohol or tobacco.      ROS  She is back to her baseline energy level.   She denies bleeding episode.     She reports HA from IVIG, maybe also a little from dex.   Still has neuropathy on digit on B6.        PHYSICAL EXAMINATION:    VITAL SIGNS:  Blood pressure 141/68, pulse 81, temperature 99.2  F (37.3  C), temperature source Tympanic, resp. rate 18, height 1.676 m (5' 5.98\"), weight 141.6 kg (312 lb 1.6 oz), last menstrual period 09/15/2007, SpO2 96 %, not currently breastfeeding.    ECOG 0    GENERAL APPEARANCE:  A middle-aged woman who appears her stated age, very pleasant, morbidly obese, sitting comfortably in a chair and knitting.    HEENT: The patient is normocephalic, atraumatic. Pupils are equally reactive to light.  Sclerae are anicteric.  There is moist oral mucosa, negative pharynx, no oral thrush.    NECK:  Supple, no jugular venous distention, thyroid not palpable.    LYMPH NODES:  Superficial lymphadenopathy is not appreciable in the bilateral cervical, " supraclavicular, axillary or inguinal areas.    CARDIOVASCULAR:  S1, S2 regular with no murmurs or gallops, no carotid or abdominal bruits.    PULMONARY:  Lungs are clear to auscultation and percussion bilaterally.  There is no wheezing or rhonchi.    GASTROINTESTINAL:  Abdomen is soft, nontender with no hepatosplenomegaly, no signs of ascites and no mass appreciable.    MUSCULOSKELETAL/EXTREMITIES:  No edema, no cyanotic changes, no signs of joint deformity, no lymphedema.    NEUROLOGIC:  Cranial nerves II-XII are grossly intact, sensation intact, muscle strength and muscle tone symmetrical, 5/5 throughout.    BACK:  No spinal or paraspinal tenderness, no CVA tenderness.    SKIN:  No petechiae, no rash and no signs of cellulitis.  There is scattered bruising on forearms and thighs, which are healing.        LABORATORY DATA reviewed   from 151 from 8 9/17/2018 from 50-90 in 2018    Current Image reviewed  CT a/p 3/2018 - Chronic thrombosis of the left portal vein and posterior branch of the right portal vein are again seen. The main portal vein is patent. 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        Old data reviewed with summary  PL 12/4/2017 at 190.  PL in teens around 11/30, then had IVIG 400 mg /kg and dex 40 mg po qd x4.  around 40 in the last wks, LFT/Cr nl in 10/2017      in 7/2107 from nl 6/2017  Hb nl, wbc/diff nl.     BM at [place] 10/2017 - nl including cyto.     CT head 11/30/2017 - no bleeding.     CT a/P 9/2017: No significant change. Findings suggest chronic thrombosis of the left portal vein, posterior branch of the right portal vein and branches of the superior mesenteric vein as above.    CT a/p 7/2017 : Progression of left portal venous branch thrombosis and SMV thrombosis is seen compared to the prior study.    US 4/2017  1.  Recanalized main portal and splenic veins. Persistent occlusion of left portal vein.  2.  Splenectomy.    CT 1/9/2017   1. Status post splenectomy with  postsurgical changes in the left upper quadrant. Persistent thrombosis of the splenic vein.  2. Main portal vein now appears patent with possible peripheral nonocclusive thrombosis/periportal edema. Residual thrombosis of portion of the left branch of the portal vein and the right posterior  branch of the portal vein.  3. Residual partial thrombosis of the distal portion of the superior mesenteric vein.   4. Small focal nodular opacities in both lower lobes, likely of infectious etiology.            ASSESSMENT/PLAN:    1.  Refractory ITP dx first in 7/2016 .   She saw [facility] as 2nd opinion in 10/2017, Dr. Key had nl BM without fibrosis at [facility], ITP dx is assured.  Cyclosporine was also recommended to start at 200 mg po qd if PL less than 30,  with nl renal function, and adjust the dose.       We discussed the tx option:   Azathioprine or cyclosporin immunosuppression.   Cytoxan 750mg - 1g/m2 IV q month or po (maybe 100mg/m2 D1-14 q 28days)  vicristine 1-2mg IV wkly for several wks  vinblastin 0.1mg/kg  combination chemo -CVP, CVP+ -16.  Promacta po - 3-4% risk of thrombosis    The side effects associated with the above options. This is a tough situation. The top choices are oral CTX, cyclosporine, or Promacta or wkly IVIG prn.   She is very hesitated to get on immunosuppression and oral chemo.     Due to new severe thrombocytopenia mid Sep 2018.   Advice 1g/kg IVIG divided in 2-3 doses prn wkly if PL less than 30.   Start prednisone 100 mg po every day taper by 10 mg per wk.     She is happy with this plan. Does not want to get on immuno suppresion and chemo.     2. 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 S/p thrombolysis to portal vein, lovenox. Has been on Coumadin, lovenox again, Xarelto for 9 months.   Her clots are chronic and stable per CT 9/2017 and 3/2018.  Due to her low PL. Recommended off anticoagulation in Sep 2017   Advised full dose ASA on 12/4/2017 due to PL rebound to 190, then  fluctuating.    ASA dose was reduced to  in 3/2018 due to PL .  Advice discontinue if PL less than 50. 81 mg ASA if PL , 160 mg ASA if PL > 100.    3. 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 She feels it is helpful.     4. None specific HA - likely from IVIG, CT head is negative for bleed.      Again, thank you for allowing me to participate in the care of your patient.        Sincerely,        Kerrie Rankin MD, MD

## 2018-10-02 NOTE — NURSING NOTE
"Oncology Rooming Note    October 2, 2018 2:13 PM   Charlee Marks is a 63 year old female who presents for:    Chief Complaint   Patient presents with     Hematology     2 week recheck Idiopathic thrombocytopenic purpura,      Initial Vitals: /68 (BP Location: Right arm, Patient Position: Sitting, Cuff Size: Adult Large)  Pulse 81  Temp 99.2  F (37.3  C) (Tympanic)  Resp 18  Ht 1.676 m (5' 5.98\")  Wt 141.6 kg (312 lb 1.6 oz)  LMP 09/15/2007  SpO2 96%  Breastfeeding? No  BMI 50.4 kg/m2 Estimated body mass index is 50.4 kg/(m^2) as calculated from the following:    Height as of this encounter: 1.676 m (5' 5.98\").    Weight as of this encounter: 141.6 kg (312 lb 1.6 oz). Body surface area is 2.57 meters squared.  Mild Pain (3) Comment: Data Unavailable   Patient's last menstrual period was 09/15/2007.  Allergies reviewed: Yes  Medications reviewed: Yes    Medications: Medication refills not needed today.  Pharmacy name entered into [employer]: [facility] [place], [place] - [address] [facility]    Clinical concerns:  2 week recheck Idiopathic thrombocytopenic purpura, review labs   Some upper abdominal pain.     7  minutes for nursing intake (face to face time)     Lorena Wei CMA              "

## 2018-10-02 NOTE — MR AVS SNAPSHOT
After Visit Summary   10/2/2018    Charlee Marks    MRN: [id number]           Patient Information     Date Of Birth          1955        Visit Information        Provider Department      10/2/2018 2:15 PM Kerrie Rankin MD [facility]        Today's Diagnoses     Idiopathic thrombocytopenic purpura (H)    -  1    S/P splenectomy        Neuropathy          Care Instructions    Dr. Rankin would like you to change CBC to every 2 weeks and continue prednisone taper.     We would like to see you back in 1 month for a follow up appointment.     When you are in need of a refill, please call your pharmacy and they will send us a request.      Copy of appointments, and after visit summary (AVS) given to patient.      If you have any questions please call Karla Scott RN, BSN Oncology Hematology  [facility] [phone]. For questions after business hours, or on holidays/weekends, please call our after hours Nurse Triage line [phone]. Thank you.           Change cbc to q 2ks. Continue prednisone taper. F/u in 1 month.          Follow-ups after your visit        Your next 10 appointments already scheduled     Oct 15, 2018  3:30 PM CDT   LAB with CL LAB   [facility]    [address] [facility] [address] [phone]           Please do not eat 10-12 hours before your appointment if you are coming in fasting for labs on lipids, cholesterol, or glucose (sugar). This does not apply to pregnant women. Water, hot tea and black coffee (with nothing added) are okay. Do not drink other fluids, diet soda or chew gum.            Oct 19, 2018 10:30 AM CDT   US ABDOMEN COMPLETE with UCUS2    [facility] ([facility])    [address]  1st Floor  [facility] [address] [phone]           How do I prepare for my exam? (Food and drink instructions) Adults: No  eating, smoking, gum chewing or drinking for 8 hours before the exam. You may take medicine with a small sip of water.  Children: * Infants, breast-fed: may have breast milk up to 2 hours before exam. * Infants, formula: may have bottle until 4 hours before exam. * Children 1-5 years: No food or drink for 4 hours before exam. * Children 6 -12 years: No food or drink for 6 hours before exam. * Children over 12 years: No food or drink for 8 hours before exam.  * J Tube Fed: No need to stop feedings.  What should I wear: Wear comfortable clothes.  How long does the exam take: Most ultrasounds take 30 to 60 minutes.  What should I bring: Bring a list of your medicines, including vitamins, minerals and over-the-counter drugs. It is safest to leave personal items at home.  Do I need a :  No  is needed.  What do I need to tell my doctor: Tell your doctor about any allergies you may have.  What should I do after the exam: No restrictions, You may resume normal activities.  What is this test: An ultrasound uses sound waves to make pictures of the body. Sound waves do not cause pain. The only discomfort may be the pressure of the wand against your skin or full bladder.  Who should I call with questions: If you have any questions, please call the Imaging Department where you will have your exam. Directions, parking instructions, and other information is available on our website, [email or web address].            Oct 19, 2018 11:30 AM CDT   (Arrive by 11:15 AM)   Return General Liver with Agustin Le MD   [facility] ([facility] [place] [facility])    [address]  [address]  [facility] [address]   [phone]            Oct 29, 2018  3:30 PM CDT   LAB with CL LAB   [facility] ([facility])    [address]  [facility] [address] [phone]           Please do not eat 10-12 hours before your appointment if you are coming in fasting for  labs on lipids, cholesterol, or glucose (sugar). This does not apply to pregnant women. Water, hot tea and black coffee (with nothing added) are okay. Do not drink other fluids, diet soda or chew gum.            Nov 05, 2018   Procedure with Brock Brower MD   [facility] ([facility])    [address] [address]   [phone]           The medical center is located at [address] (between [address] in [place], [place] north of [place]).            Nov 08, 2018  3:15 PM CST   Return Visit with Kerrie Rankin MD   [facility] ([facility])    [facility]  [address]  [facility] [address]   [phone]            Nov 12, 2018  3:30 PM CST   LAB with CL LAB   [facility] (Former[facility])    [address] [address]   [phone]           Please do not eat 10-12 hours before your appointment if you are coming in fasting for labs on lipids, cholesterol, or glucose (sugar). This does not apply to pregnant women. Water, hot tea and black coffee (with nothing added) are okay. Do not drink other fluids, diet soda or chew gum.            Nov 26, 2018  3:30 PM CST   LAB with CL LAB   [facility] (Former[facility])    [address] [address]   [phone]           Please do not eat 10-12 hours before your appointment if you are coming in fasting for labs on lipids, cholesterol, or glucose (sugar). This does not apply to pregnant women. Water, hot tea and black coffee (with nothing added) are okay. Do not drink other fluids, diet soda or chew gum.            Dec 10, 2018  3:30 PM CST   LAB with CL LAB   [facility] (Former[facility])    [address] [address]   [phone]           Please do not eat 10-12 hours before your appointment if  you are coming in fasting for labs on lipids, cholesterol, or glucose (sugar). This does not apply to pregnant women. Water, hot tea and black coffee (with nothing added) are okay. Do not drink other fluids, diet soda or chew gum.            Dec 24, 2018  8:30 AM CST   LAB with CL LAB   [facility] ([facility])    [address]  [facility] [address]   [phone]           Please do not eat 10-12 hours before your appointment if you are coming in fasting for labs on lipids, cholesterol, or glucose (sugar). This does not apply to pregnant women. Water, hot tea and black coffee (with nothing added) are okay. Do not drink other fluids, diet soda or chew gum.              Future tests that were ordered for you today     Open Standing Orders        Priority Remaining Interval Expires Ordered    CBC with platelets differential Routine 4/4 q 2 wks 10/2/2019 10/2/2018            Who to contact     If you have questions or need follow up information about today's clinic visit or your schedule please contact [facility] directly at [phone].  Normal or non-critical lab and imaging results will be communicated to you by [employer], letter or phone within 4 business days after the clinic has received the results. If you do not hear from us within 7 days, please contact the clinic through [employer] or phone. If you have a critical or abnormal lab result, we will notify you by phone as soon as possible.  Submit refill requests through [employer] or call your pharmacy and they will forward the refill request to us. Please allow 3 business days for your refill to be completed.          Additional Information About Your Visit        [employer] Information     [employer] gives you secure access to your electronic health record. If you see a primary care provider, you can also send messages to your care team and make appointments. If you have questions, please call your primary care  "clinic.  If you do not have a primary care provider, please call [phone] and they will assist you.        Care EveryWhere ID     This is your Care EveryWhere ID. This could be used by other organizations to access your [facility] medical records  [id number]        Your Vitals Were     Pulse Temperature Respirations Height Last Period Pulse Oximetry    81 99.2  F (37.3  C) (Tympanic) 18 1.676 m (5' 5.98\") 09/15/2007 96%    Breastfeeding? BMI (Body Mass Index)                No 50.4 kg/m2           Blood Pressure from Last 3 Encounters:   10/02/18 141/68   10/01/18 126/78   09/19/18 143/57    Weight from Last 3 Encounters:   10/02/18 141.6 kg (312 lb 1.6 oz)   09/18/18 143.3 kg (316 lb)   09/10/18 143.3 kg (316 lb)                 Today's Medication Changes          These changes are accurate as of 10/2/18  3:08 PM.  If you have any questions, ask your nurse or doctor.               These medicines have changed or have updated prescriptions.        Dose/Directions    ranitidine 150 MG tablet   Commonly known as:  ZANTAC   This may have changed:  See the new instructions.   Used for:  Esophageal reflux        ONE TABLET TWICE DAILY as needed will call when needed   Quantity:  180 Tab   Refills:  3                Primary Care Provider Office Phone # Fax #    Kay Briggs [phone] [address] [address]        Equal Access to Services     [facility]: Hadii sourav mcdonaldo Soelizabeth, waaxda luqadaha, qaybta kaalmada arnoldo, julieta khanna . So [facility] [phone].    ATENCIÓN: Si habla español, tiene a cosme disposición servicios gratuitos de asistencia lingüística. Llame al [phone].    We comply with applicable federal civil rights laws and [place] laws. We do not discriminate on the basis of race, color, national origin, age, disability, sex, sexual orientation, or gender identity.            Thank you!     Thank you for choosing [facility]  for your care. Our goal is always to provide you with excellent care. Hearing back from our patients is one way we can continue to improve our services. Please take a few minutes to complete the written survey that you may receive in the mail after your visit with us. Thank you!             Your Updated Medication List - Protect others around you: Learn how to safely use, store and throw away your medicines at www.disposemymeds.org.          This list is accurate as of 10/2/18  3:08 PM.  Always use your most recent med list.                   Brand Name Dispense Instructions for use Diagnosis    albuterol 108 (90 Base) MCG/ACT inhaler    PROAIR HFA/PROVENTIL HFA/VENTOLIN HFA    1 Inhaler    Inhale 2 puffs into the lungs every 4 hours as needed for shortness of breath / dyspnea    Mild intermittent asthma without complication       ASPIRIN PO      Take 81 mg by mouth daily        B-6 100 MG Tabs      Take 1 tablet by mouth daily        BENADRYL PO      Take 25 mg by mouth every 6 hours as needed        calcium carbonate-vitamin D 500-400 MG-UNIT Tabs per tablet      Take 1 tablet by mouth daily        GLUCOSAMINE CHONDRO COMPLEX OR      daily        hydrochlorothiazide 25 MG tablet    HYDRODIURIL    90 tablet    Take 1 tablet (25 mg) by mouth daily    Peripheral edema       loratadine 10 MG tablet    CLARITIN    90 Tab    ONE DAILY    Allergy, unspecified not elsewhere classified       MULTIVITAMIN TABS   OR      1 TABLET DAILY        omega 3 1000 MG Caps      daily        predniSONE 10 MG tablet    DELTASONE    200 tablet    Start 100 mg po every day (can do bid divided dosing), taper 10 mg every wk. All taken post meal.    Idiopathic thrombocytopenic purpura (H)       ranitidine 150 MG tablet    ZANTAC    180 Tab    ONE TABLET TWICE DAILY as needed will call when needed    Esophageal reflux       TYLENOL PO      Take 1,000 mg by mouth At Bedtime

## 2018-10-02 NOTE — PATIENT INSTRUCTIONS
Dr. Rankin would like you to change CBC to every 2 weeks and continue prednisone taper.     We would like to see you back in 1 month for a follow up appointment.     When you are in need of a refill, please call your pharmacy and they will send us a request.      Copy of appointments, and after visit summary (AVS) given to patient.      If you have any questions please call Karla Scott RN, BSN Oncology Hematology  [facility] [phone]. For questions after business hours, or on holidays/weekends, please call our after hours Nurse Triage line [phone]. Thank you.           Change cbc to q 2ks. Continue prednisone taper. F/u in 1 month.

## 2018-10-15 DIAGNOSIS — D69.3 IDIOPATHIC THROMBOCYTOPENIC PURPURA (H): ICD-10-CM

## 2018-10-15 DIAGNOSIS — I81 PORTAL VEIN THROMBOSIS: ICD-10-CM

## 2018-10-15 LAB
ALBUMIN SERPL-MCNC: 3.1 G/DL (ref 3.4–5)
ALP SERPL-CCNC: 102 U/L (ref 40–150)
ALT SERPL W P-5'-P-CCNC: 68 U/L (ref 0–50)
ANION GAP SERPL CALCULATED.3IONS-SCNC: 3 MMOL/L (ref 3–14)
AST SERPL W P-5'-P-CCNC: 37 U/L (ref 0–45)
BASOPHILS # BLD AUTO: 0 10E9/L (ref 0–0.2)
BASOPHILS NFR BLD AUTO: 0.1 %
BILIRUB DIRECT SERPL-MCNC: 0.1 MG/DL (ref 0–0.2)
BILIRUB SERPL-MCNC: 0.5 MG/DL (ref 0.2–1.3)
BUN SERPL-MCNC: 23 MG/DL (ref 7–30)
CALCIUM SERPL-MCNC: 8.3 MG/DL (ref 8.5–10.1)
CHLORIDE SERPL-SCNC: 96 MMOL/L (ref 94–109)
CO2 SERPL-SCNC: 35 MMOL/L (ref 20–32)
CREAT SERPL-MCNC: 0.88 MG/DL (ref 0.52–1.04)
DIFFERENTIAL METHOD BLD: ABNORMAL
EOSINOPHIL # BLD AUTO: 0 10E9/L (ref 0–0.7)
EOSINOPHIL NFR BLD AUTO: 0 %
ERYTHROCYTE [DISTWIDTH] IN BLOOD BY AUTOMATED COUNT: 14.4 % (ref 10–15)
GFR SERPL CREATININE-BSD FRML MDRD: 65 ML/MIN/1.7M2
GLUCOSE SERPL-MCNC: 170 MG/DL (ref 70–99)
HCT VFR BLD AUTO: 42.3 % (ref 35–47)
HGB BLD-MCNC: 13.6 G/DL (ref 11.7–15.7)
IMM GRANULOCYTES # BLD: 0.1 10E9/L (ref 0–0.4)
IMM GRANULOCYTES NFR BLD: 0.4 %
INR PPP: 0.92 (ref 0.86–1.14)
LYMPHOCYTES # BLD AUTO: 1.5 10E9/L (ref 0.8–5.3)
LYMPHOCYTES NFR BLD AUTO: 9.4 %
MCH RBC QN AUTO: 31.3 PG (ref 26.5–33)
MCHC RBC AUTO-ENTMCNC: 32.2 G/DL (ref 31.5–36.5)
MCV RBC AUTO: 98 FL (ref 78–100)
MONOCYTES # BLD AUTO: 0.2 10E9/L (ref 0–1.3)
MONOCYTES NFR BLD AUTO: 1.3 %
NEUTROPHILS # BLD AUTO: 13.8 10E9/L (ref 1.6–8.3)
NEUTROPHILS NFR BLD AUTO: 88.8 %
NRBC # BLD AUTO: 0 10*3/UL
NRBC BLD AUTO-RTO: 0 /100
PLATELET # BLD AUTO: 43 10E9/L (ref 150–450)
POTASSIUM SERPL-SCNC: 3.9 MMOL/L (ref 3.4–5.3)
PROT SERPL-MCNC: 7.2 G/DL (ref 6.8–8.8)
RBC # BLD AUTO: 4.34 10E12/L (ref 3.8–5.2)
SODIUM SERPL-SCNC: 134 MMOL/L (ref 133–144)
WBC # BLD AUTO: 15.6 10E9/L (ref 4–11)

## 2018-10-15 PROCEDURE — 85025 COMPLETE CBC W/AUTO DIFF WBC: CPT | Performed by: INTERNAL MEDICINE

## 2018-10-15 PROCEDURE — 85610 PROTHROMBIN TIME: CPT | Performed by: INTERNAL MEDICINE

## 2018-10-15 PROCEDURE — 80048 BASIC METABOLIC PNL TOTAL CA: CPT | Performed by: INTERNAL MEDICINE

## 2018-10-15 PROCEDURE — 36415 COLL VENOUS BLD VENIPUNCTURE: CPT | Performed by: INTERNAL MEDICINE

## 2018-10-15 PROCEDURE — 80076 HEPATIC FUNCTION PANEL: CPT | Performed by: INTERNAL MEDICINE

## 2018-10-16 ENCOUNTER — TELEPHONE (OUTPATIENT)
Dept: ONCOLOGY | Facility: CLINIC | Age: 63
End: 2018-10-16

## 2018-10-16 NOTE — TELEPHONE ENCOUNTER
Results called and left with . She is still tapering the prednisone. If she has additional questions he will have her call us back.     Arlen

## 2018-10-18 ENCOUNTER — OFFICE VISIT (OUTPATIENT)
Dept: GASTROENTEROLOGY | Facility: CLINIC | Age: 63
End: 2018-10-18
Attending: INTERNAL MEDICINE
Payer: COMMERCIAL

## 2018-10-18 ENCOUNTER — RADIANT APPOINTMENT (OUTPATIENT)
Dept: ULTRASOUND IMAGING | Facility: CLINIC | Age: 63
End: 2018-10-18
Attending: INTERNAL MEDICINE

## 2018-10-18 VITALS
WEIGHT: 293 LBS | DIASTOLIC BLOOD PRESSURE: 62 MMHG | TEMPERATURE: 97.4 F | BODY MASS INDEX: 47.09 KG/M2 | OXYGEN SATURATION: 98 % | SYSTOLIC BLOOD PRESSURE: 148 MMHG | HEART RATE: 74 BPM | RESPIRATION RATE: 18 BRPM | HEIGHT: 66 IN

## 2018-10-18 DIAGNOSIS — I81 PORTAL VEIN THROMBOSIS: ICD-10-CM

## 2018-10-18 DIAGNOSIS — I81 PORTAL VEIN THROMBOSIS: Primary | ICD-10-CM

## 2018-10-18 PROCEDURE — G0463 HOSPITAL OUTPT CLINIC VISIT: HCPCS | Mod: ZF

## 2018-10-18 ASSESSMENT — PAIN SCALES - GENERAL: PAINLEVEL: NO PAIN (0)

## 2018-10-18 NOTE — LETTER
10/18/2018      RE: Charlee Marks  [address] [place] [address]       HISTORY OF PRESENT ILLNESS:  I had the pleasure of seeing Charlee Marks for followup in the [facility] on 10/18/2018.  Ms. Marks returns for followup because of the risk of reactivation of hepatitis B.      Her major clinical problem has been and still is her ITP.  She underwent a splenectomy in 12/2016 after having been tried on Rituxan.  She did well for a period of 7-8 months, but unfortunately has again developed some evidence of ITP.  She still seems to respond well to IVIG and is currently on prednisone.      She also has a portal vein thrombosis that is partially recanalized.  It is only present in the left portal vein at this point in time and her right is open as is the splenic and mesenteric veins.      She feels fairly well.  She denies any abdominal pain, itching, skin rash or fatigue.  She denies any increased abdominal girth or lower extremity edema.  She denies any fevers or chills, cough or shortness of breath.  She denies any nausea, vomiting, diarrhea or constipation.  Her appetite has been good, and her weight she believes has increased on the current dose of prednisone, but what we have is over the past month her weight is down 6 pounds.     Current Outpatient Prescriptions   Medication     Acetaminophen (TYLENOL PO)     albuterol (PROAIR HFA/PROVENTIL HFA/VENTOLIN HFA) 108 (90 Base) MCG/ACT inhaler     ASPIRIN PO     calcium carbonate-vitamin D 500-400 MG-UNIT TABS tablt     DiphenhydrAMINE HCl (BENADRYL PO)     GLUCOSAMINE CHONDRO COMPLEX OR     hydrochlorothiazide (HYDRODIURIL) 25 MG tablet     LORATADINE 10 MG PO TABS     MULTIVITAMIN TABS   OR     OMEGA 3 1000 MG OR CAPS     predniSONE (DELTASONE) 10 MG tablet     Pyridoxine HCl (B-6) 100 MG TABS     RANITIDINE  MG PO TABS     No current facility-administered medications for this visit.      B/P: 148/62, T: 97.4, P: 74, R:  18    In general she appears well.  HEENT exam shows no scleral icterus or temporal muscle wasting.  Her chest is clear.  Her abdominal exam shows no increase in girth.  No masses or tenderness to palpation are present.  Her liver is 10 cm in span without left lobe enlargement.  No spleen tip is palpable.  Extremity exam shows no edema.  Skin exam shows no stigmata of chronic liver disease.  Neurologic exam shows no asterixis.     Recent Results (from the past 168 hour(s))   Hepatic panel    Collection Time: 10/15/18  3:17 PM   Result Value Ref Range    Bilirubin Direct 0.1 0.0 - 0.2 mg/dL    Bilirubin Total 0.5 0.2 - 1.3 mg/dL    Albumin 3.1 (L) 3.4 - 5.0 g/dL    Protein Total 7.2 6.8 - 8.8 g/dL    Alkaline Phosphatase 102 40 - 150 U/L    ALT 68 (H) 0 - 50 U/L    AST 37 0 - 45 U/L   INR    Collection Time: 10/15/18  3:17 PM   Result Value Ref Range    INR 0.92 0.86 - 1.14   Basic metabolic panel    Collection Time: 10/15/18  3:17 PM   Result Value Ref Range    Sodium 134 133 - 144 mmol/L    Potassium 3.9 3.4 - 5.3 mmol/L    Chloride 96 94 - 109 mmol/L    Carbon Dioxide 35 (H) 20 - 32 mmol/L    Anion Gap 3 3 - 14 mmol/L    Glucose 170 (H) 70 - 99 mg/dL    Urea Nitrogen 23 7 - 30 mg/dL    Creatinine 0.88 0.52 - 1.04 mg/dL    GFR Estimate 65 >60 mL/min/1.7m2    GFR Estimate If Black 79 >60 mL/min/1.7m2    Calcium 8.3 (L) 8.5 - 10.1 mg/dL   CBC with platelets differential    Collection Time: 10/15/18  3:18 PM   Result Value Ref Range    WBC 15.6 (H) 4.0 - 11.0 10e9/L    RBC Count 4.34 3.8 - 5.2 10e12/L    Hemoglobin 13.6 11.7 - 15.7 g/dL    Hematocrit 42.3 35.0 - 47.0 %    MCV 98 78 - 100 fl    MCH 31.3 26.5 - 33.0 pg    MCHC 32.2 31.5 - 36.5 g/dL    RDW 14.4 10.0 - 15.0 %    Platelet Count 43 (LL) 150 - 450 10e9/L    Diff Method Automated Method     % Neutrophils 88.8 %    % Lymphocytes 9.4 %    % Monocytes 1.3 %    % Eosinophils 0.0 %    % Basophils 0.1 %    % Immature Granulocytes 0.4 %    Nucleated RBCs 0 0 /100     Absolute Neutrophil 13.8 (H) 1.6 - 8.3 10e9/L    Absolute Lymphocytes 1.5 0.8 - 5.3 10e9/L    Absolute Monocytes 0.2 0.0 - 1.3 10e9/L    Absolute Eosinophils 0.0 0.0 - 0.7 10e9/L    Absolute Basophils 0.0 0.0 - 0.2 10e9/L    Abs Immature Granulocytes 0.1 0 - 0.4 10e9/L    Absolute Nucleated RBC 0.0       She does have an ultrasound that shows a partial recanalized portal vein thrombosis in the left portal vein, her right portal vein is open and her flow is antegrade.      IMPRESSION:  My impression is that Ms. Marks has a history of hepatitis B for a partial immunity.  It does not seem as though they are planning on using any biologics and she has been off antivirals now for quite some time.  Interestingly, her liver does not show any signs of fatty liver disease and a very minor portal vein thrombosis is stable.  I will only see her back in the clinic as needed at this point in time.  As I previously stated, she should not be put on thrombopoietin analog because of the portal vein thrombosis.      Thank you very much for allowing me to participate in the care of this patient.  If you have any questions regarding my recommendations, please do not hesitate to contact me.       Agustin Le MD      Professor of Medicine  [employer]      Executive Medical Director, Solid Organ Transplant Program  [facility]

## 2018-10-18 NOTE — MR AVS SNAPSHOT
After Visit Summary   10/18/2018    Charlee Marks    MRN: [id number]           Patient Information     Date Of Birth          1955        Visit Information        Provider Department      10/18/2018 9:30 AM Agustin Le MD [facility] Hepatology        Today's Diagnoses     Portal vein thrombosis    -  1      Care Instructions    Preventive Care:    Breast Cancer Screening: During our visit today, we discussed that it is recommended you receive breast cancer screening. Please call or make an appointment with your primary care provider to discuss this with them. You may also call the [facility] scheduling line ([phone]) to set up a mammography appointment at the Breast Center within the [facility].    Colorectal Cancer Screening: During our visit today, we discussed that it is recommended you receive colorectal cancer screening. Please call or make an appointment with your primary care provider to discuss this. You may also call the [facility] scheduling line ([phone]) to set up a colonoscopy appointment.                Follow-ups after your visit        Follow-up notes from your care team     Return if symptoms worsen or fail to improve.      Your next 10 appointments already scheduled     Oct 29, 2018  3:30 PM CDT   LAB with CL LAB   [facility]    [address] [facility] [id number] [phone]           Please do not eat 10-12 hours before your appointment if you are coming in fasting for labs on lipids, cholesterol, or glucose (sugar). This does not apply to pregnant women. Water, hot tea and black coffee (with nothing added) are okay. Do not drink other fluids, diet soda or chew gum.            Nov 05, 2018   Procedure with Brock Brower MD   [facility] ([facility])    [address] [address]   [phone]           The medical center is located at [address] (between [address] in [place], [place] north of [place]).            Nov 08, 2018  3:15 PM CST   Return Visit with Kerrie Rankin MD   [facility] ([facility])    [facility]  [address]  [facility] [address]   [phone]            Nov 12, 2018  3:30 PM CST   LAB with CL LAB   [facility] ([facility])    [address] [address]   [phone]           Please do not eat 10-12 hours before your appointment if you are coming in fasting for labs on lipids, cholesterol, or glucose (sugar). This does not apply to pregnant women. Water, hot tea and black coffee (with nothing added) are okay. Do not drink other fluids, diet soda or chew gum.            Nov 26, 2018  3:30 PM CST   LAB with CL LAB   [facility] ([facility])    [address] [address]   [phone]           Please do not eat 10-12 hours before your appointment if you are coming in fasting for labs on lipids, cholesterol, or glucose (sugar). This does not apply to pregnant women. Water, hot tea and black coffee (with nothing added) are okay. Do not drink other fluids, diet soda or chew gum.            Dec 10, 2018  3:30 PM CST   LAB with CL LAB   [facility] ([facility])    [address] [address]   [phone]           Please do not eat 10-12 hours before your appointment if you are coming in fasting for labs on lipids, cholesterol, or glucose (sugar). This does not apply to pregnant women. Water, hot tea and black coffee (with nothing added) are okay. Do not drink other fluids, diet soda or chew gum.            Dec 24, 2018  8:30 AM CST   LAB with CL LAB   [facility] ([facility])    [address] [address]   [phone]            Please do not eat 10-12 hours before your appointment if you are coming in fasting for labs on lipids, cholesterol, or glucose (sugar). This does not apply to pregnant women. Water, hot tea and black coffee (with nothing added) are okay. Do not drink other fluids, diet soda or chew gum.            Jan 07, 2019  3:30 PM CST   LAB with CL LAB   [facility] ([facility])    [address] [address]   [phone]           Please do not eat 10-12 hours before your appointment if you are coming in fasting for labs on lipids, cholesterol, or glucose (sugar). This does not apply to pregnant women. Water, hot tea and black coffee (with nothing added) are okay. Do not drink other fluids, diet soda or chew gum.            Jan 08, 2019 11:30 AM CST   Return Visit with Kerrie Rankin MD   [facility] ([facility])    [facility]  [address]  [facility] [address]   [phone]            Jan 21, 2019  3:30 PM CST   LAB with CL LAB   [facility] ([facility])    [address] [address]   [phone]           Please do not eat 10-12 hours before your appointment if you are coming in fasting for labs on lipids, cholesterol, or glucose (sugar). This does not apply to pregnant women. Water, hot tea and black coffee (with nothing added) are okay. Do not drink other fluids, diet soda or chew gum.              Who to contact     If you have questions or need follow up information about today's clinic visit or your schedule please contact [facility] HEPAT[facility] directly at [phone].  Normal or non-critical lab and imaging results will be communicated to you by MyChart, letter or phone within 4 business days after the clinic has received the results. If you do not hear from us within 7 days, please contact the clinic through MyChart or phone. If you have a critical  "or abnormal lab result, we will notify you by phone as soon as possible.  Submit refill requests through [employer] or call your pharmacy and they will forward the refill request to us. Please allow 3 business days for your refill to be completed.          Additional Information About Your Visit        [employer] Information     [employer] gives you secure access to your electronic health record. If you see a primary care provider, you can also send messages to your care team and make appointments. If you have questions, please call your primary care clinic.  If you do not have a primary care provider, please call [phone] and they will assist you.        Care EveryWhere ID     This is your Care EveryWhere ID. This could be used by other organizations to access your [facility] medical records  [id number]        Your Vitals Were     Pulse Temperature Respirations Height Last Period Pulse Oximetry    74 97.4  F (36.3  C) (Oral) 18 1.676 m (5' 6\") 09/15/2007 98%    BMI (Body Mass Index)                   50.15 kg/m2            Blood Pressure from Last 3 Encounters:   10/18/18 148/62   10/02/18 141/68   10/01/18 126/78    Weight from Last 3 Encounters:   10/18/18 140.9 kg (310 lb 11.2 oz)   10/02/18 141.6 kg (312 lb 1.6 oz)   09/18/18 143.3 kg (316 lb)              Today, you had the following     No orders found for display         Today's Medication Changes          These changes are accurate as of 10/18/18  1:49 PM.  If you have any questions, ask your nurse or doctor.               These medicines have changed or have updated prescriptions.        Dose/Directions    ranitidine 150 MG tablet   Commonly known as:  ZANTAC   This may have changed:  See the new instructions.   Used for:  Esophageal reflux        ONE TABLET TWICE DAILY as needed will call when needed   Quantity:  180 Tab   Refills:  3                Primary Care Provider Office Phone # Fax #    Kay Briggs [phone] [address]  [facility] [address]        Equal Access to Services     LAVERN CABA : Hadii sourav altman tamarasahra Olafali, waaxda luqadaha, qaybta rejikizzysussy mclaughlin, julieta castañedalisettebrenden mcguire. So [facility] [phone].    ATENCIÓN: Si habla español, tiene a cosme disposición servicios gratuitos de asistencia lingüística. Llame al [phone].    We comply with applicable federal civil rights laws and [place] laws. We do not discriminate on the basis of race, color, national origin, age, disability, sex, sexual orientation, or gender identity.            Thank you!     Thank you for choosing [facility] HEPATOLOG[facility]  for your care. Our goal is always to provide you with excellent care. Hearing back from our patients is one way we can continue to improve our services. Please take a few minutes to complete the written survey that you may receive in the mail after your visit with us. Thank you!             Your Updated Medication List - Protect others around you: Learn how to safely use, store and throw away your medicines at www.disposemymeds.org.          This list is accurate as of 10/18/18  1:49 PM.  Always use your most recent med list.                   Brand Name Dispense Instructions for use Diagnosis    albuterol 108 (90 Base) MCG/ACT inhaler    PROAIR HFA/PROVENTIL HFA/VENTOLIN HFA    1 Inhaler    Inhale 2 puffs into the lungs every 4 hours as needed for shortness of breath / dyspnea    Mild intermittent asthma without complication       ASPIRIN PO      Take 81 mg by mouth daily        B-6 100 MG Tabs      Take 1 tablet by mouth daily        BENADRYL PO      Take 25 mg by mouth every 6 hours as needed        calcium carbonate-vitamin D 500-400 MG-UNIT Tabs per tablet      Take 1 tablet by mouth daily        GLUCOSAMINE CHONDRO COMPLEX OR      daily        hydrochlorothiazide 25 MG tablet    HYDRODIURIL    90 tablet    Take 1 tablet (25 mg) by mouth daily    Peripheral edema       loratadine 10 MG tablet    CLARITIN     90 Tab    ONE DAILY    Allergy, unspecified not elsewhere classified       MULTIVITAMIN TABS   OR      1 TABLET DAILY        omega 3 1000 MG Caps      daily        predniSONE 10 MG tablet    DELTASONE    200 tablet    Start 100 mg po every day (can do bid divided dosing), taper 10 mg every wk. All taken post meal.    Idiopathic thrombocytopenic purpura (H)       ranitidine 150 MG tablet    ZANTAC    180 Tab    ONE TABLET TWICE DAILY as needed will call when needed    Esophageal reflux       TYLENOL PO      Take 1,000 mg by mouth At Bedtime

## 2018-10-18 NOTE — PATIENT INSTRUCTIONS
Preventive Care:    Breast Cancer Screening: During our visit today, we discussed that it is recommended you receive breast cancer screening. Please call or make an appointment with your primary care provider to discuss this with them. You may also call the  [employer] scheduling line ([phone]) to set up a mammography appointment at the Breast Center within the [facility].    Colorectal Cancer Screening: During our visit today, we discussed that it is recommended you receive colorectal cancer screening. Please call or make an appointment with your primary care provider to discuss this. You may also call the  [employer] scheduling line ([phone]) to set up a colonoscopy appointment.

## 2018-10-18 NOTE — PROGRESS NOTES
HISTORY OF PRESENT ILLNESS:  I had the pleasure of seeing Charlee Marks for followup in the [facility] on 10/18/2018.  Ms. Marks returns for followup because of the risk of reactivation of hepatitis B.      Her major clinical problem has been and still is her ITP.  She underwent a splenectomy in 12/2016 after having been tried on Rituxan.  She did well for a period of 7-8 months, but unfortunately has again developed some evidence of ITP.  She still seems to respond well to IVIG and is currently on prednisone.      She also has a portal vein thrombosis that is partially recanalized.  It is only present in the left portal vein at this point in time and her right is open as is the splenic and mesenteric veins.      She feels fairly well.  She denies any abdominal pain, itching, skin rash or fatigue.  She denies any increased abdominal girth or lower extremity edema.  She denies any fevers or chills, cough or shortness of breath.  She denies any nausea, vomiting, diarrhea or constipation.  Her appetite has been good, and her weight she believes has increased on the current dose of prednisone, but what we have is over the past month her weight is down 6 pounds.     Current Outpatient Prescriptions   Medication     Acetaminophen (TYLENOL PO)     albuterol (PROAIR HFA/PROVENTIL HFA/VENTOLIN HFA) 108 (90 Base) MCG/ACT inhaler     ASPIRIN PO     calcium carbonate-vitamin D 500-400 MG-UNIT TABS tablt     DiphenhydrAMINE HCl (BENADRYL PO)     GLUCOSAMINE CHONDRO COMPLEX OR     hydrochlorothiazide (HYDRODIURIL) 25 MG tablet     LORATADINE 10 MG PO TABS     MULTIVITAMIN TABS   OR     OMEGA 3 1000 MG OR CAPS     predniSONE (DELTASONE) 10 MG tablet     Pyridoxine HCl (B-6) 100 MG TABS     RANITIDINE  MG PO TABS     No current facility-administered medications for this visit.      B/P: 148/62, T: 97.4, P: 74, R: 18    In general she appears well.  HEENT exam shows no scleral icterus or temporal  muscle wasting.  Her chest is clear.  Her abdominal exam shows no increase in girth.  No masses or tenderness to palpation are present.  Her liver is 10 cm in span without left lobe enlargement.  No spleen tip is palpable.  Extremity exam shows no edema.  Skin exam shows no stigmata of chronic liver disease.  Neurologic exam shows no asterixis.     Recent Results (from the past 168 hour(s))   Hepatic panel    Collection Time: 10/15/18  3:17 PM   Result Value Ref Range    Bilirubin Direct 0.1 0.0 - 0.2 mg/dL    Bilirubin Total 0.5 0.2 - 1.3 mg/dL    Albumin 3.1 (L) 3.4 - 5.0 g/dL    Protein Total 7.2 6.8 - 8.8 g/dL    Alkaline Phosphatase 102 40 - 150 U/L    ALT 68 (H) 0 - 50 U/L    AST 37 0 - 45 U/L   INR    Collection Time: 10/15/18  3:17 PM   Result Value Ref Range    INR 0.92 0.86 - 1.14   Basic metabolic panel    Collection Time: 10/15/18  3:17 PM   Result Value Ref Range    Sodium 134 133 - 144 mmol/L    Potassium 3.9 3.4 - 5.3 mmol/L    Chloride 96 94 - 109 mmol/L    Carbon Dioxide 35 (H) 20 - 32 mmol/L    Anion Gap 3 3 - 14 mmol/L    Glucose 170 (H) 70 - 99 mg/dL    Urea Nitrogen 23 7 - 30 mg/dL    Creatinine 0.88 0.52 - 1.04 mg/dL    GFR Estimate 65 >60 mL/min/1.7m2    GFR Estimate If Black 79 >60 mL/min/1.7m2    Calcium 8.3 (L) 8.5 - 10.1 mg/dL   CBC with platelets differential    Collection Time: 10/15/18  3:18 PM   Result Value Ref Range    WBC 15.6 (H) 4.0 - 11.0 10e9/L    RBC Count 4.34 3.8 - 5.2 10e12/L    Hemoglobin 13.6 11.7 - 15.7 g/dL    Hematocrit 42.3 35.0 - 47.0 %    MCV 98 78 - 100 fl    MCH 31.3 26.5 - 33.0 pg    MCHC 32.2 31.5 - 36.5 g/dL    RDW 14.4 10.0 - 15.0 %    Platelet Count 43 (LL) 150 - 450 10e9/L    Diff Method Automated Method     % Neutrophils 88.8 %    % Lymphocytes 9.4 %    % Monocytes 1.3 %    % Eosinophils 0.0 %    % Basophils 0.1 %    % Immature Granulocytes 0.4 %    Nucleated RBCs 0 0 /100    Absolute Neutrophil 13.8 (H) 1.6 - 8.3 10e9/L    Absolute Lymphocytes 1.5 0.8 - 5.3  10e9/L    Absolute Monocytes 0.2 0.0 - 1.3 10e9/L    Absolute Eosinophils 0.0 0.0 - 0.7 10e9/L    Absolute Basophils 0.0 0.0 - 0.2 10e9/L    Abs Immature Granulocytes 0.1 0 - 0.4 10e9/L    Absolute Nucleated RBC 0.0       She does have an ultrasound that shows a partial recanalized portal vein thrombosis in the left portal vein, her right portal vein is open and her flow is antegrade.      IMPRESSION:  My impression is that Ms. Marks has a history of hepatitis B for a partial immunity.  It does not seem as though they are planning on using any biologics and she has been off antivirals now for quite some time.  Interestingly, her liver does not show any signs of fatty liver disease and a very minor portal vein thrombosis is stable.  I will only see her back in the clinic as needed at this point in time.  As I previously stated, she should not be put on thrombopoietin analog because of the portal vein thrombosis.      Thank you very much for allowing me to participate in the care of this patient.  If you have any questions regarding my recommendations, please do not hesitate to contact me.       Agustin Le MD      Professor of Medicine  [employer]      Executive Medical Director, Solid Organ Transplant Program  [facility]

## 2018-10-18 NOTE — LETTER
10/18/2018       RE: Charlee Marks  [address] [place] [address]     Dear Colleague,    Thank you for referring your patient, Charlee Marks, to the [facility] HEPATOLOG[facility] at [facility]. Please see a copy of my visit note below.    HISTORY OF PRESENT ILLNESS:  I had the pleasure of seeing Charlee Marks for followup in the [facility] on 10/18/2018.  Ms. Marks returns for followup because of the risk of reactivation of hepatitis B.      Her major clinical problem has been and still is her ITP.  She underwent a splenectomy in 12/2016 after having been tried on Rituxan.  She did well for a period of 7-8 months, but unfortunately has again developed some evidence of ITP.  She still seems to respond well to IVIG and is currently on prednisone.      She also has a portal vein thrombosis that is partially recanalized.  It is only present in the left portal vein at this point in time and her right is open as is the splenic and mesenteric veins.      She feels fairly well.  She denies any abdominal pain, itching, skin rash or fatigue.  She denies any increased abdominal girth or lower extremity edema.  She denies any fevers or chills, cough or shortness of breath.  She denies any nausea, vomiting, diarrhea or constipation.  Her appetite has been good, and her weight she believes has increased on the current dose of prednisone, but what we have is over the past month her weight is down 6 pounds.     Current Outpatient Prescriptions   Medication     Acetaminophen (TYLENOL PO)     albuterol (PROAIR HFA/PROVENTIL HFA/VENTOLIN HFA) 108 (90 Base) MCG/ACT inhaler     ASPIRIN PO     calcium carbonate-vitamin D 500-400 MG-UNIT TABS tablt     DiphenhydrAMINE HCl (BENADRYL PO)     GLUCOSAMINE CHONDRO COMPLEX OR     hydrochlorothiazide (HYDRODIURIL) 25 MG tablet     LORATADINE 10 MG PO TABS     MULTIVITAMIN TABS   OR     OMEGA 3 1000 MG OR CAPS     predniSONE  (DELTASONE) 10 MG tablet     Pyridoxine HCl (B-6) 100 MG TABS     RANITIDINE  MG PO TABS     No current facility-administered medications for this visit.      B/P: 148/62, T: 97.4, P: 74, R: 18    In general she appears well.  HEENT exam shows no scleral icterus or temporal muscle wasting.  Her chest is clear.  Her abdominal exam shows no increase in girth.  No masses or tenderness to palpation are present.  Her liver is 10 cm in span without left lobe enlargement.  No spleen tip is palpable.  Extremity exam shows no edema.  Skin exam shows no stigmata of chronic liver disease.  Neurologic exam shows no asterixis.     Recent Results (from the past 168 hour(s))   Hepatic panel    Collection Time: 10/15/18  3:17 PM   Result Value Ref Range    Bilirubin Direct 0.1 0.0 - 0.2 mg/dL    Bilirubin Total 0.5 0.2 - 1.3 mg/dL    Albumin 3.1 (L) 3.4 - 5.0 g/dL    Protein Total 7.2 6.8 - 8.8 g/dL    Alkaline Phosphatase 102 40 - 150 U/L    ALT 68 (H) 0 - 50 U/L    AST 37 0 - 45 U/L   INR    Collection Time: 10/15/18  3:17 PM   Result Value Ref Range    INR 0.92 0.86 - 1.14   Basic metabolic panel    Collection Time: 10/15/18  3:17 PM   Result Value Ref Range    Sodium 134 133 - 144 mmol/L    Potassium 3.9 3.4 - 5.3 mmol/L    Chloride 96 94 - 109 mmol/L    Carbon Dioxide 35 (H) 20 - 32 mmol/L    Anion Gap 3 3 - 14 mmol/L    Glucose 170 (H) 70 - 99 mg/dL    Urea Nitrogen 23 7 - 30 mg/dL    Creatinine 0.88 0.52 - 1.04 mg/dL    GFR Estimate 65 >60 mL/min/1.7m2    GFR Estimate If Black 79 >60 mL/min/1.7m2    Calcium 8.3 (L) 8.5 - 10.1 mg/dL   CBC with platelets differential    Collection Time: 10/15/18  3:18 PM   Result Value Ref Range    WBC 15.6 (H) 4.0 - 11.0 10e9/L    RBC Count 4.34 3.8 - 5.2 10e12/L    Hemoglobin 13.6 11.7 - 15.7 g/dL    Hematocrit 42.3 35.0 - 47.0 %    MCV 98 78 - 100 fl    MCH 31.3 26.5 - 33.0 pg    MCHC 32.2 31.5 - 36.5 g/dL    RDW 14.4 10.0 - 15.0 %    Platelet Count 43 (LL) 150 - 450 10e9/L    Diff  Method Automated Method     % Neutrophils 88.8 %    % Lymphocytes 9.4 %    % Monocytes 1.3 %    % Eosinophils 0.0 %    % Basophils 0.1 %    % Immature Granulocytes 0.4 %    Nucleated RBCs 0 0 /100    Absolute Neutrophil 13.8 (H) 1.6 - 8.3 10e9/L    Absolute Lymphocytes 1.5 0.8 - 5.3 10e9/L    Absolute Monocytes 0.2 0.0 - 1.3 10e9/L    Absolute Eosinophils 0.0 0.0 - 0.7 10e9/L    Absolute Basophils 0.0 0.0 - 0.2 10e9/L    Abs Immature Granulocytes 0.1 0 - 0.4 10e9/L    Absolute Nucleated RBC 0.0       She does have an ultrasound that shows a partial recanalized portal vein thrombosis in the left portal vein, her right portal vein is open and her flow is antegrade.      IMPRESSION:  My impression is that Ms. Marks has a history of hepatitis B for a partial immunity.  It does not seem as though they are planning on using any biologics and she has been off antivirals now for quite some time.  Interestingly, her liver does not show any signs of fatty liver disease and a very minor portal vein thrombosis is stable.  I will only see her back in the clinic as needed at this point in time.  As I previously stated, she should not be put on thrombopoietin analog because of the portal vein thrombosis.      Thank you very much for allowing me to participate in the care of this patient.  If you have any questions regarding my recommendations, please do not hesitate to contact me.       Agustin Le MD      Professor of Medicine  [employer]      Executive Medical Director, Solid Organ Transplant Program  [facility]

## 2018-10-18 NOTE — NURSING NOTE
"Chief Complaint   Patient presents with     RECHECK     Hep B       /62 (BP Location: Right arm, Patient Position: Sitting, Cuff Size: Adult Regular)  Pulse 74  Temp 97.4  F (36.3  C) (Oral)  Resp 18  Ht 1.676 m (5' 6\")  Wt 140.9 kg (310 lb 11.2 oz)  LMP 09/15/2007  SpO2 98%  BMI 50.15 kg/m2    Cynthia Saleh [facility]  10/18/2018 9:30 AM      "

## 2018-10-25 ENCOUNTER — ANESTHESIA EVENT (OUTPATIENT)
Dept: GASTROENTEROLOGY | Facility: CLINIC | Age: 63
End: 2018-10-25
Payer: COMMERCIAL

## 2018-10-25 NOTE — ANESTHESIA PREPROCEDURE EVALUATION
Anesthesia Evaluation     . Pt has had prior anesthetic. Type: General    No history of anesthetic complications          ROS/MED HX    ENT/Pulmonary:     (+)KALEB risk factors hypertension, obese, Intermittent asthma , . .    Neurologic:     (+)neuropathy migraines,     Cardiovascular:     (+) hypertension----. : . . . :. .       METS/Exercise Tolerance:     Hematologic:     (+) Other Hematologic Disorder-ITP S/P splenectomy      Musculoskeletal:  - neg musculoskeletal ROS       GI/Hepatic:     (+) GERD liver disease, Other GI/Hepatic       Renal/Genitourinary:  - ROS Renal section negative       Endo:     (+) Obesity (Morbid), .      Psychiatric:  - neg psychiatric ROS       Infectious Disease:         Malignancy:      - no malignancy   Other:    - neg other ROS                 Physical Exam  Normal systems: cardiovascular, pulmonary and dental    Airway   Mallampati: II  TM distance: >3 FB  Neck ROM: full    Dental     Cardiovascular       Pulmonary                     Anesthesia Plan      History & Physical Review      ASA Status:  3 .        Plan for MAC Reason for MAC:  Deep or markedly invasive procedure (G8)         Postoperative Care      Consents  Anesthetic plan, risks, benefits and alternatives discussed with:  Patient..                          .

## 2018-10-29 DIAGNOSIS — D69.3 IDIOPATHIC THROMBOCYTOPENIC PURPURA (H): ICD-10-CM

## 2018-10-29 LAB
BASOPHILS # BLD AUTO: 0 10E9/L (ref 0–0.2)
BASOPHILS NFR BLD AUTO: 0.1 %
DIFFERENTIAL METHOD BLD: ABNORMAL
EOSINOPHIL # BLD AUTO: 0 10E9/L (ref 0–0.7)
EOSINOPHIL NFR BLD AUTO: 0 %
ERYTHROCYTE [DISTWIDTH] IN BLOOD BY AUTOMATED COUNT: 15.3 % (ref 10–15)
HCT VFR BLD AUTO: 44.2 % (ref 35–47)
HGB BLD-MCNC: 14.1 G/DL (ref 11.7–15.7)
LYMPHOCYTES # BLD AUTO: 2.8 10E9/L (ref 0.8–5.3)
LYMPHOCYTES NFR BLD AUTO: 15.4 %
MCH RBC QN AUTO: 30.6 PG (ref 26.5–33)
MCHC RBC AUTO-ENTMCNC: 31.9 G/DL (ref 31.5–36.5)
MCV RBC AUTO: 96 FL (ref 78–100)
MONOCYTES # BLD AUTO: 0.5 10E9/L (ref 0–1.3)
MONOCYTES NFR BLD AUTO: 2.6 %
NEUTROPHILS # BLD AUTO: 15 10E9/L (ref 1.6–8.3)
NEUTROPHILS NFR BLD AUTO: 81.9 %
PLATELET # BLD AUTO: 86 10E9/L (ref 150–450)
RBC # BLD AUTO: 4.61 10E12/L (ref 3.8–5.2)
WBC # BLD AUTO: 18.3 10E9/L (ref 4–11)

## 2018-10-29 PROCEDURE — 85025 COMPLETE CBC W/AUTO DIFF WBC: CPT | Performed by: INTERNAL MEDICINE

## 2018-10-29 PROCEDURE — 36415 COLL VENOUS BLD VENIPUNCTURE: CPT | Performed by: INTERNAL MEDICINE

## 2018-11-05 ENCOUNTER — ANESTHESIA (OUTPATIENT)
Dept: GASTROENTEROLOGY | Facility: CLINIC | Age: 63
End: 2018-11-05
Payer: COMMERCIAL

## 2018-11-05 ENCOUNTER — SURGERY (OUTPATIENT)
Age: 63
End: 2018-11-05

## 2018-11-05 ENCOUNTER — HOSPITAL ENCOUNTER (OUTPATIENT)
Facility: CLINIC | Age: 63
Discharge: HOME | End: 2018-11-05
Attending: SURGERY | Admitting: SURGERY
Payer: COMMERCIAL

## 2018-11-05 VITALS
DIASTOLIC BLOOD PRESSURE: 76 MMHG | SYSTOLIC BLOOD PRESSURE: 126 MMHG | OXYGEN SATURATION: 96 % | TEMPERATURE: 97.7 F | RESPIRATION RATE: 15 BRPM | WEIGHT: 293 LBS | HEIGHT: 67 IN | HEART RATE: 72 BPM | BODY MASS INDEX: 45.99 KG/M2

## 2018-11-05 LAB — COLONOSCOPY: NORMAL

## 2018-11-05 PROCEDURE — 37000008 ZZH ANESTHESIA TECHNICAL FEE, 1ST 30 MIN: Performed by: SURGERY

## 2018-11-05 PROCEDURE — 25000128 H RX IP 250 OP 636: Performed by: SURGERY

## 2018-11-05 PROCEDURE — 45384 COLONOSCOPY W/LESION REMOVAL: CPT | Mod: PT | Performed by: SURGERY

## 2018-11-05 PROCEDURE — 88305 TISSUE EXAM BY PATHOLOGIST: CPT | Performed by: SURGERY

## 2018-11-05 PROCEDURE — 45384 COLONOSCOPY W/LESION REMOVAL: CPT | Performed by: SURGERY

## 2018-11-05 PROCEDURE — 25000128 H RX IP 250 OP 636: Performed by: NURSE ANESTHETIST, CERTIFIED REGISTERED

## 2018-11-05 PROCEDURE — 88305 TISSUE EXAM BY PATHOLOGIST: CPT | Mod: 26 | Performed by: SURGERY

## 2018-11-05 PROCEDURE — 25000125 ZZHC RX 250: Performed by: NURSE ANESTHETIST, CERTIFIED REGISTERED

## 2018-11-05 RX ORDER — GLYCOPYRROLATE 0.2 MG/ML
INJECTION, SOLUTION INTRAMUSCULAR; INTRAVENOUS PRN
Status: DISCONTINUED | OUTPATIENT
Start: 2018-11-05 | End: 2018-11-05

## 2018-11-05 RX ORDER — LIDOCAINE HYDROCHLORIDE 10 MG/ML
INJECTION, SOLUTION EPIDURAL; INFILTRATION; INTRACAUDAL; PERINEURAL PRN
Status: DISCONTINUED | OUTPATIENT
Start: 2018-11-05 | End: 2018-11-05

## 2018-11-05 RX ORDER — ONDANSETRON 2 MG/ML
4 INJECTION INTRAMUSCULAR; INTRAVENOUS
Status: DISCONTINUED | OUTPATIENT
Start: 2018-11-05 | End: 2018-11-05 | Stop reason: HOSPADM

## 2018-11-05 RX ORDER — LIDOCAINE 40 MG/G
CREAM TOPICAL
Status: DISCONTINUED | OUTPATIENT
Start: 2018-11-05 | End: 2018-11-05 | Stop reason: HOSPADM

## 2018-11-05 RX ORDER — PROPOFOL 10 MG/ML
INJECTION, EMULSION INTRAVENOUS CONTINUOUS PRN
Status: DISCONTINUED | OUTPATIENT
Start: 2018-11-05 | End: 2018-11-05

## 2018-11-05 RX ORDER — PROPOFOL 10 MG/ML
INJECTION, EMULSION INTRAVENOUS PRN
Status: DISCONTINUED | OUTPATIENT
Start: 2018-11-05 | End: 2018-11-05

## 2018-11-05 RX ORDER — SODIUM CHLORIDE, SODIUM LACTATE, POTASSIUM CHLORIDE, CALCIUM CHLORIDE 600; 310; 30; 20 MG/100ML; MG/100ML; MG/100ML; MG/100ML
INJECTION, SOLUTION INTRAVENOUS CONTINUOUS
Status: DISCONTINUED | OUTPATIENT
Start: 2018-11-05 | End: 2018-11-05 | Stop reason: HOSPADM

## 2018-11-05 RX ADMIN — PROPOFOL 200 MCG/KG/MIN: 10 INJECTION, EMULSION INTRAVENOUS at 08:41

## 2018-11-05 RX ADMIN — LIDOCAINE HYDROCHLORIDE 50 MG: 10 INJECTION, SOLUTION EPIDURAL; INFILTRATION; INTRACAUDAL; PERINEURAL at 08:41

## 2018-11-05 RX ADMIN — PROPOFOL 100 MG: 10 INJECTION, EMULSION INTRAVENOUS at 08:41

## 2018-11-05 RX ADMIN — GLYCOPYRROLATE 0.2 MG: 0.2 INJECTION, SOLUTION INTRAMUSCULAR; INTRAVENOUS at 08:41

## 2018-11-05 RX ADMIN — SODIUM CHLORIDE, POTASSIUM CHLORIDE, SODIUM LACTATE AND CALCIUM CHLORIDE: 600; 310; 30; 20 INJECTION, SOLUTION INTRAVENOUS at 08:21

## 2018-11-05 NOTE — H&P
"63 year old year old female here for colonoscopy for screening.    Patient Active Problem List   Diagnosis     Plantar fascial fibromatosis     Mild intermittent asthma     Allergic state     Edema     Effusion of lower leg joint     ESOPHAGEAL REFLUX     Morbid obesity (H)     CARDIOVASCULAR SCREENING; LDL GOAL LESS THAN 160     Advanced directives, counseling/discussion     Cystocele     ITP (idiopathic thrombocytopenic purpura)     Hepatitis B core antibody positive     S/P splenectomy     Portal vein thrombosis     Benign essential hypertension       Past Medical History:   Diagnosis Date     Asthma      GERD (gastroesophageal reflux disease)      Idiopathic thrombocytopenic purpura (H)      Obesity      Other specified gastritis without mention of hemorrhage      Splenomegaly        Past Surgical History:   Procedure Laterality Date     C LIGATE FALLOPIAN TUBE       COLONOSCOPY  10/19/2007     LAPAROSCOPIC SPLENECTOMY N/A 12/13/2016    Procedure: LAPAROSCOPIC SPLENECTOMY;  Surgeon: Catalina Brian MD;  Location: [facility] OR     PICC INSERTION Right 1/4/2017    5fr DL BioFlo PICC, 44cm (3cm external) in the R cephalic vein w/ tip in the low SVC.     SURGICAL HISTORY OF -   1976    Breast Cyst Excision      SURGICAL HISTORY OF -   8/1997    Open Bladder Neck Suspension for stress urinary incontinence     SURGICAL HISTORY OF -   Age 6    Tonsillectomy     SURGICAL HISTORY OF -   10/83 & 2/84    Oral Surgery Josh        @[facility]@    No current outpatient prescriptions on file.       Allergies   Allergen Reactions     Adhesive Tape Other (See Comments)     reaction on skin     Sulfa Drugs Nausea       Pt reports that she has never smoked. She has never used smokeless tobacco. She reports that she does not drink alcohol or use illicit drugs.    Exam:  /79 (BP Location: Right arm)  Pulse 80  Temp 97.7  F (36.5  C) (Oral)  Resp 18  Ht 1.702 m (5' 7\")  Wt 141.5 kg (312 lb)  LMP 09/15/2007  SpO2 96%  BMI " 48.87 kg/m2    Awake, Alert OX3  Lungs - CTA bilaterally  CV - RRR, no murmurs, distal pulses intact  Abd - soft, non-distended, non-tender, +BS  Extr - No cyanosis or edema    A/P 63 year old year old female in need of colonoscopy for screening. Risks, benefits, alternatives, and complications were discussed including the possibility of perforation and the patient agreed to proceed    Brock Brower MD

## 2018-11-05 NOTE — ANESTHESIA POSTPROCEDURE EVALUATION
Patient: Charlee Marks    Procedure(s):  COMBINED COLONOSCOPY, REMOVE TUMOR/POLYP/LESION BY FULGURATION/HOT BIOPSY    Diagnosis:screening  Diagnosis Additional Information: No value filed.    Anesthesia Type:  MAC    Note:  Anesthesia Post Evaluation    Patient location during evaluation: Bedside  Patient participation: Able to fully participate in evaluation  Level of consciousness: awake and alert  Pain management: adequate  Airway patency: patent  Cardiovascular status: acceptable  Respiratory status: acceptable  Hydration status: acceptable  PONV: none     Anesthetic complications: None          Last vitals:  Vitals:    11/05/18 0809 11/05/18 0900   BP: 154/79 111/74   Pulse: 80 72   Resp: 18 16   Temp: 36.5  C (97.7  F)    SpO2: 96% 97%         Electronically Signed By: ANDRA Ge CRNA  November 5, 2018  9:26 AM

## 2018-11-05 NOTE — ANESTHESIA CARE TRANSFER NOTE
Patient: Charlee Marks    Procedure(s):  COMBINED COLONOSCOPY, REMOVE TUMOR/POLYP/LESION BY FULGURATION/HOT BIOPSY    Diagnosis: screening  Diagnosis Additional Information: No value filed.    Anesthesia Type:   MAC     Note:  Airway :Nasal Cannula  Patient transferred to:Phase II  Handoff Report: Identifed the Patient, Identified the Reponsible Provider, Reviewed the pertinent medical history, Discussed the surgical course, Reviewed Intra-OP anesthesia mangement and issues during anesthesia, Set expectations for post-procedure period and Allowed opportunity for questions and acknowledgement of understanding      Vitals: (Last set prior to Anesthesia Care Transfer)    CRNA VITALS  11/5/2018 0826 - 11/5/2018 0857      11/5/2018             Pulse: 85    Ht Rate: 85    SpO2: 99 %                Electronically Signed By: ANDRA Ge CRNA  November 5, 2018  8:57 AM

## 2018-11-06 PROBLEM — Z86.0100 HISTORY OF COLONIC POLYPS: Status: ACTIVE | Noted: 2018-11-06

## 2018-11-06 LAB — COPATH REPORT: NORMAL

## 2018-11-08 ENCOUNTER — ONCOLOGY VISIT (OUTPATIENT)
Dept: ONCOLOGY | Facility: CLINIC | Age: 63
End: 2018-11-08
Attending: INTERNAL MEDICINE
Payer: COMMERCIAL

## 2018-11-08 VITALS
HEART RATE: 98 BPM | WEIGHT: 293 LBS | SYSTOLIC BLOOD PRESSURE: 152 MMHG | OXYGEN SATURATION: 96 % | DIASTOLIC BLOOD PRESSURE: 79 MMHG | TEMPERATURE: 98.6 F | HEIGHT: 66 IN | BODY MASS INDEX: 47.09 KG/M2

## 2018-11-08 DIAGNOSIS — G62.9 NEUROPATHY: ICD-10-CM

## 2018-11-08 DIAGNOSIS — D69.3 IDIOPATHIC THROMBOCYTOPENIC PURPURA (H): Primary | ICD-10-CM

## 2018-11-08 DIAGNOSIS — K55.069 SUPERIOR MESENTERIC VEIN THROMBOSIS (H): ICD-10-CM

## 2018-11-08 PROCEDURE — 99214 OFFICE O/P EST MOD 30 MIN: CPT | Performed by: INTERNAL MEDICINE

## 2018-11-08 PROCEDURE — G0463 HOSPITAL OUTPT CLINIC VISIT: HCPCS

## 2018-11-08 RX ORDER — PREDNISONE 10 MG/1
TABLET ORAL
Qty: 60 TABLET | Refills: 1 | Status: SHIPPED | OUTPATIENT
Start: 2018-11-08 | End: 2019-03-05

## 2018-11-08 ASSESSMENT — PAIN SCALES - GENERAL: PAINLEVEL: NO PAIN (0)

## 2018-11-08 NOTE — NURSING NOTE
"Oncology Rooming Note    November 8, 2018 3:33 PM   Charlee Marks is a 63 year old female who presents for:    Chief Complaint   Patient presents with     Hematology     Follow up Idiopathic thrombocytopenic purpura.      Initial Vitals: /79 (BP Location: Right arm, Patient Position: Sitting, Cuff Size: Adult Large)  Pulse 98  Temp 98.6  F (37  C) (Tympanic)  Ht 1.676 m (5' 6\")  Wt 146.6 kg (323 lb 1.6 oz)  LMP 09/15/2007  SpO2 96%  Breastfeeding? No  BMI 52.15 kg/m2 Estimated body mass index is 52.15 kg/(m^2) as calculated from the following:    Height as of this encounter: 1.676 m (5' 6\").    Weight as of this encounter: 146.6 kg (323 lb 1.6 oz). Body surface area is 2.61 meters squared.  No Pain (0) Comment: Data Unavailable   Patient's last menstrual period was 09/15/2007.  Allergies reviewed: Yes  Medications reviewed: Yes    Medications: Medication refills not needed today.  Pharmacy name entered into [employer]: [facility] [place], [place] - [address]    Clinical concerns: Follow up Idiopathic thrombocytopenic purpura.     8 minutes for nursing intake (face to face time)     Roxie Brink CMA            "

## 2018-11-08 NOTE — PATIENT INSTRUCTIONS
Dr. Rankin would like you to continue with a slow prednisone taper.   Continue with CBC every two weeks and and IVIG prn.     We would like to see you back in 4 weeks for a follow up appointment.     When you are in need of a refill, please call your pharmacy and they will send us a request.      Copy of appointments, and after visit summary (AVS) given to patient.      If you have any questions please call Karla Scott RN, BSN Oncology Hematology  [facility] [phone]. For questions after business hours, or on holidays/weekends, please call our after hours Nurse Triage line [phone]. Thank you.     Continue slow prednisone taper, and q 2 wks cbc check with prn IVIG. F/u in 4 wks.

## 2018-11-08 NOTE — MR AVS SNAPSHOT
After Visit Summary   11/8/2018    Charlee Marks    MRN: [id number]           Patient Information     Date Of Birth          1955        Visit Information        Provider Department      11/8/2018 3:15 PM Kerrie Rankin MD [facility]        Today's Diagnoses     Idiopathic thrombocytopenic purpura (H)    -  1    Superior mesenteric vein thrombosis        Neuropathy          Care Instructions    Continue slow prednisone taper, and q 2 wks cbc check with prn IVIG. F/u in 4 wks.           Follow-ups after your visit        Your next 10 appointments already scheduled     Nov 12, 2018  3:30 PM CST   LAB with CL LAB   [facility] ([facility])    [address] [address]   [phone]           Please do not eat 10-12 hours before your appointment if you are coming in fasting for labs on lipids, cholesterol, or glucose (sugar). This does not apply to pregnant women. Water, hot tea and black coffee (with nothing added) are okay. Do not drink other fluids, diet soda or chew gum.            Nov 26, 2018  3:30 PM CST   LAB with CL LAB   [facility] ([facility])    [address] [address]   [phone]           Please do not eat 10-12 hours before your appointment if you are coming in fasting for labs on lipids, cholesterol, or glucose (sugar). This does not apply to pregnant women. Water, hot tea and black coffee (with nothing added) are okay. Do not drink other fluids, diet soda or chew gum.            Dec 10, 2018  3:30 PM CST   LAB with CL LAB   [facility] ([facility])    [address] [address]   [phone]           Please do not eat 10-12 hours before your appointment if you are coming in fasting for labs on lipids, cholesterol, or glucose (sugar). This does not apply to pregnant women. Water, hot tea and black coffee  (with nothing added) are okay. Do not drink other fluids, diet soda or chew gum.            Dec 13, 2018  3:15 PM CST   Return Visit with Kerrie Rankin MD   [facility] ([facility])    [address] [address]   [phone]            Dec 24, 2018  8:30 AM CST   LAB with CL LAB   [facility] ([facility])    [address] [address]   [phone]           Please do not eat 10-12 hours before your appointment if you are coming in fasting for labs on lipids, cholesterol, or glucose (sugar). This does not apply to pregnant women. Water, hot tea and black coffee (with nothing added) are okay. Do not drink other fluids, diet soda or chew gum.            Jan 07, 2019  3:30 PM CST   LAB with CL LAB   [facility] ([facility])    [address] [address]   [phone]           Please do not eat 10-12 hours before your appointment if you are coming in fasting for labs on lipids, cholesterol, or glucose (sugar). This does not apply to pregnant women. Water, hot tea and black coffee (with nothing added) are okay. Do not drink other fluids, diet soda or chew gum.            Jan 08, 2019 11:30 AM CST   Return Visit with Kerrie Rankin MD   [facility] ([facility])    [address] [address]   [phone]            Jan 21, 2019  3:30 PM CST   LAB with CL LAB   [facility] ([facility])    [address] [address]   [phone]           Please do not eat 10-12 hours before your appointment if you are coming in fasting for labs on lipids, cholesterol, or glucose (sugar). This does not apply to pregnant women. Water, hot tea and black coffee (with nothing added) are okay. Do not drink other fluids,  "diet soda or chew gum.              Who to contact     If you have questions or need follow up information about today's clinic visit or your schedule please contact [facility] directly at [phone].  Normal or non-critical lab and imaging results will be communicated to you by MyChart, letter or phone within 4 business days after the clinic has received the results. If you do not hear from us within 7 days, please contact the clinic through [email or web address] or phone. If you have a critical or abnormal lab result, we will notify you by phone as soon as possible.  Submit refill requests through [employer] or call your pharmacy and they will forward the refill request to us. Please allow 3 business days for your refill to be completed.          Additional Information About Your Visit        [employer] Information     [employer] gives you secure access to your electronic health record. If you see a primary care provider, you can also send messages to your care team and make appointments. If you have questions, please call your primary care clinic.  If you do not have a primary care provider, please call [phone] and they will assist you.        Care EveryWhere ID     This is your Care EveryWhere ID. This could be used by other organizations to access your [facility] medical records  [id number]        Your Vitals Were     Pulse Temperature Height Last Period Pulse Oximetry Breastfeeding?    98 98.6  F (37  C) (Tympanic) 1.676 m (5' 6\") 09/15/2007 96% No    BMI (Body Mass Index)                   52.15 kg/m2            Blood Pressure from Last 3 Encounters:   11/08/18 152/79   11/05/18 126/76   10/18/18 148/62    Weight from Last 3 Encounters:   11/08/18 146.6 kg (323 lb 1.6 oz)   11/05/18 141.5 kg (312 lb)   10/18/18 140.9 kg (310 lb 11.2 oz)              Today, you had the following     No orders found for display         Today's Medication Changes          These changes are accurate as of 11/8/18  4:04 PM.  If you have " any questions, ask your nurse or doctor.               These medicines have changed or have updated prescriptions.        Dose/Directions    predniSONE 10 MG tablet   Commonly known as:  DELTASONE   This may have changed:  additional instructions   Used for:  Idiopathic thrombocytopenic purpura (H)   Changed by:  Kerrie Rankin MD        30 mg po every day for 1 wk, then 20 mg po every day for 2 wks, then 10 mg po every day for 1 month, all taken post meals   Quantity:  60 tablet   Refills:  1       ranitidine 150 MG tablet   Commonly known as:  ZANTAC   This may have changed:  See the new instructions.   Used for:  Esophageal reflux        ONE TABLET TWICE DAILY as needed will call when needed   Quantity:  180 Tab   Refills:  3            Where to get your medicines      These medications were sent to [facility] [address]  [address] [address]     Phone:  [phone]     predniSONE 10 MG tablet                Primary Care Provider Office Phone # Fax #    Kay Briggs [phone] [address] [address]        Equal Access to Services     [facility]: Hadii sourav ku hadasho Soomaali, waaxda luqadaha, qaybta kaalmada adeegyada, waxay kraig hayalejandro khanna . So [facility] [phone].    ATENCIÓN: Si habla español, tiene a cosme disposición servicios gratuitos de asistencia lingüística. Llame al [phone].    We comply with applicable federal civil rights laws and [place] laws. We do not discriminate on the basis of race, color, national origin, age, disability, sex, sexual orientation, or gender identity.            Thank you!     Thank you for choosing [facility]  for your care. Our goal is always to provide you with excellent care. Hearing back from our patients is one way we can continue to improve our services. Please take a few minutes to complete the written survey that you may receive in the  mail after your visit with us. Thank you!             Your Updated Medication List - Protect others around you: Learn how to safely use, store and throw away your medicines at www.disposemymeds.org.          This list is accurate as of 11/8/18  4:04 PM.  Always use your most recent med list.                   Brand Name Dispense Instructions for use Diagnosis    albuterol 108 (90 Base) MCG/ACT inhaler    PROAIR HFA/PROVENTIL HFA/VENTOLIN HFA    1 Inhaler    Inhale 2 puffs into the lungs every 4 hours as needed for shortness of breath / dyspnea    Mild intermittent asthma without complication       ASPIRIN PO      Take 81 mg by mouth daily        B-6 100 MG Tabs      Take 1 tablet by mouth daily        BENADRYL PO      Take 25 mg by mouth every 6 hours as needed        calcium carbonate-vitamin D 500-400 MG-UNIT Tabs per tablet      Take 1 tablet by mouth daily        GLUCOSAMINE CHONDRO COMPLEX OR      daily        hydrochlorothiazide 25 MG tablet    HYDRODIURIL    90 tablet    Take 1 tablet (25 mg) by mouth daily    Peripheral edema       loratadine 10 MG tablet    CLARITIN    90 Tab    ONE DAILY    Allergy, unspecified not elsewhere classified       MULTIVITAMIN TABS   OR      1 TABLET DAILY        omega 3 1000 MG Caps      daily        predniSONE 10 MG tablet    DELTASONE    60 tablet    30 mg po every day for 1 wk, then 20 mg po every day for 2 wks, then 10 mg po every day for 1 month, all taken post meals    Idiopathic thrombocytopenic purpura (H)       ranitidine 150 MG tablet    ZANTAC    180 Tab    ONE TABLET TWICE DAILY as needed will call when needed    Esophageal reflux       TYLENOL PO      Take 1,000 mg by mouth At Bedtime

## 2018-11-08 NOTE — LETTER
11/8/2018         RE: Charlee Marks  [address]  [place] [address]        Dear Colleague,    Thank you for referring your patient, Charlee Marks, to the [facility]. Please see a copy of my visit note below.    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      HISTORY OF PRESENT ILLNESS:  she presented with 2-3 months of easy bruising and also fatigue.  She presented to her Primary Care Physician's office on 07/25/2016 and was found to have critical thrombocytopenia with a platelet count of 3.    She was offered IVIG 1 gram on 7/25/2016 and 4 days of   Dexamethasone. She was d/c with PL of 38 on 7/27/2016. PL dip to 13 on 8/4 and 7 on 8/11. She was offered wklyI IVIG 1g/kg starting 8/4/2016, and 2nd cycle of dex x 4 days on 8/11/2016. PL improves to 77 on 8/15, then dip to 31 on 8/18.   Due to rapid drop of her PL when off dex, tx was changed to po prednisone 8/18/2017 with wkly IVIG. R was added on 8/30/2016. She had no PL response despite R, steroid and IVIG.   Nplate was started 9/23/2016. PL up from 20 to 110 after 2 doses of it. Then dip down again.   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   Course was complicated with portal vein and SMV thromboses end of 12/2016. She was admitted to , had IR thrombectomy and catheter-directed lytic therapy to portal vein. Following the procedure she was placed on high intensity heparin drip which was then bridged with warfarin starting 1/9/2017 with an INR goal of 2-3.   Due to progression of portal venous branch thrombosis and SMV thrombosis by CT in 7/2017. Tx is changed to Lovenox. She could not tolerates the shots. tx was changed to Xarelto.     She finished prednisone slow taper 4/2017.      PL dip down to around 50's in Aug 2017. Xarelto put on hold in Sep 2017 due to PL less than 50 and stable SMV thromboses, she also had acne type of rash.      She saw [facility] as 2nd opinion, Dr. Key had nl BM without fibrosis at  "[facility], ITP dx is assured.  Cyclosporine was also recommended to start at 200 mg po qd if PL less than 30, with nl renal function, and adjust the dose.     She elected to be observed due to concern of side effects from chemo and immunosuppression agents. PL dip to 13-15 on 11/30/2017 required dex 40 mg 4 days and IVIG, PL up to 190 on 12/4/2017.     She then is on q 1-2 wks prn IVIG.   PL dip down again to single digit 9/2018 required IVIG and steroid.     PAST MEDICAL HISTORY:      1.  Cystocele.    2.  Morbid obesity.    3.  Reflux.    4.  Intermittent asthma.    5.  Effusion of lower leg joints.    6. 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    Mother had anemia, great aunt had anemia.       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  She is  with children.  She denies the use of alcohol or tobacco.      ROS  She is back to her baseline energy level.   She denies bleeding episode.     She reports HA from IVIG, maybe also a little from dex.   Still has neuropathy on digits on B6.        PHYSICAL EXAMINATION:    VITAL SIGNS:  Blood pressure 152/79, pulse 98, temperature 98.6  F (37  C), temperature source Tympanic, height 1.676 m (5' 6\"), weight 146.6 kg (323 lb 1.6 oz), last menstrual period 09/15/2007, SpO2 96 %, not currently breastfeeding.    ECOG 0    GENERAL APPEARANCE:  A middle-aged woman who appears her stated age, very pleasant, morbidly obese, sitting comfortably in a chair and knitting.    HEENT: The patient is normocephalic, atraumatic. Pupils are equally reactive to light.  Sclerae are anicteric.  There is moist oral mucosa, negative pharynx, no oral thrush.    NECK:  Supple, no jugular venous distention, thyroid not palpable.    LYMPH NODES:  Superficial lymphadenopathy is not appreciable in the bilateral cervical, " supraclavicular, axillary or inguinal areas.    CARDIOVASCULAR:  S1, S2 regular with no murmurs or gallops, no carotid or abdominal bruits.    PULMONARY:  Lungs are clear to auscultation and percussion bilaterally.  There is no wheezing or rhonchi.    GASTROINTESTINAL:  Abdomen is soft, nontender with no hepatosplenomegaly, no signs of ascites and no mass appreciable.    MUSCULOSKELETAL/EXTREMITIES:  No edema, no cyanotic changes, no signs of joint deformity, no lymphedema.    NEUROLOGIC:  Cranial nerves II-XII are grossly intact, sensation intact, muscle strength and muscle tone symmetrical, 5/5 throughout.    BACK:  No spinal or paraspinal tenderness, no CVA tenderness.    SKIN:  No petechiae, no rash and no signs of cellulitis.  There is scattered bruising on forearms and thighs, which are healing.        LABORATORY DATA reviewed  PL 86 from 43 from 243 from 151 from 8 9/17/2018 from 50-90 in 2018    Current Image reviewed  US abd 10/2018   1.  Diminutive appearance of the presumed left portal vein, which was  previously occluded on the prior exam 4/22/2017. It is possible that  this may represent a small portal venous collateral as opposed to left  portal vein. Patent right portal vein and main portal vein with flow  in appropriate direction.  2.  Layering echogenic sludge versus stones in the gallbladder,  without evidence of acute cholecystitis.    CT a/p 3/2018 - Chronic thrombosis of the left portal vein and posterior branch of the right portal vein are again seen. The main portal vein is patent. 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        Old data reviewed with summary  PL 12/4/2017 at 190.  PL in teens around 11/30, then had IVIG 400 mg /kg and dex 40 mg po qd x4.  around 40 in the last wks, LFT/Cr nl in 10/2017      in 7/2107 from nl 6/2017  Hb nl, wbc/diff nl.     BM at [facility] 10/2017 - nl including cyto.     CT head 11/30/2017 - no bleeding.     CT a/P 9/2017: No significant change.  Findings suggest chronic thrombosis of the left portal vein, posterior branch of the right portal vein and branches of the superior mesenteric vein as above.    CT a/p 7/2017 : Progression of left portal venous branch thrombosis and SMV thrombosis is seen compared to the prior study.    US 4/2017  1.  Recanalized main portal and splenic veins. Persistent occlusion of left portal vein.  2.  Splenectomy.    CT 1/9/2017   1. Status post splenectomy with postsurgical changes in the left upper quadrant. Persistent thrombosis of the splenic vein.  2. Main portal vein now appears patent with possible peripheral nonocclusive thrombosis/periportal edema. Residual thrombosis of portion of the left branch of the portal vein and the right posterior  branch of the portal vein.  3. Residual partial thrombosis of the distal portion of the superior mesenteric vein.   4. Small focal nodular opacities in both lower lobes, likely of infectious etiology.            ASSESSMENT/PLAN:    1.  Refractory ITP dx first in 7/2016 .   She saw [facility] as 2nd opinion in 10/2017, Dr. Key had nl BM without fibrosis at [facility], ITP dx is assured.  Cyclosporine was also recommended to start at 200 mg po qd if PL less than 30,  with nl renal function, and adjust the dose.       We discussed the tx option:   Azathioprine or cyclosporin immunosuppression.   Cytoxan 750mg - 1g/m2 IV q month or po (maybe 100mg/m2 D1-14 q 28days)  vicristine 1-2mg IV wkly for several wks  vinblastin 0.1mg/kg  combination chemo -CVP, CVP+ -16.  Promacta po - 3-4% risk of thrombosis    The side effects associated with the above options. This is a tough situation. The top choices are oral CTX, cyclosporine, or Promacta or wkly IVIG prn.   She is very hesitated to get on immunosuppression and oral chemo.     Due to new severe thrombocytopenia mid Sep 2018.   Advice 1g/kg IVIG divided in 2-3 doses prn wkly if PL less than 30.   Start prednisone 100 mg po every day taper by 10  mg per wk.     She is happy with this plan. Does not want to get on immuno suppresion and chemo.     2. 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 S/p thrombolysis to portal vein, lovenox. Has been on Coumadin, lovenox again, Xarelto for 9 months.   Her clots are chronic and stable per CT 9/2017 and 3/2018.  Due to her low PL. Recommended off anticoagulation in Sep 2017   Advised full dose ASA on 12/4/2017 due to PL rebound to 190, then fluctuating.    ASA dose was reduced to  in 3/2018 due to PL .    Advice discontinue if PL less than 50. 81 mg ASA if PL , 160 mg ASA if PL > 100.    3. 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 She feels it is helpful.     4. None specific HA - likely from IVIG fast infusion, CT head is negative for bleed.      Again, thank you for allowing me to participate in the care of your patient.        Sincerely,        Kerrie Rankin MD, MD

## 2018-11-08 NOTE — PROGRESS NOTES
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      HISTORY OF PRESENT ILLNESS:  she presented with 2-3 months of easy bruising and also fatigue.  She presented to her Primary Care Physician's office on 07/25/2016 and was found to have critical thrombocytopenia with a platelet count of 3.    She was offered IVIG 1 gram on 7/25/2016 and 4 days of   Dexamethasone. She was d/c with PL of 38 on 7/27/2016. PL dip to 13 on 8/4 and 7 on 8/11. She was offered wklyI IVIG 1g/kg starting 8/4/2016, and 2nd cycle of dex x 4 days on 8/11/2016. PL improves to 77 on 8/15, then dip to 31 on 8/18.   Due to rapid drop of her PL when off dex, tx was changed to po prednisone 8/18/2017 with wkly IVIG. R was added on 8/30/2016. She had no PL response despite R, steroid and IVIG.   Nplate was started 9/23/2016. PL up from 20 to 110 after 2 doses of it. Then dip down again.   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   Course was complicated with portal vein and SMV thromboses end of 12/2016. She was admitted to , had IR thrombectomy and catheter-directed lytic therapy to portal vein. Following the procedure she was placed on high intensity heparin drip which was then bridged with warfarin starting 1/9/2017 with an INR goal of 2-3.   Due to progression of portal venous branch thrombosis and SMV thrombosis by CT in 7/2017. Tx is changed to Lovenox. She could not tolerates the shots. tx was changed to Xarelto.     She finished prednisone slow taper 4/2017.      PL dip down to around 50's in Aug 2017. Xarelto put on hold in Sep 2017 due to PL less than 50 and stable SMV thromboses, she also had acne type of rash.      She saw [facility] as 2nd opinion, Dr. Key had nl BM without fibrosis at [facility], ITP dx is assured.  Cyclosporine was also recommended to start at 200 mg po qd if PL less than 30, with nl renal function, and adjust the dose.     She elected to be observed due to concern of side effects from chemo and  "immunosuppression agents. PL dip to 13-15 on 11/30/2017 required dex 40 mg 4 days and IVIG, PL up to 190 on 12/4/2017.     She then is on q 1-2 wks prn IVIG.   PL dip down again to single digit 9/2018 required IVIG and steroid.     PAST MEDICAL HISTORY:      1.  Cystocele.    2.  Morbid obesity.    3.  Reflux.    4.  Intermittent asthma.    5.  Effusion of lower leg joints.    6. 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    Mother had anemia, great aunt had anemia.       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  She denies the use of alcohol or tobacco.      ROS  She is back to her baseline energy level.   She denies bleeding episode.     She reports HA from IVIG, maybe also a little from dex.   Still has neuropathy on digits on B6.        PHYSICAL EXAMINATION:    VITAL SIGNS:  Blood pressure 152/79, pulse 98, temperature 98.6  F (37  C), temperature source Tympanic, height 1.676 m (5' 6\"), weight 146.6 kg (323 lb 1.6 oz), last menstrual period 09/15/2007, SpO2 96 %, not currently breastfeeding.    ECOG 0    GENERAL APPEARANCE:  A middle-aged woman who appears her stated age, very pleasant, morbidly obese, sitting comfortably in a chair and knitting.    HEENT: The patient is normocephalic, atraumatic. Pupils are equally reactive to light.  Sclerae are anicteric.  There is moist oral mucosa, negative pharynx, no oral thrush.    NECK:  Supple, no jugular venous distention, thyroid not palpable.    LYMPH NODES:  Superficial lymphadenopathy is not appreciable in the bilateral cervical, supraclavicular, axillary or inguinal areas.    CARDIOVASCULAR:  S1, S2 regular with no murmurs or gallops, no carotid or abdominal bruits.    PULMONARY:  Lungs are clear to auscultation and percussion bilaterally.  There is no wheezing or " rhonchi.    GASTROINTESTINAL:  Abdomen is soft, nontender with no hepatosplenomegaly, no signs of ascites and no mass appreciable.    MUSCULOSKELETAL/EXTREMITIES:  No edema, no cyanotic changes, no signs of joint deformity, no lymphedema.    NEUROLOGIC:  Cranial nerves II-XII are grossly intact, sensation intact, muscle strength and muscle tone symmetrical, 5/5 throughout.    BACK:  No spinal or paraspinal tenderness, no CVA tenderness.    SKIN:  No petechiae, no rash and no signs of cellulitis.  There is scattered bruising on forearms and thighs, which are healing.        LABORATORY DATA reviewed  PL 86 from 43 from 243 from 151 from 8 9/17/2018 from 50-90 in 2018    Current Image reviewed  US abd 10/2018   1.  Diminutive appearance of the presumed left portal vein, which was  previously occluded on the prior exam 4/22/2017. It is possible that  this may represent a small portal venous collateral as opposed to left  portal vein. Patent right portal vein and main portal vein with flow  in appropriate direction.  2.  Layering echogenic sludge versus stones in the gallbladder,  without evidence of acute cholecystitis.    CT a/p 3/2018 - Chronic thrombosis of the left portal vein and posterior branch of the right portal vein are again seen. The main portal vein is patent. 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        Old data reviewed with summary  PL 12/4/2017 at 190.  PL in teens around 11/30, then had IVIG 400 mg /kg and dex 40 mg po qd x4.  around 40 in the last wks, LFT/Cr nl in 10/2017      in 7/2107 from nl 6/2017  Hb nl, wbc/diff nl.     BM at [facility] 10/2017 - nl including cyto.     CT head 11/30/2017 - no bleeding.     CT a/P 9/2017: No significant change. Findings suggest chronic thrombosis of the left portal vein, posterior branch of the right portal vein and branches of the superior mesenteric vein as above.    CT a/p 7/2017 : Progression of left portal venous branch thrombosis and SMV  thrombosis is seen compared to the prior study.    US 4/2017  1.  Recanalized main portal and splenic veins. Persistent occlusion of left portal vein.  2.  Splenectomy.    CT 1/9/2017   1. Status post splenectomy with postsurgical changes in the left upper quadrant. Persistent thrombosis of the splenic vein.  2. Main portal vein now appears patent with possible peripheral nonocclusive thrombosis/periportal edema. Residual thrombosis of portion of the left branch of the portal vein and the right posterior  branch of the portal vein.  3. Residual partial thrombosis of the distal portion of the superior mesenteric vein.   4. Small focal nodular opacities in both lower lobes, likely of infectious etiology.            ASSESSMENT/PLAN:    1.  Refractory ITP dx first in 7/2016 .   She saw [facility] as 2nd opinion in 10/2017, Dr. Key had nl BM without fibrosis at [facility], ITP dx is assured.  Cyclosporine was also recommended to start at 200 mg po qd if PL less than 30,  with nl renal function, and adjust the dose.       We discussed the tx option:   Azathioprine or cyclosporin immunosuppression.   Cytoxan 750mg - 1g/m2 IV q month or po (maybe 100mg/m2 D1-14 q 28days)  vicristine 1-2mg IV wkly for several wks  vinblastin 0.1mg/kg  combination chemo -CVP, CVP+ -16.  Promacta po - 3-4% risk of thrombosis    The side effects associated with the above options. This is a tough situation. The top choices are oral CTX, cyclosporine, or Promacta or wkly IVIG prn.   She is very hesitated to get on immunosuppression and oral chemo.     Due to new severe thrombocytopenia mid Sep 2018.   Advice 1g/kg IVIG divided in 2-3 doses prn wkly if PL less than 30.   Start prednisone 100 mg po every day taper by 10 mg per wk.     She is happy with this plan. Does not want to get on immuno suppresion and chemo.     2. 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 S/p thrombolysis to portal vein, lovenox. Has been on Coumadin, lovenox again,  Xarelto for 9 months.   Her clots are chronic and stable per CT 9/2017 and 3/2018.  Due to her low PL. Recommended off anticoagulation in Sep 2017   Advised full dose ASA on 12/4/2017 due to PL rebound to 190, then fluctuating.    ASA dose was reduced to  in 3/2018 due to PL .    Advice discontinue if PL less than 50. 81 mg ASA if PL , 160 mg ASA if PL > 100.    3. 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 She feels it is helpful.     4. None specific HA - likely from IVIG fast infusion, CT head is negative for bleed.

## 2018-11-12 DIAGNOSIS — D69.3 IDIOPATHIC THROMBOCYTOPENIC PURPURA (H): ICD-10-CM

## 2018-11-12 PROCEDURE — 85025 COMPLETE CBC W/AUTO DIFF WBC: CPT | Performed by: INTERNAL MEDICINE

## 2018-11-12 PROCEDURE — 36415 COLL VENOUS BLD VENIPUNCTURE: CPT | Performed by: INTERNAL MEDICINE

## 2018-11-13 LAB
BASOPHILS # BLD AUTO: 0.2 10E9/L (ref 0–0.2)
BASOPHILS NFR BLD AUTO: 1 %
DIFFERENTIAL METHOD BLD: ABNORMAL
EOSINOPHIL # BLD AUTO: 0.2 10E9/L (ref 0–0.7)
EOSINOPHIL NFR BLD AUTO: 1 %
ERYTHROCYTE [DISTWIDTH] IN BLOOD BY AUTOMATED COUNT: 14 % (ref 10–15)
HCT VFR BLD AUTO: 42.5 % (ref 35–47)
HGB BLD-MCNC: 13.6 G/DL (ref 11.7–15.7)
LYMPHOCYTES # BLD AUTO: 3.9 10E9/L (ref 0.8–5.3)
LYMPHOCYTES NFR BLD AUTO: 22 %
MCH RBC QN AUTO: 31.7 PG (ref 26.5–33)
MCHC RBC AUTO-ENTMCNC: 32 G/DL (ref 31.5–36.5)
MCV RBC AUTO: 99 FL (ref 78–100)
MONOCYTES # BLD AUTO: 1.1 10E9/L (ref 0–1.3)
MONOCYTES NFR BLD AUTO: 6 %
MYELOCYTES # BLD: 0.2 10E9/L
MYELOCYTES NFR BLD MANUAL: 1 %
NEUTROPHILS # BLD AUTO: 12.2 10E9/L (ref 1.6–8.3)
NEUTROPHILS NFR BLD AUTO: 69 %
PLATELET # BLD AUTO: 118 10E9/L (ref 150–450)
RBC # BLD AUTO: 4.29 10E12/L (ref 3.8–5.2)
WBC # BLD AUTO: 17.8 10E9/L (ref 4–11)

## 2018-11-26 DIAGNOSIS — D69.3 IDIOPATHIC THROMBOCYTOPENIC PURPURA (H): ICD-10-CM

## 2018-11-26 PROCEDURE — 85025 COMPLETE CBC W/AUTO DIFF WBC: CPT | Performed by: INTERNAL MEDICINE

## 2018-11-26 PROCEDURE — 36415 COLL VENOUS BLD VENIPUNCTURE: CPT | Performed by: INTERNAL MEDICINE

## 2018-11-27 LAB
DIFFERENTIAL METHOD BLD: ABNORMAL
EOSINOPHIL NFR BLD AUTO: 1 %
ERYTHROCYTE [DISTWIDTH] IN BLOOD BY AUTOMATED COUNT: 13.2 % (ref 10–15)
HCT VFR BLD AUTO: 43.8 % (ref 35–47)
HGB BLD-MCNC: 13.8 G/DL (ref 11.7–15.7)
IMM GRANULOCYTES NFR BLD: 1 %
LYMPHOCYTES NFR BLD AUTO: 27 %
MCH RBC QN AUTO: 31.1 PG (ref 26.5–33)
MCHC RBC AUTO-ENTMCNC: 31.5 G/DL (ref 31.5–36.5)
MCV RBC AUTO: 99 FL (ref 78–100)
MONOCYTES NFR BLD AUTO: 7 %
NEUTROPHILS NFR BLD AUTO: 64 %
PLATELET # BLD AUTO: 76 10E9/L (ref 150–450)
RBC # BLD AUTO: 4.44 10E12/L (ref 3.8–5.2)
WBC # BLD AUTO: 20.7 10E9/L (ref 4–11)

## 2018-12-04 ENCOUNTER — DOCUMENTATION ONLY (OUTPATIENT)
Dept: LAB | Facility: CLINIC | Age: 63
End: 2018-12-04

## 2018-12-04 DIAGNOSIS — D69.3 IDIOPATHIC THROMBOCYTOPENIC PURPURA (H): Primary | ICD-10-CM

## 2018-12-04 NOTE — PROGRESS NOTES
This patient has a lab only appointment for Monday, December 24, 2018. There are no orders in place. Please place any lab orders she may need. Thank you.

## 2018-12-10 DIAGNOSIS — D69.3 IDIOPATHIC THROMBOCYTOPENIC PURPURA (H): ICD-10-CM

## 2018-12-10 LAB
BASOPHILS # BLD AUTO: 0.1 10E9/L (ref 0–0.2)
BASOPHILS NFR BLD AUTO: 0.7 %
DIFFERENTIAL METHOD BLD: ABNORMAL
EOSINOPHIL # BLD AUTO: 0.3 10E9/L (ref 0–0.7)
EOSINOPHIL NFR BLD AUTO: 1.8 %
ERYTHROCYTE [DISTWIDTH] IN BLOOD BY AUTOMATED COUNT: 13.2 % (ref 10–15)
HCT VFR BLD AUTO: 42.2 % (ref 35–47)
HGB BLD-MCNC: 13.3 G/DL (ref 11.7–15.7)
IMM GRANULOCYTES # BLD: 0.2 10E9/L (ref 0–0.4)
IMM GRANULOCYTES NFR BLD: 1.2 %
LYMPHOCYTES # BLD AUTO: 3.3 10E9/L (ref 0.8–5.3)
LYMPHOCYTES NFR BLD AUTO: 19.4 %
MCH RBC QN AUTO: 31.1 PG (ref 26.5–33)
MCHC RBC AUTO-ENTMCNC: 31.5 G/DL (ref 31.5–36.5)
MCV RBC AUTO: 99 FL (ref 78–100)
MONOCYTES # BLD AUTO: 1 10E9/L (ref 0–1.3)
MONOCYTES NFR BLD AUTO: 6.1 %
NEUTROPHILS # BLD AUTO: 11.9 10E9/L (ref 1.6–8.3)
NEUTROPHILS NFR BLD AUTO: 70.8 %
NRBC # BLD AUTO: 0 10*3/UL
NRBC BLD AUTO-RTO: 0 /100
PLATELET # BLD AUTO: 72 10E9/L (ref 150–450)
RBC # BLD AUTO: 4.27 10E12/L (ref 3.8–5.2)
WBC # BLD AUTO: 16.8 10E9/L (ref 4–11)

## 2018-12-10 PROCEDURE — 36415 COLL VENOUS BLD VENIPUNCTURE: CPT | Performed by: INTERNAL MEDICINE

## 2018-12-10 PROCEDURE — 85025 COMPLETE CBC W/AUTO DIFF WBC: CPT | Performed by: INTERNAL MEDICINE

## 2018-12-13 ENCOUNTER — ONCOLOGY VISIT (OUTPATIENT)
Dept: ONCOLOGY | Facility: CLINIC | Age: 63
End: 2018-12-13
Attending: INTERNAL MEDICINE
Payer: COMMERCIAL

## 2018-12-13 VITALS
WEIGHT: 293 LBS | TEMPERATURE: 98.5 F | RESPIRATION RATE: 18 BRPM | HEIGHT: 66 IN | BODY MASS INDEX: 47.09 KG/M2 | HEART RATE: 94 BPM | DIASTOLIC BLOOD PRESSURE: 57 MMHG | SYSTOLIC BLOOD PRESSURE: 143 MMHG | OXYGEN SATURATION: 98 %

## 2018-12-13 DIAGNOSIS — K55.069 SUPERIOR MESENTERIC VEIN THROMBOSIS (H): ICD-10-CM

## 2018-12-13 DIAGNOSIS — D69.3 IDIOPATHIC THROMBOCYTOPENIC PURPURA (H): Primary | ICD-10-CM

## 2018-12-13 DIAGNOSIS — G62.9 NEUROPATHY: ICD-10-CM

## 2018-12-13 DIAGNOSIS — Z90.81 S/P SPLENECTOMY: ICD-10-CM

## 2018-12-13 PROCEDURE — 99214 OFFICE O/P EST MOD 30 MIN: CPT | Performed by: INTERNAL MEDICINE

## 2018-12-13 PROCEDURE — G0463 HOSPITAL OUTPT CLINIC VISIT: HCPCS

## 2018-12-13 ASSESSMENT — PAIN SCALES - GENERAL: PAINLEVEL: NO PAIN (0)

## 2018-12-13 ASSESSMENT — MIFFLIN-ST. JEOR: SCORE: 2066.37

## 2018-12-13 NOTE — LETTER
12/13/2018         RE: Charlee Marks  [address]  [place] [address]        Dear Colleague,    Thank you for referring your patient, Charlee Marks, to the [facility]. Please see a copy of my visit note below.    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      HISTORY OF PRESENT ILLNESS:  she presented with 2-3 months of easy bruising and also fatigue.  She presented to her Primary Care Physician's office on 07/25/2016 and was found to have critical thrombocytopenia with a platelet count of 3.    She was offered IVIG 1 gram on 7/25/2016 and 4 days of   Dexamethasone. She was d/c with PL of 38 on 7/27/2016. PL dip to 13 on 8/4 and 7 on 8/11. She was offered wklyI IVIG 1g/kg starting 8/4/2016, and 2nd cycle of dex x 4 days on 8/11/2016. PL improves to 77 on 8/15, then dip to 31 on 8/18.   Due to rapid drop of her PL when off dex, tx was changed to po prednisone 8/18/2017 with wkly IVIG. R was added on 8/30/2016. She had no PL response despite R, steroid and IVIG.   Nplate was started 9/23/2016. PL up from 20 to 110 after 2 doses of it. Then dip down again.   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   Course was complicated with portal vein and SMV thromboses end of 12/2016. She was admitted to , had IR thrombectomy and catheter-directed lytic therapy to portal vein. Following the procedure she was placed on high intensity heparin drip which was then bridged with warfarin starting 1/9/2017 with an INR goal of 2-3.   Due to progression of portal venous branch thrombosis and SMV thrombosis by CT in 7/2017. Tx is changed to Lovenox. She could not tolerates the shots. tx was changed to Xarelto.     She finished prednisone slow taper 4/2017.      PL dip down to around 50's in Aug 2017. Xarelto put on hold in Sep 2017 due to PL less than 50 and stable SMV thromboses, she also had acne type of rash.      She saw [facility] as 2nd opinion, Dr. Key had nl BM without fibrosis  "at [place], ITP dx is assured.  Cyclosporine was also recommended to start at 200 mg po qd if PL less than 30, with nl renal function, and adjust the dose.     She elected to be observed due to concern of side effects from chemo and immunosuppression agents. PL dip to 13-15 on 11/30/2017 required dex 40 mg 4 days and IVIG, PL up to 190 on 12/4/2017.     She then was on q 1-2 wks prn IVIG.   PL dip down again to single digit 9/2018 required IVIG and steroid high does 100 mg po every day then slow taper.     PAST MEDICAL HISTORY:      1.  Cystocele.    2.  Morbid obesity.    3.  Reflux.    4.  Intermittent asthma.    5.  Effusion of lower leg joints.    6. 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    Mother had anemia, great aunt had anemia.       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  She is  with children.  She denies the use of alcohol or tobacco.      ROS  She is back to her baseline energy level.   She denies bleeding episode.     She reports HA from IVIG, maybe also a little from dex.   Still has neuropathy on digits on B6.        PHYSICAL EXAMINATION:    VITAL SIGNS:  Blood pressure 143/57, pulse 94, temperature 98.5  F (36.9  C), temperature source Tympanic, resp. rate 18, height 1.676 m (5' 6\"), weight 148.5 kg (327 lb 4.8 oz), last menstrual period 09/15/2007, SpO2 98 %, not currently breastfeeding.    ECOG 0    GENERAL APPEARANCE:  A middle-aged woman who appears her stated age, very pleasant, morbidly obese, sitting comfortably in a chair and knitting.    HEENT: The patient is normocephalic, atraumatic. Pupils are equally reactive to light.  Sclerae are anicteric.  There is moist oral mucosa, negative pharynx, no oral thrush.    NECK:  Supple, no jugular venous distention, thyroid not palpable.    LYMPH NODES:  Superficial " lymphadenopathy is not appreciable in the bilateral cervical, supraclavicular, axillary or inguinal areas.    CARDIOVASCULAR:  S1, S2 regular with no murmurs or gallops, no carotid or abdominal bruits.    PULMONARY:  Lungs are clear to auscultation and percussion bilaterally.  There is no wheezing or rhonchi.    GASTROINTESTINAL:  Abdomen is soft, nontender with no hepatosplenomegaly, no signs of ascites and no mass appreciable.    MUSCULOSKELETAL/EXTREMITIES:  No edema, no cyanotic changes, no signs of joint deformity, no lymphedema.    NEUROLOGIC:  Cranial nerves II-XII are grossly intact, sensation intact, muscle strength and muscle tone symmetrical, 5/5 throughout.    BACK:  No spinal or paraspinal tenderness, no CVA tenderness.    SKIN:  No petechiae, no rash and no signs of cellulitis.  There is scattered bruising on forearms and thighs, which are healing.        LABORATORY DATA reviewed  PL 70 -86 from 43 from 243 from 151 from 8 9/17/2018 from 50-90 in 2018    Current Image reviewed  US abd 10/2018   1.  Diminutive appearance of the presumed left portal vein, which was  previously occluded on the prior exam 4/22/2017. It is possible that  this may represent a small portal venous collateral as opposed to left  portal vein. Patent right portal vein and main portal vein with flow  in appropriate direction.  2.  Layering echogenic sludge versus stones in the gallbladder,  without evidence of acute cholecystitis.    CT a/p 3/2018 - Chronic thrombosis of the left portal vein and posterior branch of the right portal vein are again seen. The main portal vein is patent. 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        Old data reviewed with summary  PL 12/4/2017 at 190.  PL in teens around 11/30, then had IVIG 400 mg /kg and dex 40 mg po qd x4.  around 40 in the last wks, LFT/Cr nl in 10/2017      in 7/2107 from nl 6/2017  Hb nl, wbc/diff nl.     BM at [facility] 10/2017 - nl including cyto.     CT head  11/30/2017 - no bleeding.     CT a/P 9/2017: No significant change. Findings suggest chronic thrombosis of the left portal vein, posterior branch of the right portal vein and branches of the superior mesenteric vein as above.    CT a/p 7/2017 : Progression of left portal venous branch thrombosis and SMV thrombosis is seen compared to the prior study.    US 4/2017  1.  Recanalized main portal and splenic veins. Persistent occlusion of left portal vein.  2.  Splenectomy.    CT 1/9/2017   1. Status post splenectomy with postsurgical changes in the left upper quadrant. Persistent thrombosis of the splenic vein.  2. Main portal vein now appears patent with possible peripheral nonocclusive thrombosis/periportal edema. Residual thrombosis of portion of the left branch of the portal vein and the right posterior  branch of the portal vein.  3. Residual partial thrombosis of the distal portion of the superior mesenteric vein.   4. Small focal nodular opacities in both lower lobes, likely of infectious etiology.            ASSESSMENT/PLAN:    1.  Refractory ITP dx first in 7/2016 .   She saw [facility] as 2nd opinion in 10/2017, Dr. Key had nl BM without fibrosis at [facility], ITP dx is assured.  Cyclosporine was also recommended to start at 200 mg po qd if PL less than 30,  with nl renal function, and adjust the dose.       We discussed the tx option:   Azathioprine or cyclosporin immunosuppression.   Cytoxan 750mg - 1g/m2 IV q month or po (maybe 100mg/m2 D1-14 q 28days)  vicristine 1-2mg IV wkly for several wks  vinblastin 0.1mg/kg  combination chemo -CVP, CVP+ -16.  Promacta po - 3-4% risk of thrombosis    The side effects associated with the above options. This is a tough situation. The top choices are oral CTX, cyclosporine, or Promacta or wkly IVIG prn.   She is very hesitated to get on immunosuppression and oral chemo.     Due to new severe thrombocytopenia mid Sep 2018.   Advice 1g/kg IVIG divided in 2-3 doses prn wkly if  PL less than 30.   Start prednisone 100 mg po every day taper by 10 mg per wk. She is down to 10 mg po every day, advice to do 1 month, then taper to 5 mg po every day.     She is happy with this plan. Does not want to get on immuno suppresion and chemo.     2. 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 S/p thrombolysis to portal vein, lovenox. Has been on Coumadin, lovenox again, Xarelto for 9 months.   Her clots are chronic and stable per CT 9/2017 and 3/2018.  Due to her low PL. Recommended off anticoagulation in Sep 2017   Advised full dose ASA on 12/4/2017 due to PL rebound to 190, then fluctuating.    ASA dose was reduced to  in 3/2018 due to PL .    Advice discontinue if PL less than 50. 81 mg ASA if PL , 160 mg ASA if PL > 100.    3. 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 She feels it is helpful.         Again, thank you for allowing me to participate in the care of your patient.        Sincerely,        Kerrie Rankin MD, MD

## 2018-12-13 NOTE — PATIENT INSTRUCTIONS
Dr. Rankin would like you to continue with the steroid taper. Continue with CBC every 2 weeks.    We would like to see you back in 3 months for a follow up appointment with labs.     When you are in need of a refill, please call your pharmacy and they will send us a request.      Copy of appointments, and after visit summary (AVS) given to patient.      If you have any questions please call Karla Scott RN, BSN Oncology Hematology  [facility] [phone]. For questions after business hours, or on holidays/weekends, please call our after hours Nurse Triage line [phone]. Thank you. Continue slow taper steroid and q 2 wks cbc.   3 months f/u.

## 2018-12-13 NOTE — NURSING NOTE
"Oncology Rooming Note    December 13, 2018 3:47 PM   Charlee Marks is a 63 year old female who presents for:    Chief Complaint   Patient presents with     Hematology     4 week follow up ITP. Review lab results.      Initial Vitals: /57 (BP Location: Other (Comment), Patient Position: Sitting, Cuff Size: Adult Large)   Pulse 94   Temp 98.5  F (36.9  C) (Tympanic)   Resp 18   Ht 1.676 m (5' 6\")   Wt 148.5 kg (327 lb 4.8 oz)   LMP 09/15/2007   SpO2 98%   Breastfeeding? No   BMI 52.83 kg/m   Estimated body mass index is 52.83 kg/m  as calculated from the following:    Height as of this encounter: 1.676 m (5' 6\").    Weight as of this encounter: 148.5 kg (327 lb 4.8 oz). Body surface area is 2.63 meters squared.  No Pain (0) Comment: Data Unavailable   Patient's last menstrual period was 09/15/2007.  Allergies reviewed: Yes  Medications reviewed: Yes    Medications: Medication refills not needed today.  Pharmacy name entered into [employer]: [facility] [place], [place] - [address]    Clinical concerns: 4 week follow up ITP. Review lab results.     8 minutes for nursing intake (face to face time)     Roxie Brink CMA            "

## 2018-12-21 ENCOUNTER — TELEPHONE (OUTPATIENT)
Dept: ONCOLOGY | Facility: CLINIC | Age: 63
End: 2018-12-21

## 2018-12-21 DIAGNOSIS — D69.3 IDIOPATHIC THROMBOCYTOPENIC PURPURA (H): ICD-10-CM

## 2018-12-21 LAB
BASOPHILS # BLD AUTO: 0.1 10E9/L (ref 0–0.2)
BASOPHILS NFR BLD AUTO: 0.8 %
DIFFERENTIAL METHOD BLD: ABNORMAL
EOSINOPHIL # BLD AUTO: 0.3 10E9/L (ref 0–0.7)
EOSINOPHIL NFR BLD AUTO: 1.8 %
ERYTHROCYTE [DISTWIDTH] IN BLOOD BY AUTOMATED COUNT: 13 % (ref 10–15)
HCT VFR BLD AUTO: 42.8 % (ref 35–47)
HGB BLD-MCNC: 13.4 G/DL (ref 11.7–15.7)
IMM GRANULOCYTES # BLD: 0.1 10E9/L (ref 0–0.4)
IMM GRANULOCYTES NFR BLD: 0.5 %
LYMPHOCYTES # BLD AUTO: 3.2 10E9/L (ref 0.8–5.3)
LYMPHOCYTES NFR BLD AUTO: 20.9 %
MCH RBC QN AUTO: 31 PG (ref 26.5–33)
MCHC RBC AUTO-ENTMCNC: 31.3 G/DL (ref 31.5–36.5)
MCV RBC AUTO: 99 FL (ref 78–100)
MONOCYTES # BLD AUTO: 1 10E9/L (ref 0–1.3)
MONOCYTES NFR BLD AUTO: 6.4 %
NEUTROPHILS # BLD AUTO: 10.7 10E9/L (ref 1.6–8.3)
NEUTROPHILS NFR BLD AUTO: 69.6 %
NRBC # BLD AUTO: 0 10*3/UL
NRBC BLD AUTO-RTO: 0 /100
PLATELET # BLD AUTO: 59 10E9/L (ref 150–450)
RBC # BLD AUTO: 4.32 10E12/L (ref 3.8–5.2)
WBC # BLD AUTO: 15.4 10E9/L (ref 4–11)

## 2018-12-21 PROCEDURE — 85025 COMPLETE CBC W/AUTO DIFF WBC: CPT | Performed by: INTERNAL MEDICINE

## 2018-12-21 PROCEDURE — 36415 COLL VENOUS BLD VENIPUNCTURE: CPT | Performed by: INTERNAL MEDICINE

## 2018-12-21 NOTE — TELEPHONE ENCOUNTER
DATE:  12/21/2018   TIME OF RECEIPT FROM LAB:  4:10 PM  LAB TEST:  Plt   LAB VALUE:  58 (Preliminary)  RESULTS GIVEN WITH READ-BACK TO (PROVIDER):  Dr. Chucho Tamayo  TIME LAB VALUE REPORTED TO PROVIDER:   4:13 PM

## 2019-01-07 DIAGNOSIS — D69.3 IDIOPATHIC THROMBOCYTOPENIC PURPURA (H): ICD-10-CM

## 2019-01-07 LAB
BASOPHILS # BLD AUTO: 0.1 10E9/L (ref 0–0.2)
BASOPHILS NFR BLD AUTO: 0.7 %
DIFFERENTIAL METHOD BLD: ABNORMAL
EOSINOPHIL # BLD AUTO: 0.2 10E9/L (ref 0–0.7)
EOSINOPHIL NFR BLD AUTO: 1 %
ERYTHROCYTE [DISTWIDTH] IN BLOOD BY AUTOMATED COUNT: 12.6 % (ref 10–15)
HCT VFR BLD AUTO: 43.9 % (ref 35–47)
HGB BLD-MCNC: 13.8 G/DL (ref 11.7–15.7)
IMM GRANULOCYTES # BLD: 0.1 10E9/L (ref 0–0.4)
IMM GRANULOCYTES NFR BLD: 0.4 %
LYMPHOCYTES # BLD AUTO: 4.6 10E9/L (ref 0.8–5.3)
LYMPHOCYTES NFR BLD AUTO: 27.6 %
MCH RBC QN AUTO: 30.3 PG (ref 26.5–33)
MCHC RBC AUTO-ENTMCNC: 31.4 G/DL (ref 31.5–36.5)
MCV RBC AUTO: 97 FL (ref 78–100)
MONOCYTES # BLD AUTO: 1.3 10E9/L (ref 0–1.3)
MONOCYTES NFR BLD AUTO: 7.7 %
NEUTROPHILS # BLD AUTO: 10.5 10E9/L (ref 1.6–8.3)
NEUTROPHILS NFR BLD AUTO: 62.6 %
NRBC # BLD AUTO: 0 10*3/UL
NRBC BLD AUTO-RTO: 0 /100
PLATELET # BLD AUTO: 88 10E9/L (ref 150–450)
RBC # BLD AUTO: 4.55 10E12/L (ref 3.8–5.2)
WBC # BLD AUTO: 16.8 10E9/L (ref 4–11)

## 2019-01-07 PROCEDURE — 36415 COLL VENOUS BLD VENIPUNCTURE: CPT | Performed by: INTERNAL MEDICINE

## 2019-01-07 PROCEDURE — 85025 COMPLETE CBC W/AUTO DIFF WBC: CPT | Performed by: INTERNAL MEDICINE

## 2019-01-21 DIAGNOSIS — D69.3 IDIOPATHIC THROMBOCYTOPENIC PURPURA (H): ICD-10-CM

## 2019-01-21 LAB
BASOPHILS # BLD AUTO: 0.1 10E9/L (ref 0–0.2)
BASOPHILS NFR BLD AUTO: 0.8 %
DIFFERENTIAL METHOD BLD: ABNORMAL
EOSINOPHIL # BLD AUTO: 0.3 10E9/L (ref 0–0.7)
EOSINOPHIL NFR BLD AUTO: 2 %
ERYTHROCYTE [DISTWIDTH] IN BLOOD BY AUTOMATED COUNT: 12.9 % (ref 10–15)
HCT VFR BLD AUTO: 42.4 % (ref 35–47)
HGB BLD-MCNC: 13.4 G/DL (ref 11.7–15.7)
IMM GRANULOCYTES # BLD: 0 10E9/L (ref 0–0.4)
IMM GRANULOCYTES NFR BLD: 0.3 %
LYMPHOCYTES # BLD AUTO: 6.8 10E9/L (ref 0.8–5.3)
LYMPHOCYTES NFR BLD AUTO: 44.7 %
MCH RBC QN AUTO: 30.7 PG (ref 26.5–33)
MCHC RBC AUTO-ENTMCNC: 31.6 G/DL (ref 31.5–36.5)
MCV RBC AUTO: 97 FL (ref 78–100)
MONOCYTES # BLD AUTO: 1.6 10E9/L (ref 0–1.3)
MONOCYTES NFR BLD AUTO: 10.4 %
NEUTROPHILS # BLD AUTO: 6.4 10E9/L (ref 1.6–8.3)
NEUTROPHILS NFR BLD AUTO: 41.8 %
NRBC # BLD AUTO: 0 10*3/UL
NRBC BLD AUTO-RTO: 0 /100
PLATELET # BLD AUTO: 75 10E9/L (ref 150–450)
PLATELET # BLD EST: ABNORMAL 10*3/UL
RBC # BLD AUTO: 4.37 10E12/L (ref 3.8–5.2)
WBC # BLD AUTO: 15.2 10E9/L (ref 4–11)

## 2019-01-21 PROCEDURE — 36415 COLL VENOUS BLD VENIPUNCTURE: CPT | Performed by: INTERNAL MEDICINE

## 2019-01-21 PROCEDURE — 85025 COMPLETE CBC W/AUTO DIFF WBC: CPT | Performed by: INTERNAL MEDICINE

## 2019-02-04 ENCOUNTER — TELEPHONE (OUTPATIENT)
Dept: ONCOLOGY | Facility: CLINIC | Age: 64
End: 2019-02-04

## 2019-02-04 DIAGNOSIS — D69.3 IDIOPATHIC THROMBOCYTOPENIC PURPURA (H): ICD-10-CM

## 2019-02-04 PROCEDURE — 85025 COMPLETE CBC W/AUTO DIFF WBC: CPT | Performed by: INTERNAL MEDICINE

## 2019-02-04 PROCEDURE — 36415 COLL VENOUS BLD VENIPUNCTURE: CPT | Performed by: INTERNAL MEDICINE

## 2019-02-04 NOTE — TELEPHONE ENCOUNTER
DATE:  2/4/2019   TIME OF RECEIPT FROM LAB:  3:30 pm  LAB TEST:  PLT (PRELIM)  LAB VALUE:  37 (PRELIM)  RESULTS GIVEN WITH READ-BACK TO (PROVIDER):  Dr. Rankin via inBeddit  TIME LAB VALUE REPORTED TO PROVIDER:   4:02pm    Karla Scott RN on 2/4/2019 at 4:02 PM

## 2019-02-05 LAB
DIFFERENTIAL METHOD BLD: ABNORMAL
EOSINOPHIL # BLD AUTO: 0.3 10E9/L (ref 0–0.7)
EOSINOPHIL NFR BLD AUTO: 2 %
ERYTHROCYTE [DISTWIDTH] IN BLOOD BY AUTOMATED COUNT: 13.1 % (ref 10–15)
HCT VFR BLD AUTO: 45.3 % (ref 35–47)
HGB BLD-MCNC: 14.1 G/DL (ref 11.7–15.7)
LYMPHOCYTES # BLD AUTO: 7.5 10E9/L (ref 0.8–5.3)
LYMPHOCYTES NFR BLD AUTO: 54 %
MCH RBC QN AUTO: 30.5 PG (ref 26.5–33)
MCHC RBC AUTO-ENTMCNC: 31.1 G/DL (ref 31.5–36.5)
MCV RBC AUTO: 98 FL (ref 78–100)
MONOCYTES # BLD AUTO: 1.1 10E9/L (ref 0–1.3)
MONOCYTES NFR BLD AUTO: 8 %
NEUTROPHILS # BLD AUTO: 5 10E9/L (ref 1.6–8.3)
NEUTROPHILS NFR BLD AUTO: 36 %
PLATELET # BLD AUTO: 43 10E9/L (ref 150–450)
RBC # BLD AUTO: 4.62 10E12/L (ref 3.8–5.2)
WBC # BLD AUTO: 13.9 10E9/L (ref 4–11)

## 2019-02-18 DIAGNOSIS — D69.3 IDIOPATHIC THROMBOCYTOPENIC PURPURA (H): Primary | ICD-10-CM

## 2019-02-18 DIAGNOSIS — D69.3 IDIOPATHIC THROMBOCYTOPENIC PURPURA (H): ICD-10-CM

## 2019-02-18 LAB
BASOPHILS # BLD AUTO: 0.1 10E9/L (ref 0–0.2)
BASOPHILS NFR BLD AUTO: 0.8 %
DIFFERENTIAL METHOD BLD: ABNORMAL
EOSINOPHIL # BLD AUTO: 0.3 10E9/L (ref 0–0.7)
EOSINOPHIL NFR BLD AUTO: 2 %
ERYTHROCYTE [DISTWIDTH] IN BLOOD BY AUTOMATED COUNT: 13.3 % (ref 10–15)
HCT VFR BLD AUTO: 44.3 % (ref 35–47)
HGB BLD-MCNC: 13.8 G/DL (ref 11.7–15.7)
IMM GRANULOCYTES # BLD: 0.1 10E9/L (ref 0–0.4)
IMM GRANULOCYTES NFR BLD: 0.4 %
LYMPHOCYTES # BLD AUTO: 6 10E9/L (ref 0.8–5.3)
LYMPHOCYTES NFR BLD AUTO: 42.6 %
MCH RBC QN AUTO: 29.9 PG (ref 26.5–33)
MCHC RBC AUTO-ENTMCNC: 31.2 G/DL (ref 31.5–36.5)
MCV RBC AUTO: 96 FL (ref 78–100)
MONOCYTES # BLD AUTO: 1.3 10E9/L (ref 0–1.3)
MONOCYTES NFR BLD AUTO: 8.9 %
NEUTROPHILS # BLD AUTO: 6.4 10E9/L (ref 1.6–8.3)
NEUTROPHILS NFR BLD AUTO: 45.3 %
NRBC # BLD AUTO: 0 10*3/UL
NRBC BLD AUTO-RTO: 0 /100
PLATELET # BLD AUTO: 85 10E9/L (ref 150–450)
PLATELET # BLD EST: ABNORMAL 10*3/UL
RBC # BLD AUTO: 4.61 10E12/L (ref 3.8–5.2)
RBC MORPH BLD: NORMAL
WBC # BLD AUTO: 14.1 10E9/L (ref 4–11)

## 2019-02-18 PROCEDURE — 36415 COLL VENOUS BLD VENIPUNCTURE: CPT | Performed by: INTERNAL MEDICINE

## 2019-02-18 PROCEDURE — 85025 COMPLETE CBC W/AUTO DIFF WBC: CPT | Performed by: INTERNAL MEDICINE

## 2019-03-04 DIAGNOSIS — D69.3 IDIOPATHIC THROMBOCYTOPENIC PURPURA (H): ICD-10-CM

## 2019-03-04 PROCEDURE — 85025 COMPLETE CBC W/AUTO DIFF WBC: CPT | Performed by: INTERNAL MEDICINE

## 2019-03-04 PROCEDURE — 36415 COLL VENOUS BLD VENIPUNCTURE: CPT | Performed by: INTERNAL MEDICINE

## 2019-03-05 ENCOUNTER — ONCOLOGY VISIT (OUTPATIENT)
Dept: ONCOLOGY | Facility: CLINIC | Age: 64
End: 2019-03-05
Attending: INTERNAL MEDICINE
Payer: COMMERCIAL

## 2019-03-05 VITALS
TEMPERATURE: 97.4 F | HEIGHT: 66 IN | BODY MASS INDEX: 47.09 KG/M2 | DIASTOLIC BLOOD PRESSURE: 87 MMHG | SYSTOLIC BLOOD PRESSURE: 138 MMHG | RESPIRATION RATE: 20 BRPM | HEART RATE: 76 BPM | WEIGHT: 293 LBS | OXYGEN SATURATION: 97 %

## 2019-03-05 DIAGNOSIS — G62.9 NEUROPATHY: ICD-10-CM

## 2019-03-05 DIAGNOSIS — D69.3 IDIOPATHIC THROMBOCYTOPENIC PURPURA (H): Primary | ICD-10-CM

## 2019-03-05 DIAGNOSIS — Z90.81 S/P SPLENECTOMY: ICD-10-CM

## 2019-03-05 DIAGNOSIS — K55.069 SUPERIOR MESENTERIC VEIN THROMBOSIS (H): ICD-10-CM

## 2019-03-05 LAB
BASOPHILS # BLD AUTO: 0.1 10E9/L (ref 0–0.2)
BASOPHILS NFR BLD AUTO: 1 %
DIFFERENTIAL METHOD BLD: ABNORMAL
EOSINOPHIL # BLD AUTO: 0.4 10E9/L (ref 0–0.7)
EOSINOPHIL NFR BLD AUTO: 3 %
ERYTHROCYTE [DISTWIDTH] IN BLOOD BY AUTOMATED COUNT: 13.7 % (ref 10–15)
HCT VFR BLD AUTO: 43 % (ref 35–47)
HGB BLD-MCNC: 13.3 G/DL (ref 11.7–15.7)
LYMPHOCYTES # BLD AUTO: 4.7 10E9/L (ref 0.8–5.3)
LYMPHOCYTES NFR BLD AUTO: 37 %
MCH RBC QN AUTO: 29.3 PG (ref 26.5–33)
MCHC RBC AUTO-ENTMCNC: 30.9 G/DL (ref 31.5–36.5)
MCV RBC AUTO: 95 FL (ref 78–100)
MONOCYTES # BLD AUTO: 1.3 10E9/L (ref 0–1.3)
MONOCYTES NFR BLD AUTO: 10 %
NEUTROPHILS # BLD AUTO: 6.2 10E9/L (ref 1.6–8.3)
NEUTROPHILS NFR BLD AUTO: 49 %
PLATELET # BLD AUTO: 80 10E9/L (ref 150–450)
PLATELET # BLD EST: ABNORMAL 10*3/UL
RBC # BLD AUTO: 4.54 10E12/L (ref 3.8–5.2)
RBC MORPH BLD: ABNORMAL
WBC # BLD AUTO: 12.6 10E9/L (ref 4–11)

## 2019-03-05 PROCEDURE — 99214 OFFICE O/P EST MOD 30 MIN: CPT | Performed by: INTERNAL MEDICINE

## 2019-03-05 PROCEDURE — G0463 HOSPITAL OUTPT CLINIC VISIT: HCPCS

## 2019-03-05 ASSESSMENT — MIFFLIN-ST. JEOR: SCORE: 2040.97

## 2019-03-05 ASSESSMENT — PAIN SCALES - GENERAL: PAINLEVEL: NO PAIN (0)

## 2019-03-05 NOTE — PATIENT INSTRUCTIONS
Dr. Rankin would like you to continue with labs every 2 weeks with IVIG as needed.          When you are in need of a refill, please call your pharmacy and they will send us a request.      Copy of appointments, and after visit summary (AVS) given to patient.      If you have any questions please call Karla Scott RN, BSN Oncology Hematology  [facility] [phone]. For questions after business hours, or on holidays/weekends, please call our after hours Nurse Triage line [phone]. Thank you.         Off steroid. IVIG order still in therapy plan prn. Cbc diff q 2 wks.

## 2019-03-05 NOTE — LETTER
3/5/2019         RE: Charlee Marks  [address]  [place] [address]        Dear Colleague,    Thank you for referring your patient, Charlee Marks, to the [facility]. Please see a copy of my visit note below.    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      HISTORY OF PRESENT ILLNESS:  she presented with 2-3 months of easy bruising and also fatigue.  She presented to her Primary Care Physician's office on 07/25/2016 and was found to have critical thrombocytopenia with a platelet count of 3.    She was offered IVIG 1 gram on 7/25/2016 and 4 days of   Dexamethasone. She was d/c with PL of 38 on 7/27/2016. PL dip to 13 on 8/4 and 7 on 8/11. She was offered wklyI IVIG 1g/kg starting 8/4/2016, and 2nd cycle of dex x 4 days on 8/11/2016. PL improves to 77 on 8/15, then dip to 31 on 8/18.   Due to rapid drop of her PL when off dex, tx was changed to po prednisone 8/18/2017 with wkly IVIG. R was added on 8/30/2016. She had no PL response despite R, steroid and IVIG.   Nplate was started 9/23/2016. PL up from 20 to 110 after 2 doses of it. Then dip down again.   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   Course was complicated with portal vein and SMV thromboses end of 12/2016. She was admitted to , had IR thrombectomy and catheter-directed lytic therapy to portal vein. Following the procedure she was placed on high intensity heparin drip which was then bridged with warfarin starting 1/9/2017 with an INR goal of 2-3.   Due to progression of portal venous branch thrombosis and SMV thrombosis by CT in 7/2017. Tx is changed to Lovenox. She could not tolerates the shots. tx was changed to Xarelto.     She finished prednisone slow taper 4/2017.      PL dip down to around 50's in Aug 2017. Xarelto put on hold in Sep 2017 due to PL less than 50 and stable SMV thromboses, she also had acne type of rash.      She saw [facility] as 2nd opinion, Dr. Key had nl BM without fibrosis at  "[facility], ITP dx is assured.  Cyclosporine was also recommended to start at 200 mg po qd if PL less than 30, with nl renal function, and adjust the dose.     She elected to be observed due to concern of side effects from chemo and immunosuppression agents. PL dip to 13-15 on 11/30/2017 required dex 40 mg 4 days and IVIG, PL up to 190 on 12/4/2017.     She then was on q 1-2 wks prn IVIG.   PL dip down again to single digit 9/2018 required IVIG and steroid high does 100 mg po every day then slow taper, done early Jan 2019.     PAST MEDICAL HISTORY:      1.  Cystocele.    2.  Morbid obesity.    3.  Reflux.    4.  Intermittent asthma.    5.  Effusion of lower leg joints.    6. 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    Mother had anemia, great aunt had anemia.       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  She denies the use of alcohol or tobacco.      ROS  She is back to her baseline energy level.   She denies bleeding episode.     She reports HA from IVIG, maybe also a little from dex.   Still has neuropathy on digits on B6.        PHYSICAL EXAMINATION:    VITAL SIGNS:  Blood pressure 138/87, pulse 76, temperature 97.4  F (36.3  C), temperature source Tympanic, resp. rate 20, height 1.676 m (5' 6\"), weight 145.9 kg (321 lb 11.2 oz), last menstrual period 09/15/2007, SpO2 97 %, not currently breastfeeding.    ECOG 0    GENERAL APPEARANCE:  A middle-aged woman who appears her stated age, very pleasant, morbidly obese, sitting comfortably in a chair and knitting.    HEENT: The patient is normocephalic, atraumatic. Pupils are equally reactive to light.  Sclerae are anicteric.  There is moist oral mucosa, negative pharynx, no oral thrush.    NECK:  Supple, no jugular venous distention, thyroid not palpable.    LYMPH NODES:  " Superficial lymphadenopathy is not appreciable in the bilateral cervical, supraclavicular, axillary or inguinal areas.    CARDIOVASCULAR:  S1, S2 regular with no murmurs or gallops, no carotid or abdominal bruits.    PULMONARY:  Lungs are clear to auscultation and percussion bilaterally.  There is no wheezing or rhonchi.    GASTROINTESTINAL:  Abdomen is soft, nontender with no hepatosplenomegaly, no signs of ascites and no mass appreciable.    MUSCULOSKELETAL/EXTREMITIES:  No edema, no cyanotic changes, no signs of joint deformity, no lymphedema.    NEUROLOGIC:  Cranial nerves II-XII are grossly intact, sensation intact, muscle strength and muscle tone symmetrical, 5/5 throughout.    BACK:  No spinal or paraspinal tenderness, no CVA tenderness.    SKIN:  No petechiae, no rash and no signs of cellulitis.  There is scattered bruising on forearms and thighs, which are healing.        LABORATORY DATA reviewed  PL 70 -86 from 43 from 243 from 151 from 8 9/17/2018 from 50-90 in 2018    Old data reviewed with summary  US abd 10/2018   1.  Diminutive appearance of the presumed left portal vein, which was previously occluded on the prior exam 4/22/2017. It is possible that this may represent a small portal venous collateral as opposed to left  portal vein. Patent right portal vein and main portal vein with flow in appropriate direction.  2.  Layering echogenic sludge versus stones in the gallbladder, without evidence of acute cholecystitis.    CT a/p 3/2018 - Chronic thrombosis of the left portal vein and posterior branch of the right portal vein are again seen. The main portal vein is patent. 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        PL 12/4/2017 at 190.  PL in teens around 11/30, then had IVIG 400 mg /kg and dex 40 mg po qd x4.  around 40 in the last wks, LFT/Cr nl in 10/2017      in 7/2107 from nl 6/2017  Hb nl, wbc/diff nl.     BM at [facility] 10/2017 - nl including cyto.     CT head 11/30/2017 - no  bleeding.     CT a/P 9/2017: No significant change. Findings suggest chronic thrombosis of the left portal vein, posterior branch of the right portal vein and branches of the superior mesenteric vein as above.    CT a/p 7/2017 : Progression of left portal venous branch thrombosis and SMV thrombosis is seen compared to the prior study.    US 4/2017  1.  Recanalized main portal and splenic veins. Persistent occlusion of left portal vein.  2.  Splenectomy.    CT 1/9/2017   1. Status post splenectomy with postsurgical changes in the left upper quadrant. Persistent thrombosis of the splenic vein.  2. Main portal vein now appears patent with possible peripheral nonocclusive thrombosis/periportal edema. Residual thrombosis of portion of the left branch of the portal vein and the right posterior  branch of the portal vein.  3. Residual partial thrombosis of the distal portion of the superior mesenteric vein.   4. Small focal nodular opacities in both lower lobes, likely of infectious etiology.            ASSESSMENT/PLAN:    1.  Refractory ITP dx first in 7/2016 .   She saw [facility] as 2nd opinion in 10/2017 with Dr. Key had nl BM without fibrosis at [facility], ITP dx is assured.  Cyclosporine was also recommended to start at 200 mg po qd if PL less than 30,  with nl renal function, and adjust the dose.       We discussed the tx option:   Azathioprine or cyclosporin immunosuppression.   Cytoxan 750mg - 1g/m2 IV q month or po (maybe 100mg/m2 D1-14 q 28days)  vicristine 1-2mg IV wkly for several wks  vinblastin 0.1mg/kg  combination chemo -CVP, CVP+ -16.  Promacta po - 3-4% risk of thrombosis  New FAD approved oral agent blocking destruction of PL.     She is very hesitated to get on immunosuppression and oral chemo.     Due to new severe thrombocytopenia mid Sep 2018.   Advice 1g/kg IVIG divided in 2-3 doses prn wkly if PL less than 30.   Start prednisone 100 mg po every day taper by 10 mg per wk. She finished the slow taper  1/2019.  She has IVIG prn.   She is happy with this plan. Does not want to get on immuno suppresion and chemo.     2. 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 S/p thrombolysis to portal vein, lovenox. Has been on Coumadin, lovenox again, Xarelto for 9 months.   Her clots are chronic and stable per CT 9/2017 and 3/2018.    Advice discontinue if PL less than 50. 81 mg ASA if PL , 160 mg ASA if PL > 100.    3. 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 She feels it is helpful.         Again, thank you for allowing me to participate in the care of your patient.        Sincerely,        Kerrie Rankin MD, MD

## 2019-03-05 NOTE — PROGRESS NOTES
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      HISTORY OF PRESENT ILLNESS:  she presented with 2-3 months of easy bruising and also fatigue.  She presented to her Primary Care Physician's office on 07/25/2016 and was found to have critical thrombocytopenia with a platelet count of 3.    She was offered IVIG 1 gram on 7/25/2016 and 4 days of   Dexamethasone. She was d/c with PL of 38 on 7/27/2016. PL dip to 13 on 8/4 and 7 on 8/11. She was offered wklyI IVIG 1g/kg starting 8/4/2016, and 2nd cycle of dex x 4 days on 8/11/2016. PL improves to 77 on 8/15, then dip to 31 on 8/18.   Due to rapid drop of her PL when off dex, tx was changed to po prednisone 8/18/2017 with wkly IVIG. R was added on 8/30/2016. She had no PL response despite R, steroid and IVIG.   Nplate was started 9/23/2016. PL up from 20 to 110 after 2 doses of it. Then dip down again.   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   Course was complicated with portal vein and SMV thromboses end of 12/2016. She was admitted to , had IR thrombectomy and catheter-directed lytic therapy to portal vein. Following the procedure she was placed on high intensity heparin drip which was then bridged with warfarin starting 1/9/2017 with an INR goal of 2-3.   Due to progression of portal venous branch thrombosis and SMV thrombosis by CT in 7/2017. Tx is changed to Lovenox. She could not tolerates the shots. tx was changed to Xarelto.     She finished prednisone slow taper 4/2017.      PL dip down to around 50's in Aug 2017. Xarelto put on hold in Sep 2017 due to PL less than 50 and stable SMV thromboses, she also had acne type of rash.      She saw [facility] as 2nd opinion, Dr. Key had nl BM without fibrosis at [facility], ITP dx is assured.  Cyclosporine was also recommended to start at 200 mg po qd if PL less than 30, with nl renal function, and adjust the dose.     She elected to be observed due to concern of side effects from chemo and  "immunosuppression agents. PL dip to 13-15 on 11/30/2017 required dex 40 mg 4 days and IVIG, PL up to 190 on 12/4/2017.     She then was on q 1-2 wks prn IVIG.   PL dip down again to single digit 9/2018 required IVIG and steroid high does 100 mg po every day then slow taper, done early Jan 2019.     PAST MEDICAL HISTORY:      1.  Cystocele.    2.  Morbid obesity.    3.  Reflux.    4.  Intermittent asthma.    5.  Effusion of lower leg joints.    6. 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    Mother had anemia, great aunt had anemia.       SOCIAL HISTORY:  The patient lives in [facility] and works in the school district.  She is  with children.  She denies the use of alcohol or tobacco.      ROS  She is back to her baseline energy level.   She denies bleeding episode.     She reports HA from IVIG, maybe also a little from dex.   Still has neuropathy on digits on B6.        PHYSICAL EXAMINATION:    VITAL SIGNS:  Blood pressure 138/87, pulse 76, temperature 97.4  F (36.3  C), temperature source Tympanic, resp. rate 20, height 1.676 m (5' 6\"), weight 145.9 kg (321 lb 11.2 oz), last menstrual period 09/15/2007, SpO2 97 %, not currently breastfeeding.    ECOG 0    GENERAL APPEARANCE:  A middle-aged woman who appears her stated age, very pleasant, morbidly obese, sitting comfortably in a chair and knitting.    HEENT: The patient is normocephalic, atraumatic. Pupils are equally reactive to light.  Sclerae are anicteric.  There is moist oral mucosa, negative pharynx, no oral thrush.    NECK:  Supple, no jugular venous distention, thyroid not palpable.    LYMPH NODES:  Superficial lymphadenopathy is not appreciable in the bilateral cervical, supraclavicular, axillary or inguinal areas.    CARDIOVASCULAR:  S1, S2 regular with no murmurs or gallops, no carotid or abdominal bruits.    PULMONARY:  " Lungs are clear to auscultation and percussion bilaterally.  There is no wheezing or rhonchi.    GASTROINTESTINAL:  Abdomen is soft, nontender with no hepatosplenomegaly, no signs of ascites and no mass appreciable.    MUSCULOSKELETAL/EXTREMITIES:  No edema, no cyanotic changes, no signs of joint deformity, no lymphedema.    NEUROLOGIC:  Cranial nerves II-XII are grossly intact, sensation intact, muscle strength and muscle tone symmetrical, 5/5 throughout.    BACK:  No spinal or paraspinal tenderness, no CVA tenderness.    SKIN:  No petechiae, no rash and no signs of cellulitis.  There is scattered bruising on forearms and thighs, which are healing.        LABORATORY DATA reviewed  PL 70 -86 from 43 from 243 from 151 from 8 9/17/2018 from 50-90 in 2018    Old data reviewed with summary  US abd 10/2018   1.  Diminutive appearance of the presumed left portal vein, which was previously occluded on the prior exam 4/22/2017. It is possible that this may represent a small portal venous collateral as opposed to left  portal vein. Patent right portal vein and main portal vein with flow in appropriate direction.  2.  Layering echogenic sludge versus stones in the gallbladder, without evidence of acute cholecystitis.    CT a/p 3/2018 - Chronic thrombosis of the left portal vein and posterior branch of the right portal vein are again seen. The main portal vein is patent. 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        PL 12/4/2017 at 190.  PL in teens around 11/30, then had IVIG 400 mg /kg and dex 40 mg po qd x4.  around 40 in the last wks, LFT/Cr nl in 10/2017      in 7/2107 from nl 6/2017  Hb nl, wbc/diff nl.     BM at [facility] 10/2017 - nl including cyto.     CT head 11/30/2017 - no bleeding.     CT a/P 9/2017: No significant change. Findings suggest chronic thrombosis of the left portal vein, posterior branch of the right portal vein and branches of the superior mesenteric vein as above.    CT a/p 7/2017 :  Progression of left portal venous branch thrombosis and SMV thrombosis is seen compared to the prior study.    US 4/2017  1.  Recanalized main portal and splenic veins. Persistent occlusion of left portal vein.  2.  Splenectomy.    CT 1/9/2017   1. Status post splenectomy with postsurgical changes in the left upper quadrant. Persistent thrombosis of the splenic vein.  2. Main portal vein now appears patent with possible peripheral nonocclusive thrombosis/periportal edema. Residual thrombosis of portion of the left branch of the portal vein and the right posterior  branch of the portal vein.  3. Residual partial thrombosis of the distal portion of the superior mesenteric vein.   4. Small focal nodular opacities in both lower lobes, likely of infectious etiology.            ASSESSMENT/PLAN:    1.  Refractory ITP dx first in 7/2016 .   She saw [facility] as 2nd opinion in 10/2017 with Dr. Key had nl BM without fibrosis at [facility], ITP dx is assured.  Cyclosporine was also recommended to start at 200 mg po qd if PL less than 30,  with nl renal function, and adjust the dose.       We discussed the tx option:   Azathioprine or cyclosporin immunosuppression.   Cytoxan 750mg - 1g/m2 IV q month or po (maybe 100mg/m2 D1-14 q 28days)  vicristine 1-2mg IV wkly for several wks  vinblastin 0.1mg/kg  combination chemo -CVP, CVP+ -16.  Promacta po - 3-4% risk of thrombosis  New FAD approved oral agent blocking destruction of PL.     She is very hesitated to get on immunosuppression and oral chemo.     Due to new severe thrombocytopenia mid Sep 2018.   Advice 1g/kg IVIG divided in 2-3 doses prn wkly if PL less than 30.   Start prednisone 100 mg po every day taper by 10 mg per wk. She finished the slow taper 1/2019.  She has IVIG prn.   She is happy with this plan. Does not want to get on immuno suppresion and chemo.     2. 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 S/p thrombolysis to portal vein, lovenox. Has been on  Coumadin, lovenox again, Xarelto for 9 months.   Her clots are chronic and stable per CT 9/2017 and 3/2018.    Advice discontinue if PL less than 50. 81 mg ASA if PL , 160 mg ASA if PL > 100.    3. 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 She feels it is helpful.

## 2019-03-05 NOTE — NURSING NOTE
"Oncology Rooming Note    March 5, 2019 3:44 PM   Charlee Marks is a 63 year old female who presents for:    Chief Complaint   Patient presents with     Oncology Clinic Visit     3 month recheck ITP (idiopathic thrombocytopenic purpura) , review labs      Initial Vitals: /87   Pulse 76   Temp 97.4  F (36.3  C) (Tympanic)   Resp 20   Ht 1.676 m (5' 6\")   Wt 145.9 kg (321 lb 11.2 oz)   LMP 09/15/2007   SpO2 97%   BMI 51.92 kg/m   Estimated body mass index is 51.92 kg/m  as calculated from the following:    Height as of this encounter: 1.676 m (5' 6\").    Weight as of this encounter: 145.9 kg (321 lb 11.2 oz). Body surface area is 2.61 meters squared.  No Pain (0) Comment: Data Unavailable   Patient's last menstrual period was 09/15/2007.  Allergies reviewed: Yes  Medications reviewed: Yes    Medications: Medication refills not needed today.  Pharmacy name entered into [facility]: [facility] [place], [place] - [address]    Clinical concerns:3 month recheck ITP (idiopathic thrombocytopenic purpura) , review labs.     1. Neuropathy in both feet & hands.         Lorena Wei CMA              "

## 2019-03-18 ENCOUNTER — PATIENT OUTREACH (OUTPATIENT)
Dept: ONCOLOGY | Facility: CLINIC | Age: 64
End: 2019-03-18

## 2019-03-18 DIAGNOSIS — D69.3 IDIOPATHIC THROMBOCYTOPENIC PURPURA (H): ICD-10-CM

## 2019-03-18 PROCEDURE — 36415 COLL VENOUS BLD VENIPUNCTURE: CPT | Performed by: INTERNAL MEDICINE

## 2019-03-18 PROCEDURE — 85025 COMPLETE CBC W/AUTO DIFF WBC: CPT | Performed by: INTERNAL MEDICINE

## 2019-03-18 NOTE — PROGRESS NOTES
DATE:  3/18/2019   TIME OF RECEIPT FROM LAB:  343pm  LAB TEST:  PLT prelim  LAB VALUE:  31 (prelim)  RESULTS GIVEN WITH READ-BACK TO (PROVIDER):  Dr. Hicks via inbasket. Pt has IVIG order for PLT <30. Will await final results.  TIME LAB VALUE REPORTED TO PROVIDER:   3:50pm

## 2019-03-19 LAB
BASOPHILS # BLD AUTO: 0.1 10E9/L (ref 0–0.2)
BASOPHILS NFR BLD AUTO: 0.8 %
DIFFERENTIAL METHOD BLD: ABNORMAL
EOSINOPHIL # BLD AUTO: 0.1 10E9/L (ref 0–0.7)
EOSINOPHIL NFR BLD AUTO: 1.1 %
ERYTHROCYTE [DISTWIDTH] IN BLOOD BY AUTOMATED COUNT: 13.5 % (ref 10–15)
HCT VFR BLD AUTO: 45.1 % (ref 35–47)
HGB BLD-MCNC: 14.2 G/DL (ref 11.7–15.7)
IMM GRANULOCYTES # BLD: 0 10E9/L (ref 0–0.4)
IMM GRANULOCYTES NFR BLD: 0.2 %
LYMPHOCYTES # BLD AUTO: 5.3 10E9/L (ref 0.8–5.3)
LYMPHOCYTES NFR BLD AUTO: 42.9 %
MCH RBC QN AUTO: 29.2 PG (ref 26.5–33)
MCHC RBC AUTO-ENTMCNC: 31.5 G/DL (ref 31.5–36.5)
MCV RBC AUTO: 93 FL (ref 78–100)
MONOCYTES # BLD AUTO: 1.2 10E9/L (ref 0–1.3)
MONOCYTES NFR BLD AUTO: 9.3 %
NEUTROPHILS # BLD AUTO: 5.6 10E9/L (ref 1.6–8.3)
NEUTROPHILS NFR BLD AUTO: 45.7 %
NRBC # BLD AUTO: 0 10*3/UL
NRBC BLD AUTO-RTO: 0 /100
PLATELET # BLD AUTO: 35 10E9/L (ref 150–450)
RBC # BLD AUTO: 4.86 10E12/L (ref 3.8–5.2)
WBC # BLD AUTO: 12.3 10E9/L (ref 4–11)

## 2019-03-25 NOTE — RESULT ENCOUNTER NOTE
Called and reviewed results with the patient per Dr. Rankin. Mailed the information to the pt. Patient had no further questions or concerns at this time and also verbalized understanding. Direct line provided if any further questions/concerns arise.

## 2019-03-26 ENCOUNTER — TELEPHONE (OUTPATIENT)
Dept: ONCOLOGY | Facility: CLINIC | Age: 64
End: 2019-03-26

## 2019-03-26 DIAGNOSIS — D69.3 IDIOPATHIC THROMBOCYTOPENIC PURPURA (H): Primary | ICD-10-CM

## 2019-03-28 NOTE — TELEPHONE ENCOUNTER
Prior Authorization Approval    Authorization Effective Date: 3/26/2019  Authorization Expiration Date: 7/26/2019  Medication: Travalisse- [place] Approved  Approved Dose/Quantity: 60/30ds  Reference #: [id number]   Insurance Company: [employer] [place] - Phone [phone] Fax [phone]  Expected CoPay: $45, copay card will bring down to $15     CoPay Card Available: Yes    Foundation Assistance Needed:  No  Which Pharmacy is filling the prescription (Not needed for infusion/clinic administered): [facility] - [place], [place] - [address]   Pharmacy Notified: Yes  Patient Notified: Yes Ira notified her and pt is not sure if she wants to start yet (Also, this was sent to [facility] on 3/26 but eligible for filling with [employer])

## 2019-03-29 ENCOUNTER — PATIENT OUTREACH (OUTPATIENT)
Dept: ONCOLOGY | Facility: CLINIC | Age: 64
End: 2019-03-29

## 2019-03-29 DIAGNOSIS — D69.3 IDIOPATHIC THROMBOCYTOPENIC PURPURA (H): Primary | ICD-10-CM

## 2019-03-29 NOTE — PROGRESS NOTES
Pt called after receiving the information in the mail and would like Dr. Rankin to reach out to Dr. Le to get his opinion. She noticed a side effect was changes in the liver functions and she has had problems with this in the past. Pt at risk for reactivation of Hep B.    Will review with Dr. Rankin and have her contact Dr. Agustin Le.     Karla Scott RN on 3/29/2019 at 11:57 AM

## 2019-04-01 ENCOUNTER — RESULT FOLLOW UP (OUTPATIENT)
Dept: ONCOLOGY | Facility: CLINIC | Age: 64
End: 2019-04-01

## 2019-04-01 DIAGNOSIS — D69.3 IDIOPATHIC THROMBOCYTOPENIC PURPURA (H): ICD-10-CM

## 2019-04-01 LAB
BASO STIPL BLD QL SMEAR: PRESENT
BASOPHILS # BLD AUTO: 0.1 10E9/L (ref 0–0.2)
BASOPHILS NFR BLD AUTO: 1 %
DIFFERENTIAL METHOD BLD: ABNORMAL
EOSINOPHIL # BLD AUTO: 0.1 10E9/L (ref 0–0.7)
EOSINOPHIL NFR BLD AUTO: 1 %
ERYTHROCYTE [DISTWIDTH] IN BLOOD BY AUTOMATED COUNT: 14 % (ref 10–15)
HCT VFR BLD AUTO: 44 % (ref 35–47)
HGB BLD-MCNC: 13.9 G/DL (ref 11.7–15.7)
LYMPHOCYTES # BLD AUTO: 5.4 10E9/L (ref 0.8–5.3)
LYMPHOCYTES NFR BLD AUTO: 42 %
MCH RBC QN AUTO: 29.3 PG (ref 26.5–33)
MCHC RBC AUTO-ENTMCNC: 31.6 G/DL (ref 31.5–36.5)
MCV RBC AUTO: 93 FL (ref 78–100)
MONOCYTES # BLD AUTO: 1.3 10E9/L (ref 0–1.3)
MONOCYTES NFR BLD AUTO: 10 %
NEUTROPHILS # BLD AUTO: 5.9 10E9/L (ref 1.6–8.3)
NEUTROPHILS NFR BLD AUTO: 46 %
PLATELET # BLD AUTO: 44 10E9/L (ref 150–450)
PLATELET # BLD EST: ABNORMAL 10*3/UL
RBC # BLD AUTO: 4.75 10E12/L (ref 3.8–5.2)
WBC # BLD AUTO: 12.8 10E9/L (ref 4–11)

## 2019-04-01 PROCEDURE — 36415 COLL VENOUS BLD VENIPUNCTURE: CPT | Performed by: INTERNAL MEDICINE

## 2019-04-01 PROCEDURE — 85025 COMPLETE CBC W/AUTO DIFF WBC: CPT | Performed by: INTERNAL MEDICINE

## 2019-04-05 NOTE — PROGRESS NOTES
Checked with Dr. Rankin and waiting for direction on this from Dr. Le.     Karla Scott, RN on 4/5/2019 at 10:19 AM

## 2019-04-15 ENCOUNTER — PATIENT OUTREACH (OUTPATIENT)
Dept: ONCOLOGY | Facility: CLINIC | Age: 64
End: 2019-04-15

## 2019-04-15 DIAGNOSIS — D69.3 IDIOPATHIC THROMBOCYTOPENIC PURPURA (H): ICD-10-CM

## 2019-04-15 DIAGNOSIS — D69.3 IDIOPATHIC THROMBOCYTOPENIC PURPURA (H): Primary | ICD-10-CM

## 2019-04-15 LAB
BASOPHILS # BLD AUTO: 0.1 10E9/L (ref 0–0.2)
BASOPHILS NFR BLD AUTO: 0.7 %
DIFFERENTIAL METHOD BLD: ABNORMAL
EOSINOPHIL # BLD AUTO: 0.2 10E9/L (ref 0–0.7)
EOSINOPHIL NFR BLD AUTO: 1.3 %
ERYTHROCYTE [DISTWIDTH] IN BLOOD BY AUTOMATED COUNT: 14 % (ref 10–15)
HCT VFR BLD AUTO: 43.3 % (ref 35–47)
HGB BLD-MCNC: 13.8 G/DL (ref 11.7–15.7)
IMM GRANULOCYTES # BLD: 0.1 10E9/L (ref 0–0.4)
IMM GRANULOCYTES NFR BLD: 0.3 %
LYMPHOCYTES # BLD AUTO: 6 10E9/L (ref 0.8–5.3)
LYMPHOCYTES NFR BLD AUTO: 40.1 %
MCH RBC QN AUTO: 29.5 PG (ref 26.5–33)
MCHC RBC AUTO-ENTMCNC: 31.9 G/DL (ref 31.5–36.5)
MCV RBC AUTO: 93 FL (ref 78–100)
MONOCYTES # BLD AUTO: 1.3 10E9/L (ref 0–1.3)
MONOCYTES NFR BLD AUTO: 8.7 %
NEUTROPHILS # BLD AUTO: 7.3 10E9/L (ref 1.6–8.3)
NEUTROPHILS NFR BLD AUTO: 48.9 %
NRBC # BLD AUTO: 0 10*3/UL
NRBC BLD AUTO-RTO: 0 /100
PLATELET # BLD AUTO: 38 10E9/L (ref 150–450)
PLATELET # BLD EST: ABNORMAL 10*3/UL
RBC # BLD AUTO: 4.68 10E12/L (ref 3.8–5.2)
RBC MORPH BLD: NORMAL
WBC # BLD AUTO: 14.9 10E9/L (ref 4–11)

## 2019-04-15 PROCEDURE — 36415 COLL VENOUS BLD VENIPUNCTURE: CPT | Performed by: INTERNAL MEDICINE

## 2019-04-15 PROCEDURE — 85025 COMPLETE CBC W/AUTO DIFF WBC: CPT | Performed by: INTERNAL MEDICINE

## 2019-04-15 NOTE — PROGRESS NOTES
DATE:  4/15/2019   TIME OF RECEIPT FROM LAB:  3:30  LAB TEST:  plt (prelim)  LAB VALUE:  22  RESULTS GIVEN WITH READ-BACK TO (PROVIDER):  Dr. Rankin  TIME LAB VALUE REPORTED TO PROVIDER:   3:45 via in-basket    Karla Scott RN on 4/15/2019 at 3:45 PM

## 2019-04-15 NOTE — PROGRESS NOTES
Plan for IVIG this week per Treatment plan of PLT <30. Pt aware to watch for a s/s of bleeding an the need to be evaluated in ER if this occurs.    Plan for IVIG Thursday and Friday at 8am.      Karla Scott RN on 4/15/2019 at 4:29 PM

## 2019-04-16 ENCOUNTER — TELEPHONE (OUTPATIENT)
Dept: GASTROENTEROLOGY | Facility: CLINIC | Age: 64
End: 2019-04-16

## 2019-04-16 NOTE — TELEPHONE ENCOUNTER
Call back to patient with input from Dr. Le as stated below. Pt voiced understanding.    -------  Agustin Le MD [place], Shannon, LPN    It should be OK.  No HBV prophylaxis is needed.     Indra     -------  You  Agustin Le MD Dr.Ge ordered fostamatinib for ITP for this patient. Pt wanted to make you aware before she picks up medication because she thought she needed to be on medication to prevent Hep b flare?   -------  [facility]    Phone Message    May a detailed message be left on voicemail: yes    Reason for Call: Other: Patient has questions about medications and Dr Rankin prescribing them.  Dr Rankin wants Dr Le to prescribe them if he feels the need for them.  Please call patient to discuss.      Action Taken: Message routed to:  [facility] ([facility]): gen

## 2019-04-16 NOTE — PROGRESS NOTES
Final PLT result came back at 38. Pt does not need IVIG. Appt cancelled and pt verbalize understanding.     Karla Scott RN on 4/16/2019 at 12:53 PM

## 2019-04-16 NOTE — PROGRESS NOTES
Dr. Rankin would like pt to make an appt with Dr. Le to discuss the recommendation. Pt verbalized understanding and will call to set up the appt.     Karla Scott RN on 4/16/2019 at 12:35 PM

## 2019-04-18 NOTE — PROGRESS NOTES
Pt wondering when she should start? Duration? And how long till effectiveness?    Reviewed with Dr. Rankin and pt should start ASAP, this will be a long term medication pending tolerance and we should know the effectiveness within weeks.     Plan for pt to f/u 2 weeks after starting with labs.    Pt requesting a baseline CMP. Ordered.     Pt verbalized understanding.     Karla Scott RN on 4/18/2019 at 3:29 PM

## 2019-04-19 DIAGNOSIS — D69.3 IDIOPATHIC THROMBOCYTOPENIC PURPURA (H): ICD-10-CM

## 2019-04-19 LAB
ALBUMIN SERPL-MCNC: 3.7 G/DL (ref 3.4–5)
ALP SERPL-CCNC: 125 U/L (ref 40–150)
ALT SERPL W P-5'-P-CCNC: 34 U/L (ref 0–50)
ANION GAP SERPL CALCULATED.3IONS-SCNC: 4 MMOL/L (ref 3–14)
AST SERPL W P-5'-P-CCNC: 31 U/L (ref 0–45)
BILIRUB SERPL-MCNC: 0.5 MG/DL (ref 0.2–1.3)
BUN SERPL-MCNC: 14 MG/DL (ref 7–30)
CALCIUM SERPL-MCNC: 8.9 MG/DL (ref 8.5–10.1)
CHLORIDE SERPL-SCNC: 103 MMOL/L (ref 94–109)
CO2 SERPL-SCNC: 31 MMOL/L (ref 20–32)
CREAT SERPL-MCNC: 0.67 MG/DL (ref 0.52–1.04)
GFR SERPL CREATININE-BSD FRML MDRD: >90 ML/MIN/{1.73_M2}
GLUCOSE SERPL-MCNC: 106 MG/DL (ref 70–99)
POTASSIUM SERPL-SCNC: 4.2 MMOL/L (ref 3.4–5.3)
PROT SERPL-MCNC: 7.4 G/DL (ref 6.8–8.8)
SODIUM SERPL-SCNC: 138 MMOL/L (ref 133–144)

## 2019-04-19 PROCEDURE — 36415 COLL VENOUS BLD VENIPUNCTURE: CPT | Performed by: INTERNAL MEDICINE

## 2019-04-19 PROCEDURE — 80053 COMPREHEN METABOLIC PANEL: CPT | Performed by: INTERNAL MEDICINE

## 2019-04-29 DIAGNOSIS — D69.3 IDIOPATHIC THROMBOCYTOPENIC PURPURA (H): ICD-10-CM

## 2019-04-29 LAB
BASOPHILS # BLD AUTO: 0.1 10E9/L (ref 0–0.2)
BASOPHILS NFR BLD AUTO: 0.9 %
DIFFERENTIAL METHOD BLD: ABNORMAL
EOSINOPHIL # BLD AUTO: 0.2 10E9/L (ref 0–0.7)
EOSINOPHIL NFR BLD AUTO: 1.5 %
ERYTHROCYTE [DISTWIDTH] IN BLOOD BY AUTOMATED COUNT: 14 % (ref 10–15)
HCT VFR BLD AUTO: 43.7 % (ref 35–47)
HGB BLD-MCNC: 13.7 G/DL (ref 11.7–15.7)
IMM GRANULOCYTES # BLD: 0 10E9/L (ref 0–0.4)
IMM GRANULOCYTES NFR BLD: 0.2 %
LYMPHOCYTES # BLD AUTO: 5.1 10E9/L (ref 0.8–5.3)
LYMPHOCYTES NFR BLD AUTO: 41.8 %
MCH RBC QN AUTO: 29.3 PG (ref 26.5–33)
MCHC RBC AUTO-ENTMCNC: 31.4 G/DL (ref 31.5–36.5)
MCV RBC AUTO: 93 FL (ref 78–100)
MONOCYTES # BLD AUTO: 1.1 10E9/L (ref 0–1.3)
MONOCYTES NFR BLD AUTO: 9.1 %
NEUTROPHILS # BLD AUTO: 5.7 10E9/L (ref 1.6–8.3)
NEUTROPHILS NFR BLD AUTO: 46.5 %
NRBC # BLD AUTO: 0 10*3/UL
NRBC BLD AUTO-RTO: 0 /100
PLATELET # BLD AUTO: 56 10E9/L (ref 150–450)
PLATELET # BLD EST: ABNORMAL 10*3/UL
RBC # BLD AUTO: 4.68 10E12/L (ref 3.8–5.2)
RBC MORPH BLD: NORMAL
VARIANT LYMPHS BLD QL SMEAR: PRESENT
WBC # BLD AUTO: 12.3 10E9/L (ref 4–11)

## 2019-04-29 PROCEDURE — 36415 COLL VENOUS BLD VENIPUNCTURE: CPT | Performed by: INTERNAL MEDICINE

## 2019-04-29 PROCEDURE — 85025 COMPLETE CBC W/AUTO DIFF WBC: CPT | Performed by: INTERNAL MEDICINE

## 2019-04-30 ENCOUNTER — PATIENT OUTREACH (OUTPATIENT)
Dept: ONCOLOGY | Facility: CLINIC | Age: 64
End: 2019-04-30

## 2019-04-30 DIAGNOSIS — D69.3 IDIOPATHIC THROMBOCYTOPENIC PURPURA (H): Primary | ICD-10-CM

## 2019-04-30 NOTE — PROGRESS NOTES
Pt called to review plan and yesterdays lab results. Pt PLT level was 56, reviewed with Dr. Rankin and plan for pt to hold off on starting the new drug (Tavalisse) since she is >50. Continue CBC every 2 weeks and will review result at that time.     Pt verbalized understanding,     Karla Scott, RN on 4/30/2019 at 12:14 PM

## 2019-05-13 DIAGNOSIS — D69.3 IDIOPATHIC THROMBOCYTOPENIC PURPURA (H): ICD-10-CM

## 2019-05-13 LAB
BASOPHILS # BLD AUTO: 0.1 10E9/L (ref 0–0.2)
BASOPHILS NFR BLD AUTO: 0.8 %
DIFFERENTIAL METHOD BLD: ABNORMAL
EOSINOPHIL # BLD AUTO: 0.2 10E9/L (ref 0–0.7)
EOSINOPHIL NFR BLD AUTO: 1.3 %
ERYTHROCYTE [DISTWIDTH] IN BLOOD BY AUTOMATED COUNT: 13.5 % (ref 10–15)
HCT VFR BLD AUTO: 43.7 % (ref 35–47)
HGB BLD-MCNC: 13.9 G/DL (ref 11.7–15.7)
IMM GRANULOCYTES # BLD: 0.1 10E9/L (ref 0–0.4)
IMM GRANULOCYTES NFR BLD: 0.4 %
LYMPHOCYTES # BLD AUTO: 4.8 10E9/L (ref 0.8–5.3)
LYMPHOCYTES NFR BLD AUTO: 35.7 %
MCH RBC QN AUTO: 29.3 PG (ref 26.5–33)
MCHC RBC AUTO-ENTMCNC: 31.8 G/DL (ref 31.5–36.5)
MCV RBC AUTO: 92 FL (ref 78–100)
MONOCYTES # BLD AUTO: 1.2 10E9/L (ref 0–1.3)
MONOCYTES NFR BLD AUTO: 8.8 %
NEUTROPHILS # BLD AUTO: 7.1 10E9/L (ref 1.6–8.3)
NEUTROPHILS NFR BLD AUTO: 53 %
NRBC # BLD AUTO: 0 10*3/UL
NRBC BLD AUTO-RTO: 0 /100
PLATELET # BLD AUTO: 53 10E9/L (ref 150–450)
RBC # BLD AUTO: 4.74 10E12/L (ref 3.8–5.2)
WBC # BLD AUTO: 13.3 10E9/L (ref 4–11)

## 2019-05-13 PROCEDURE — 36415 COLL VENOUS BLD VENIPUNCTURE: CPT | Performed by: INTERNAL MEDICINE

## 2019-05-13 PROCEDURE — 85025 COMPLETE CBC W/AUTO DIFF WBC: CPT | Performed by: INTERNAL MEDICINE

## 2019-05-15 ENCOUNTER — ANCILLARY PROCEDURE (OUTPATIENT)
Dept: MAMMOGRAPHY | Facility: CLINIC | Age: 64
End: 2019-05-15
Attending: FAMILY MEDICINE
Payer: COMMERCIAL

## 2019-05-15 DIAGNOSIS — Z12.31 VISIT FOR SCREENING MAMMOGRAM: ICD-10-CM

## 2019-05-15 PROCEDURE — 77067 SCR MAMMO BI INCL CAD: CPT | Mod: TC

## 2019-05-28 ENCOUNTER — PATIENT OUTREACH (OUTPATIENT)
Dept: ONCOLOGY | Facility: CLINIC | Age: 64
End: 2019-05-28

## 2019-05-28 DIAGNOSIS — D69.3 IDIOPATHIC THROMBOCYTOPENIC PURPURA (H): ICD-10-CM

## 2019-05-28 LAB
BASOPHILS # BLD AUTO: 0.1 10E9/L (ref 0–0.2)
BASOPHILS NFR BLD AUTO: 0.6 %
DIFFERENTIAL METHOD BLD: ABNORMAL
EOSINOPHIL # BLD AUTO: 0.2 10E9/L (ref 0–0.7)
EOSINOPHIL NFR BLD AUTO: 1.3 %
ERYTHROCYTE [DISTWIDTH] IN BLOOD BY AUTOMATED COUNT: 13.7 % (ref 10–15)
HCT VFR BLD AUTO: 43.1 % (ref 35–47)
HGB BLD-MCNC: 13.6 G/DL (ref 11.7–15.7)
IMM GRANULOCYTES # BLD: 0 10E9/L (ref 0–0.4)
IMM GRANULOCYTES NFR BLD: 0.3 %
LYMPHOCYTES # BLD AUTO: 4.7 10E9/L (ref 0.8–5.3)
LYMPHOCYTES NFR BLD AUTO: 39.5 %
MCH RBC QN AUTO: 29.4 PG (ref 26.5–33)
MCHC RBC AUTO-ENTMCNC: 31.6 G/DL (ref 31.5–36.5)
MCV RBC AUTO: 93 FL (ref 78–100)
MONOCYTES # BLD AUTO: 1.1 10E9/L (ref 0–1.3)
MONOCYTES NFR BLD AUTO: 9.3 %
NEUTROPHILS # BLD AUTO: 5.8 10E9/L (ref 1.6–8.3)
NEUTROPHILS NFR BLD AUTO: 49 %
NRBC # BLD AUTO: 0 10*3/UL
NRBC BLD AUTO-RTO: 0 /100
PLATELET # BLD AUTO: 41 10E9/L (ref 150–450)
RBC # BLD AUTO: 4.62 10E12/L (ref 3.8–5.2)
WBC # BLD AUTO: 11.9 10E9/L (ref 4–11)

## 2019-05-28 PROCEDURE — 85025 COMPLETE CBC W/AUTO DIFF WBC: CPT | Performed by: INTERNAL MEDICINE

## 2019-05-28 PROCEDURE — 36415 COLL VENOUS BLD VENIPUNCTURE: CPT | Performed by: INTERNAL MEDICINE

## 2019-05-28 NOTE — PROGRESS NOTES
DATE:  5/28/2019   TIME OF RECEIPT FROM LAB:  1532  LAB TEST:  PLT- Prelim  LAB VALUE:  35  RESULTS GIVEN WITH READ-BACK TO (PROVIDER):  Dr. Rankin Via [employer]  TIME LAB VALUE REPORTED TO PROVIDER:   Lissa Scott RN on 5/28/2019 at 4:21 PM

## 2019-05-31 ENCOUNTER — PATIENT OUTREACH (OUTPATIENT)
Dept: ONCOLOGY | Facility: CLINIC | Age: 64
End: 2019-05-31

## 2019-05-31 DIAGNOSIS — D69.3 IDIOPATHIC THROMBOCYTOPENIC PURPURA (H): Primary | ICD-10-CM

## 2019-05-31 NOTE — PROGRESS NOTES
Dr. Rankin reviewed labs and would like pt to continue to wait on starting the tavalisse and recheck labs in 2 weeks.     Pt verbalized understating and is set up for labs on 06.10.     Karla Scott RN on 5/31/2019 at 8:50 AM

## 2019-06-10 DIAGNOSIS — D69.3 IDIOPATHIC THROMBOCYTOPENIC PURPURA (H): ICD-10-CM

## 2019-06-10 LAB
BASOPHILS # BLD AUTO: 0.1 10E9/L (ref 0–0.2)
BASOPHILS NFR BLD AUTO: 0.9 %
DIFFERENTIAL METHOD BLD: ABNORMAL
EOSINOPHIL # BLD AUTO: 0.2 10E9/L (ref 0–0.7)
EOSINOPHIL NFR BLD AUTO: 1.9 %
ERYTHROCYTE [DISTWIDTH] IN BLOOD BY AUTOMATED COUNT: 13.8 % (ref 10–15)
HCT VFR BLD AUTO: 44.8 % (ref 35–47)
HGB BLD-MCNC: 13.9 G/DL (ref 11.7–15.7)
IMM GRANULOCYTES # BLD: 0 10E9/L (ref 0–0.4)
IMM GRANULOCYTES NFR BLD: 0.2 %
LYMPHOCYTES # BLD AUTO: 5.4 10E9/L (ref 0.8–5.3)
LYMPHOCYTES NFR BLD AUTO: 44 %
MCH RBC QN AUTO: 29.4 PG (ref 26.5–33)
MCHC RBC AUTO-ENTMCNC: 31 G/DL (ref 31.5–36.5)
MCV RBC AUTO: 95 FL (ref 78–100)
MONOCYTES # BLD AUTO: 0.9 10E9/L (ref 0–1.3)
MONOCYTES NFR BLD AUTO: 7.7 %
NEUTROPHILS # BLD AUTO: 5.6 10E9/L (ref 1.6–8.3)
NEUTROPHILS NFR BLD AUTO: 45.3 %
NRBC # BLD AUTO: 0 10*3/UL
NRBC BLD AUTO-RTO: 0 /100
PLATELET # BLD AUTO: 96 10E9/L (ref 150–450)
PLATELET # BLD EST: ABNORMAL 10*3/UL
RBC # BLD AUTO: 4.72 10E12/L (ref 3.8–5.2)
RBC MORPH BLD: NORMAL
VARIANT LYMPHS BLD QL SMEAR: PRESENT
WBC # BLD AUTO: 12.3 10E9/L (ref 4–11)

## 2019-06-10 PROCEDURE — 85025 COMPLETE CBC W/AUTO DIFF WBC: CPT | Performed by: INTERNAL MEDICINE

## 2019-06-10 PROCEDURE — 36415 COLL VENOUS BLD VENIPUNCTURE: CPT | Performed by: INTERNAL MEDICINE

## 2019-06-12 ENCOUNTER — OFFICE VISIT (OUTPATIENT)
Dept: FAMILY MEDICINE | Facility: CLINIC | Age: 64
End: 2019-06-12
Payer: COMMERCIAL

## 2019-06-12 VITALS
HEART RATE: 72 BPM | BODY MASS INDEX: 47.09 KG/M2 | HEIGHT: 66 IN | DIASTOLIC BLOOD PRESSURE: 72 MMHG | TEMPERATURE: 96.8 F | OXYGEN SATURATION: 98 % | SYSTOLIC BLOOD PRESSURE: 124 MMHG | WEIGHT: 293 LBS | RESPIRATION RATE: 18 BRPM

## 2019-06-12 DIAGNOSIS — L71.9 ACNE ROSACEA: ICD-10-CM

## 2019-06-12 DIAGNOSIS — L82.0 INFLAMED SEBORRHEIC KERATOSIS: Primary | ICD-10-CM

## 2019-06-12 PROCEDURE — 17110 DESTRUCTION B9 LES UP TO 14: CPT | Performed by: FAMILY MEDICINE

## 2019-06-12 PROCEDURE — 99213 OFFICE O/P EST LOW 20 MIN: CPT | Mod: 25 | Performed by: FAMILY MEDICINE

## 2019-06-12 RX ORDER — METRONIDAZOLE 7.5 MG/G
GEL TOPICAL 2 TIMES DAILY
Qty: 45 G | Refills: 3 | Status: SHIPPED | OUTPATIENT
Start: 2019-06-12

## 2019-06-12 ASSESSMENT — ASTHMA QUESTIONNAIRES
ACT_TOTALSCORE: 25
QUESTION_1 LAST FOUR WEEKS HOW MUCH OF THE TIME DID YOUR ASTHMA KEEP YOU FROM GETTING AS MUCH DONE AT WORK, SCHOOL OR AT HOME: NONE OF THE TIME
QUESTION_3 LAST FOUR WEEKS HOW OFTEN DID YOUR ASTHMA SYMPTOMS (WHEEZING, COUGHING, SHORTNESS OF BREATH, CHEST TIGHTNESS OR PAIN) WAKE YOU UP AT NIGHT OR EARLIER THAN USUAL IN THE MORNING: NOT AT ALL
QUESTION_5 LAST FOUR WEEKS HOW WOULD YOU RATE YOUR ASTHMA CONTROL: COMPLETELY CONTROLLED
ACUTE_EXACERBATION_TODAY: NO
QUESTION_2 LAST FOUR WEEKS HOW OFTEN HAVE YOU HAD SHORTNESS OF BREATH: NOT AT ALL
QUESTION_4 LAST FOUR WEEKS HOW OFTEN HAVE YOU USED YOUR RESCUE INHALER OR NEBULIZER MEDICATION (SUCH AS ALBUTEROL): NOT AT ALL

## 2019-06-12 ASSESSMENT — MIFFLIN-ST. JEOR: SCORE: 1978.83

## 2019-06-12 ASSESSMENT — PAIN SCALES - GENERAL: PAINLEVEL: NO PAIN (0)

## 2019-06-12 NOTE — PROGRESS NOTES
"Subjective     Charlee Marks is a 63 year old female who presents to clinic today for the following health issues:    HPI     Chief Complaint   Patient presents with     Derm Problem     Patient has a spot on back that is right across her bra line. Patient will find it bothersome from rubbing on bra.      Two lesions:  Left breast and middle of back at braline.  Both seem to get caught/irritated by clothing.       Also bothered more in the past six months by redness and acne like papules on her face (nose, cheeks).      Reviewed and updated as needed this visit by Provider         Review of Systems   ROS COMP: Constitutional, HEENT, cardiovascular, pulmonary, gi and gu systems are negative, except as otherwise noted.      Objective    /72   Pulse 72   Temp 96.8  F (36  C) (Tympanic)   Resp 18   Ht 1.676 m (5' 6\")   Wt 139.7 kg (308 lb)   LMP 09/15/2007   SpO2 98%   BMI 49.71 kg/m    Body mass index is 49.71 kg/m .  Physical Exam   GENERAL: healthy, alert and no distress  SKIN: no suspicious lesions or rashes, keratoses - seborrheic # 2 - frozen   Lesion at braline in the back is inflamed/irritated.   and papular acne rosacea on her nose/cheeks.     Diagnostic Test Results:  none         Assessment & Plan     1. Inflamed seborrheic keratosis   treated two lesions with liquid nitrogen x 3 freeze/thaw cycles  - DESTRUCT BENIGN LESION, UP TO 14    2. Acne rosacea     - metroNIDAZOLE (METROGEL) 0.75 % external gel; Apply topically 2 times daily  Dispense: 45 g; Refill: 3             No follow-ups on file.    Kay Briggs MD  [facility]    "

## 2019-06-12 NOTE — PATIENT INSTRUCTIONS
Our Clinic hours are:  Mondays    7:20 am - 7 pm  Tues -  Fri  7:20 am - 5 pm    Clinic Phone: [phone]    The clinic lab opens at 7:30 am Mon - Fri and appointments are required.    [facility]. [phone]  Monday  8 am - 7pm  Tues - Fri 8 am - 5:30 pm

## 2019-06-13 ASSESSMENT — ASTHMA QUESTIONNAIRES: ACT_TOTALSCORE: 25

## 2019-06-17 PROBLEM — Z79.01 LONG TERM CURRENT USE OF ANTICOAGULANT THERAPY: Status: RESOLVED | Noted: 2017-01-16 | Resolved: 2017-12-06

## 2019-06-21 DIAGNOSIS — D69.3 IDIOPATHIC THROMBOCYTOPENIC PURPURA (H): ICD-10-CM

## 2019-06-21 LAB
BASOPHILS # BLD AUTO: 0.1 10E9/L (ref 0–0.2)
BASOPHILS NFR BLD AUTO: 1 %
DIFFERENTIAL METHOD BLD: ABNORMAL
EOSINOPHIL # BLD AUTO: 0.3 10E9/L (ref 0–0.7)
EOSINOPHIL NFR BLD AUTO: 2.5 %
ERYTHROCYTE [DISTWIDTH] IN BLOOD BY AUTOMATED COUNT: 13.2 % (ref 10–15)
HCT VFR BLD AUTO: 45.3 % (ref 35–47)
HGB BLD-MCNC: 14.3 G/DL (ref 11.7–15.7)
IMM GRANULOCYTES # BLD: 0 10E9/L (ref 0–0.4)
IMM GRANULOCYTES NFR BLD: 0.1 %
LYMPHOCYTES # BLD AUTO: 5.1 10E9/L (ref 0.8–5.3)
LYMPHOCYTES NFR BLD AUTO: 45.3 %
MCH RBC QN AUTO: 29.7 PG (ref 26.5–33)
MCHC RBC AUTO-ENTMCNC: 31.6 G/DL (ref 31.5–36.5)
MCV RBC AUTO: 94 FL (ref 78–100)
MONOCYTES # BLD AUTO: 1 10E9/L (ref 0–1.3)
MONOCYTES NFR BLD AUTO: 8.9 %
NEUTROPHILS # BLD AUTO: 4.7 10E9/L (ref 1.6–8.3)
NEUTROPHILS NFR BLD AUTO: 42.2 %
NRBC # BLD AUTO: 0 10*3/UL
NRBC BLD AUTO-RTO: 0 /100
PLATELET # BLD AUTO: 69 10E9/L (ref 150–450)
RBC # BLD AUTO: 4.82 10E12/L (ref 3.8–5.2)
VARIANT LYMPHS BLD QL SMEAR: PRESENT
WBC # BLD AUTO: 11.2 10E9/L (ref 4–11)

## 2019-06-21 PROCEDURE — 36415 COLL VENOUS BLD VENIPUNCTURE: CPT | Performed by: INTERNAL MEDICINE

## 2019-06-21 PROCEDURE — 85025 COMPLETE CBC W/AUTO DIFF WBC: CPT | Performed by: INTERNAL MEDICINE

## 2019-07-05 ENCOUNTER — PATIENT OUTREACH (OUTPATIENT)
Dept: ONCOLOGY | Facility: CLINIC | Age: 64
End: 2019-07-05

## 2019-07-05 ENCOUNTER — DOCUMENTATION ONLY (OUTPATIENT)
Dept: ONCOLOGY | Facility: CLINIC | Age: 64
End: 2019-07-05

## 2019-07-05 DIAGNOSIS — D69.3 IDIOPATHIC THROMBOCYTOPENIC PURPURA (H): Primary | ICD-10-CM

## 2019-07-05 DIAGNOSIS — D69.3 IDIOPATHIC THROMBOCYTOPENIC PURPURA (H): ICD-10-CM

## 2019-07-05 LAB
BASOPHILS # BLD AUTO: 0.1 10E9/L (ref 0–0.2)
BASOPHILS NFR BLD AUTO: 1.1 %
DIFFERENTIAL METHOD BLD: ABNORMAL
EOSINOPHIL # BLD AUTO: 0.3 10E9/L (ref 0–0.7)
EOSINOPHIL NFR BLD AUTO: 2.6 %
ERYTHROCYTE [DISTWIDTH] IN BLOOD BY AUTOMATED COUNT: 13.3 % (ref 10–15)
HCT VFR BLD AUTO: 43.1 % (ref 35–47)
HGB BLD-MCNC: 13.3 G/DL (ref 11.7–15.7)
IMM GRANULOCYTES # BLD: 0 10E9/L (ref 0–0.4)
IMM GRANULOCYTES NFR BLD: 0.3 %
LYMPHOCYTES # BLD AUTO: 4.7 10E9/L (ref 0.8–5.3)
LYMPHOCYTES NFR BLD AUTO: 47.5 %
MCH RBC QN AUTO: 29.5 PG (ref 26.5–33)
MCHC RBC AUTO-ENTMCNC: 30.9 G/DL (ref 31.5–36.5)
MCV RBC AUTO: 96 FL (ref 78–100)
MONOCYTES # BLD AUTO: 0.9 10E9/L (ref 0–1.3)
MONOCYTES NFR BLD AUTO: 8.5 %
NEUTROPHILS # BLD AUTO: 4 10E9/L (ref 1.6–8.3)
NEUTROPHILS NFR BLD AUTO: 40 %
NRBC # BLD AUTO: 0 10*3/UL
NRBC BLD AUTO-RTO: 0 /100
PLATELET # BLD AUTO: 50 10E9/L (ref 150–450)
RBC # BLD AUTO: 4.51 10E12/L (ref 3.8–5.2)
WBC # BLD AUTO: 10 10E9/L (ref 4–11)

## 2019-07-05 PROCEDURE — 85025 COMPLETE CBC W/AUTO DIFF WBC: CPT | Performed by: INTERNAL MEDICINE

## 2019-07-05 PROCEDURE — 36415 COLL VENOUS BLD VENIPUNCTURE: CPT | Performed by: INTERNAL MEDICINE

## 2019-07-05 NOTE — PROGRESS NOTES
DATE:  7/5/2019   TIME OF RECEIPT FROM LAB:  9:06 AM  LAB TEST:  Plt  LAB VALUE:  Preliminary 47  RESULTS GIVEN WITH READ-BACK TO (PROVIDER):  Dr. Rankin  TIME LAB VALUE REPORTED TO PROVIDER:   9:08 AM via inSequentsket.  Sulma Ray RN

## 2019-07-05 NOTE — PROGRESS NOTES
This patient is coming in for her CBCD on Monday, July 22,2019. Please place a new standing order. Thank you.

## 2019-07-22 DIAGNOSIS — D69.3 IDIOPATHIC THROMBOCYTOPENIC PURPURA (H): ICD-10-CM

## 2019-07-22 LAB
BASOPHILS # BLD AUTO: 0.1 10E9/L (ref 0–0.2)
BASOPHILS NFR BLD AUTO: 0.6 %
DIFFERENTIAL METHOD BLD: ABNORMAL
EOSINOPHIL # BLD AUTO: 0.3 10E9/L (ref 0–0.7)
EOSINOPHIL NFR BLD AUTO: 2.7 %
ERYTHROCYTE [DISTWIDTH] IN BLOOD BY AUTOMATED COUNT: 14.3 % (ref 10–15)
HCT VFR BLD AUTO: 42.8 % (ref 35–47)
HGB BLD-MCNC: 14 G/DL (ref 11.7–15.7)
LYMPHOCYTES # BLD AUTO: 4.5 10E9/L (ref 0.8–5.3)
LYMPHOCYTES NFR BLD AUTO: 42.1 %
MCH RBC QN AUTO: 30.4 PG (ref 26.5–33)
MCHC RBC AUTO-ENTMCNC: 32.7 G/DL (ref 31.5–36.5)
MCV RBC AUTO: 93 FL (ref 78–100)
MONOCYTES # BLD AUTO: 1 10E9/L (ref 0–1.3)
MONOCYTES NFR BLD AUTO: 9.5 %
NEUTROPHILS # BLD AUTO: 4.8 10E9/L (ref 1.6–8.3)
NEUTROPHILS NFR BLD AUTO: 45.1 %
PLATELET # BLD AUTO: 111 10E9/L (ref 150–450)
RBC # BLD AUTO: 4.6 10E12/L (ref 3.8–5.2)
WBC # BLD AUTO: 10.6 10E9/L (ref 4–11)

## 2019-07-22 PROCEDURE — 85025 COMPLETE CBC W/AUTO DIFF WBC: CPT | Performed by: INTERNAL MEDICINE

## 2019-07-22 PROCEDURE — 36415 COLL VENOUS BLD VENIPUNCTURE: CPT | Performed by: INTERNAL MEDICINE

## 2019-08-05 ENCOUNTER — PATIENT OUTREACH (OUTPATIENT)
Dept: ONCOLOGY | Facility: CLINIC | Age: 64
End: 2019-08-05

## 2019-08-05 DIAGNOSIS — D69.3 IDIOPATHIC THROMBOCYTOPENIC PURPURA (H): ICD-10-CM

## 2019-08-05 LAB
BASOPHILS # BLD AUTO: 0.1 10E9/L (ref 0–0.2)
BASOPHILS NFR BLD AUTO: 0.9 %
DIFFERENTIAL METHOD BLD: ABNORMAL
EOSINOPHIL # BLD AUTO: 0.3 10E9/L (ref 0–0.7)
EOSINOPHIL NFR BLD AUTO: 3.1 %
ERYTHROCYTE [DISTWIDTH] IN BLOOD BY AUTOMATED COUNT: 13.2 % (ref 10–15)
HCT VFR BLD AUTO: 44.8 % (ref 35–47)
HGB BLD-MCNC: 14.1 G/DL (ref 11.7–15.7)
IMM GRANULOCYTES # BLD: 0 10E9/L (ref 0–0.4)
IMM GRANULOCYTES NFR BLD: 0.3 %
LYMPHOCYTES # BLD AUTO: 4.9 10E9/L (ref 0.8–5.3)
LYMPHOCYTES NFR BLD AUTO: 45.4 %
MCH RBC QN AUTO: 29.8 PG (ref 26.5–33)
MCHC RBC AUTO-ENTMCNC: 31.5 G/DL (ref 31.5–36.5)
MCV RBC AUTO: 95 FL (ref 78–100)
MONOCYTES # BLD AUTO: 0.8 10E9/L (ref 0–1.3)
MONOCYTES NFR BLD AUTO: 7.7 %
NEUTROPHILS # BLD AUTO: 4.6 10E9/L (ref 1.6–8.3)
NEUTROPHILS NFR BLD AUTO: 42.6 %
NRBC # BLD AUTO: 0 10*3/UL
NRBC BLD AUTO-RTO: 0 /100
PLATELET # BLD AUTO: 44 10E9/L (ref 150–450)
RBC # BLD AUTO: 4.73 10E12/L (ref 3.8–5.2)
WBC # BLD AUTO: 10.9 10E9/L (ref 4–11)

## 2019-08-05 PROCEDURE — 85025 COMPLETE CBC W/AUTO DIFF WBC: CPT | Performed by: INTERNAL MEDICINE

## 2019-08-05 PROCEDURE — 36415 COLL VENOUS BLD VENIPUNCTURE: CPT | Performed by: INTERNAL MEDICINE

## 2019-08-05 NOTE — PROGRESS NOTES
DATE:  8/5/2019   TIME OF RECEIPT FROM LAB:  8:49 AM  LAB TEST:  Plt  LAB VALUE:  39 PRELIMINARY  RESULTS GIVEN WITH READ-BACK TO (PROVIDER):  Karla Chi RN via inCloudBeds  TIME LAB VALUE REPORTED TO PROVIDER:   8:50 AM  Sulma Ray RN

## 2019-08-19 DIAGNOSIS — D69.3 IDIOPATHIC THROMBOCYTOPENIC PURPURA (H): ICD-10-CM

## 2019-08-19 LAB
BASOPHILS # BLD AUTO: 0.1 10E9/L (ref 0–0.2)
BASOPHILS NFR BLD AUTO: 1.1 %
DIFFERENTIAL METHOD BLD: ABNORMAL
EOSINOPHIL # BLD AUTO: 0.3 10E9/L (ref 0–0.7)
EOSINOPHIL NFR BLD AUTO: 2.8 %
ERYTHROCYTE [DISTWIDTH] IN BLOOD BY AUTOMATED COUNT: 13.2 % (ref 10–15)
HCT VFR BLD AUTO: 44 % (ref 35–47)
HGB BLD-MCNC: 13.7 G/DL (ref 11.7–15.7)
IMM GRANULOCYTES # BLD: 0 10E9/L (ref 0–0.4)
IMM GRANULOCYTES NFR BLD: 0.3 %
LYMPHOCYTES # BLD AUTO: 5.6 10E9/L (ref 0.8–5.3)
LYMPHOCYTES NFR BLD AUTO: 48.2 %
MCH RBC QN AUTO: 29.4 PG (ref 26.5–33)
MCHC RBC AUTO-ENTMCNC: 31.1 G/DL (ref 31.5–36.5)
MCV RBC AUTO: 94 FL (ref 78–100)
MONOCYTES # BLD AUTO: 0.9 10E9/L (ref 0–1.3)
MONOCYTES NFR BLD AUTO: 7.6 %
NEUTROPHILS # BLD AUTO: 4.6 10E9/L (ref 1.6–8.3)
NEUTROPHILS NFR BLD AUTO: 40 %
NRBC # BLD AUTO: 0 10*3/UL
NRBC BLD AUTO-RTO: 0 /100
PLATELET # BLD AUTO: 75 10E9/L (ref 150–450)
PLATELET # BLD EST: ABNORMAL 10*3/UL
RBC # BLD AUTO: 4.66 10E12/L (ref 3.8–5.2)
RBC MORPH BLD: NORMAL
VARIANT LYMPHS BLD QL SMEAR: PRESENT
WBC # BLD AUTO: 11.6 10E9/L (ref 4–11)

## 2019-08-19 PROCEDURE — 85025 COMPLETE CBC W/AUTO DIFF WBC: CPT | Performed by: INTERNAL MEDICINE

## 2019-08-19 PROCEDURE — 36415 COLL VENOUS BLD VENIPUNCTURE: CPT | Performed by: INTERNAL MEDICINE

## 2019-09-03 ENCOUNTER — PATIENT OUTREACH (OUTPATIENT)
Dept: ONCOLOGY | Facility: CLINIC | Age: 64
End: 2019-09-03

## 2019-09-03 DIAGNOSIS — D69.3 IDIOPATHIC THROMBOCYTOPENIC PURPURA (H): ICD-10-CM

## 2019-09-03 PROCEDURE — 85025 COMPLETE CBC W/AUTO DIFF WBC: CPT | Performed by: INTERNAL MEDICINE

## 2019-09-03 PROCEDURE — 36415 COLL VENOUS BLD VENIPUNCTURE: CPT | Performed by: INTERNAL MEDICINE

## 2019-09-03 NOTE — PROGRESS NOTES
DATE:  9/3/2019   TIME OF RECEIPT FROM LAB:  3:28 PM  LAB TEST:  Plt  LAB VALUE:  42  RESULTS GIVEN WITH READ-BACK TO (PROVIDER):  Karla Chi RN via [employer]  TIME LAB VALUE REPORTED TO PROVIDER:   3:29 PM

## 2019-09-04 LAB
BASOPHILS # BLD AUTO: 0.3 10E9/L (ref 0–0.2)
BASOPHILS NFR BLD AUTO: 2 %
DIFFERENTIAL METHOD BLD: ABNORMAL
EOSINOPHIL # BLD AUTO: 0.2 10E9/L (ref 0–0.7)
EOSINOPHIL NFR BLD AUTO: 1 %
ERYTHROCYTE [DISTWIDTH] IN BLOOD BY AUTOMATED COUNT: 13.1 % (ref 10–15)
HCT VFR BLD AUTO: 43 % (ref 35–47)
HGB BLD-MCNC: 13.8 G/DL (ref 11.7–15.7)
LYMPHOCYTES # BLD AUTO: 6.6 10E9/L (ref 0.8–5.3)
LYMPHOCYTES NFR BLD AUTO: 42 %
MCH RBC QN AUTO: 29.9 PG (ref 26.5–33)
MCHC RBC AUTO-ENTMCNC: 32.1 G/DL (ref 31.5–36.5)
MCV RBC AUTO: 93 FL (ref 78–100)
MONOCYTES # BLD AUTO: 1.2 10E9/L (ref 0–1.3)
MONOCYTES NFR BLD AUTO: 8 %
NEUTROPHILS # BLD AUTO: 7.3 10E9/L (ref 1.6–8.3)
NEUTROPHILS NFR BLD AUTO: 47 %
PLATELET # BLD AUTO: 51 10E9/L (ref 150–450)
PLATELET # BLD EST: ABNORMAL 10*3/UL
RBC # BLD AUTO: 4.62 10E12/L (ref 3.8–5.2)
RBC MORPH BLD: ABNORMAL
WBC # BLD AUTO: 15.6 10E9/L (ref 4–11)

## 2019-09-16 DIAGNOSIS — D69.3 IDIOPATHIC THROMBOCYTOPENIC PURPURA (H): ICD-10-CM

## 2019-09-16 LAB
BASOPHILS # BLD AUTO: 0.1 10E9/L (ref 0–0.2)
BASOPHILS NFR BLD AUTO: 0.8 %
DIFFERENTIAL METHOD BLD: ABNORMAL
EOSINOPHIL # BLD AUTO: 0.2 10E9/L (ref 0–0.7)
EOSINOPHIL NFR BLD AUTO: 1.1 %
ERYTHROCYTE [DISTWIDTH] IN BLOOD BY AUTOMATED COUNT: 13.3 % (ref 10–15)
HCT VFR BLD AUTO: 44.1 % (ref 35–47)
HGB BLD-MCNC: 14.1 G/DL (ref 11.7–15.7)
IMM GRANULOCYTES # BLD: 0 10E9/L (ref 0–0.4)
IMM GRANULOCYTES NFR BLD: 0.3 %
LYMPHOCYTES # BLD AUTO: 4.5 10E9/L (ref 0.8–5.3)
LYMPHOCYTES NFR BLD AUTO: 31.6 %
MCH RBC QN AUTO: 30.1 PG (ref 26.5–33)
MCHC RBC AUTO-ENTMCNC: 32 G/DL (ref 31.5–36.5)
MCV RBC AUTO: 94 FL (ref 78–100)
MONOCYTES # BLD AUTO: 1.2 10E9/L (ref 0–1.3)
MONOCYTES NFR BLD AUTO: 8.7 %
NEUTROPHILS # BLD AUTO: 8.1 10E9/L (ref 1.6–8.3)
NEUTROPHILS NFR BLD AUTO: 57.5 %
NRBC # BLD AUTO: 0 10*3/UL
NRBC BLD AUTO-RTO: 0 /100
PLATELET # BLD AUTO: 66 10E9/L (ref 150–450)
RBC # BLD AUTO: 4.68 10E12/L (ref 3.8–5.2)
WBC # BLD AUTO: 14.1 10E9/L (ref 4–11)

## 2019-09-16 PROCEDURE — 36415 COLL VENOUS BLD VENIPUNCTURE: CPT | Performed by: INTERNAL MEDICINE

## 2019-09-16 PROCEDURE — 85025 COMPLETE CBC W/AUTO DIFF WBC: CPT | Performed by: INTERNAL MEDICINE

## 2019-09-19 ENCOUNTER — OFFICE VISIT (OUTPATIENT)
Dept: FAMILY MEDICINE | Facility: CLINIC | Age: 64
End: 2019-09-19
Payer: COMMERCIAL

## 2019-09-19 ENCOUNTER — ANCILLARY PROCEDURE (OUTPATIENT)
Dept: GENERAL RADIOLOGY | Facility: CLINIC | Age: 64
End: 2019-09-19
Attending: FAMILY MEDICINE
Payer: COMMERCIAL

## 2019-09-19 VITALS
OXYGEN SATURATION: 94 % | TEMPERATURE: 99 F | BODY MASS INDEX: 47.09 KG/M2 | SYSTOLIC BLOOD PRESSURE: 114 MMHG | DIASTOLIC BLOOD PRESSURE: 62 MMHG | HEART RATE: 84 BPM | HEIGHT: 66 IN | RESPIRATION RATE: 18 BRPM | WEIGHT: 293 LBS

## 2019-09-19 DIAGNOSIS — M25.50 POLYARTHRALGIA: Primary | ICD-10-CM

## 2019-09-19 DIAGNOSIS — D69.3 IDIOPATHIC THROMBOCYTOPENIC PURPURA (H): ICD-10-CM

## 2019-09-19 DIAGNOSIS — M25.562 BILATERAL CHRONIC KNEE PAIN: ICD-10-CM

## 2019-09-19 DIAGNOSIS — G89.29 BILATERAL CHRONIC KNEE PAIN: ICD-10-CM

## 2019-09-19 DIAGNOSIS — M25.561 BILATERAL CHRONIC KNEE PAIN: ICD-10-CM

## 2019-09-19 DIAGNOSIS — M25.50 POLYARTHRALGIA: ICD-10-CM

## 2019-09-19 DIAGNOSIS — E66.01 MORBID OBESITY (H): ICD-10-CM

## 2019-09-19 DIAGNOSIS — Z23 NEED FOR PROPHYLACTIC VACCINATION AND INOCULATION AGAINST INFLUENZA: ICD-10-CM

## 2019-09-19 LAB
BASOPHILS # BLD AUTO: 0.2 10E9/L (ref 0–0.2)
BASOPHILS NFR BLD AUTO: 1.3 %
CRP SERPL-MCNC: 14.2 MG/L (ref 0–8)
DIFFERENTIAL METHOD BLD: ABNORMAL
EOSINOPHIL # BLD AUTO: 0.2 10E9/L (ref 0–0.7)
EOSINOPHIL NFR BLD AUTO: 1.4 %
ERYTHROCYTE [DISTWIDTH] IN BLOOD BY AUTOMATED COUNT: 13.2 % (ref 10–15)
ERYTHROCYTE [SEDIMENTATION RATE] IN BLOOD BY WESTERGREN METHOD: 57 MM/H (ref 0–30)
HCT VFR BLD AUTO: 44.6 % (ref 35–47)
HGB BLD-MCNC: 14.6 G/DL (ref 11.7–15.7)
IMM GRANULOCYTES # BLD: 0 10E9/L (ref 0–0.4)
IMM GRANULOCYTES NFR BLD: 0.3 %
LYMPHOCYTES # BLD AUTO: 4.1 10E9/L (ref 0.8–5.3)
LYMPHOCYTES NFR BLD AUTO: 35.3 %
MCH RBC QN AUTO: 30.8 PG (ref 26.5–33)
MCHC RBC AUTO-ENTMCNC: 32.7 G/DL (ref 31.5–36.5)
MCV RBC AUTO: 94 FL (ref 78–100)
MONOCYTES # BLD AUTO: 1 10E9/L (ref 0–1.3)
MONOCYTES NFR BLD AUTO: 9 %
NEUTROPHILS # BLD AUTO: 6.1 10E9/L (ref 1.6–8.3)
NEUTROPHILS NFR BLD AUTO: 52.7 %
NRBC # BLD AUTO: 0 10*3/UL
NRBC BLD AUTO-RTO: 0 /100
PLATELET # BLD AUTO: 49 10E9/L (ref 150–450)
RBC # BLD AUTO: 4.74 10E12/L (ref 3.8–5.2)
WBC # BLD AUTO: 11.6 10E9/L (ref 4–11)

## 2019-09-19 PROCEDURE — 86200 CCP ANTIBODY: CPT | Performed by: FAMILY MEDICINE

## 2019-09-19 PROCEDURE — 86431 RHEUMATOID FACTOR QUANT: CPT | Performed by: FAMILY MEDICINE

## 2019-09-19 PROCEDURE — 85652 RBC SED RATE AUTOMATED: CPT | Performed by: FAMILY MEDICINE

## 2019-09-19 PROCEDURE — 85025 COMPLETE CBC W/AUTO DIFF WBC: CPT | Performed by: FAMILY MEDICINE

## 2019-09-19 PROCEDURE — 36415 COLL VENOUS BLD VENIPUNCTURE: CPT | Performed by: FAMILY MEDICINE

## 2019-09-19 PROCEDURE — 73560 X-RAY EXAM OF KNEE 1 OR 2: CPT | Mod: 50

## 2019-09-19 PROCEDURE — 90682 RIV4 VACC RECOMBINANT DNA IM: CPT | Performed by: FAMILY MEDICINE

## 2019-09-19 PROCEDURE — 86140 C-REACTIVE PROTEIN: CPT | Performed by: FAMILY MEDICINE

## 2019-09-19 PROCEDURE — 86618 LYME DISEASE ANTIBODY: CPT | Performed by: FAMILY MEDICINE

## 2019-09-19 PROCEDURE — 86038 ANTINUCLEAR ANTIBODIES: CPT | Performed by: FAMILY MEDICINE

## 2019-09-19 PROCEDURE — 90471 IMMUNIZATION ADMIN: CPT | Performed by: FAMILY MEDICINE

## 2019-09-19 PROCEDURE — 99214 OFFICE O/P EST MOD 30 MIN: CPT | Mod: 25 | Performed by: FAMILY MEDICINE

## 2019-09-19 ASSESSMENT — MIFFLIN-ST. JEOR: SCORE: 1960.22

## 2019-09-19 ASSESSMENT — PAIN SCALES - GENERAL: PAINLEVEL: SEVERE PAIN (6)

## 2019-09-19 NOTE — PATIENT INSTRUCTIONS
Our Clinic hours are:  Mondays    7:20 am - 7 pm  Tues -  Fri  7:20 am - 5 pm    Clinic Phone: [phone]    The clinic lab opens at 7:30 am Mon - Fri and appointments are required.    [facility]. [phone]  Monday  8 am - 7pm  Tues - Fri 8 am - 5:30 pm

## 2019-09-19 NOTE — PROGRESS NOTES
John Marks is a 64 year old female who presents to clinic today for the following health issues:    HPI   Joint Pain    Onset: 3 months     Description:   Location: left elbow, right elbow, left wrist, right wrist, left knee, right knee, left ankle and right ankle  Character: Dull ache to a tingling pin prick feeling in elbow and hands. Tingling in feet. Knees has a dull ache pain and no sharp pains yet and stiffness with pain when getting up.    Intensity: moderate    Progression of Symptoms: worse    Accompanying Signs & Symptoms:  Other symptoms: radiation of pain to elbow to wrists, numbness, tingling, weakness of knees and swelling    History:   Previous similar pain: no       Precipitating factors:   Trauma or overuse: no     Alleviating factors:  Improved by: rest/inactivity and support wrap    Therapies Tried and outcome: wrist brace, tylenol    No specific recent illnesses.  No redness of joint.  No specific swelling of joints.  No tick bites.     Knees have been bothersome for some time, the remainder of joint pain is new and pretty consistent.      Taking acetaminophen 1000 mg twice daily     Patient Active Problem List    Diagnosis Date Noted     Colon Polyp- Tubular Adenoma 11/06/2018     Priority: Medium     Collected: 11/5/2018   Received: 11/5/2018   Reported: 11/6/2018 13:49   Ordering Phy(s): RAND RODRIGUEZ     For improved result formatting, select 'View Enhanced Report Format' under    Linked Documents section.     SPECIMEN(S):   Hepatic flexure polyp     FINAL DIAGNOSIS:   Hepatic flexure polyp:   - Tubular adenoma.   - Negative for high grade dysplasia or malignancy.        Benign essential hypertension 09/10/2018     Priority: Medium     Portal vein thrombosis 12/31/2016     Priority: Medium     S/P splenectomy 12/13/2016     Priority: Medium     Received Menactra, Bexsero, HIB and Prevnar 13 11/7/2016  Due for Menactra  booster 1/7/2017 then every 5 years  Due for second  Pneumovax 23 1/7/2017  For age 56 years and older Give two doses of Menactra or Menveo 2 months apart   and either Trumenba or Bexsero*. Boost every 5 years with Menactra or Menveo  . (Up to Date)         Hepatitis B core antibody positive 09/08/2016     Priority: Medium     ITP (idiopathic thrombocytopenic purpura) 07/25/2016     Priority: Medium     Cystocele 08/28/2013     Priority: Medium     Advanced directives, counseling/discussion 05/03/2013     Priority: Medium     Advance Care Planning:   Initial facilitation introduction:   Charlee Marks presented for initial session regarding ACP at the clinic. She was accompanied by self. Honoring Choices information provided and resources reviewed. She currently will bring home honoring choices information to go over with family members.  She currently has the following questions or concerns about Advance Care Planning: none known.  Confirmed/documented designated decision maker(s). See permanent comments section of demographics in clinical tab. Confirmed code status reflects current choices . Follow-up meeting: none.  Added by Pilar Garay on 5/3/2013           CARDIOVASCULAR SCREENING; LDL GOAL LESS THAN 160 10/31/2010     Priority: Medium     Morbid obesity (H) 08/20/2007     Priority: Medium     BMI 45       ESOPHAGEAL REFLUX 12/20/2006     Priority: Medium     Edema 08/10/2006     Priority: Medium     Effusion of lower leg joint 08/10/2006     Priority: Medium     Plantar fascial fibromatosis 08/25/2005     Priority: Medium     Mild intermittent asthma 08/25/2005     Priority: Medium     Allergic state 08/25/2005     Priority: Medium     Problem list name updated by automated process. Provider to review         Reviewed and updated as needed this visit by Provider         Review of Systems   ROS COMP: Constitutional, HEENT, cardiovascular, pulmonary, gi and gu systems are negative, except as otherwise noted.      Objective    /62   Pulse 84   Temp 99  F (37.2  " C) (Tympanic)   Resp 18   Ht 1.676 m (5' 6\")   Wt 138.3 kg (305 lb)   LMP 09/15/2007   SpO2 94%   Breastfeeding? No   BMI 49.23 kg/m    Body mass index is 49.23 kg/m .  Physical Exam   GENERAL: healthy, alert and no distress  NECK: no adenopathy, no asymmetry, masses, or scars and thyroid normal to palpation  RESP: lungs clear to auscultation - no rales, rhonchi or wheezes  CV: regular rate and rhythm, normal S1 S2, no S3 or S4, no murmur, click or rub, no peripheral edema and peripheral pulses strong  ABDOMEN: soft, nontender, no hepatosplenomegaly, no masses and bowel sounds normal  MS: no synovitis, no redness  Bilateral knee crepitus, exam difficult given body habitus    Diagnostic Test Results:  Xray knees:  Medial joint space narrowing bilaterally      Results for orders placed or performed in visit on 09/16/19   CBC with platelets differential   Result Value Ref Range    WBC 14.1 (H) 4.0 - 11.0 10e9/L    RBC Count 4.68 3.8 - 5.2 10e12/L    Hemoglobin 14.1 11.7 - 15.7 g/dL    Hematocrit 44.1 35.0 - 47.0 %    MCV 94 78 - 100 fl    MCH 30.1 26.5 - 33.0 pg    MCHC 32.0 31.5 - 36.5 g/dL    RDW 13.3 10.0 - 15.0 %    Platelet Count 66 (L) 150 - 450 10e9/L    Diff Method Automated Method     % Neutrophils 57.5 %    % Lymphocytes 31.6 %    % Monocytes 8.7 %    % Eosinophils 1.1 %    % Basophils 0.8 %    % Immature Granulocytes 0.3 %    Nucleated RBCs 0 0 /100    Absolute Neutrophil 8.1 1.6 - 8.3 10e9/L    Absolute Lymphocytes 4.5 0.8 - 5.3 10e9/L    Absolute Monocytes 1.2 0.0 - 1.3 10e9/L    Absolute Eosinophils 0.2 0.0 - 0.7 10e9/L    Absolute Basophils 0.1 0.0 - 0.2 10e9/L    Abs Immature Granulocytes 0.0 0 - 0.4 10e9/L    Absolute Nucleated RBC 0.0          Assessment & Plan     1. Polyarthralgia    R/o autoimmune/rheumatologic issues with some basic screening labs  R/o lyme  - Asthma Action Plan (AAP)  - CBC with platelets differential  - Erythrocyte sedimentation rate auto  - CRP inflammation  - " Rheumatoid factor  - Anti Nuclear Adriana IgG by IFA with Reflex  - XR Knee Bilateral 1/2 Views; Future  - Cyclic Citrullinated Peptide Antibody IgG  - Lyme Disease Adriana with reflex to WB Serum    2. Bilateral chronic knee pain     - XR Knee Bilateral 1/2 Views; Future  - ORTHO  REFERRAL    3. Need for prophylactic vaccination and inoculation against influenza     - INFLUENZA QUAD, RECOMBINANT, P-FREE (RIV4) (FLUBLOCK) [25401]  - Vaccine Administration, Initial [36876]    4. Morbid obesity (H)       5. ITP (idiopathic thrombocytopenic purpura)   followed by hematology           No follow-ups on file.    Kay Briggs MD  [facility]

## 2019-09-20 LAB
ANA SER QL IF: NEGATIVE
B BURGDOR IGG+IGM SER QL: 0.09 (ref 0–0.89)
CCP AB SER IA-ACNC: 1 U/ML
RHEUMATOID FACT SER NEPH-ACNC: <20 IU/ML (ref 0–20)

## 2019-09-25 ENCOUNTER — OFFICE VISIT (OUTPATIENT)
Dept: ORTHOPEDICS | Facility: CLINIC | Age: 64
End: 2019-09-25
Payer: COMMERCIAL

## 2019-09-25 VITALS
WEIGHT: 293 LBS | HEIGHT: 66 IN | SYSTOLIC BLOOD PRESSURE: 122 MMHG | DIASTOLIC BLOOD PRESSURE: 70 MMHG | BODY MASS INDEX: 47.09 KG/M2

## 2019-09-25 DIAGNOSIS — M17.0 ARTHRITIS OF BOTH KNEES: Primary | ICD-10-CM

## 2019-09-25 PROCEDURE — 99243 OFF/OP CNSLTJ NEW/EST LOW 30: CPT | Performed by: PEDIATRICS

## 2019-09-25 ASSESSMENT — MIFFLIN-ST. JEOR: SCORE: 1960.22

## 2019-09-25 NOTE — LETTER
9/25/2019         RE: Charlee Marks  [address] [place] [address]        Dear Colleague,    Thank you for referring your patient, Charlee Marks, to the [facility]. Please see a copy of my visit note below.    Sports Medicine Clinic Visit    PCP: Kay Briggs    Charlee Marks is a 64 year old female who is seen  in consultation at the request of  Kay Briggs M.D. presenting with bilateral knee pain    Injury: She reports chronic knee pain for 6+ months. She reports no injury. She states her pain is consistent but increases with standing and walking.     Location of Pain: bilateral knee  Duration of Pain: 6+ month(s)  Rating of Pain at worst: 9/10  Rating of Pain Currently: 2/10  Symptoms are better with: Tylenol  Symptoms are worse with: standing and stairs and walking  Additional Features:   Positive: swelling, popping, grinding, instability and weakness   Negative: bruising, catching, locking, paresthesias and numbness  Other evaluation and/or treatments so far consists of: PT and Steroid injection 20+ years ago (helped for a bit)  Prior History of related problems: Chronic knee pain    Social History:  at a school    Review of Systems  Skin: no bruising, no swelling  Musculoskeletal: as above  Neurologic: no numbness, paresthesias  Remainder of review of systems is negative including constitutional, CV, pulmonary, GI, except as noted in HPI or medical history.    Patient's current problem list, past medical and surgical history, and family history were reviewed.    Patient Active Problem List   Diagnosis     Plantar fascial fibromatosis     Mild intermittent asthma     Allergic state     Edema     Effusion of lower leg joint     ESOPHAGEAL REFLUX     Morbid obesity (H)     CARDIOVASCULAR SCREENING; LDL GOAL LESS THAN 160     Advanced directives, counseling/discussion     Cystocele     ITP (idiopathic thrombocytopenic purpura)      "Hepatitis B core antibody positive     S/P splenectomy     Portal vein thrombosis     Benign essential hypertension     Colon Polyp- Tubular Adenoma     Past Medical History:   Diagnosis Date     Asthma      GERD (gastroesophageal reflux disease)      Idiopathic thrombocytopenic purpura (H)      Obesity      Other specified gastritis without mention of hemorrhage      Splenomegaly      Past Surgical History:   Procedure Laterality Date     C LIGATE FALLOPIAN TUBE       COLONOSCOPY  10/19/2007     LAPAROSCOPIC SPLENECTOMY N/A 12/13/2016    Procedure: LAPAROSCOPIC SPLENECTOMY;  Surgeon: Catalina Biran MD;  Location: [facility] OR     PICC INSERTION Right 1/4/2017    5fr DL BioFlo PICC, 44cm (3cm external) in the R cephalic vein w/ tip in the low SVC.     SURGICAL HISTORY OF -   1976    Breast Cyst Excision      SURGICAL HISTORY OF -   8/1997    Open Bladder Neck Suspension for stress urinary incontinence     SURGICAL HISTORY OF -   Age 6    Tonsillectomy     SURGICAL HISTORY OF -   10/83 & 2/84    Oral Surgery [place]      Family History   Problem Relation Age of Onset     Breast Cancer Mother         70's     Breast Cancer Maternal Grandmother      Diabetes Father      C.A.D. Father      Respiratory Maternal Grandfather      Allergies Sister      Respiratory Sister      Allergies Daughter          Objective  /70   Ht 1.676 m (5' 6\")   Wt 138.3 kg (305 lb)   LMP 09/15/2007   BMI 49.23 kg/m       GENERAL APPEARANCE: healthy, alert and no distress   GAIT: NORMAL  SKIN: no suspicious lesions or rashes  HEENT: Sclera clear, anicteric  CV: good peripheral pulses  RESP: Breathing not labored  NEURO: Normal strength and tone, mentation intact and speech normal  PSYCH:  mentation appears normal and affect normal/bright    Bilateral Knee exam  Inspection:      no effusion, swelling of bruising bilateral    Patella:      Crepitus noted in the patellofemoral joint bilateral    Tender:      medial joint line bilateral      "  lateral joint line bilateral    Non Tender:      remainder of knee area bilateral    Knee ROM:      Range of motion limited slightly in full flexion and extension    Strength:      5-/5 with knee extension bilateral    Special Tests:     neg (-) Joey bilateral       neg (-) anterior drawer bilateral       neg (-) posterior drawer bilateral       neg (-) varus at 0 deg and 30 deg bilateral       neg (-) valgus at 0 deg and 30 deg bilateral    Gait:      normal    Neurovascular:      2+ peripheral pulses bilaterally and brisk capillary refill       sensation grossly intact    Radiology  I ordered, visualized and reviewed these images with the patient  Xr Knee Bilateral 1/2 Views  Result Date: 9/19/2019  KNEE BILATERAL ONE TO TWO VIEWS  9/19/2019 3:53 PM HISTORY: Bilateral chronic knee pain. COMPARISON: None. FINDINGS: Right: Moderate medial compartment narrowing with mild hypertrophic change. Mild hypertrophic change in the lateral and patellofemoral compartments. Small knee joint effusion. Left: Mild narrowing and hypertrophic change in the medial compartment. Mild hypertrophic change in the lateral and patellofemoral compartments. Trace knee joint effusion.   IMPRESSION:  Bilateral tricompartmental osteoarthritis.  NASIMA WORKMAN MD    Assessment:  1. Arthritis of both knees      Discussed nature of degenerative arthrosis of the knee. Discussed symptom treatment with over-the-counter medications, ice or heat, topical treatments, and rest if needed. Discussed use of sleeve or wrap for comfort. Discussed benefits of exercise and weight loss to reduce pressure at the knee. Discussed injection therapy. Also briefly discussed future consideration of referral to orthopedic surgery for further evaluation and discussion of arthroplasty.  - Given ITP, will review possible injection with Hematology/Oncology and call patient after that discussion.    Plan:  - Today's Plan of Care:  Rehab: Physical Therapy: [place]  [facility] - [phone]    -We also discussed other future treatment options:  Pool Therapy  US guided injection - will discuss with Oncology  Synvisc Injection - US guided  Referral to Orthopedic Surgery    Follow Up: will call    Concerning signs and symptoms were reviewed.  The patient expressed understanding of this management plan and all questions were answered at this time.    Thanks for the opportunity to participate in the care of this patient, I will keep you updated on their progress.    CC: Kay Hunter MD [place]  Primary Care Sports Medicine  [facility]      Again, thank you for allowing me to participate in the care of your patient.        Sincerely,        Nohelia Hunter MD

## 2019-09-25 NOTE — PATIENT INSTRUCTIONS
Plan:  - Today's Plan of Care:  Rehab: Physical Therapy: [facility] - [phone]    -We also discussed other future treatment options:  Pool Therapy  US guided injection - will discuss with Oncology  Synvisc Injection - US guided  Referral to Orthopedic Surgery    Follow Up: will call    If you have any further questions for your physician or physician s care team you can call [phone] and use option 3 to leave a voice message. Calls received during business hours will be returned same day.

## 2019-09-25 NOTE — PROGRESS NOTES
Sports Medicine Clinic Visit    PCP: Kay Briggs    Charlee Marks is a 64 year old female who is seen  in consultation at the request of  Kay Briggs M.D. presenting with bilateral knee pain    Injury: She reports chronic knee pain for 6+ months. She reports no injury. She states her pain is consistent but increases with standing and walking.     Location of Pain: bilateral knee  Duration of Pain: 6+ month(s)  Rating of Pain at worst: 9/10  Rating of Pain Currently: 2/10  Symptoms are better with: Tylenol  Symptoms are worse with: standing and stairs and walking  Additional Features:   Positive: swelling, popping, grinding, instability and weakness   Negative: bruising, catching, locking, paresthesias and numbness  Other evaluation and/or treatments so far consists of: PT and Steroid injection 20+ years ago (helped for a bit)  Prior History of related problems: Chronic knee pain    Social History:  at a school    Review of Systems  Skin: no bruising, no swelling  Musculoskeletal: as above  Neurologic: no numbness, paresthesias  Remainder of review of systems is negative including constitutional, CV, pulmonary, GI, except as noted in HPI or medical history.    Patient's current problem list, past medical and surgical history, and family history were reviewed.    Patient Active Problem List   Diagnosis     Plantar fascial fibromatosis     Mild intermittent asthma     Allergic state     Edema     Effusion of lower leg joint     ESOPHAGEAL REFLUX     Morbid obesity (H)     CARDIOVASCULAR SCREENING; LDL GOAL LESS THAN 160     Advanced directives, counseling/discussion     Cystocele     ITP (idiopathic thrombocytopenic purpura)     Hepatitis B core antibody positive     S/P splenectomy     Portal vein thrombosis     Benign essential hypertension     Colon Polyp- Tubular Adenoma     Past Medical History:   Diagnosis Date     Asthma      GERD (gastroesophageal reflux disease)      Idiopathic  "thrombocytopenic purpura (H)      Obesity      Other specified gastritis without mention of hemorrhage      Splenomegaly      Past Surgical History:   Procedure Laterality Date     C LIGATE FALLOPIAN TUBE       COLONOSCOPY  10/19/2007     LAPAROSCOPIC SPLENECTOMY N/A 12/13/2016    Procedure: LAPAROSCOPIC SPLENECTOMY;  Surgeon: Catalina Brian MD;  Location: [facility] OR     PICC INSERTION Right 1/4/2017    5fr DL BioFlo PICC, 44cm (3cm external) in the R cephalic vein w/ tip in the low SVC.     SURGICAL HISTORY OF -   1976    Breast Cyst Excision      SURGICAL HISTORY OF -   8/1997    Open Bladder Neck Suspension for stress urinary incontinence     SURGICAL HISTORY OF -   Age 6    Tonsillectomy     SURGICAL HISTORY OF -   10/83 & 2/84    [facility] [place]      Family History   Problem Relation Age of Onset     Breast Cancer Mother         70's     Breast Cancer Maternal Grandmother      Diabetes Father      C.A.D. Father      Respiratory Maternal Grandfather      Allergies Sister      Respiratory Sister      Allergies Daughter          Objective  /70   Ht 1.676 m (5' 6\")   Wt 138.3 kg (305 lb)   LMP 09/15/2007   BMI 49.23 kg/m      GENERAL APPEARANCE: healthy, alert and no distress   GAIT: NORMAL  SKIN: no suspicious lesions or rashes  HEENT: Sclera clear, anicteric  CV: good peripheral pulses  RESP: Breathing not labored  NEURO: Normal strength and tone, mentation intact and speech normal  PSYCH:  mentation appears normal and affect normal/bright    Bilateral Knee exam  Inspection:      no effusion, swelling of bruising bilateral    Patella:      Crepitus noted in the patellofemoral joint bilateral    Tender:      medial joint line bilateral       lateral joint line bilateral    Non Tender:      remainder of knee area bilateral    Knee ROM:      Range of motion limited slightly in full flexion and extension    Strength:      5-/5 with knee extension bilateral    Special Tests:     neg (-) Joey " bilateral       neg (-) anterior drawer bilateral       neg (-) posterior drawer bilateral       neg (-) varus at 0 deg and 30 deg bilateral       neg (-) valgus at 0 deg and 30 deg bilateral    Gait:      normal    Neurovascular:      2+ peripheral pulses bilaterally and brisk capillary refill       sensation grossly intact    Radiology  I ordered, visualized and reviewed these images with the patient  Xr Knee Bilateral 1/2 Views  Result Date: 9/19/2019  KNEE BILATERAL ONE TO TWO VIEWS  9/19/2019 3:53 PM HISTORY: Bilateral chronic knee pain. COMPARISON: None. FINDINGS: Right: Moderate medial compartment narrowing with mild hypertrophic change. Mild hypertrophic change in the lateral and patellofemoral compartments. Small knee joint effusion. Left: Mild narrowing and hypertrophic change in the medial compartment. Mild hypertrophic change in the lateral and patellofemoral compartments. Trace knee joint effusion.   IMPRESSION:  Bilateral tricompartmental osteoarthritis.  NASIMA WORKMAN MD    Assessment:  1. Arthritis of both knees      Discussed nature of degenerative arthrosis of the knee. Discussed symptom treatment with over-the-counter medications, ice or heat, topical treatments, and rest if needed. Discussed use of sleeve or wrap for comfort. Discussed benefits of exercise and weight loss to reduce pressure at the knee. Discussed injection therapy. Also briefly discussed future consideration of referral to orthopedic surgery for further evaluation and discussion of arthroplasty.  - Given ITP, will review possible injection with Hematology/Oncology and call patient after that discussion.    Plan:  - Today's Plan of Care:  [facility] [phone]    -We also discussed other future treatment options:  Pool Therapy  US guided injection - will discuss with Oncology  Synvisc Injection - US guided  Referral to Orthopedic Surgery    Follow Up: will call    Concerning signs and  symptoms were reviewed.  The patient expressed understanding of this management plan and all questions were answered at this time.    Thanks for the opportunity to participate in the care of this patient, I will keep you updated on their progress.    CC: Kay Hunter MD CA  Primary Care Sports Medicine  [facility]

## 2019-09-30 ENCOUNTER — TELEPHONE (OUTPATIENT)
Dept: ORTHOPEDICS | Facility: CLINIC | Age: 64
End: 2019-09-30

## 2019-09-30 DIAGNOSIS — D69.3 IDIOPATHIC THROMBOCYTOPENIC PURPURA (H): Primary | ICD-10-CM

## 2019-09-30 DIAGNOSIS — M17.0 ARTHRITIS OF BOTH KNEES: ICD-10-CM

## 2019-09-30 NOTE — TELEPHONE ENCOUNTER
Please call patient.    Reviewed possible US guided corticosteroid injection with Hematology/Oncology.  Risk is of bleeding during the procedure with low platelets, no contraindication to corticosteroids.  If patient would like to proceed, they recommended CBC the morning of the procedure to make sure platelets are > 50.    Please see if patient would like to schedule US guided injection at this time.  Nohelia Hunter MD

## 2019-09-30 NOTE — TELEPHONE ENCOUNTER
Appointment scheduled won 10/7 at 1040 AM with Dr. Le     CBC order pended.     Jennifer Awad MS ATC

## 2019-10-07 ENCOUNTER — OFFICE VISIT (OUTPATIENT)
Dept: ORTHOPEDICS | Facility: CLINIC | Age: 64
End: 2019-10-07
Payer: COMMERCIAL

## 2019-10-07 DIAGNOSIS — D69.3 IDIOPATHIC THROMBOCYTOPENIC PURPURA (H): ICD-10-CM

## 2019-10-07 DIAGNOSIS — M17.0 ARTHRITIS OF BOTH KNEES: ICD-10-CM

## 2019-10-07 DIAGNOSIS — M17.0 ARTHRITIS OF BOTH KNEES: Primary | ICD-10-CM

## 2019-10-07 LAB
BASOPHILS # BLD AUTO: 0.1 10E9/L (ref 0–0.2)
BASOPHILS NFR BLD AUTO: 1.3 %
DIFFERENTIAL METHOD BLD: ABNORMAL
EOSINOPHIL # BLD AUTO: 0.2 10E9/L (ref 0–0.7)
EOSINOPHIL NFR BLD AUTO: 1.4 %
ERYTHROCYTE [DISTWIDTH] IN BLOOD BY AUTOMATED COUNT: 13.4 % (ref 10–15)
HCT VFR BLD AUTO: 42.6 % (ref 35–47)
HGB BLD-MCNC: 13.8 G/DL (ref 11.7–15.7)
IMM GRANULOCYTES # BLD: 0 10E9/L (ref 0–0.4)
IMM GRANULOCYTES NFR BLD: 0.3 %
LYMPHOCYTES # BLD AUTO: 5.7 10E9/L (ref 0.8–5.3)
LYMPHOCYTES NFR BLD AUTO: 51.4 %
MCH RBC QN AUTO: 31.1 PG (ref 26.5–33)
MCHC RBC AUTO-ENTMCNC: 32.4 G/DL (ref 31.5–36.5)
MCV RBC AUTO: 96 FL (ref 78–100)
MONOCYTES # BLD AUTO: 1 10E9/L (ref 0–1.3)
MONOCYTES NFR BLD AUTO: 9 %
NEUTROPHILS # BLD AUTO: 4.1 10E9/L (ref 1.6–8.3)
NEUTROPHILS NFR BLD AUTO: 36.6 %
NRBC # BLD AUTO: 0 10*3/UL
NRBC BLD AUTO-RTO: 0 /100
PLATELET # BLD AUTO: 118 10E9/L (ref 150–450)
PLATELET # BLD EST: ABNORMAL 10*3/UL
RBC # BLD AUTO: 4.44 10E12/L (ref 3.8–5.2)
RBC MORPH BLD: NORMAL
VARIANT LYMPHS BLD QL SMEAR: PRESENT
WBC # BLD AUTO: 11.1 10E9/L (ref 4–11)

## 2019-10-07 PROCEDURE — 20611 DRAIN/INJ JOINT/BURSA W/US: CPT | Mod: 50 | Performed by: FAMILY MEDICINE

## 2019-10-07 PROCEDURE — 85025 COMPLETE CBC W/AUTO DIFF WBC: CPT | Performed by: PEDIATRICS

## 2019-10-07 PROCEDURE — 36415 COLL VENOUS BLD VENIPUNCTURE: CPT | Performed by: PEDIATRICS

## 2019-10-07 RX ADMIN — BETAMETHASONE SODIUM PHOSPHATE AND BETAMETHASONE ACETATE 6 MG: 3; 3 INJECTION, SUSPENSION INTRA-ARTICULAR; INTRALESIONAL; INTRAMUSCULAR; SOFT TISSUE at 10:45

## 2019-10-07 RX ADMIN — ROPIVACAINE HYDROCHLORIDE 3 ML: 5 INJECTION, SOLUTION EPIDURAL; INFILTRATION; PERINEURAL at 10:45

## 2019-10-07 NOTE — LETTER
10/7/2019         RE: Charlee Marks  [address] [place] [address]        Dear Colleague,    Thank you for referring your patient, Charlee Marks, to the [facility]. Please see a copy of my visit note below.    Large Joint Injection/Arthocentesis: bilateral knee  Date/Time: 10/7/2019 10:45 AM  Performed by: Reid Le MD  Authorized by: Reid Le MD     Indications:  Pain and osteoarthritis  Needle Size:  25 G  Guidance: ultrasound    Approach:  Superolateral  Location:  Knee  Laterality:  Bilateral      Medications (Right):  6 mg betamethasone acet & sod phos 6 (3-3) MG/ML; 3 mL ropivacaine 5 MG/ML   Medications (Right) comment:  Actual amount of ropivacaine used 4 mL  Medications (Left):  6 mg betamethasone acet & sod phos 6 (3-3) MG/ML; 3 mL ropivacaine 5 MG/ML   Medications (Left) comment:  Actual amount of ropivacaine used 4 mL  Outcome:  Tolerated well, no immediate complications  Procedure discussed: discussed risks, benefits, and alternatives    Consent Given by:  Patient  Timeout: timeout called immediately prior to procedure    Prep: patient was prepped and draped in usual sterile fashion     Pt with HO ITP and bilateral knee OA presented by referral from Dr. Hunter for bilateral knee steroid injections.  Repeat Plt today at 111. Patient reported significant improvement of pain after bilateral knee steroid injections.  Aftercare instructions given to patient.  Plan to follow-up as previously discussed with referring provider.     Reid eL MD [facility] [place]            Again, thank you for allowing me to participate in the care of your patient.        Sincerely,        Reid Le MD

## 2019-10-07 NOTE — PATIENT INSTRUCTIONS
Diagnosis: Bilateral Knee Arthritis  Image Findings: Bilateral Knee Arthritis medially  Treatment: bilateral steroid injections  Job: no restrictions  Medications: none  Follow-up: As needed    It was great seeing you today!    Reid Le    [facility] Injection Discharge Instructions      You may shower, however avoid swimming, tub baths or hot tubs for 24 hours following your procedure    You may have a mild to moderate increase in pain for several days following the injection.    It may take up to 14 days for the steroid medication to start working although you may feel the effect as early as a few days after the procedure.    You may use ice packs for 10-15 minutes, 3 to 4 times a day at the injection site for comfort    You may use anti-inflammatory medications (such as Ibuprofen or Aleve or Advil) or Tylenol for pain control if necessary    If you were fasting, you may resume your normal diet and medications after the procedure    If you have diabetes, check your blood sugar more frequently than usual as your blood sugar may be higher than normal for 10-14 days following a steroid injection. Contact your doctor who manages your diabetes if your blood sugar is higher than usual      If you experience any of the following, call [facility] @ [phone] or [phone]  -Fever over 100 degree F  -Swelling, bleeding, redness, drainage, warmth at the injection site  - New or worsening pain

## 2019-10-07 NOTE — PROGRESS NOTES
Large Joint Injection/Arthocentesis: bilateral knee  Date/Time: 10/7/2019 10:45 AM  Performed by: Reid Le MD  Authorized by: Reid Le MD     Indications:  Pain and osteoarthritis  Needle Size:  25 G  Guidance: ultrasound    Approach:  Superolateral  Location:  Knee  Laterality:  Bilateral      Medications (Right):  6 mg betamethasone acet & sod phos 6 (3-3) MG/ML; 3 mL ropivacaine 5 MG/ML   Medications (Right) comment:  Actual amount of ropivacaine used 4 mL  Medications (Left):  6 mg betamethasone acet & sod phos 6 (3-3) MG/ML; 3 mL ropivacaine 5 MG/ML   Medications (Left) comment:  Actual amount of ropivacaine used 4 mL  Outcome:  Tolerated well, no immediate complications  Procedure discussed: discussed risks, benefits, and alternatives    Consent Given by:  Patient  Timeout: timeout called immediately prior to procedure    Prep: patient was prepped and draped in usual sterile fashion     Pt with HO ITP and bilateral knee OA presented by referral from Dr. Hunter for bilateral knee steroid injections.  Repeat Plt today at 111. Patient reported significant improvement of pain after bilateral knee steroid injections.  Aftercare instructions given to patient.  Plan to follow-up as previously discussed with referring provider.     Reid Le MD [facility]

## 2019-10-08 RX ORDER — ROPIVACAINE HYDROCHLORIDE 5 MG/ML
3 INJECTION, SOLUTION EPIDURAL; INFILTRATION; PERINEURAL
Status: SHIPPED | OUTPATIENT
Start: 2019-10-07

## 2019-10-08 RX ORDER — BETAMETHASONE SODIUM PHOSPHATE AND BETAMETHASONE ACETATE 3; 3 MG/ML; MG/ML
6 INJECTION, SUSPENSION INTRA-ARTICULAR; INTRALESIONAL; INTRAMUSCULAR; SOFT TISSUE
Status: SHIPPED | OUTPATIENT
Start: 2019-10-07

## 2019-10-21 ENCOUNTER — PATIENT OUTREACH (OUTPATIENT)
Dept: ONCOLOGY | Facility: CLINIC | Age: 64
End: 2019-10-21

## 2019-10-21 ENCOUNTER — HOSPITAL ENCOUNTER (OUTPATIENT)
Dept: PHYSICAL THERAPY | Facility: CLINIC | Age: 64
Setting detail: THERAPIES SERIES
End: 2019-10-21
Attending: PEDIATRICS
Payer: COMMERCIAL

## 2019-10-21 DIAGNOSIS — D69.3 IDIOPATHIC THROMBOCYTOPENIC PURPURA (H): ICD-10-CM

## 2019-10-21 LAB
BASOPHILS # BLD AUTO: 0.1 10E9/L (ref 0–0.2)
BASOPHILS NFR BLD AUTO: 0.7 %
DIFFERENTIAL METHOD BLD: ABNORMAL
EOSINOPHIL # BLD AUTO: 0.2 10E9/L (ref 0–0.7)
EOSINOPHIL NFR BLD AUTO: 1.2 %
ERYTHROCYTE [DISTWIDTH] IN BLOOD BY AUTOMATED COUNT: 13.2 % (ref 10–15)
HCT VFR BLD AUTO: 42.8 % (ref 35–47)
HGB BLD-MCNC: 13.5 G/DL (ref 11.7–15.7)
IMM GRANULOCYTES # BLD: 0.1 10E9/L (ref 0–0.4)
IMM GRANULOCYTES NFR BLD: 0.8 %
LYMPHOCYTES # BLD AUTO: 5.7 10E9/L (ref 0.8–5.3)
LYMPHOCYTES NFR BLD AUTO: 42.6 %
MCH RBC QN AUTO: 30.1 PG (ref 26.5–33)
MCHC RBC AUTO-ENTMCNC: 31.5 G/DL (ref 31.5–36.5)
MCV RBC AUTO: 95 FL (ref 78–100)
MONOCYTES # BLD AUTO: 1.1 10E9/L (ref 0–1.3)
MONOCYTES NFR BLD AUTO: 8.1 %
NEUTROPHILS # BLD AUTO: 6.3 10E9/L (ref 1.6–8.3)
NEUTROPHILS NFR BLD AUTO: 46.6 %
NRBC # BLD AUTO: 0 10*3/UL
NRBC BLD AUTO-RTO: 0 /100
PLATELET # BLD AUTO: 40 10E9/L (ref 150–450)
PLATELET # BLD EST: ABNORMAL 10*3/UL
RBC # BLD AUTO: 4.49 10E12/L (ref 3.8–5.2)
RBC MORPH BLD: NORMAL
VARIANT LYMPHS BLD QL SMEAR: PRESENT
WBC # BLD AUTO: 13.5 10E9/L (ref 4–11)

## 2019-10-21 PROCEDURE — 36415 COLL VENOUS BLD VENIPUNCTURE: CPT | Performed by: INTERNAL MEDICINE

## 2019-10-21 PROCEDURE — 97140 MANUAL THERAPY 1/> REGIONS: CPT | Mod: GP | Performed by: PHYSICAL THERAPIST

## 2019-10-21 PROCEDURE — 85025 COMPLETE CBC W/AUTO DIFF WBC: CPT | Performed by: INTERNAL MEDICINE

## 2019-10-21 PROCEDURE — 97110 THERAPEUTIC EXERCISES: CPT | Mod: GP | Performed by: PHYSICAL THERAPIST

## 2019-10-21 PROCEDURE — 97161 PT EVAL LOW COMPLEX 20 MIN: CPT | Mod: GP | Performed by: PHYSICAL THERAPIST

## 2019-10-21 NOTE — PROGRESS NOTES
Charlee Marks  1955 Physical Therapy Initial Evaluation  10/21/19 1500   General Information   Type of Visit Initial OP Ortho PT Evaluation   Start of Care Date 10/21/19   Referring Physician Nohelia Hunter MD   Patient/Family Goals Statement Walk without pain   Orders Evaluate and Treat   Date of Order 09/26/19   Medical Diagnosis Arthritis of both knees   Surgical/Medical history reviewed Yes   Precautions/Limitations no known precautions/limitations   Body Part(s)   Body Part(s) Knee   Presentation and Etiology   Pertinent history of current problem (include personal factors and/or comorbidities that impact the POC) Has been having knee pain off and on for years. But really started hurting in May. When the school year ended took it easy over the summer. Knees felt better then. Now that school has started again knees have started hurting again as well. Has had PT in the past and it seemed to help. Gets pain on inside of knee joint. / Comorbidities - Asthma, Morbid obesity, HTN, Edema, idiopathic thrombocytopenic purpura    Impairments A. Pain;C. Swelling;E. Decreased flexibility;J. Burning;K. Numbness;L. Tingling;M. Locking or catching   Functional Limitations perform activities of daily living;perform required work activities;perform desired leisure / sports activities   Symptom Location Medial knee pain B   How/Where did it occur From insidious onset   Onset date of current episode/exacerbation 09/26/19  (Date of Order)   Chronicity Chronic   Pain rating (0-10 point scale) Best (/10);Worst (/10)   Best (/10) 3   Worst (/10) 7   Pain quality A. Sharp;B. Dull;C. Aching;D. Burning   Frequency of pain/symptoms C. With activity   Pain/symptoms exacerbated by B. Walking;M. Other   Pain exacerbation comment When getting up from sitting to standing   Pain/symptoms eased by G. Heat   Progression of symptoms since onset: Worsened   Current / Previous Interventions   Diagnostic Tests: X-ray   X-ray Results Results    X-ray results IMPRESSION:  Bilateral tricompartmental osteoarthritis.     Prior Level of Function   Functional Level Prior Comment Ind   Current Level of Function   Patient role/employment history A. Employed   Employment Comments  at high school   Fall Risk Screen   Fall screen completed by PT   Have you fallen 2 or more times in the past year? No   Have you fallen and had an injury in the past year? No   Is patient a fall risk? No   Abuse Screen (yes response referral indicated)   Feels Unsafe at Home or Work/School no   Feels Threatened by Someone no   Does Anyone Try to Keep You From Having Contact with Others or Doing Things Outside Your Home? no   Physical Signs of Abuse Present no   Knee Objective Findings   Side (if bilateral, select both right and left) Right   Gait/Locomotion Noticeable limp with reduced stride length and heel strke   Foot Position In Standing Pes planus B   Anterior Drawer Test Negative B   Posterior Drawer Test Negative B   Varus Stress Test Negative B   Valgus Stress Test Negative B   Apley's Test Negative B   Apprehension Test Negative B   Palpation Pain with palpation over Medial and lateral joint line B   Right Knee Extension AROM 0 / Left - 2 degrees of hyperextension   Right Knee Flexion PROM 119 / Left - 121   Right Knee Flexion Strength 4/5 B   Right Knee Extension Strength 4/5 B   Right Hip Abduction Strength 4-/5 B   Right Gastrocnemius Flexibility Mildly restricted B   Right Hamstring Flexibility Mildly restricted B   Right Hip Flexor Flexibility Moderately restricted B   Right Quadricep Flexibility Moderately restricted B   Planned Therapy Interventions   Planned Therapy Interventions joint mobilization;manual therapy;neuromuscular re-education;ROM;strengthening;stretching   Planned Modality Interventions   Planned Modality Interventions Cryotherapy;Hot packs;Electrical stimulation;TENS   Clinical Impression   Criteria for Skilled Therapeutic  Interventions Met yes, treatment indicated   PT Diagnosis Bilateral knee pain with strength and flexibility deficits of knee and hip musculature bilaterally   Influenced by the following impairments Pain, Strength and flexibility deficits   Functional limitations due to impairments Difficulty with stairs, ambulation   Clinical Presentation Stable/Uncomplicated   Clinical Presentation Rationale Several comorbidities impacting PT / 1 body system / Stable   Clinical Decision Making (Complexity) Low complexity   Therapy Frequency 1 time/week   Predicted Duration of Therapy Intervention (days/wks) 8 weeks   Risk & Benefits of therapy have been explained Yes   Patient, Family & other staff in agreement with plan of care Yes   Education Assessment   Preferred Learning Style Listening;Reading;Demonstration;Pictures/video   Barriers to Learning No barriers   ORTHO GOALS   PT Ortho Eval Goals 1;2;3;4   Ortho Goal 1   Goal Identifier HEP   Goal Description Pt will be independent in HEP in order to achieve and maintain long term treatment goals.   Target Date 11/21/19   Ortho Goal 2   Goal Identifier Stairs   Goal Description Pt will be able to ascend 3 steps to get into her house with a step to gait pattern and hanrail assist with a minimal increase in pain 2-3/10.   Target Date 12/16/19   Ortho Goal 3   Goal Identifier Work   Goal Description Pt will be able to ambulate for 30 minutes without having to stop due to knee pain.   Target Date 12/16/19   Total Evaluation Time   PT John, Low Complexity Minutes (53759) 20     Lupillo Kamara, PT, DPT

## 2019-10-21 NOTE — PROGRESS NOTES
DATE:  10/21/2019   TIME OF RECEIPT FROM LAB:  1523  LAB TEST:  PLT (prelim)  LAB VALUE:  38  RESULTS GIVEN WITH READ-BACK TO (PROVIDER):  Dr. Rankin (Via in basket)  TIME LAB VALUE REPORTED TO PROVIDER:   1524    Karla Scott RN on 10/21/2019 at 3:24 PM

## 2019-10-28 ENCOUNTER — HOSPITAL ENCOUNTER (OUTPATIENT)
Dept: PHYSICAL THERAPY | Facility: CLINIC | Age: 64
Setting detail: THERAPIES SERIES
End: 2019-10-28
Attending: PEDIATRICS
Payer: COMMERCIAL

## 2019-10-28 PROCEDURE — 97110 THERAPEUTIC EXERCISES: CPT | Mod: GP | Performed by: PHYSICAL THERAPIST

## 2019-10-28 PROCEDURE — 97140 MANUAL THERAPY 1/> REGIONS: CPT | Mod: GP | Performed by: PHYSICAL THERAPIST

## 2019-11-02 ENCOUNTER — HEALTH MAINTENANCE LETTER (OUTPATIENT)
Age: 64
End: 2019-11-02

## 2019-11-04 ENCOUNTER — HOSPITAL ENCOUNTER (OUTPATIENT)
Dept: PHYSICAL THERAPY | Facility: CLINIC | Age: 64
Setting detail: THERAPIES SERIES
End: 2019-11-04
Attending: PEDIATRICS
Payer: COMMERCIAL

## 2019-11-04 DIAGNOSIS — D69.3 IDIOPATHIC THROMBOCYTOPENIC PURPURA (H): ICD-10-CM

## 2019-11-04 LAB
BASOPHILS # BLD AUTO: 0 10E9/L (ref 0–0.2)
BASOPHILS NFR BLD AUTO: 0.3 %
DIFFERENTIAL METHOD BLD: ABNORMAL
EOSINOPHIL # BLD AUTO: 0.3 10E9/L (ref 0–0.7)
EOSINOPHIL NFR BLD AUTO: 2.6 %
ERYTHROCYTE [DISTWIDTH] IN BLOOD BY AUTOMATED COUNT: 13.9 % (ref 10–15)
HCT VFR BLD AUTO: 42.2 % (ref 35–47)
HGB BLD-MCNC: 13.3 G/DL (ref 11.7–15.7)
LYMPHOCYTES # BLD AUTO: 5.3 10E9/L (ref 0.8–5.3)
LYMPHOCYTES NFR BLD AUTO: 39.9 %
MCH RBC QN AUTO: 30.6 PG (ref 26.5–33)
MCHC RBC AUTO-ENTMCNC: 31.5 G/DL (ref 31.5–36.5)
MCV RBC AUTO: 97 FL (ref 78–100)
MONOCYTES # BLD AUTO: 1.4 10E9/L (ref 0–1.3)
MONOCYTES NFR BLD AUTO: 10.5 %
NEUTROPHILS # BLD AUTO: 6.2 10E9/L (ref 1.6–8.3)
NEUTROPHILS NFR BLD AUTO: 46.7 %
PLATELET # BLD AUTO: 140 10E9/L (ref 150–450)
RBC # BLD AUTO: 4.34 10E12/L (ref 3.8–5.2)
WBC # BLD AUTO: 13.2 10E9/L (ref 4–11)

## 2019-11-04 PROCEDURE — 97140 MANUAL THERAPY 1/> REGIONS: CPT | Mod: GP | Performed by: PHYSICAL THERAPIST

## 2019-11-04 PROCEDURE — 36415 COLL VENOUS BLD VENIPUNCTURE: CPT | Performed by: INTERNAL MEDICINE

## 2019-11-04 PROCEDURE — 85025 COMPLETE CBC W/AUTO DIFF WBC: CPT | Performed by: INTERNAL MEDICINE

## 2019-11-12 ENCOUNTER — HOSPITAL ENCOUNTER (OUTPATIENT)
Dept: PHYSICAL THERAPY | Facility: CLINIC | Age: 64
Setting detail: THERAPIES SERIES
End: 2019-11-12
Attending: PEDIATRICS
Payer: COMMERCIAL

## 2019-11-12 PROCEDURE — 97140 MANUAL THERAPY 1/> REGIONS: CPT | Mod: GP | Performed by: PHYSICAL THERAPIST

## 2019-11-19 ENCOUNTER — HOSPITAL ENCOUNTER (OUTPATIENT)
Dept: PHYSICAL THERAPY | Facility: CLINIC | Age: 64
Setting detail: THERAPIES SERIES
End: 2019-11-19
Attending: PEDIATRICS
Payer: COMMERCIAL

## 2019-11-19 PROCEDURE — 97110 THERAPEUTIC EXERCISES: CPT | Mod: GP | Performed by: PHYSICAL THERAPIST

## 2019-11-19 PROCEDURE — 97140 MANUAL THERAPY 1/> REGIONS: CPT | Mod: GP | Performed by: PHYSICAL THERAPIST

## 2019-11-25 ENCOUNTER — HOSPITAL ENCOUNTER (OUTPATIENT)
Dept: LAB | Facility: CLINIC | Age: 64
Discharge: HOME | End: 2019-11-25
Attending: INTERNAL MEDICINE | Admitting: INTERNAL MEDICINE
Payer: COMMERCIAL

## 2019-11-25 ENCOUNTER — ONCOLOGY VISIT (OUTPATIENT)
Dept: ONCOLOGY | Facility: CLINIC | Age: 64
End: 2019-11-25
Attending: INTERNAL MEDICINE
Payer: COMMERCIAL

## 2019-11-25 VITALS
SYSTOLIC BLOOD PRESSURE: 148 MMHG | DIASTOLIC BLOOD PRESSURE: 73 MMHG | TEMPERATURE: 98.7 F | HEART RATE: 76 BPM | HEIGHT: 66 IN | BODY MASS INDEX: 47.09 KG/M2 | WEIGHT: 293 LBS | RESPIRATION RATE: 18 BRPM | OXYGEN SATURATION: 95 %

## 2019-11-25 DIAGNOSIS — G62.9 NEUROPATHY: ICD-10-CM

## 2019-11-25 DIAGNOSIS — I81 PORTAL VEIN THROMBOSIS: ICD-10-CM

## 2019-11-25 DIAGNOSIS — D69.3 IDIOPATHIC THROMBOCYTOPENIC PURPURA (H): ICD-10-CM

## 2019-11-25 DIAGNOSIS — D69.3 IDIOPATHIC THROMBOCYTOPENIC PURPURA (H): Primary | ICD-10-CM

## 2019-11-25 LAB
BASOPHILS # BLD AUTO: 0.1 10E9/L (ref 0–0.2)
BASOPHILS NFR BLD AUTO: 0.9 %
DIFFERENTIAL METHOD BLD: ABNORMAL
EOSINOPHIL # BLD AUTO: 0.2 10E9/L (ref 0–0.7)
EOSINOPHIL NFR BLD AUTO: 1.4 %
ERYTHROCYTE [DISTWIDTH] IN BLOOD BY AUTOMATED COUNT: 13.2 % (ref 10–15)
HCT VFR BLD AUTO: 40.9 % (ref 35–47)
HGB BLD-MCNC: 13.2 G/DL (ref 11.7–15.7)
IMM GRANULOCYTES # BLD: 0 10E9/L (ref 0–0.4)
IMM GRANULOCYTES NFR BLD: 0.3 %
LYMPHOCYTES # BLD AUTO: 4.4 10E9/L (ref 0.8–5.3)
LYMPHOCYTES NFR BLD AUTO: 39.6 %
MCH RBC QN AUTO: 30.7 PG (ref 26.5–33)
MCHC RBC AUTO-ENTMCNC: 32.3 G/DL (ref 31.5–36.5)
MCV RBC AUTO: 95 FL (ref 78–100)
MONOCYTES # BLD AUTO: 1.1 10E9/L (ref 0–1.3)
MONOCYTES NFR BLD AUTO: 9.8 %
NEUTROPHILS # BLD AUTO: 5.3 10E9/L (ref 1.6–8.3)
NEUTROPHILS NFR BLD AUTO: 48 %
NRBC # BLD AUTO: 0 10*3/UL
NRBC BLD AUTO-RTO: 0 /100
PLATELET # BLD AUTO: 118 10E9/L (ref 150–450)
RBC # BLD AUTO: 4.3 10E12/L (ref 3.8–5.2)
WBC # BLD AUTO: 11.1 10E9/L (ref 4–11)

## 2019-11-25 PROCEDURE — 85025 COMPLETE CBC W/AUTO DIFF WBC: CPT | Performed by: INTERNAL MEDICINE

## 2019-11-25 PROCEDURE — 99214 OFFICE O/P EST MOD 30 MIN: CPT | Performed by: INTERNAL MEDICINE

## 2019-11-25 PROCEDURE — G0463 HOSPITAL OUTPT CLINIC VISIT: HCPCS

## 2019-11-25 PROCEDURE — 36415 COLL VENOUS BLD VENIPUNCTURE: CPT | Performed by: INTERNAL MEDICINE

## 2019-11-25 ASSESSMENT — PAIN SCALES - GENERAL: PAINLEVEL: NO PAIN (0)

## 2019-11-25 ASSESSMENT — MIFFLIN-ST. JEOR: SCORE: 1986.87

## 2019-11-25 NOTE — PROGRESS NOTES
"Oncology Rooming Note    November 25, 2019 3:33 PM   Charlee Marks is a 64 year old female who presents for:    Chief Complaint   Patient presents with     Hematology     Recheck ITP      Initial Vitals: BP (!) 148/73 (BP Location: Right arm, Patient Position: Sitting, Cuff Size: Adult Large)   Pulse 76   Temp 98.7  F (37.1  C) (Tympanic)   Resp 18   Ht 1.683 m (5' 6.25\")   Wt 140.6 kg (310 lb)   LMP 09/15/2007   SpO2 95%   BMI 49.66 kg/m   Estimated body mass index is 49.66 kg/m  as calculated from the following:    Height as of this encounter: 1.683 m (5' 6.25\").    Weight as of this encounter: 140.6 kg (310 lb). Body surface area is 2.56 meters squared.  No Pain (0) Comment: Data Unavailable   Patient's last menstrual period was 09/15/2007.  Allergies reviewed: Yes  Medications reviewed: Yes    Medications: Medication refills not needed today.  Pharmacy name entered into [facility]:    [facility] - [place], [place] - [address] [facility] - [place], [place] - [address]    Clinical concerns: Recheck ITP, review labs.       Lorena Wei CMA              "

## 2019-11-25 NOTE — LETTER
"    11/25/2019         RE: Charlee Marks  [address]  [place] [address]        Dear Colleague,    Thank you for referring your patient, Charlee Marks, to the [facility]. Please see a copy of my visit note below.    Oncology Rooming Note    November 25, 2019 3:33 PM   Charlee Marks is a 64 year old female who presents for:    Chief Complaint   Patient presents with     Hematology     Recheck ITP      Initial Vitals: BP (!) 148/73 (BP Location: Right arm, Patient Position: Sitting, Cuff Size: Adult Large)   Pulse 76   Temp 98.7  F (37.1  C) (Tympanic)   Resp 18   Ht 1.683 m (5' 6.25\")   Wt 140.6 kg (310 lb)   LMP 09/15/2007   SpO2 95%   BMI 49.66 kg/m    Estimated body mass index is 49.66 kg/m  as calculated from the following:    Height as of this encounter: 1.683 m (5' 6.25\").    Weight as of this encounter: 140.6 kg (310 lb). Body surface area is 2.56 meters squared.  No Pain (0) Comment: Data Unavailable   Patient's last menstrual period was 09/15/2007.  Allergies reviewed: Yes  Medications reviewed: Yes    Medications: Medication refills not needed today.  Pharmacy name entered into [facility]:    [facility] [place], [place] - [address] [facility] - [place], [place] - [address]    Clinical concerns: Recheck ITP, review labs.       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        HISTORY OF HEMATOLOGY ILLNESS:  she presented with 2-3 months of easy bruising and also fatigue.  She presented to her Primary Care Physician's office on 07/25/2016 and was found to have critical thrombocytopenia with a platelet count of 3.    She was offered IVIG 1 gram on 7/25/2016 and 4 days of   Dexamethasone. She was d/c with PL of 38 on 7/27/2016. PL dip to 13 on 8/4 and 7 on 8/11. She was offered wklyI IVIG 1g/kg starting 8/4/2016, and 2nd cycle of dex x 4 days on 8/11/2016. PL " improves to 77 on 8/15, then dip to 31 on 8/18.   Due to rapid drop of her PL when off dex, tx was changed to po prednisone 8/18/2017 with wkly IVIG. R was added on 8/30/2016. She had no PL response despite R, steroid and IVIG.   Nplate was started 9/23/2016. PL up from 20 to 110 after 2 doses of it. Then dip down again.   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   Course was complicated with portal vein and SMV thromboses end of 12/2016. She was admitted to , had IR thrombectomy and catheter-directed lytic therapy to portal vein. Following the procedure she was placed on high intensity heparin drip which was then bridged with warfarin starting 1/9/2017 with an INR goal of 2-3.   Due to progression of portal venous branch thrombosis and SMV thrombosis by CT in 7/2017. Tx is changed to Lovenox. She could not tolerates the shots. tx was changed to Xarelto.     She finished prednisone slow taper 4/2017.      PL dip down to around 50's in Aug 2017. Xarelto put on hold in Sep 2017 due to PL less than 50 and stable SMV thromboses, she also had acne type of rash.      She saw [facility] as 2nd opinion, Dr. Key had nl BM without fibrosis at [facility], ITP dx is assured.  Cyclosporine was also recommended to start at 200 mg po qd if PL less than 30, with nl renal function, and adjust the dose.     She elected to be observed due to concern of side effects from chemo and immunosuppression agents. PL dip to 13-15 on 11/30/2017 required dex 40 mg 4 days and IVIG, PL up to 190 on 12/4/2017.     She then was on q 1-2 wks prn IVIG.   PL dip down again to single digit 9/2018 required IVIG and steroid high does 100 mg po every day then slow taper, done early Jan 2019.  Her platelet dipped down again after that to the mid 30s in mid March 2019.       INTERVAL HISTORY:  She is very hesitated to get on immunosuppression and oral chemo.  She Made informed decision to try fostamatinib   in April 2019.  Then never really need to start it due to her  "fluctuating severe to mild thrombocytopenia platelet count between 40s and 140.      PAST MEDICAL HISTORY:      1.  Cystocele.    2.  Morbid obesity.    3.  Reflux.    4.  Intermittent asthma.    5.  Effusion of lower leg joints.    6. 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    Mother had anemia, great aunt had anemia.       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  She is  with children.  She denies the use of alcohol or tobacco.      ROS  She is back to her baseline energy level.   She denies bleeding episode.     Still has neuropathy on digits on B6.          PHYSICAL EXAMINATION:    VITAL SIGNS:  Blood pressure (!) 148/73, pulse 76, temperature 98.7  F (37.1  C), temperature source Tympanic, resp. rate 18, height 1.683 m (5' 6.25\"), weight 140.6 kg (310 lb), last menstrual period 09/15/2007, SpO2 95 %, not currently breastfeeding.    ECOG 0    GENERAL APPEARANCE:  A middle-aged woman who appears her stated age, very pleasant, morbidly obese, sitting comfortably in a chair and knitting.    HEENT: The patient is normocephalic, atraumatic. Pupils are equally reactive to light.  Sclerae are anicteric.  There is moist oral mucosa, negative pharynx, no oral thrush.    NECK:  Supple, no jugular venous distention, thyroid not palpable.    LYMPH NODES:  Superficial lymphadenopathy is not appreciable in the bilateral cervical, supraclavicular, axillary or inguinal areas.    CARDIOVASCULAR:  S1, S2 regular with no murmurs or gallops, no carotid or abdominal bruits.    PULMONARY:  Lungs are clear to auscultation and percussion bilaterally.  There is no wheezing or rhonchi.    GASTROINTESTINAL:  Abdomen is soft, nontender with no hepatosplenomegaly, no signs of ascites and no mass appreciable.    MUSCULOSKELETAL/EXTREMITIES:  No edema, no cyanotic " changes, no signs of joint deformity, no lymphedema.    NEUROLOGIC:  Cranial nerves II-XII are grossly intact, sensation intact, muscle strength and muscle tone symmetrical, 5/5 throughout.    BACK:  No spinal or paraspinal tenderness, no CVA tenderness.    SKIN:  No petechiae, no rash and no signs of cellulitis.  There is scattered bruising on forearms and thighs, which are healing.        LABORATORY DATA reviewed  PL  since 3/2019      Old data reviewed with summary  US abd 10/2018   1.  Diminutive appearance of the presumed left portal vein, which was previously occluded on the prior exam 4/22/2017. It is possible that this may represent a small portal venous collateral as opposed to left  portal vein. Patent right portal vein and main portal vein with flow in appropriate direction.  2.  Layering echogenic sludge versus stones in the gallbladder, without evidence of acute cholecystitis.    CT a/p 3/2018 - Chronic thrombosis of the left portal vein and posterior branch of the right portal vein are again seen. The main portal vein is patent. 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        PL 12/4/2017 at 190.  PL in teens around 11/30, then had IVIG 400 mg /kg and dex 40 mg po qd x4.  around 40 in the last wks, LFT/Cr nl in 10/2017      in 7/2107 from nl 6/2017  Hb nl, wbc/diff nl.     BM at [facility] 10/2017 - nl including cyto.     CT head 11/30/2017 - no bleeding.     CT a/P 9/2017: No significant change. Findings suggest chronic thrombosis of the left portal vein, posterior branch of the right portal vein and branches of the superior mesenteric vein as above.    CT a/p 7/2017 : Progression of left portal venous branch thrombosis and SMV thrombosis is seen compared to the prior study.    US 4/2017  1.  Recanalized main portal and splenic veins. Persistent occlusion of left portal vein.  2.  Splenectomy.    CT 1/9/2017   1. Status post splenectomy with postsurgical changes in the left upper  quadrant. Persistent thrombosis of the splenic vein.  2. Main portal vein now appears patent with possible peripheral nonocclusive thrombosis/periportal edema. Residual thrombosis of portion of the left branch of the portal vein and the right posterior  branch of the portal vein.  3. Residual partial thrombosis of the distal portion of the superior mesenteric vein.   4. Small focal nodular opacities in both lower lobes, likely of infectious etiology.            ASSESSMENT/PLAN:    1.  Refractory ITP dx first in 7/2016 .     She saw [facility] as 2nd opinion in 10/2017 with Dr. Key had nl BM without fibrosis at [facility], ITP dx is assured.  Cyclosporine was also recommended to start at 200 mg po qd if PL less than 30,  with nl renal function, and adjust the dose.       We discussed the tx option:   Azathioprine or cyclosporin immunosuppression.   Cytoxan 750mg - 1g/m2 IV q month or po (maybe 100mg/m2 D1-14 q 28days)  vicristine 1-2mg IV wkly for several wks  vinblastin 0.1mg/kg  combination chemo -CVP, CVP+ -16.  Promacta po - 3-4% risk of thrombosis  [facility] approved oral agent blocking destruction of PL fostamatinib.     She is very hesitated to get on immunosuppression and oral chemo.  Made informed decision to try the last option in April 2019.  Then never really need to start it due to her fluctuating severe to mild thrombocytopenia platelet count between 40s and 140.        2. 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 S/p thrombolysis to portal vein, lovenox. Has been on Coumadin, lovenox again, Xarelto for 9 months.   Her clots are chronic and stable per CT 9/2017 and 3/2018.    Advice discontinue if PL less than 50. 81 mg ASA if PL , 160 mg ASA if PL > 100.      3. 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  She is advised on B6. She feels it is helpful.   She has not done acupuncture. She is interested with it for now.        Again, thank you for allowing me to participate in the care of your  patient.        Sincerely,        Kerrie Rankin MD, MD

## 2019-11-25 NOTE — PROGRESS NOTES
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        HISTORY OF HEMATOLOGY ILLNESS:  she presented with 2-3 months of easy bruising and also fatigue.  She presented to her Primary Care Physician's office on 07/25/2016 and was found to have critical thrombocytopenia with a platelet count of 3.    She was offered IVIG 1 gram on 7/25/2016 and 4 days of   Dexamethasone. She was d/c with PL of 38 on 7/27/2016. PL dip to 13 on 8/4 and 7 on 8/11. She was offered wklyI IVIG 1g/kg starting 8/4/2016, and 2nd cycle of dex x 4 days on 8/11/2016. PL improves to 77 on 8/15, then dip to 31 on 8/18.   Due to rapid drop of her PL when off dex, tx was changed to po prednisone 8/18/2017 with wkly IVIG. R was added on 8/30/2016. She had no PL response despite R, steroid and IVIG.   Nplate was started 9/23/2016. PL up from 20 to 110 after 2 doses of it. Then dip down again.   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   Course was complicated with portal vein and SMV thromboses end of 12/2016. She was admitted to , had IR thrombectomy and catheter-directed lytic therapy to portal vein. Following the procedure she was placed on high intensity heparin drip which was then bridged with warfarin starting 1/9/2017 with an INR goal of 2-3.   Due to progression of portal venous branch thrombosis and SMV thrombosis by CT in 7/2017. Tx is changed to Lovenox. She could not tolerates the shots. tx was changed to Xarelto.     She finished prednisone slow taper 4/2017.      PL dip down to around 50's in Aug 2017. Xarelto put on hold in Sep 2017 due to PL less than 50 and stable SMV thromboses, she also had acne type of rash.      She saw [facility] as 2nd opinion, Dr. Key had nl BM without fibrosis at [facility], ITP dx is assured.  Cyclosporine was also recommended to start at 200 mg po qd if PL less than 30, with nl renal function, and adjust the dose.     She elected to be observed due to concern of side effects from chemo and  "immunosuppression agents. PL dip to 13-15 on 11/30/2017 required dex 40 mg 4 days and IVIG, PL up to 190 on 12/4/2017.     She then was on q 1-2 wks prn IVIG.   PL dip down again to single digit 9/2018 required IVIG and steroid high does 100 mg po every day then slow taper, done early Jan 2019.  Her platelet dipped down again after that to the mid 30s in mid March 2019.       INTERVAL HISTORY:  She is very hesitated to get on immunosuppression and oral chemo.  She Made informed decision to try fostamatinib   in April 2019.  Then never really need to start it due to her fluctuating severe to mild thrombocytopenia platelet count between 40s and 140.      PAST MEDICAL HISTORY:      1.  Cystocele.    2.  Morbid obesity.    3.  Reflux.    4.  Intermittent asthma.    5.  Effusion of lower leg joints.    6. 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    Mother had anemia, great aunt had anemia.       SOCIAL HISTORY:  The patient lives in [facility] and works in the school district.  She is  with children.  She denies the use of alcohol or tobacco.      ROS  She is back to her baseline energy level.   She denies bleeding episode.     Still has neuropathy on digits on B6.          PHYSICAL EXAMINATION:    VITAL SIGNS:  Blood pressure (!) 148/73, pulse 76, temperature 98.7  F (37.1  C), temperature source Tympanic, resp. rate 18, height 1.683 m (5' 6.25\"), weight 140.6 kg (310 lb), last menstrual period 09/15/2007, SpO2 95 %, not currently breastfeeding.    ECOG 0    GENERAL APPEARANCE:  A middle-aged woman who appears her stated age, very pleasant, morbidly obese, sitting comfortably in a chair and knitting.    HEENT: The patient is normocephalic, atraumatic. Pupils are equally reactive to light.  Sclerae are anicteric.  There is moist oral mucosa, negative pharynx, no oral thrush.    NECK:  " Supple, no jugular venous distention, thyroid not palpable.    LYMPH NODES:  Superficial lymphadenopathy is not appreciable in the bilateral cervical, supraclavicular, axillary or inguinal areas.    CARDIOVASCULAR:  S1, S2 regular with no murmurs or gallops, no carotid or abdominal bruits.    PULMONARY:  Lungs are clear to auscultation and percussion bilaterally.  There is no wheezing or rhonchi.    GASTROINTESTINAL:  Abdomen is soft, nontender with no hepatosplenomegaly, no signs of ascites and no mass appreciable.    MUSCULOSKELETAL/EXTREMITIES:  No edema, no cyanotic changes, no signs of joint deformity, no lymphedema.    NEUROLOGIC:  Cranial nerves II-XII are grossly intact, sensation intact, muscle strength and muscle tone symmetrical, 5/5 throughout.    BACK:  No spinal or paraspinal tenderness, no CVA tenderness.    SKIN:  No petechiae, no rash and no signs of cellulitis.  There is scattered bruising on forearms and thighs, which are healing.        LABORATORY DATA reviewed  PL  since 3/2019      Old data reviewed with summary  US abd 10/2018   1.  Diminutive appearance of the presumed left portal vein, which was previously occluded on the prior exam 4/22/2017. It is possible that this may represent a small portal venous collateral as opposed to left  portal vein. Patent right portal vein and main portal vein with flow in appropriate direction.  2.  Layering echogenic sludge versus stones in the gallbladder, without evidence of acute cholecystitis.    CT a/p 3/2018 - Chronic thrombosis of the left portal vein and posterior branch of the right portal vein are again seen. The main portal vein is patent. 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        PL 12/4/2017 at 190.  PL in teens around 11/30, then had IVIG 400 mg /kg and dex 40 mg po qd x4.  around 40 in the last wks, LFT/Cr nl in 10/2017      in 7/2107 from nl 6/2017  Hb nl, wbc/diff nl.     BM at [facility] 10/2017 - nl including cyto.      CT head 11/30/2017 - no bleeding.     CT a/P 9/2017: No significant change. Findings suggest chronic thrombosis of the left portal vein, posterior branch of the right portal vein and branches of the superior mesenteric vein as above.    CT a/p 7/2017 : Progression of left portal venous branch thrombosis and SMV thrombosis is seen compared to the prior study.    US 4/2017  1.  Recanalized main portal and splenic veins. Persistent occlusion of left portal vein.  2.  Splenectomy.    CT 1/9/2017   1. Status post splenectomy with postsurgical changes in the left upper quadrant. Persistent thrombosis of the splenic vein.  2. Main portal vein now appears patent with possible peripheral nonocclusive thrombosis/periportal edema. Residual thrombosis of portion of the left branch of the portal vein and the right posterior  branch of the portal vein.  3. Residual partial thrombosis of the distal portion of the superior mesenteric vein.   4. Small focal nodular opacities in both lower lobes, likely of infectious etiology.            ASSESSMENT/PLAN:    1.  Refractory ITP dx first in 7/2016 .     She saw [facility] as 2nd opinion in 10/2017 with Dr. Key had nl BM without fibrosis at [facility], ITP dx is assured.  Cyclosporine was also recommended to start at 200 mg po qd if PL less than 30,  with nl renal function, and adjust the dose.       We discussed the tx option:   Azathioprine or cyclosporin immunosuppression.   Cytoxan 750mg - 1g/m2 IV q month or po (maybe 100mg/m2 D1-14 q 28days)  vicristine 1-2mg IV wkly for several wks  vinblastin 0.1mg/kg  combination chemo -CVP, CVP+ -16.  Promacta po - 3-4% risk of thrombosis  New FAD approved oral agent blocking destruction of PL fostamatinib.     She is very hesitated to get on immunosuppression and oral chemo.  Made informed decision to try the last option in April 2019.  Then never really need to start it due to her fluctuating severe to mild thrombocytopenia platelet count  between 40s and 140.        2. 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 S/p thrombolysis to portal vein, lovenox. Has been on Coumadin, lovenox again, Xarelto for 9 months.   Her clots are chronic and stable per CT 9/2017 and 3/2018.    Advice discontinue if PL less than 50. 81 mg ASA if PL , 160 mg ASA if PL > 100.      3. 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  She is advised on B6. She feels it is helpful.   She has not done acupuncture. She is interested with it for now.

## 2019-11-25 NOTE — PATIENT INSTRUCTIONS
Dr. Rankin would like you to continue monthly labs.       When you are in need of a refill, please call your pharmacy and they will send us a request.      Copy of appointments, and after visit summary (AVS) given to patient.      If you have any questions please call Karla Scott RN, BSN Oncology Hematology  [facility] [phone]. For questions after business hours, or on holidays/weekends, please call our after hours Nurse Triage line [phone]. Thank you.         Continue monthly cbc diff.

## 2019-11-26 ENCOUNTER — HOSPITAL ENCOUNTER (OUTPATIENT)
Dept: PHYSICAL THERAPY | Facility: CLINIC | Age: 64
Setting detail: THERAPIES SERIES
End: 2019-11-26
Attending: PEDIATRICS
Payer: COMMERCIAL

## 2019-11-26 PROCEDURE — 97140 MANUAL THERAPY 1/> REGIONS: CPT | Mod: GP | Performed by: PHYSICAL THERAPIST

## 2019-12-10 ENCOUNTER — HOSPITAL ENCOUNTER (OUTPATIENT)
Dept: PHYSICAL THERAPY | Facility: CLINIC | Age: 64
Setting detail: THERAPIES SERIES
End: 2019-12-10
Attending: PEDIATRICS
Payer: COMMERCIAL

## 2019-12-10 PROCEDURE — 97140 MANUAL THERAPY 1/> REGIONS: CPT | Mod: GP | Performed by: PHYSICAL THERAPIST

## 2019-12-17 ENCOUNTER — HOSPITAL ENCOUNTER (OUTPATIENT)
Dept: PHYSICAL THERAPY | Facility: CLINIC | Age: 64
Setting detail: THERAPIES SERIES
End: 2019-12-17
Attending: PEDIATRICS
Payer: COMMERCIAL

## 2019-12-17 PROCEDURE — 97140 MANUAL THERAPY 1/> REGIONS: CPT | Mod: GP | Performed by: PHYSICAL THERAPIST

## 2019-12-17 PROCEDURE — 97110 THERAPEUTIC EXERCISES: CPT | Mod: GP | Performed by: PHYSICAL THERAPIST

## 2019-12-17 NOTE — PROGRESS NOTES
Charlee Marks  1955  Diagnosis - Arthritis of both knees Physical Therapy Progress Note  12/17/19 1500   Signing Clinician's Name / Credentials   Signing clinician's name / credentials Lupillo Kamara PT, DPT   Session Number   Session Number 8 (Start of Care Date - 10/21/2019)   Progress Note/Recertification   Progress Note Due Date 12/16/19   Progress Note Completed Date 12/17/19   Adult Goals   PT Ortho Eval Goals 1;2;3;4   Ortho Goal 1   Goal Identifier HEP   Goal Description Pt will be independent in HEP in order to achieve and maintain long term treatment goals.   Target Date 11/21/19   Date Met 11/26/19   Ortho Goal 2   Goal Identifier Stairs   Goal Description Pt will be able to ascend 3 steps to get into her house with a step to gait pattern and hanrail assist with a minimal increase in pain 2-3/10.  (Mostly met. Can do on most days.)   Target Date 12/16/19   Ortho Goal 3   Goal Identifier Work   Goal Description Pt will be able to ambulate for 30 minutes without having to stop due to knee pain.   Target Date 12/16/19   Date Met 12/17/19   Subjective Report   Subjective Report Knees have been better. The cold and walking on snow and ice have increased pain. 50% improvement since beginning physical therapy.     Objective Measures   Objective Measures Objective Measure 1;Objective Measure 2;Objective Measure 3   Objective Measure 1   Objective Measure Ambulation   Details No limp noted   Objective Measure 2   Objective Measure Palpation   Details Tender along medial and lateral joint lines B   Objective Measure 3   Objective Measure Strength   Details Knee extension - 4/5 B / Knee flexion - 4/5 B / Hip flexion - 4/5 B   Treatment Interventions   Interventions Therapeutic Procedure/Exercise;Manual Therapy   Therapeutic Procedure/exercise   Therapeutic Procedures: strength, endurance, ROM, flexibillity minutes (66711) 16   Skilled Intervention Strengthening exercise education   Patient Response No increase  in discomfort noted   Treatment Detail SLR in supine x5 and x8 B / HS stretch in supine x4 minutes / Mini lunges x10 B / Monster walks x8 lengths with YTB /     Manual Therapy   Manual Therapy: Mobilization, MFR, MLD, friction massage minutes (45665) 10   Skilled Intervention Joint mobilizations   Patient Response Improve motion   Treatment Detail Long axis hip distraction B to improve motion and reduce pain   Education   Learner Patient   Readiness Acceptance   Method Booklet/handout;Explanation;Demonstration   Response Verbalizes Understanding;Demonstrates Understanding   Plan   Homework Bruises easily   Home program Mini lunges at counter / SLR in supine / Standing marching / LAQ x15 B / Gastroc stretch at wall 2x30 seconds B / Standing hip abduction x10 B   Updates to plan of care Continue 1 time / week for 1 month   Plan for next session Check Mini lunges / Monster walks / Resisted HS curls / Joint mobs / Knee scoop / Long axis hip distraction   Comments   Comments Pt has had a setback over the last couple of weeks but states that despite that she has seen a 50% improvement since beginning physical therapy. Pt has met 2 of 3 goals and is making progress towards the third goal as stairs have become easier to climb and descend. Pt would benefit from skilled physical therapy in order to impove LE and core strength and meet the remaining goal.    Total Session Time   Timed Code Treatment Minutes 26   Total Treatment Time (sum of timed and untimed services) 26   AMBULATORY CLINICS ONLY-MEDICAL AND TREATMENT DIAGNOSIS   PT Diagnosis Bilateral knee pain with strength and flexibility deficits of knee and hip musculature bilaterally     Referring Physician - Nohelia Hunter MD

## 2019-12-20 DIAGNOSIS — D69.3 IDIOPATHIC THROMBOCYTOPENIC PURPURA (H): ICD-10-CM

## 2019-12-20 LAB
BASOPHILS # BLD AUTO: 0.1 10E9/L (ref 0–0.2)
BASOPHILS NFR BLD AUTO: 0.8 %
DIFFERENTIAL METHOD BLD: ABNORMAL
EOSINOPHIL # BLD AUTO: 0.2 10E9/L (ref 0–0.7)
EOSINOPHIL NFR BLD AUTO: 1.7 %
ERYTHROCYTE [DISTWIDTH] IN BLOOD BY AUTOMATED COUNT: 12.9 % (ref 10–15)
HCT VFR BLD AUTO: 40.9 % (ref 35–47)
HGB BLD-MCNC: 13.3 G/DL (ref 11.7–15.7)
IMM GRANULOCYTES # BLD: 0 10E9/L (ref 0–0.4)
IMM GRANULOCYTES NFR BLD: 0.2 %
LYMPHOCYTES # BLD AUTO: 5 10E9/L (ref 0.8–5.3)
LYMPHOCYTES NFR BLD AUTO: 41.8 %
MCH RBC QN AUTO: 30.8 PG (ref 26.5–33)
MCHC RBC AUTO-ENTMCNC: 32.5 G/DL (ref 31.5–36.5)
MCV RBC AUTO: 95 FL (ref 78–100)
MONOCYTES # BLD AUTO: 1.1 10E9/L (ref 0–1.3)
MONOCYTES NFR BLD AUTO: 9.3 %
NEUTROPHILS # BLD AUTO: 5.5 10E9/L (ref 1.6–8.3)
NEUTROPHILS NFR BLD AUTO: 46.2 %
NRBC # BLD AUTO: 0 10*3/UL
NRBC BLD AUTO-RTO: 0 /100
PLATELET # BLD AUTO: 84 10E9/L (ref 150–450)
PLATELET # BLD EST: ABNORMAL 10*3/UL
RBC # BLD AUTO: 4.32 10E12/L (ref 3.8–5.2)
RBC MORPH BLD: NORMAL
VARIANT LYMPHS BLD QL SMEAR: PRESENT
WBC # BLD AUTO: 11.8 10E9/L (ref 4–11)

## 2019-12-20 PROCEDURE — 36415 COLL VENOUS BLD VENIPUNCTURE: CPT | Performed by: INTERNAL MEDICINE

## 2019-12-20 PROCEDURE — 85025 COMPLETE CBC W/AUTO DIFF WBC: CPT | Performed by: INTERNAL MEDICINE

## 2019-12-24 ENCOUNTER — HOSPITAL ENCOUNTER (OUTPATIENT)
Dept: PHYSICAL THERAPY | Facility: CLINIC | Age: 64
Setting detail: THERAPIES SERIES
End: 2019-12-24
Attending: PEDIATRICS
Payer: COMMERCIAL

## 2019-12-24 PROCEDURE — 97140 MANUAL THERAPY 1/> REGIONS: CPT | Mod: GP | Performed by: PHYSICAL THERAPIST

## 2019-12-24 PROCEDURE — 97110 THERAPEUTIC EXERCISES: CPT | Mod: GP | Performed by: PHYSICAL THERAPIST

## 2019-12-24 NOTE — PROGRESS NOTES
Charlee HERNANDEZ Kendrick  1955  Diagnosis - Arthritis of both knees Physical Therapy Discharge Note  12/24/19 0800   Signing Clinician's Name / Credentials   Signing clinician's name / credentials Lupillo Kamara PT, DPT   Session Number   Session Number 9 (Start of Care Date - 10/21/2019)   Progress Note/Recertification   Progress Note Due Date 01/17/19   Progress Note Completed Date 12/24/19   Adult Goals   PT Ortho Eval Goals 1;2;3;4   Ortho Goal 1   Goal Identifier HEP   Goal Description Pt will be independent in HEP in order to achieve and maintain long term treatment goals.   Target Date 11/21/19   Date Met 11/26/19   Ortho Goal 2   Goal Identifier Stairs   Goal Description Pt will be able to ascend 3 steps to get into her house with a step to gait pattern and hanrail assist with a minimal increase in pain 2-3/10.   Target Date 12/16/19   Date Met 12/24/19   Ortho Goal 3   Goal Identifier Work   Goal Description Pt will be able to ambulate for 30 minutes without having to stop due to knee pain.   Target Date 12/16/19   Date Met 12/17/19   Subjective Report   Subjective Report Went to see her daughter who lives on the 3rd floor. Things felt pretty good afterwards.    Objective Measures   Objective Measures Objective Measure 1;Objective Measure 2;Objective Measure 3   Objective Measure 1   Objective Measure Ambulation   Details Non-antalgic   Objective Measure 2   Objective Measure Palpation   Details Tender along medial and lateral joint lines B. Reduced tenderness compared to last visit.   Objective Measure 3   Objective Measure Strength   Details Knee extension - 4/5 B / Knee flexion - 4/5 B / Hip flexion - 4/5 B   Treatment Interventions   Interventions Therapeutic Procedure/Exercise;Manual Therapy;Gait Training   Therapeutic Procedure/exercise   Therapeutic Procedures: strength, endurance, ROM, flexibillity minutes (82687) 14   Skilled Intervention Strengthening exercise education   Patient Response No increase in  discomfort noted   Treatment Detail SLR in supine x10 B / Mini lunges x10 B / Monster walks x8 lengths with YTB (1/2 length of gym, pain in low back with full length) /     Gait Training   Gait Training Minutes, includes stair climbing (11271) 3   Skilled Intervention Step-up education   Patient Response Performed with proper technique and without handrail assist   Treatment Detail Step-ups x10 B on 6 inch step   Manual Therapy   Manual Therapy: Mobilization, MFR, MLD, friction massage minutes (04385) 10   Skilled Intervention Joint mobilizations   Patient Response No increase in discomfort noted / Improved motion   Treatment Detail Long axis hip distraction B to improve motion and reduce pain   Education   Learner Patient   Readiness Acceptance   Method Booklet/handout;Explanation;Demonstration   Response Verbalizes Understanding;Demonstrates Understanding   Plan   Homework Bruises easily   Home program Step-ups / Mini lunges at counter / SLR in supine / Standing marching / LAQ x15 B / Gastroc stretch at wall 2x30 seconds B / Standing hip abduction x10 B   Updates to plan of care Discharged   Comments   Comments Pt has done well in physical therapy and her pain level has been reduced. Pt has met all goals at today's visit. Pt states that her knees are feeling much better and she would like to manage her symptoms independently with HEP.    Total Session Time   Timed Code Treatment Minutes 27   Total Treatment Time (sum of timed and untimed services) 27   AMBULATORY CLINICS ONLY-MEDICAL AND TREATMENT DIAGNOSIS   PT Diagnosis Bilateral knee pain with strength and flexibility deficits of knee and hip musculature bilaterally     Referring Physician - Nohelia Hunter MD

## 2020-01-22 DIAGNOSIS — D69.3 IDIOPATHIC THROMBOCYTOPENIC PURPURA (H): ICD-10-CM

## 2020-01-22 LAB
BASOPHILS # BLD AUTO: 0.1 10E9/L (ref 0–0.2)
BASOPHILS NFR BLD AUTO: 0.8 %
DIFFERENTIAL METHOD BLD: ABNORMAL
EOSINOPHIL # BLD AUTO: 0.2 10E9/L (ref 0–0.7)
EOSINOPHIL NFR BLD AUTO: 1.5 %
ERYTHROCYTE [DISTWIDTH] IN BLOOD BY AUTOMATED COUNT: 13 % (ref 10–15)
HCT VFR BLD AUTO: 42 % (ref 35–47)
HGB BLD-MCNC: 13.3 G/DL (ref 11.7–15.7)
IMM GRANULOCYTES # BLD: 0 10E9/L (ref 0–0.4)
IMM GRANULOCYTES NFR BLD: 0.2 %
LYMPHOCYTES # BLD AUTO: 4.3 10E9/L (ref 0.8–5.3)
LYMPHOCYTES NFR BLD AUTO: 39.2 %
MCH RBC QN AUTO: 30 PG (ref 26.5–33)
MCHC RBC AUTO-ENTMCNC: 31.7 G/DL (ref 31.5–36.5)
MCV RBC AUTO: 95 FL (ref 78–100)
MONOCYTES # BLD AUTO: 1 10E9/L (ref 0–1.3)
MONOCYTES NFR BLD AUTO: 9.5 %
NEUTROPHILS # BLD AUTO: 5.3 10E9/L (ref 1.6–8.3)
NEUTROPHILS NFR BLD AUTO: 48.8 %
NRBC # BLD AUTO: 0 10*3/UL
NRBC BLD AUTO-RTO: 0 /100
PLATELET # BLD AUTO: 67 10E9/L (ref 150–450)
RBC # BLD AUTO: 4.43 10E12/L (ref 3.8–5.2)
WBC # BLD AUTO: 10.9 10E9/L (ref 4–11)

## 2020-01-22 PROCEDURE — 36415 COLL VENOUS BLD VENIPUNCTURE: CPT | Performed by: INTERNAL MEDICINE

## 2020-01-22 PROCEDURE — 85025 COMPLETE CBC W/AUTO DIFF WBC: CPT | Performed by: INTERNAL MEDICINE

## 2020-02-26 DIAGNOSIS — D69.3 IDIOPATHIC THROMBOCYTOPENIC PURPURA (H): ICD-10-CM

## 2020-02-26 LAB
BASOPHILS # BLD AUTO: 0.1 10E9/L (ref 0–0.2)
BASOPHILS NFR BLD AUTO: 0.7 %
DIFFERENTIAL METHOD BLD: ABNORMAL
EOSINOPHIL # BLD AUTO: 0.2 10E9/L (ref 0–0.7)
EOSINOPHIL NFR BLD AUTO: 2 %
ERYTHROCYTE [DISTWIDTH] IN BLOOD BY AUTOMATED COUNT: 12.7 % (ref 10–15)
HCT VFR BLD AUTO: 41.6 % (ref 35–47)
HGB BLD-MCNC: 13.3 G/DL (ref 11.7–15.7)
IMM GRANULOCYTES # BLD: 0 10E9/L (ref 0–0.4)
IMM GRANULOCYTES NFR BLD: 0.2 %
LYMPHOCYTES # BLD AUTO: 5.4 10E9/L (ref 0.8–5.3)
LYMPHOCYTES NFR BLD AUTO: 44.2 %
MCH RBC QN AUTO: 30.5 PG (ref 26.5–33)
MCHC RBC AUTO-ENTMCNC: 32 G/DL (ref 31.5–36.5)
MCV RBC AUTO: 95 FL (ref 78–100)
MONOCYTES # BLD AUTO: 1.2 10E9/L (ref 0–1.3)
MONOCYTES NFR BLD AUTO: 9.7 %
NEUTROPHILS # BLD AUTO: 5.2 10E9/L (ref 1.6–8.3)
NEUTROPHILS NFR BLD AUTO: 43.2 %
NRBC # BLD AUTO: 0 10*3/UL
NRBC BLD AUTO-RTO: 0 /100
PLATELET # BLD AUTO: 59 10E9/L (ref 150–450)
PLATELET # BLD EST: ABNORMAL 10*3/UL
RBC # BLD AUTO: 4.36 10E12/L (ref 3.8–5.2)
RBC MORPH BLD: NORMAL
VARIANT LYMPHS BLD QL SMEAR: PRESENT
WBC # BLD AUTO: 12.1 10E9/L (ref 4–11)

## 2020-02-26 PROCEDURE — 85025 COMPLETE CBC W/AUTO DIFF WBC: CPT | Performed by: INTERNAL MEDICINE

## 2020-02-26 PROCEDURE — 36415 COLL VENOUS BLD VENIPUNCTURE: CPT | Performed by: INTERNAL MEDICINE

## 2020-03-30 DIAGNOSIS — D69.3 IDIOPATHIC THROMBOCYTOPENIC PURPURA (H): ICD-10-CM

## 2020-03-30 LAB
BASOPHILS # BLD AUTO: 0.3 10E9/L (ref 0–0.2)
BASOPHILS NFR BLD AUTO: 2 %
DIFFERENTIAL METHOD BLD: ABNORMAL
EOSINOPHIL # BLD AUTO: 0.1 10E9/L (ref 0–0.7)
EOSINOPHIL NFR BLD AUTO: 1 %
ERYTHROCYTE [DISTWIDTH] IN BLOOD BY AUTOMATED COUNT: 12.8 % (ref 10–15)
HCT VFR BLD AUTO: 42.5 % (ref 35–47)
HGB BLD-MCNC: 13.7 G/DL (ref 11.7–15.7)
LYMPHOCYTES # BLD AUTO: 5.8 10E9/L (ref 0.8–5.3)
LYMPHOCYTES NFR BLD AUTO: 42 %
MCH RBC QN AUTO: 30.6 PG (ref 26.5–33)
MCHC RBC AUTO-ENTMCNC: 32.2 G/DL (ref 31.5–36.5)
MCV RBC AUTO: 95 FL (ref 78–100)
MONOCYTES # BLD AUTO: 1.2 10E9/L (ref 0–1.3)
MONOCYTES NFR BLD AUTO: 9 %
NEUTROPHILS # BLD AUTO: 6.3 10E9/L (ref 1.6–8.3)
NEUTROPHILS NFR BLD AUTO: 46 %
PLATELET # BLD AUTO: 99 10E9/L (ref 150–450)
PLATELET # BLD EST: ABNORMAL 10*3/UL
RBC # BLD AUTO: 4.47 10E12/L (ref 3.8–5.2)
RBC MORPH BLD: NORMAL
WBC # BLD AUTO: 13.7 10E9/L (ref 4–11)

## 2020-03-30 PROCEDURE — 85025 COMPLETE CBC W/AUTO DIFF WBC: CPT | Performed by: INTERNAL MEDICINE

## 2020-03-30 PROCEDURE — 36415 COLL VENOUS BLD VENIPUNCTURE: CPT | Performed by: INTERNAL MEDICINE

## 2020-04-28 DIAGNOSIS — D69.3 IDIOPATHIC THROMBOCYTOPENIC PURPURA (H): ICD-10-CM

## 2020-04-28 LAB
BASOPHILS # BLD AUTO: 0.1 10E9/L (ref 0–0.2)
BASOPHILS NFR BLD AUTO: 0.9 %
DIFFERENTIAL METHOD BLD: ABNORMAL
EOSINOPHIL # BLD AUTO: 0.3 10E9/L (ref 0–0.7)
EOSINOPHIL NFR BLD AUTO: 2.5 %
ERYTHROCYTE [DISTWIDTH] IN BLOOD BY AUTOMATED COUNT: 13.4 % (ref 10–15)
HCT VFR BLD AUTO: 41.3 % (ref 35–47)
HGB BLD-MCNC: 13.2 G/DL (ref 11.7–15.7)
IMM GRANULOCYTES # BLD: 0 10E9/L (ref 0–0.4)
IMM GRANULOCYTES NFR BLD: 0.3 %
LYMPHOCYTES # BLD AUTO: 5.2 10E9/L (ref 0.8–5.3)
LYMPHOCYTES NFR BLD AUTO: 47.1 %
MCH RBC QN AUTO: 30.7 PG (ref 26.5–33)
MCHC RBC AUTO-ENTMCNC: 32 G/DL (ref 31.5–36.5)
MCV RBC AUTO: 96 FL (ref 78–100)
MONOCYTES # BLD AUTO: 1 10E9/L (ref 0–1.3)
MONOCYTES NFR BLD AUTO: 9 %
NEUTROPHILS # BLD AUTO: 4.5 10E9/L (ref 1.6–8.3)
NEUTROPHILS NFR BLD AUTO: 40.2 %
NRBC # BLD AUTO: 0 10*3/UL
NRBC BLD AUTO-RTO: 0 /100
PLATELET # BLD AUTO: 87 10E9/L (ref 150–450)
PLATELET # BLD EST: ABNORMAL 10*3/UL
RBC # BLD AUTO: 4.3 10E12/L (ref 3.8–5.2)
RBC MORPH BLD: NORMAL
VARIANT LYMPHS BLD QL SMEAR: PRESENT
WBC # BLD AUTO: 11.1 10E9/L (ref 4–11)

## 2020-04-28 PROCEDURE — 36415 COLL VENOUS BLD VENIPUNCTURE: CPT | Performed by: INTERNAL MEDICINE

## 2020-04-28 PROCEDURE — 85025 COMPLETE CBC W/AUTO DIFF WBC: CPT | Performed by: INTERNAL MEDICINE

## 2020-05-29 DIAGNOSIS — D69.3 IDIOPATHIC THROMBOCYTOPENIC PURPURA (H): ICD-10-CM

## 2020-05-29 LAB
BASOPHILS # BLD AUTO: 0.1 10E9/L (ref 0–0.2)
BASOPHILS NFR BLD AUTO: 0.7 %
DIFFERENTIAL METHOD BLD: ABNORMAL
EOSINOPHIL # BLD AUTO: 0.2 10E9/L (ref 0–0.7)
EOSINOPHIL NFR BLD AUTO: 1.2 %
ERYTHROCYTE [DISTWIDTH] IN BLOOD BY AUTOMATED COUNT: 12.9 % (ref 10–15)
HCT VFR BLD AUTO: 41.4 % (ref 35–47)
HGB BLD-MCNC: 13.3 G/DL (ref 11.7–15.7)
IMM GRANULOCYTES # BLD: 0 10E9/L (ref 0–0.4)
IMM GRANULOCYTES NFR BLD: 0.3 %
LYMPHOCYTES # BLD AUTO: 5.2 10E9/L (ref 0.8–5.3)
LYMPHOCYTES NFR BLD AUTO: 40.7 %
MCH RBC QN AUTO: 30.4 PG (ref 26.5–33)
MCHC RBC AUTO-ENTMCNC: 32.1 G/DL (ref 31.5–36.5)
MCV RBC AUTO: 95 FL (ref 78–100)
MONOCYTES # BLD AUTO: 1.5 10E9/L (ref 0–1.3)
MONOCYTES NFR BLD AUTO: 11.6 %
NEUTROPHILS # BLD AUTO: 5.8 10E9/L (ref 1.6–8.3)
NEUTROPHILS NFR BLD AUTO: 45.5 %
NRBC # BLD AUTO: 0 10*3/UL
NRBC BLD AUTO-RTO: 0 /100
PLATELET # BLD AUTO: 87 10E9/L (ref 150–450)
PLATELET # BLD EST: ABNORMAL 10*3/UL
RBC # BLD AUTO: 4.37 10E12/L (ref 3.8–5.2)
RBC MORPH BLD: NORMAL
VARIANT LYMPHS BLD QL SMEAR: PRESENT
WBC # BLD AUTO: 12.7 10E9/L (ref 4–11)

## 2020-05-29 PROCEDURE — 85025 COMPLETE CBC W/AUTO DIFF WBC: CPT | Performed by: INTERNAL MEDICINE

## 2020-05-29 PROCEDURE — 36415 COLL VENOUS BLD VENIPUNCTURE: CPT | Performed by: INTERNAL MEDICINE

## 2020-07-01 DIAGNOSIS — D69.3 IDIOPATHIC THROMBOCYTOPENIC PURPURA (H): Primary | ICD-10-CM

## 2020-07-01 DIAGNOSIS — D69.3 IDIOPATHIC THROMBOCYTOPENIC PURPURA (H): ICD-10-CM

## 2020-07-01 LAB
BASOPHILS # BLD AUTO: 0.1 10E9/L (ref 0–0.2)
BASOPHILS NFR BLD AUTO: 0.4 %
DIFFERENTIAL METHOD BLD: ABNORMAL
EOSINOPHIL # BLD AUTO: 0.3 10E9/L (ref 0–0.7)
EOSINOPHIL NFR BLD AUTO: 2.2 %
ERYTHROCYTE [DISTWIDTH] IN BLOOD BY AUTOMATED COUNT: 13.1 % (ref 10–15)
HCT VFR BLD AUTO: 41.9 % (ref 35–47)
HGB BLD-MCNC: 13.6 G/DL (ref 11.7–15.7)
LYMPHOCYTES # BLD AUTO: 5.2 10E9/L (ref 0.8–5.3)
LYMPHOCYTES NFR BLD AUTO: 41.1 %
MCH RBC QN AUTO: 30.9 PG (ref 26.5–33)
MCHC RBC AUTO-ENTMCNC: 32.5 G/DL (ref 31.5–36.5)
MCV RBC AUTO: 95 FL (ref 78–100)
MONOCYTES # BLD AUTO: 1.3 10E9/L (ref 0–1.3)
MONOCYTES NFR BLD AUTO: 10.5 %
NEUTROPHILS # BLD AUTO: 5.8 10E9/L (ref 1.6–8.3)
NEUTROPHILS NFR BLD AUTO: 45.8 %
PLATELET # BLD AUTO: 123 10E9/L (ref 150–450)
RBC # BLD AUTO: 4.4 10E12/L (ref 3.8–5.2)
WBC # BLD AUTO: 12.6 10E9/L (ref 4–11)

## 2020-07-01 PROCEDURE — 36415 COLL VENOUS BLD VENIPUNCTURE: CPT | Performed by: INTERNAL MEDICINE

## 2020-07-01 PROCEDURE — 85025 COMPLETE CBC W/AUTO DIFF WBC: CPT | Performed by: INTERNAL MEDICINE

## 2020-08-03 DIAGNOSIS — D69.3 IDIOPATHIC THROMBOCYTOPENIC PURPURA (H): ICD-10-CM

## 2020-08-03 LAB
BASOPHILS # BLD AUTO: 0.1 10E9/L (ref 0–0.2)
BASOPHILS NFR BLD AUTO: 0.9 %
DIFFERENTIAL METHOD BLD: ABNORMAL
EOSINOPHIL # BLD AUTO: 0.2 10E9/L (ref 0–0.7)
EOSINOPHIL NFR BLD AUTO: 2.1 %
ERYTHROCYTE [DISTWIDTH] IN BLOOD BY AUTOMATED COUNT: 12.5 % (ref 10–15)
HCT VFR BLD AUTO: 40.9 % (ref 35–47)
HGB BLD-MCNC: 13.4 G/DL (ref 11.7–15.7)
IMM GRANULOCYTES # BLD: 0 10E9/L (ref 0–0.4)
IMM GRANULOCYTES NFR BLD: 0.2 %
LYMPHOCYTES # BLD AUTO: 5.1 10E9/L (ref 0.8–5.3)
LYMPHOCYTES NFR BLD AUTO: 45.8 %
MCH RBC QN AUTO: 30.9 PG (ref 26.5–33)
MCHC RBC AUTO-ENTMCNC: 32.8 G/DL (ref 31.5–36.5)
MCV RBC AUTO: 94 FL (ref 78–100)
MONOCYTES # BLD AUTO: 1 10E9/L (ref 0–1.3)
MONOCYTES NFR BLD AUTO: 9.1 %
NEUTROPHILS # BLD AUTO: 4.7 10E9/L (ref 1.6–8.3)
NEUTROPHILS NFR BLD AUTO: 41.9 %
NRBC # BLD AUTO: 0 10*3/UL
NRBC BLD AUTO-RTO: 0 /100
PLATELET # BLD AUTO: 93 10E9/L (ref 150–450)
PLATELET # BLD EST: ABNORMAL 10*3/UL
RBC # BLD AUTO: 4.34 10E12/L (ref 3.8–5.2)
RBC MORPH BLD: NORMAL
VARIANT LYMPHS BLD QL SMEAR: PRESENT
WBC # BLD AUTO: 11.1 10E9/L (ref 4–11)

## 2020-08-03 PROCEDURE — 85025 COMPLETE CBC W/AUTO DIFF WBC: CPT | Performed by: INTERNAL MEDICINE

## 2020-08-03 PROCEDURE — 36415 COLL VENOUS BLD VENIPUNCTURE: CPT | Performed by: INTERNAL MEDICINE

## 2020-09-02 DIAGNOSIS — D69.3 IDIOPATHIC THROMBOCYTOPENIC PURPURA (H): ICD-10-CM

## 2020-09-02 LAB
BASOPHILS # BLD AUTO: 0.1 10E9/L (ref 0–0.2)
BASOPHILS NFR BLD AUTO: 1 %
DIFFERENTIAL METHOD BLD: ABNORMAL
EOSINOPHIL # BLD AUTO: 0.2 10E9/L (ref 0–0.7)
EOSINOPHIL NFR BLD AUTO: 1.6 %
ERYTHROCYTE [DISTWIDTH] IN BLOOD BY AUTOMATED COUNT: 13.1 % (ref 10–15)
HCT VFR BLD AUTO: 42 % (ref 35–47)
HGB BLD-MCNC: 13.5 G/DL (ref 11.7–15.7)
IMM GRANULOCYTES # BLD: 0 10E9/L (ref 0–0.4)
IMM GRANULOCYTES NFR BLD: 0.3 %
LYMPHOCYTES # BLD AUTO: 3.9 10E9/L (ref 0.8–5.3)
LYMPHOCYTES NFR BLD AUTO: 41 %
MCH RBC QN AUTO: 30.8 PG (ref 26.5–33)
MCHC RBC AUTO-ENTMCNC: 32.1 G/DL (ref 31.5–36.5)
MCV RBC AUTO: 96 FL (ref 78–100)
MONOCYTES # BLD AUTO: 1.1 10E9/L (ref 0–1.3)
MONOCYTES NFR BLD AUTO: 11.5 %
NEUTROPHILS # BLD AUTO: 4.2 10E9/L (ref 1.6–8.3)
NEUTROPHILS NFR BLD AUTO: 44.6 %
NRBC # BLD AUTO: 0 10*3/UL
NRBC BLD AUTO-RTO: 0 /100
PLATELET # BLD AUTO: 92 10E9/L (ref 150–450)
RBC # BLD AUTO: 4.38 10E12/L (ref 3.8–5.2)
WBC # BLD AUTO: 9.5 10E9/L (ref 4–11)

## 2020-09-02 PROCEDURE — 36415 COLL VENOUS BLD VENIPUNCTURE: CPT | Performed by: INTERNAL MEDICINE

## 2020-09-02 PROCEDURE — 85025 COMPLETE CBC W/AUTO DIFF WBC: CPT | Performed by: INTERNAL MEDICINE

## 2020-09-30 ENCOUNTER — ALLIED HEALTH/NURSE VISIT (OUTPATIENT)
Dept: FAMILY MEDICINE | Facility: CLINIC | Age: 65
End: 2020-09-30
Payer: COMMERCIAL

## 2020-09-30 DIAGNOSIS — Z23 NEED FOR PROPHYLACTIC VACCINATION AND INOCULATION AGAINST INFLUENZA: Primary | ICD-10-CM

## 2020-09-30 DIAGNOSIS — D69.3 IDIOPATHIC THROMBOCYTOPENIC PURPURA (H): ICD-10-CM

## 2020-09-30 LAB
BASOPHILS # BLD AUTO: 0.1 10E9/L (ref 0–0.2)
BASOPHILS NFR BLD AUTO: 0.7 %
DIFFERENTIAL METHOD BLD: ABNORMAL
EOSINOPHIL # BLD AUTO: 0.2 10E9/L (ref 0–0.7)
EOSINOPHIL NFR BLD AUTO: 1.8 %
ERYTHROCYTE [DISTWIDTH] IN BLOOD BY AUTOMATED COUNT: 13.1 % (ref 10–15)
HCT VFR BLD AUTO: 43.2 % (ref 35–47)
HGB BLD-MCNC: 14 G/DL (ref 11.7–15.7)
IMM GRANULOCYTES # BLD: 0 10E9/L (ref 0–0.4)
IMM GRANULOCYTES NFR BLD: 0.2 %
LYMPHOCYTES # BLD AUTO: 5.6 10E9/L (ref 0.8–5.3)
LYMPHOCYTES NFR BLD AUTO: 49.9 %
MCH RBC QN AUTO: 30.8 PG (ref 26.5–33)
MCHC RBC AUTO-ENTMCNC: 32.4 G/DL (ref 31.5–36.5)
MCV RBC AUTO: 95 FL (ref 78–100)
MONOCYTES # BLD AUTO: 1 10E9/L (ref 0–1.3)
MONOCYTES NFR BLD AUTO: 9 %
NEUTROPHILS # BLD AUTO: 4.3 10E9/L (ref 1.6–8.3)
NEUTROPHILS NFR BLD AUTO: 38.4 %
NRBC # BLD AUTO: 0 10*3/UL
NRBC BLD AUTO-RTO: 0 /100
PLATELET # BLD AUTO: 117 10E9/L (ref 150–450)
PLATELET # BLD EST: ABNORMAL 10*3/UL
RBC # BLD AUTO: 4.54 10E12/L (ref 3.8–5.2)
RBC MORPH BLD: NORMAL
VARIANT LYMPHS BLD QL SMEAR: PRESENT
WBC # BLD AUTO: 11.1 10E9/L (ref 4–11)

## 2020-09-30 PROCEDURE — 36415 COLL VENOUS BLD VENIPUNCTURE: CPT | Performed by: INTERNAL MEDICINE

## 2020-09-30 PROCEDURE — 85025 COMPLETE CBC W/AUTO DIFF WBC: CPT | Performed by: INTERNAL MEDICINE

## 2020-09-30 PROCEDURE — 99207 ZZC NO CHARGE NURSE ONLY: CPT

## 2020-09-30 PROCEDURE — 90471 IMMUNIZATION ADMIN: CPT

## 2020-09-30 PROCEDURE — 90682 RIV4 VACC RECOMBINANT DNA IM: CPT

## 2020-11-11 DIAGNOSIS — D69.3 IDIOPATHIC THROMBOCYTOPENIC PURPURA (H): ICD-10-CM

## 2020-11-11 LAB
BASOPHILS # BLD AUTO: 0.1 10E9/L (ref 0–0.2)
BASOPHILS NFR BLD AUTO: 0.9 %
DIFFERENTIAL METHOD BLD: ABNORMAL
EOSINOPHIL # BLD AUTO: 0.2 10E9/L (ref 0–0.7)
EOSINOPHIL NFR BLD AUTO: 1.6 %
ERYTHROCYTE [DISTWIDTH] IN BLOOD BY AUTOMATED COUNT: 12.8 % (ref 10–15)
HCT VFR BLD AUTO: 41.6 % (ref 35–47)
HGB BLD-MCNC: 13.6 G/DL (ref 11.7–15.7)
IMM GRANULOCYTES # BLD: 0 10E9/L (ref 0–0.4)
IMM GRANULOCYTES NFR BLD: 0.2 %
LYMPHOCYTES # BLD AUTO: 5.2 10E9/L (ref 0.8–5.3)
LYMPHOCYTES NFR BLD AUTO: 50.2 %
MCH RBC QN AUTO: 31 PG (ref 26.5–33)
MCHC RBC AUTO-ENTMCNC: 32.7 G/DL (ref 31.5–36.5)
MCV RBC AUTO: 95 FL (ref 78–100)
MONOCYTES # BLD AUTO: 0.9 10E9/L (ref 0–1.3)
MONOCYTES NFR BLD AUTO: 8.8 %
NEUTROPHILS # BLD AUTO: 4 10E9/L (ref 1.6–8.3)
NEUTROPHILS NFR BLD AUTO: 38.3 %
NRBC # BLD AUTO: 0 10*3/UL
NRBC BLD AUTO-RTO: 0 /100
PLATELET # BLD AUTO: 119 10E9/L (ref 150–450)
PLATELET # BLD EST: ABNORMAL 10*3/UL
RBC # BLD AUTO: 4.39 10E12/L (ref 3.8–5.2)
RBC MORPH BLD: NORMAL
VARIANT LYMPHS BLD QL SMEAR: PRESENT
WBC # BLD AUTO: 10.4 10E9/L (ref 4–11)

## 2020-11-11 PROCEDURE — 85025 COMPLETE CBC W/AUTO DIFF WBC: CPT | Performed by: INTERNAL MEDICINE

## 2020-11-11 PROCEDURE — 36415 COLL VENOUS BLD VENIPUNCTURE: CPT | Performed by: INTERNAL MEDICINE

## 2020-11-16 ENCOUNTER — HEALTH MAINTENANCE LETTER (OUTPATIENT)
Age: 65
End: 2020-11-16

## 2020-12-11 ENCOUNTER — TELEPHONE (OUTPATIENT)
Dept: ONCOLOGY | Facility: CLINIC | Age: 65
End: 2020-12-11

## 2020-12-11 NOTE — TELEPHONE ENCOUNTER
Spoke with patient's  regarding setting up a lab appointment that is done monthly. Patient's  stated she will call us once she gets home from work to set this up.    Bea Nielson RN BSN

## 2020-12-15 ENCOUNTER — TELEPHONE (OUTPATIENT)
Dept: ONCOLOGY | Facility: CLINIC | Age: 65
End: 2020-12-15

## 2020-12-15 DIAGNOSIS — D69.3 IDIOPATHIC THROMBOCYTOPENIC PURPURA (H): ICD-10-CM

## 2020-12-15 LAB
BASOPHILS # BLD AUTO: 0.1 10E9/L (ref 0–0.2)
BASOPHILS NFR BLD AUTO: 0.4 %
DIFFERENTIAL METHOD BLD: ABNORMAL
EOSINOPHIL # BLD AUTO: 0.2 10E9/L (ref 0–0.7)
EOSINOPHIL NFR BLD AUTO: 1.9 %
ERYTHROCYTE [DISTWIDTH] IN BLOOD BY AUTOMATED COUNT: 13.5 % (ref 10–15)
HCT VFR BLD AUTO: 40.9 % (ref 35–47)
HGB BLD-MCNC: 13.1 G/DL (ref 11.7–15.7)
LYMPHOCYTES # BLD AUTO: 5.3 10E9/L (ref 0.8–5.3)
LYMPHOCYTES NFR BLD AUTO: 45.8 %
MCH RBC QN AUTO: 30.9 PG (ref 26.5–33)
MCHC RBC AUTO-ENTMCNC: 32 G/DL (ref 31.5–36.5)
MCV RBC AUTO: 97 FL (ref 78–100)
MONOCYTES # BLD AUTO: 1 10E9/L (ref 0–1.3)
MONOCYTES NFR BLD AUTO: 8.9 %
NEUTROPHILS # BLD AUTO: 5 10E9/L (ref 1.6–8.3)
NEUTROPHILS NFR BLD AUTO: 43 %
PLATELET # BLD AUTO: 147 10E9/L (ref 150–450)
RBC # BLD AUTO: 4.24 10E12/L (ref 3.8–5.2)
WBC # BLD AUTO: 11.6 10E9/L (ref 4–11)

## 2020-12-15 PROCEDURE — 36415 COLL VENOUS BLD VENIPUNCTURE: CPT | Performed by: INTERNAL MEDICINE

## 2020-12-15 PROCEDURE — 85025 COMPLETE CBC W/AUTO DIFF WBC: CPT | Performed by: INTERNAL MEDICINE

## 2020-12-15 NOTE — TELEPHONE ENCOUNTER
Spoke with patient about CBC results from today. Patient had no further questions and will schedule another lab draw for January.    Bea Nielson RN BSN

## 2021-01-15 ENCOUNTER — TELEPHONE (OUTPATIENT)
Dept: ONCOLOGY | Facility: CLINIC | Age: 66
End: 2021-01-15

## 2021-01-15 DIAGNOSIS — D69.3 IDIOPATHIC THROMBOCYTOPENIC PURPURA (H): ICD-10-CM

## 2021-01-15 LAB
BASOPHILS # BLD AUTO: 0.1 10E9/L (ref 0–0.2)
BASOPHILS NFR BLD AUTO: 0.4 %
DIFFERENTIAL METHOD BLD: ABNORMAL
EOSINOPHIL # BLD AUTO: 0.2 10E9/L (ref 0–0.7)
EOSINOPHIL NFR BLD AUTO: 1.2 %
ERYTHROCYTE [DISTWIDTH] IN BLOOD BY AUTOMATED COUNT: 13.6 % (ref 10–15)
HCT VFR BLD AUTO: 40.8 % (ref 35–47)
HGB BLD-MCNC: 13.1 G/DL (ref 11.7–15.7)
LYMPHOCYTES # BLD AUTO: 5.3 10E9/L (ref 0.8–5.3)
LYMPHOCYTES NFR BLD AUTO: 43.3 %
MCH RBC QN AUTO: 30.6 PG (ref 26.5–33)
MCHC RBC AUTO-ENTMCNC: 32.1 G/DL (ref 31.5–36.5)
MCV RBC AUTO: 95 FL (ref 78–100)
MONOCYTES # BLD AUTO: 1.2 10E9/L (ref 0–1.3)
MONOCYTES NFR BLD AUTO: 9.6 %
NEUTROPHILS # BLD AUTO: 5.6 10E9/L (ref 1.6–8.3)
NEUTROPHILS NFR BLD AUTO: 45.5 %
PLATELET # BLD AUTO: 154 10E9/L (ref 150–450)
RBC # BLD AUTO: 4.28 10E12/L (ref 3.8–5.2)
WBC # BLD AUTO: 12.2 10E9/L (ref 4–11)

## 2021-01-15 PROCEDURE — 85025 COMPLETE CBC W/AUTO DIFF WBC: CPT | Performed by: INTERNAL MEDICINE

## 2021-01-15 PROCEDURE — 36415 COLL VENOUS BLD VENIPUNCTURE: CPT | Performed by: INTERNAL MEDICINE

## 2021-01-15 NOTE — TELEPHONE ENCOUNTER
Called patient with CBC results, specifically PLT (154). IVIG infusion not required this month as PLT is not less than 30. Denies questions or concerns. Will call and schedule next months lab draw in [facility]. Advised to call back with questions. Direct line provided. Deidre Mittal RN on 1/15/2021 at 2:34 PM

## 2021-02-19 DIAGNOSIS — D69.3 IDIOPATHIC THROMBOCYTOPENIC PURPURA (H): ICD-10-CM

## 2021-02-19 LAB
BASOPHILS # BLD AUTO: 0.1 10E9/L (ref 0–0.2)
BASOPHILS NFR BLD AUTO: 0.5 %
DIFFERENTIAL METHOD BLD: ABNORMAL
EOSINOPHIL # BLD AUTO: 0.2 10E9/L (ref 0–0.7)
EOSINOPHIL NFR BLD AUTO: 2 %
ERYTHROCYTE [DISTWIDTH] IN BLOOD BY AUTOMATED COUNT: 13.4 % (ref 10–15)
HCT VFR BLD AUTO: 42.1 % (ref 35–47)
HGB BLD-MCNC: 13.4 G/DL (ref 11.7–15.7)
LYMPHOCYTES # BLD AUTO: 4.8 10E9/L (ref 0.8–5.3)
LYMPHOCYTES NFR BLD AUTO: 49.6 %
MCH RBC QN AUTO: 31 PG (ref 26.5–33)
MCHC RBC AUTO-ENTMCNC: 31.8 G/DL (ref 31.5–36.5)
MCV RBC AUTO: 98 FL (ref 78–100)
MONOCYTES # BLD AUTO: 0.9 10E9/L (ref 0–1.3)
MONOCYTES NFR BLD AUTO: 9.7 %
NEUTROPHILS # BLD AUTO: 3.7 10E9/L (ref 1.6–8.3)
NEUTROPHILS NFR BLD AUTO: 38.2 %
PLATELET # BLD AUTO: 145 10E9/L (ref 150–450)
RBC # BLD AUTO: 4.32 10E12/L (ref 3.8–5.2)
WBC # BLD AUTO: 9.7 10E9/L (ref 4–11)

## 2021-02-19 PROCEDURE — 36415 COLL VENOUS BLD VENIPUNCTURE: CPT | Performed by: INTERNAL MEDICINE

## 2021-02-19 PROCEDURE — 85025 COMPLETE CBC W/AUTO DIFF WBC: CPT | Performed by: INTERNAL MEDICINE

## 2021-07-18 ENCOUNTER — HEALTH MAINTENANCE LETTER (OUTPATIENT)
Age: 66
End: 2021-07-18

## 2021-09-12 ENCOUNTER — HEALTH MAINTENANCE LETTER (OUTPATIENT)
Age: 66
End: 2021-09-12

## 2022-01-02 ENCOUNTER — HEALTH MAINTENANCE LETTER (OUTPATIENT)
Age: 67
End: 2022-01-02

## 2022-04-18 NOTE — PROGRESS NOTES

## 2022-11-19 ENCOUNTER — HEALTH MAINTENANCE LETTER (OUTPATIENT)
Age: 67
End: 2022-11-19

## 2023-01-18 NOTE — TELEPHONE ENCOUNTER
"Patient Communication Preferences indicate  Do not contact  and/or communication by \"Phone\" is not preferred. Call not required per Outreach team.    " No

## 2023-08-30 ENCOUNTER — OFFICE VISIT (OUTPATIENT)
Dept: FAMILY MEDICINE | Facility: CLINIC | Age: 68
End: 2023-08-30
Payer: COMMERCIAL

## 2023-08-30 ENCOUNTER — LAB (OUTPATIENT)
Dept: LAB | Facility: CLINIC | Age: 68
End: 2023-08-30
Payer: COMMERCIAL

## 2023-08-30 VITALS
WEIGHT: 293 LBS | OXYGEN SATURATION: 98 % | RESPIRATION RATE: 24 BRPM | TEMPERATURE: 97.7 F | HEIGHT: 66 IN | BODY MASS INDEX: 47.09 KG/M2 | DIASTOLIC BLOOD PRESSURE: 72 MMHG | HEART RATE: 76 BPM | SYSTOLIC BLOOD PRESSURE: 130 MMHG

## 2023-08-30 DIAGNOSIS — D69.3 IDIOPATHIC THROMBOCYTOPENIC PURPURA (H): Primary | ICD-10-CM

## 2023-08-30 DIAGNOSIS — D69.3 IDIOPATHIC THROMBOCYTOPENIC PURPURA (H): ICD-10-CM

## 2023-08-30 DIAGNOSIS — B02.9 HERPES ZOSTER WITHOUT COMPLICATION: ICD-10-CM

## 2023-08-30 LAB
ERYTHROCYTE [DISTWIDTH] IN BLOOD BY AUTOMATED COUNT: 12.8 % (ref 10–15)
HCT VFR BLD AUTO: 38.1 % (ref 35–47)
HGB BLD-MCNC: 12.3 G/DL (ref 11.7–15.7)
MCH RBC QN AUTO: 31.3 PG (ref 26.5–33)
MCHC RBC AUTO-ENTMCNC: 32.3 G/DL (ref 31.5–36.5)
MCV RBC AUTO: 97 FL (ref 78–100)
PLATELET # BLD AUTO: 195 10E3/UL (ref 150–450)
RBC # BLD AUTO: 3.93 10E6/UL (ref 3.8–5.2)
WBC # BLD AUTO: 8.3 10E3/UL (ref 4–11)

## 2023-08-30 PROCEDURE — 99203 OFFICE O/P NEW LOW 30 MIN: CPT | Performed by: FAMILY MEDICINE

## 2023-08-30 PROCEDURE — 36415 COLL VENOUS BLD VENIPUNCTURE: CPT

## 2023-08-30 PROCEDURE — 85027 COMPLETE CBC AUTOMATED: CPT

## 2023-08-30 ASSESSMENT — PAIN SCALES - GENERAL: PAINLEVEL: NO PAIN (0)

## 2023-08-30 ASSESSMENT — ASTHMA QUESTIONNAIRES: ACT_TOTALSCORE: 22

## 2023-08-30 NOTE — PROGRESS NOTES
"  Assessment & Plan   Herpes zoster without complication  Symptoms x 2 weeks now, has missed the window for antivirals.   Discussed shingles vaccine in 6-12 months  Discussed complications (postherpetic neuralgia, infection, etc).   She will let me know if symptoms worsen.       ITP (idiopathic thrombocytopenic purpura)  H/o ITP, would like a check of her plts while she is here  - CBC with platelets; Future       BMI:   Estimated body mass index is 51.62 kg/m  as calculated from the following:    Height as of this encounter: 1.683 m (5' 6.25\").    Weight as of this encounter: 146.2 kg (322 lb 4 oz).           Kay Briggs MD  [facility]    John Deshpande is a 67 year old, presenting for the following health issues:  Rash        8/30/2023    12:41 PM   Additional Questions   Roomed by Daya LIMA CMA   Accompanied by Self       Rash  Associated symptoms include a rash.   History of Present Illness       Reason for visit:  Rash on upper torso, mainly the back.  Symptom onset:  1-2 weeks ago  Symptoms include:  Pain, itching, swelling.  Rash  Symptom intensity:  Moderate  Symptom progression:  Improving  Had these symptoms before:  No  What makes it worse:  Sometimes pressure on areas in question.  What makes it better:  Advil/Tylenol for pain    She eats 2-3 servings of fruits and vegetables daily.She consumes 2 sweetened beverage(s) daily.She exercises with enough effort to increase her heart rate 9 or less minutes per day.  She exercises with enough effort to increase her heart rate 3 or less days per week.   She is taking medications regularly.       Rash  Onset/Duration: 2 weeks  Description  Location: Back and left side  Character: blotchy, raised, painful, burning, red  Itching: moderate  Intensity:  severe  Progression of Symptoms:  same  Accompanying signs and symptoms:   Fever: No  Body aches or joint pain: YES  Sore throat symptoms: No  Recent cold symptoms: No  History:        " "   Previous episodes of similar rash: None  New exposures:  None  Recent travel: No  Exposure to similar rash: No  Precipitating or alleviating factors: None  Therapies tried and outcome: Triple antibiotic cream        Review of Systems   Skin:  Positive for rash.            Objective    BP (!) 150/78   Pulse 76   Temp 97.7  F (36.5  C) (Tympanic)   Resp 24   Ht 1.683 m (5' 6.25\")   Wt 146.2 kg (322 lb 4 oz)   LMP 09/15/2007   SpO2 98%   BMI 51.62 kg/m    Body mass index is 51.62 kg/m .  Physical Exam   GENERAL APPEARANCE: healthy, alert, and no distress  SKIN: SKIN: Erythematous, well demarcated eruption with inlfamed papules and vessicles in a dermatomal distribution on the rt leg.  Superficial discomfort across dermatome including clear skin. T7-8            "

## 2023-08-30 NOTE — PATIENT INSTRUCTIONS
"Thank you for choosing [facility].      When you are out of refills or the refills say \"zero\", it is time to schedule your next appointment in clinic!    Labs are released to you almost immediately and sometimes before I have had a chance to review them.  I review labs regularly and once they are all in, you will be sent a letter with your results and/or if you are signed up for on-line services, you will be e-mailed the results with my interpretation. If there are serious findings, you typically will be called.    If you have any questions about your visit, your symptoms, your medication, your test results or it is not clear what your diagnosis or treatment plan is please contact me (via [email or web address]) or call the care team at [phone] option #2      Our Clinic hours are:  Mondays    7:20 am - 7 pm  Tues -  Fri  7:20 am - 5 pm      The clinic lab opens at 7:30 am Mon - Fri and appointments are required.    [facility]  Ph. [phone]  Monday-Thursday 8 am - 7pm  Tues/Wed/Fri 8 am - 5:30 pm       "

## 2023-09-10 ENCOUNTER — HEALTH MAINTENANCE LETTER (OUTPATIENT)
Age: 68
End: 2023-09-10

## 2023-09-13 ENCOUNTER — PATIENT OUTREACH (OUTPATIENT)
Dept: GASTROENTEROLOGY | Facility: CLINIC | Age: 68
End: 2023-09-13
Payer: COMMERCIAL

## 2023-09-13 DIAGNOSIS — Z12.11 SPECIAL SCREENING FOR MALIGNANT NEOPLASMS, COLON: Primary | ICD-10-CM

## 2023-09-13 NOTE — PROGRESS NOTES
"Pt with hx of adenomatous polyp with 5 yr recall recommended on last colonoscopy performed in 2018    Ordering/Referring Provider: Kay Briggs   BMI: Estimated body mass index is 51.62 kg/m  as calculated from the following:    Height as of 8/30/23: 1.683 m (5' 6.25\").    Weight as of 8/30/23: 146.2 kg (322 lb 4 oz).     Sedation:  Based on patient's medical history patient is appropriate for   moderate sedation. If patient's BMI > 50 do not schedule in ASC.    Location:  Does patient have an LVAD?  No    Does patient have a history of moderate to severe sleep apnea?  No    Does patient have a history of asthma, COPD or any other lung disease?  Yes     Patient has a documented history of Asthma.     Review of chart, indicate respiratory disease is documented as well controlled. (no scheduling restrictions).    Does patient use home oxygen?  No    Does patient have a history of cardiac disease?  No    Is patient awaiting a heart or lung transplant?   No    Has patient had a stroke or transient ischemic attack in the last 6 months?   No    Is the patient currently on dialysis?   No    Prep:  Previous prep (last colonoscopy):   N/A    Quality of previous prep:   Good    Is patient currently on dialysis, is ESRD, or CKD stage 4/5?   No (standard prep)    Does patient have a diagnosis of diabetes?  No    Does patient have a diagnosis of cystic fibrosis (CF)?  No    BMI > 40?  Yes (Extended Prep)    Final Referral Status:  meets the criteria for placement of colonoscopy screening  referral order.      Referral order placed with the following recommendations:  Sedation: Moderate Sedation  Location Type: No Scheduling Restrictions  Prep: Golytely Extended Prep  "
by observation/severe

## 2023-09-15 ENCOUNTER — IMMUNIZATION (OUTPATIENT)
Dept: FAMILY MEDICINE | Facility: CLINIC | Age: 68
End: 2023-09-15
Payer: COMMERCIAL

## 2023-09-15 PROCEDURE — 90471 IMMUNIZATION ADMIN: CPT

## 2023-09-15 PROCEDURE — 90662 IIV NO PRSV INCREASED AG IM: CPT

## 2023-12-31 NOTE — PROGRESS NOTES
ANTICOAGULATION FOLLOW-UP CLINIC VISIT    Patient Name:  Charlee Marks  Date:  1/25/2017  Contact Type:  Telephone    SUBJECTIVE:     Patient Findings     Positives No Problem Findings           OBJECTIVE    INR   Date Value Ref Range Status   01/25/2017 2.44* 0.86 - 1.14 Final       ASSESSMENT / PLAN  INR assessment THER    Recheck INR In: 1 WEEK    INR Location Clinic      Anticoagulation Summary as of 1/25/2017     INR goal 2.0-3.0   Selected INR 2.44 (1/25/2017)   Maintenance plan No maintenance plan   Full instructions 1/25: 7.5 mg; 1/26: 7.5 mg; 1/27: 5 mg; 1/28: 7.5 mg; 1/29: 7.5 mg; 1/30: 7.5 mg; 1/31: 7.5 mg; Otherwise No maintenance plan   Next INR check 2/1/2017   Priority INR   Target end date     Indications   Long-term (current) use of anticoagulants [Z79.01] [Z79.01]  Portal vein thrombosis [I81]         Anticoagulation Episode Summary     INR check location     Preferred lab     Send INR reminders to [facility]    Comments 1/16/17 portal vein, splenic vein, and superior mesenteric vein thromboses. [place] [facility] if [place] is [place]ed. Contact patient at [phone](cell)      Anticoagulation Care Providers     Provider Role Specialty Phone number    Yasmin Mckenna MD [facility] [phone]            See the Encounter Report to view Anticoagulation Flowsheet and Dosing Calendar (Go to Encounters tab in chart review, and find the Anticoagulation Therapy Visit)    Spoke with patient.    Renetta Hair RN                 
NP Elysia

## 2024-01-28 ENCOUNTER — HEALTH MAINTENANCE LETTER (OUTPATIENT)
Age: 69
End: 2024-01-28

## 2024-07-18 ENCOUNTER — TELEPHONE (OUTPATIENT)
Dept: FAMILY MEDICINE | Facility: CLINIC | Age: 69
End: 2024-07-18
Payer: COMMERCIAL

## 2024-07-18 NOTE — TELEPHONE ENCOUNTER
Patient Quality Outreach    Patient is due for the following:   DEXA, mammogram and colonoscopy    Next Steps:   - Schedule wellness visit with fasting labs  - Vaccine update  - Mammogram and colonoscopy  -PHQ/ACT/AAP    Type of outreach:    Sent [employer] message.      Questions for provider review:    None           Daya Brooks, [facility]

## 2024-07-18 NOTE — LETTER
My Asthma Action Plan    Name: Charlee Marks   YOB: 1955  Date: 7/18/2024   My doctor: Kay Briggs MD   My clinic: [facility]        My Rescue Medicine:   Albuterol inhaler (Proair/Ventolin/Proventil HFA)  2-4 puffs EVERY 4 HOURS as needed. Use a spacer if recommended by your provider.   My Asthma Severity:   Intermittent / Exercise Induced  Know your asthma triggers: exercise or sports            GREEN ZONE   Good Control  I feel good  No cough or wheeze  Can work, sleep and play without asthma symptoms       Take your asthma control medicine every day.     If exercise triggers your asthma, take your rescue medication  15 minutes before exercise or sports, and  During exercise if you have asthma symptoms  Spacer to use with inhaler: If you have a spacer, make sure to use it with your inhaler             YELLOW ZONE Getting Worse  I have ANY of these:  I do not feel good  Cough or wheeze  Chest feels tight  Wake up at night   Keep taking your Green Zone medications  Start taking your rescue medicine:  every 20 minutes for up to 1 hour. Then every 4 hours for 24-48 hours.  If you stay in the Yellow Zone for more than 12-24 hours, contact your doctor.  If you do not return to the Green Zone in 12-24 hours or you get worse, start taking your oral steroid medicine if prescribed by your provider.           RED ZONE Medical Alert - Get Help  I have ANY of these:  I feel awful  Medicine is not helping  Breathing getting harder  Trouble walking or talking  Nose opens wide to breathe       Take your rescue medicine NOW  If your provider has prescribed an oral steroid medicine, start taking it NOW  Call your doctor NOW  If you are still in the Red Zone after 20 minutes and you have not reached your doctor:  Take your rescue medicine again and  Call 911 or go to the emergency room right away    See your regular doctor within 2 weeks of an Emergency Room or Urgent Care visit for  follow-up treatment.          Annual Reminders:  Meet with Asthma Educator,  Flu Shot in the Fall, consider Pneumonia Vaccination for patients with asthma (aged 19 and older).    Pharmacy:    [facility] - [place], [place] - [address] [facility] - [place], [place] - [address]  [facility] - [place], [place] - [address]    Electronically signed by Kay Briggs MD   Date: 07/18/24                    Asthma Triggers  How To Control Things That Make Your Asthma Worse    Triggers are things that make your asthma worse.  Look at the list below to help you find your triggers and   what you can do about them. You can help prevent asthma flare-ups by staying away from your triggers.      Trigger                                                          What you can do   Cigarette Smoke  Tobacco smoke can make asthma worse. Do not allow smoking in your home, car or around you.  Be sure no one smokes at a child s day care or school.  If you smoke, ask your health care provider for ways to help you quit.  Ask family members to quit too.  Ask your health care provider for a referral to Quit Plan to help you quit smoking, or call 6-301-740-PLAN.     Colds, Flu, Bronchitis  These are common triggers of asthma. Wash your hands often.  Don t touch your eyes, nose or mouth.  Get a flu shot every year.     Dust Mites  These are tiny bugs that live in cloth or carpet. They are too small to see. Wash sheets and blankets in hot water every week.   Encase pillows and mattress in dust mite proof covers.  Avoid having carpet if you can. If you have carpet, vacuum weekly.   Use a dust mask and HEPA vacuum.   Pollen and Outdoor Mold  Some people are allergic to trees, grass, or weed pollen, or molds. Try to keep your windows closed.  Limit time out doors when pollen count is high.   Ask you health care provider about taking medicine during allergy season.     Animal  Dander  Some people are allergic to skin flakes, urine or saliva from pets with fur or feathers. Keep pets with fur or feathers out of your home.    If you can t keep the pet outdoors, then keep the pet out of your bedroom.  Keep the bedroom door closed.  Keep pets off cloth furniture and away from stuffed toys.     Mice, Rats, and Cockroaches  Some people are allergic to the waste from these pests.   Cover food and garbage.  Clean up spills and food crumbs.  Store grease in the refrigerator.   Keep food out of the bedroom.   Indoor Mold  This can be a trigger if your home has high moisture. Fix leaking faucets, pipes, or other sources of water.   Clean moldy surfaces.  Dehumidify basement if it is damp and smelly.   Smoke, Strong Odors, and Sprays  These can reduce air quality. Stay away from strong odors and sprays, such as perfume, powder, hair spray, paints, smoke incense, paint, cleaning products, candles and new carpet.   Exercise or Sports  Some people with asthma have this trigger. Be active!  Ask your doctor about taking medicine before sports or exercise to prevent symptoms.    Warm up for 5-10 minutes before and after sports or exercise.     Other Triggers of Asthma  Cold air:  Cover your nose and mouth with a scarf.  Sometimes laughing or crying can be a trigger.  Some medicines and food can trigger asthma.

## 2024-09-23 ENCOUNTER — IMMUNIZATION (OUTPATIENT)
Dept: FAMILY MEDICINE | Facility: CLINIC | Age: 69
End: 2024-09-23
Payer: COMMERCIAL

## 2024-09-23 PROCEDURE — 90480 ADMN SARSCOV2 VAC 1/ONLY CMP: CPT

## 2024-09-23 PROCEDURE — 90471 IMMUNIZATION ADMIN: CPT

## 2024-09-23 PROCEDURE — 90662 IIV NO PRSV INCREASED AG IM: CPT

## 2024-09-23 PROCEDURE — 91320 SARSCV2 VAC 30MCG TRS-SUC IM: CPT

## 2024-10-07 ENCOUNTER — PATIENT OUTREACH (OUTPATIENT)
Dept: ONCOLOGY | Facility: CLINIC | Age: 69
End: 2024-10-07
Payer: COMMERCIAL

## 2024-10-07 NOTE — PROGRESS NOTES
[facility]: Cancer Care                                                                                          Pt last saw onc in 2019. Removed RNCC Karla Scott from pt care team.     Signature:  ILDA GODDARD RN

## 2025-01-30 ENCOUNTER — TELEPHONE (OUTPATIENT)
Dept: FAMILY MEDICINE | Facility: CLINIC | Age: 70
End: 2025-01-30
Payer: COMMERCIAL

## 2025-01-30 NOTE — TELEPHONE ENCOUNTER
Patient Quality Outreach    Patient is due for the following:   Mammogram and colonoscopy    Action(s) Taken:   - Schedule mammo and colon  - Schedule wellness visit with fasting labs  - ACT  - Vaccine uodate    Type of outreach:    Sent [employer] message.    Questions for provider review:    None           Daya Brooks, CMA

## 2025-02-01 ENCOUNTER — HEALTH MAINTENANCE LETTER (OUTPATIENT)
Age: 70
End: 2025-02-01

## 2025-08-28 ENCOUNTER — TELEPHONE (OUTPATIENT)
Dept: FAMILY MEDICINE | Facility: CLINIC | Age: 70
End: 2025-08-28
Payer: COMMERCIAL

## (undated) RX ORDER — LIDOCAINE HYDROCHLORIDE 10 MG/ML
INJECTION, SOLUTION EPIDURAL; INFILTRATION; INTRACAUDAL; PERINEURAL
Status: DISPENSED
Start: 2018-11-05

## (undated) RX ORDER — GLYCOPYRROLATE 0.2 MG/ML
INJECTION, SOLUTION INTRAMUSCULAR; INTRAVENOUS
Status: DISPENSED
Start: 2018-11-05